# Patient Record
Sex: FEMALE | Race: WHITE | NOT HISPANIC OR LATINO
[De-identification: names, ages, dates, MRNs, and addresses within clinical notes are randomized per-mention and may not be internally consistent; named-entity substitution may affect disease eponyms.]

---

## 2019-12-20 PROBLEM — Z00.00 ENCOUNTER FOR PREVENTIVE HEALTH EXAMINATION: Status: ACTIVE | Noted: 2019-12-20

## 2019-12-23 ENCOUNTER — APPOINTMENT (OUTPATIENT)
Dept: GASTROENTEROLOGY | Facility: CLINIC | Age: 54
End: 2019-12-23
Payer: COMMERCIAL

## 2019-12-23 VITALS
DIASTOLIC BLOOD PRESSURE: 100 MMHG | SYSTOLIC BLOOD PRESSURE: 150 MMHG | RESPIRATION RATE: 16 BRPM | HEIGHT: 63 IN | BODY MASS INDEX: 39.87 KG/M2 | OXYGEN SATURATION: 98 % | HEART RATE: 57 BPM | WEIGHT: 225 LBS

## 2019-12-23 DIAGNOSIS — Z12.11 ENCOUNTER FOR SCREENING FOR MALIGNANT NEOPLASM OF COLON: ICD-10-CM

## 2019-12-23 PROCEDURE — 82272 OCCULT BLD FECES 1-3 TESTS: CPT

## 2019-12-23 PROCEDURE — 99204 OFFICE O/P NEW MOD 45 MIN: CPT

## 2020-01-27 ENCOUNTER — APPOINTMENT (OUTPATIENT)
Dept: GASTROENTEROLOGY | Facility: GI CENTER | Age: 55
End: 2020-01-27

## 2020-03-09 ENCOUNTER — APPOINTMENT (OUTPATIENT)
Dept: GASTROENTEROLOGY | Facility: GI CENTER | Age: 55
End: 2020-03-09
Payer: COMMERCIAL

## 2020-03-09 ENCOUNTER — OUTPATIENT (OUTPATIENT)
Dept: OUTPATIENT SERVICES | Facility: HOSPITAL | Age: 55
LOS: 1 days | End: 2020-03-09
Payer: COMMERCIAL

## 2020-03-09 DIAGNOSIS — Z12.11 ENCOUNTER FOR SCREENING FOR MALIGNANT NEOPLASM OF COLON: ICD-10-CM

## 2020-03-09 DIAGNOSIS — Z86.010 PERSONAL HISTORY OF COLONIC POLYPS: ICD-10-CM

## 2020-03-09 DIAGNOSIS — Z85.038 PERSONAL HISTORY OF OTHER MALIGNANT NEOPLASM OF LARGE INTESTINE: ICD-10-CM

## 2020-03-09 PROCEDURE — G0105: CPT

## 2020-03-09 PROCEDURE — 45378 DIAGNOSTIC COLONOSCOPY: CPT

## 2020-03-09 NOTE — PHYSICAL EXAM
[General Appearance - Alert] : alert [General Appearance - In No Acute Distress] : in no acute distress [General Appearance - Well Nourished] : well nourished [General Appearance - Well Developed] : well developed [Sclera] : the sclera and conjunctiva were normal [Outer Ear] : the ears and nose were normal in appearance [Neck Appearance] : the appearance of the neck was normal [Neck Cervical Mass (___cm)] : no neck mass was observed [] : no respiratory distress [Respiration, Rhythm And Depth] : normal respiratory rhythm and effort [Exaggerated Use Of Accessory Muscles For Inspiration] : no accessory muscle use [Auscultation Breath Sounds / Voice Sounds] : lungs were clear to auscultation bilaterally [Apical Impulse] : the apical impulse was normal [Heart Rate And Rhythm] : heart rate was normal and rhythm regular [Heart Sounds] : normal S1 and S2 [Bowel Sounds] : normal bowel sounds [Abdomen Soft] : soft [Abdomen Tenderness] : non-tender [Skin Color & Pigmentation] : normal skin color and pigmentation [Oriented To Time, Place, And Person] : oriented to person, place, and time [Impaired Insight] : insight and judgment were intact [Affect] : the affect was normal

## 2020-12-21 PROBLEM — Z12.11 ENCOUNTER FOR SCREENING COLONOSCOPY: Status: RESOLVED | Noted: 2019-12-23 | Resolved: 2020-12-21

## 2021-07-27 ENCOUNTER — OUTPATIENT (OUTPATIENT)
Dept: OUTPATIENT SERVICES | Facility: HOSPITAL | Age: 56
LOS: 1 days | End: 2021-07-27
Payer: COMMERCIAL

## 2021-07-27 ENCOUNTER — TRANSCRIPTION ENCOUNTER (OUTPATIENT)
Age: 56
End: 2021-07-27

## 2021-07-27 VITALS
SYSTOLIC BLOOD PRESSURE: 127 MMHG | TEMPERATURE: 98 F | HEIGHT: 63.5 IN | WEIGHT: 242.95 LBS | OXYGEN SATURATION: 96 % | HEART RATE: 66 BPM | DIASTOLIC BLOOD PRESSURE: 80 MMHG | RESPIRATION RATE: 14 BRPM

## 2021-07-27 DIAGNOSIS — M25.572 PAIN IN LEFT ANKLE AND JOINTS OF LEFT FOOT: ICD-10-CM

## 2021-07-27 DIAGNOSIS — Z90.710 ACQUIRED ABSENCE OF BOTH CERVIX AND UTERUS: Chronic | ICD-10-CM

## 2021-07-27 DIAGNOSIS — Z98.890 OTHER SPECIFIED POSTPROCEDURAL STATES: Chronic | ICD-10-CM

## 2021-07-27 DIAGNOSIS — S86.312A STRAIN OF MUSCLE(S) AND TENDON(S) OF PERONEAL MUSCLE GROUP AT LOWER LEG LEVEL, LEFT LEG, INITIAL ENCOUNTER: ICD-10-CM

## 2021-07-27 DIAGNOSIS — Z01.818 ENCOUNTER FOR OTHER PREPROCEDURAL EXAMINATION: ICD-10-CM

## 2021-07-27 LAB
ALBUMIN SERPL ELPH-MCNC: 4 G/DL — SIGNIFICANT CHANGE UP (ref 3.3–5)
ALP SERPL-CCNC: 67 U/L — SIGNIFICANT CHANGE UP (ref 30–120)
ALT FLD-CCNC: 22 U/L DA — SIGNIFICANT CHANGE UP (ref 10–60)
ANION GAP SERPL CALC-SCNC: 7 MMOL/L — SIGNIFICANT CHANGE UP (ref 5–17)
AST SERPL-CCNC: 12 U/L — SIGNIFICANT CHANGE UP (ref 10–40)
BILIRUB SERPL-MCNC: 0.8 MG/DL — SIGNIFICANT CHANGE UP (ref 0.2–1.2)
BUN SERPL-MCNC: 17 MG/DL — SIGNIFICANT CHANGE UP (ref 7–23)
CALCIUM SERPL-MCNC: 9.2 MG/DL — SIGNIFICANT CHANGE UP (ref 8.4–10.5)
CHLORIDE SERPL-SCNC: 102 MMOL/L — SIGNIFICANT CHANGE UP (ref 96–108)
CO2 SERPL-SCNC: 30 MMOL/L — SIGNIFICANT CHANGE UP (ref 22–31)
CREAT SERPL-MCNC: 0.69 MG/DL — SIGNIFICANT CHANGE UP (ref 0.5–1.3)
GLUCOSE SERPL-MCNC: 132 MG/DL — HIGH (ref 70–99)
HCT VFR BLD CALC: 41.1 % — SIGNIFICANT CHANGE UP (ref 34.5–45)
HGB BLD-MCNC: 14 G/DL — SIGNIFICANT CHANGE UP (ref 11.5–15.5)
MCHC RBC-ENTMCNC: 31.1 PG — SIGNIFICANT CHANGE UP (ref 27–34)
MCHC RBC-ENTMCNC: 34.1 GM/DL — SIGNIFICANT CHANGE UP (ref 32–36)
MCV RBC AUTO: 91.3 FL — SIGNIFICANT CHANGE UP (ref 80–100)
NRBC # BLD: 0 /100 WBCS — SIGNIFICANT CHANGE UP (ref 0–0)
PLATELET # BLD AUTO: 218 K/UL — SIGNIFICANT CHANGE UP (ref 150–400)
POTASSIUM SERPL-MCNC: 4.8 MMOL/L — SIGNIFICANT CHANGE UP (ref 3.5–5.3)
POTASSIUM SERPL-SCNC: 4.8 MMOL/L — SIGNIFICANT CHANGE UP (ref 3.5–5.3)
PROT SERPL-MCNC: 7.4 G/DL — SIGNIFICANT CHANGE UP (ref 6–8.3)
RBC # BLD: 4.5 M/UL — SIGNIFICANT CHANGE UP (ref 3.8–5.2)
RBC # FLD: 12.1 % — SIGNIFICANT CHANGE UP (ref 10.3–14.5)
SODIUM SERPL-SCNC: 139 MMOL/L — SIGNIFICANT CHANGE UP (ref 135–145)
WBC # BLD: 7.83 K/UL — SIGNIFICANT CHANGE UP (ref 3.8–10.5)
WBC # FLD AUTO: 7.83 K/UL — SIGNIFICANT CHANGE UP (ref 3.8–10.5)

## 2021-07-27 PROCEDURE — 80053 COMPREHEN METABOLIC PANEL: CPT

## 2021-07-27 PROCEDURE — 93010 ELECTROCARDIOGRAM REPORT: CPT

## 2021-07-27 PROCEDURE — 85027 COMPLETE CBC AUTOMATED: CPT

## 2021-07-27 PROCEDURE — 36415 COLL VENOUS BLD VENIPUNCTURE: CPT

## 2021-07-27 PROCEDURE — G0463: CPT

## 2021-07-27 PROCEDURE — 93005 ELECTROCARDIOGRAM TRACING: CPT

## 2021-07-27 NOTE — H&P PST ADULT - NSICDXPROBLEM_GEN_ALL_CORE_FT
PROBLEM DIAGNOSES  Problem: Left ankle pain  Assessment and Plan: left foot peroneal repair; covid appt made, preop instructions given, to go for medical clearance

## 2021-07-27 NOTE — H&P PST ADULT - NSANTHOSAYNRD_GEN_A_CORE
No. MARILOU screening performed.  STOP BANG Legend: 0-2 = LOW Risk; 3-4 = INTERMEDIATE Risk; 5-8 = HIGH Risk

## 2021-07-27 NOTE — H&P PST ADULT - HISTORY OF PRESENT ILLNESS
this is a 57 y/o female who has had left ankle pain when walking for about 2 months; tests show peroneal tendon tear, to have repair of same

## 2021-08-03 ENCOUNTER — OUTPATIENT (OUTPATIENT)
Dept: OUTPATIENT SERVICES | Facility: HOSPITAL | Age: 56
LOS: 1 days | End: 2021-08-03
Payer: COMMERCIAL

## 2021-08-03 DIAGNOSIS — Z98.890 OTHER SPECIFIED POSTPROCEDURAL STATES: Chronic | ICD-10-CM

## 2021-08-03 DIAGNOSIS — Z20.828 CONTACT WITH AND (SUSPECTED) EXPOSURE TO OTHER VIRAL COMMUNICABLE DISEASES: ICD-10-CM

## 2021-08-03 DIAGNOSIS — Z90.710 ACQUIRED ABSENCE OF BOTH CERVIX AND UTERUS: Chronic | ICD-10-CM

## 2021-08-03 LAB — SARS-COV-2 RNA SPEC QL NAA+PROBE: SIGNIFICANT CHANGE UP

## 2021-08-03 PROCEDURE — U0003: CPT

## 2021-08-03 PROCEDURE — U0005: CPT

## 2021-08-05 ENCOUNTER — TRANSCRIPTION ENCOUNTER (OUTPATIENT)
Age: 56
End: 2021-08-05

## 2021-08-06 ENCOUNTER — OUTPATIENT (OUTPATIENT)
Dept: OUTPATIENT SERVICES | Facility: HOSPITAL | Age: 56
LOS: 1 days | End: 2021-08-06
Payer: COMMERCIAL

## 2021-08-06 ENCOUNTER — RESULT REVIEW (OUTPATIENT)
Age: 56
End: 2021-08-06

## 2021-08-06 VITALS
WEIGHT: 242.95 LBS | OXYGEN SATURATION: 94 % | DIASTOLIC BLOOD PRESSURE: 62 MMHG | SYSTOLIC BLOOD PRESSURE: 152 MMHG | TEMPERATURE: 96 F | HEIGHT: 62 IN | HEART RATE: 74 BPM | RESPIRATION RATE: 18 BRPM

## 2021-08-06 VITALS
DIASTOLIC BLOOD PRESSURE: 68 MMHG | OXYGEN SATURATION: 96 % | HEART RATE: 80 BPM | SYSTOLIC BLOOD PRESSURE: 122 MMHG | TEMPERATURE: 99 F | RESPIRATION RATE: 12 BRPM

## 2021-08-06 DIAGNOSIS — Z01.818 ENCOUNTER FOR OTHER PREPROCEDURAL EXAMINATION: ICD-10-CM

## 2021-08-06 DIAGNOSIS — Z90.710 ACQUIRED ABSENCE OF BOTH CERVIX AND UTERUS: Chronic | ICD-10-CM

## 2021-08-06 DIAGNOSIS — S86.312A STRAIN OF MUSCLE(S) AND TENDON(S) OF PERONEAL MUSCLE GROUP AT LOWER LEG LEVEL, LEFT LEG, INITIAL ENCOUNTER: ICD-10-CM

## 2021-08-06 DIAGNOSIS — Z98.890 OTHER SPECIFIED POSTPROCEDURAL STATES: Chronic | ICD-10-CM

## 2021-08-06 PROCEDURE — 28200 REPAIR OF FOOT TENDON: CPT | Mod: LT

## 2021-08-06 PROCEDURE — 88304 TISSUE EXAM BY PATHOLOGIST: CPT | Mod: 26

## 2021-08-06 PROCEDURE — 97161 PT EVAL LOW COMPLEX 20 MIN: CPT

## 2021-08-06 PROCEDURE — 88304 TISSUE EXAM BY PATHOLOGIST: CPT

## 2021-08-06 PROCEDURE — 27680 RELEASE OF LOWER LEG TENDON: CPT | Mod: LT

## 2021-08-06 RX ORDER — OXYCODONE HYDROCHLORIDE 5 MG/1
5 TABLET ORAL ONCE
Refills: 0 | Status: DISCONTINUED | OUTPATIENT
Start: 2021-08-06 | End: 2021-08-09

## 2021-08-06 RX ORDER — ASPIRIN/CALCIUM CARB/MAGNESIUM 324 MG
1 TABLET ORAL
Qty: 42 | Refills: 0
Start: 2021-08-06 | End: 2021-09-16

## 2021-08-06 RX ORDER — CEFAZOLIN SODIUM 1 G
2000 VIAL (EA) INJECTION ONCE
Refills: 0 | Status: COMPLETED | OUTPATIENT
Start: 2021-08-06 | End: 2021-08-06

## 2021-08-06 RX ORDER — APREPITANT 80 MG/1
40 CAPSULE ORAL ONCE
Refills: 0 | Status: COMPLETED | OUTPATIENT
Start: 2021-08-06 | End: 2021-08-06

## 2021-08-06 RX ORDER — HYDROMORPHONE HYDROCHLORIDE 2 MG/ML
0.5 INJECTION INTRAMUSCULAR; INTRAVENOUS; SUBCUTANEOUS
Refills: 0 | Status: DISCONTINUED | OUTPATIENT
Start: 2021-08-06 | End: 2021-08-09

## 2021-08-06 RX ORDER — CHLORHEXIDINE GLUCONATE 213 G/1000ML
1 SOLUTION TOPICAL ONCE
Refills: 0 | Status: COMPLETED | OUTPATIENT
Start: 2021-08-06 | End: 2021-08-06

## 2021-08-06 RX ORDER — ONDANSETRON 8 MG/1
4 TABLET, FILM COATED ORAL ONCE
Refills: 0 | Status: DISCONTINUED | OUTPATIENT
Start: 2021-08-06 | End: 2021-08-09

## 2021-08-06 RX ORDER — SODIUM CHLORIDE 9 MG/ML
1000 INJECTION, SOLUTION INTRAVENOUS
Refills: 0 | Status: DISCONTINUED | OUTPATIENT
Start: 2021-08-06 | End: 2021-08-09

## 2021-08-06 RX ADMIN — SODIUM CHLORIDE 75 MILLILITER(S): 9 INJECTION, SOLUTION INTRAVENOUS at 11:00

## 2021-08-06 RX ADMIN — CHLORHEXIDINE GLUCONATE 1 APPLICATION(S): 213 SOLUTION TOPICAL at 06:31

## 2021-08-06 RX ADMIN — APREPITANT 40 MILLIGRAM(S): 80 CAPSULE ORAL at 06:30

## 2021-08-06 NOTE — ASU DISCHARGE PLAN (ADULT/PEDIATRIC) - CARE PROVIDER_API CALL
James Bose)  Orthopaedic Surgery  [address] [place] [address]  Phone: [phone]  Fax: [phone]  Follow Up Time:

## 2021-08-06 NOTE — PHYSICAL THERAPY INITIAL EVALUATION ADULT - RANGE OF MOTION EXAMINATION, REHAB EVAL
left ankle NT due to splint/Right LE ROM was WFL (within functional limits)/deficits as listed below

## 2021-08-06 NOTE — PHYSICAL THERAPY INITIAL EVALUATION ADULT - ADDITIONAL COMMENTS
Pt lives in a basement apt with 10 steps to apt with rail.  Pt nephew and niece lives upstairs.  Pt has a knee scooter and is going to borrow a rolling walker from her friend

## 2021-08-06 NOTE — BRIEF OPERATIVE NOTE - NSICDXBRIEFPOSTOP_GEN_ALL_CORE_FT
POST-OP DIAGNOSIS:  Peroneal tendon injury, left, initial encounter 06-Aug-2021 07:45:50  Saw Tang

## 2021-10-06 PROBLEM — E66.9 OBESITY, UNSPECIFIED: Chronic | Status: ACTIVE | Noted: 2021-07-27

## 2021-10-06 PROBLEM — U07.1 COVID-19: Chronic | Status: ACTIVE | Noted: 2021-08-06

## 2021-10-11 ENCOUNTER — OUTPATIENT (OUTPATIENT)
Dept: OUTPATIENT SERVICES | Facility: HOSPITAL | Age: 56
LOS: 1 days | End: 2021-10-11
Payer: COMMERCIAL

## 2021-10-11 VITALS
HEIGHT: 64 IN | SYSTOLIC BLOOD PRESSURE: 145 MMHG | DIASTOLIC BLOOD PRESSURE: 83 MMHG | OXYGEN SATURATION: 98 % | HEART RATE: 68 BPM | TEMPERATURE: 97 F | WEIGHT: 244.93 LBS

## 2021-10-11 DIAGNOSIS — T81.49XA INFECTION FOLLOWING A PROCEDURE, OTHER SURGICAL SITE, INITIAL ENCOUNTER: ICD-10-CM

## 2021-10-11 DIAGNOSIS — Z20.828 CONTACT WITH AND (SUSPECTED) EXPOSURE TO OTHER VIRAL COMMUNICABLE DISEASES: ICD-10-CM

## 2021-10-11 DIAGNOSIS — Z90.710 ACQUIRED ABSENCE OF BOTH CERVIX AND UTERUS: Chronic | ICD-10-CM

## 2021-10-11 DIAGNOSIS — Z01.818 ENCOUNTER FOR OTHER PREPROCEDURAL EXAMINATION: ICD-10-CM

## 2021-10-11 DIAGNOSIS — Z98.890 OTHER SPECIFIED POSTPROCEDURAL STATES: Chronic | ICD-10-CM

## 2021-10-11 LAB
ALBUMIN SERPL ELPH-MCNC: 4 G/DL — SIGNIFICANT CHANGE UP (ref 3.3–5)
ALP SERPL-CCNC: 75 U/L — SIGNIFICANT CHANGE UP (ref 30–120)
ALT FLD-CCNC: 26 U/L DA — SIGNIFICANT CHANGE UP (ref 10–60)
ANION GAP SERPL CALC-SCNC: 10 MMOL/L — SIGNIFICANT CHANGE UP (ref 5–17)
AST SERPL-CCNC: 16 U/L — SIGNIFICANT CHANGE UP (ref 10–40)
BILIRUB SERPL-MCNC: 0.5 MG/DL — SIGNIFICANT CHANGE UP (ref 0.2–1.2)
BUN SERPL-MCNC: 15 MG/DL — SIGNIFICANT CHANGE UP (ref 7–23)
CALCIUM SERPL-MCNC: 9 MG/DL — SIGNIFICANT CHANGE UP (ref 8.4–10.5)
CHLORIDE SERPL-SCNC: 103 MMOL/L — SIGNIFICANT CHANGE UP (ref 96–108)
CO2 SERPL-SCNC: 27 MMOL/L — SIGNIFICANT CHANGE UP (ref 22–31)
CREAT SERPL-MCNC: 0.84 MG/DL — SIGNIFICANT CHANGE UP (ref 0.5–1.3)
GLUCOSE SERPL-MCNC: 118 MG/DL — HIGH (ref 70–99)
HCT VFR BLD CALC: 42.8 % — SIGNIFICANT CHANGE UP (ref 34.5–45)
HGB BLD-MCNC: 14.5 G/DL — SIGNIFICANT CHANGE UP (ref 11.5–15.5)
MCHC RBC-ENTMCNC: 31 PG — SIGNIFICANT CHANGE UP (ref 27–34)
MCHC RBC-ENTMCNC: 33.9 GM/DL — SIGNIFICANT CHANGE UP (ref 32–36)
MCV RBC AUTO: 91.6 FL — SIGNIFICANT CHANGE UP (ref 80–100)
NRBC # BLD: 0 /100 WBCS — SIGNIFICANT CHANGE UP (ref 0–0)
PLATELET # BLD AUTO: 219 K/UL — SIGNIFICANT CHANGE UP (ref 150–400)
POTASSIUM SERPL-MCNC: 3.9 MMOL/L — SIGNIFICANT CHANGE UP (ref 3.5–5.3)
POTASSIUM SERPL-SCNC: 3.9 MMOL/L — SIGNIFICANT CHANGE UP (ref 3.5–5.3)
PROT SERPL-MCNC: 7.6 G/DL — SIGNIFICANT CHANGE UP (ref 6–8.3)
RBC # BLD: 4.67 M/UL — SIGNIFICANT CHANGE UP (ref 3.8–5.2)
RBC # FLD: 11.9 % — SIGNIFICANT CHANGE UP (ref 10.3–14.5)
SODIUM SERPL-SCNC: 140 MMOL/L — SIGNIFICANT CHANGE UP (ref 135–145)
WBC # BLD: 11.64 K/UL — HIGH (ref 3.8–10.5)
WBC # FLD AUTO: 11.64 K/UL — HIGH (ref 3.8–10.5)

## 2021-10-11 PROCEDURE — G0463: CPT

## 2021-10-11 PROCEDURE — 93010 ELECTROCARDIOGRAM REPORT: CPT

## 2021-10-11 PROCEDURE — 85027 COMPLETE CBC AUTOMATED: CPT

## 2021-10-11 PROCEDURE — U0003: CPT

## 2021-10-11 PROCEDURE — 93005 ELECTROCARDIOGRAM TRACING: CPT

## 2021-10-11 PROCEDURE — 36415 COLL VENOUS BLD VENIPUNCTURE: CPT

## 2021-10-11 PROCEDURE — U0005: CPT

## 2021-10-11 PROCEDURE — 80053 COMPREHEN METABOLIC PANEL: CPT

## 2021-10-11 NOTE — H&P PST ADULT - MAMMOGRAM, LAST, PROFILE
Called [phone] and left message for patient's wife to contact office to speak with clinical  2021

## 2021-10-11 NOTE — H&P PST ADULT - NSICDXPASTMEDICALHX_GEN_ALL_CORE_FT
PAST MEDICAL HISTORY:  COVID-19 december 2020 no hospitalization    Morbid obesity with BMI of 40.0-44.9, adult     Obesity     Surgical wound infection

## 2021-10-11 NOTE — H&P PST ADULT - EXTREMITIES COMMENTS
left ankle swelling; dehisced surgical wound lateral maleolus 3 cm transverse with drainage; no redness

## 2021-10-11 NOTE — H&P PST ADULT - PROBLEM SELECTOR PLAN 1
Left ankle irrigation and drainage with wound vac is planned for 10/13/2021  Covid testing today  medical clearance requested  pre op instructions reviewed; best wishes offered

## 2021-10-12 ENCOUNTER — TRANSCRIPTION ENCOUNTER (OUTPATIENT)
Age: 56
End: 2021-10-12

## 2021-10-12 LAB — SARS-COV-2 RNA SPEC QL NAA+PROBE: SIGNIFICANT CHANGE UP

## 2021-10-13 ENCOUNTER — RESULT REVIEW (OUTPATIENT)
Age: 56
End: 2021-10-13

## 2021-10-13 ENCOUNTER — INPATIENT (INPATIENT)
Facility: HOSPITAL | Age: 56
LOS: 4 days | Discharge: HOME | DRG: 908 | End: 2021-10-18
Attending: ORTHOPAEDIC SURGERY | Admitting: ORTHOPAEDIC SURGERY
Payer: COMMERCIAL

## 2021-10-13 VITALS
HEART RATE: 85 BPM | SYSTOLIC BLOOD PRESSURE: 145 MMHG | OXYGEN SATURATION: 98 % | HEIGHT: 63 IN | WEIGHT: 244.49 LBS | TEMPERATURE: 97 F | DIASTOLIC BLOOD PRESSURE: 67 MMHG | RESPIRATION RATE: 20 BRPM

## 2021-10-13 DIAGNOSIS — Z29.9 ENCOUNTER FOR PROPHYLACTIC MEASURES, UNSPECIFIED: ICD-10-CM

## 2021-10-13 DIAGNOSIS — Z98.890 OTHER SPECIFIED POSTPROCEDURAL STATES: Chronic | ICD-10-CM

## 2021-10-13 DIAGNOSIS — T81.49XA INFECTION FOLLOWING A PROCEDURE, OTHER SURGICAL SITE, INITIAL ENCOUNTER: ICD-10-CM

## 2021-10-13 DIAGNOSIS — Z90.710 ACQUIRED ABSENCE OF BOTH CERVIX AND UTERUS: Chronic | ICD-10-CM

## 2021-10-13 DIAGNOSIS — Z98.890 OTHER SPECIFIED POSTPROCEDURAL STATES: ICD-10-CM

## 2021-10-13 DIAGNOSIS — E66.01 MORBID (SEVERE) OBESITY DUE TO EXCESS CALORIES: ICD-10-CM

## 2021-10-13 PROCEDURE — 99223 1ST HOSP IP/OBS HIGH 75: CPT

## 2021-10-13 PROCEDURE — 88304 TISSUE EXAM BY PATHOLOGIST: CPT | Mod: 26

## 2021-10-13 RX ORDER — SODIUM CHLORIDE 9 MG/ML
1000 INJECTION, SOLUTION INTRAVENOUS
Refills: 0 | Status: DISCONTINUED | OUTPATIENT
Start: 2021-10-13 | End: 2021-10-13

## 2021-10-13 RX ORDER — HYDROMORPHONE HYDROCHLORIDE 2 MG/ML
0.5 INJECTION INTRAMUSCULAR; INTRAVENOUS; SUBCUTANEOUS
Refills: 0 | Status: DISCONTINUED | OUTPATIENT
Start: 2021-10-13 | End: 2021-10-13

## 2021-10-13 RX ORDER — VANCOMYCIN HCL 1 G
1750 VIAL (EA) INTRAVENOUS EVERY 12 HOURS
Refills: 0 | Status: DISCONTINUED | OUTPATIENT
Start: 2021-10-14 | End: 2021-10-14

## 2021-10-13 RX ORDER — OXYCODONE HYDROCHLORIDE 5 MG/1
5 TABLET ORAL
Refills: 0 | Status: DISCONTINUED | OUTPATIENT
Start: 2021-10-13 | End: 2021-10-18

## 2021-10-13 RX ORDER — ONDANSETRON 8 MG/1
4 TABLET, FILM COATED ORAL EVERY 6 HOURS
Refills: 0 | Status: DISCONTINUED | OUTPATIENT
Start: 2021-10-13 | End: 2021-10-18

## 2021-10-13 RX ORDER — VANCOMYCIN HCL 1 G
1750 VIAL (EA) INTRAVENOUS ONCE
Refills: 0 | Status: COMPLETED | OUTPATIENT
Start: 2021-10-13 | End: 2021-10-13

## 2021-10-13 RX ORDER — HYDROMORPHONE HYDROCHLORIDE 2 MG/ML
0.5 INJECTION INTRAMUSCULAR; INTRAVENOUS; SUBCUTANEOUS
Refills: 0 | Status: DISCONTINUED | OUTPATIENT
Start: 2021-10-13 | End: 2021-10-18

## 2021-10-13 RX ORDER — OXYCODONE HYDROCHLORIDE 5 MG/1
5 TABLET ORAL ONCE
Refills: 0 | Status: DISCONTINUED | OUTPATIENT
Start: 2021-10-13 | End: 2021-10-13

## 2021-10-13 RX ORDER — MAGNESIUM HYDROXIDE 400 MG/1
30 TABLET, CHEWABLE ORAL DAILY
Refills: 0 | Status: DISCONTINUED | OUTPATIENT
Start: 2021-10-13 | End: 2021-10-18

## 2021-10-13 RX ORDER — APREPITANT 80 MG/1
40 CAPSULE ORAL ONCE
Refills: 0 | Status: COMPLETED | OUTPATIENT
Start: 2021-10-13 | End: 2021-10-13

## 2021-10-13 RX ORDER — ACETAMINOPHEN 500 MG
1000 TABLET ORAL EVERY 8 HOURS
Refills: 0 | Status: DISCONTINUED | OUTPATIENT
Start: 2021-10-13 | End: 2021-10-18

## 2021-10-13 RX ORDER — POLYETHYLENE GLYCOL 3350 17 G/17G
17 POWDER, FOR SOLUTION ORAL AT BEDTIME
Refills: 0 | Status: DISCONTINUED | OUTPATIENT
Start: 2021-10-13 | End: 2021-10-18

## 2021-10-13 RX ORDER — SENNA PLUS 8.6 MG/1
2 TABLET ORAL AT BEDTIME
Refills: 0 | Status: DISCONTINUED | OUTPATIENT
Start: 2021-10-13 | End: 2021-10-18

## 2021-10-13 RX ORDER — OXYCODONE HYDROCHLORIDE 5 MG/1
10 TABLET ORAL
Refills: 0 | Status: DISCONTINUED | OUTPATIENT
Start: 2021-10-13 | End: 2021-10-18

## 2021-10-13 RX ORDER — ENOXAPARIN SODIUM 100 MG/ML
40 INJECTION SUBCUTANEOUS DAILY
Refills: 0 | Status: DISCONTINUED | OUTPATIENT
Start: 2021-10-14 | End: 2021-10-18

## 2021-10-13 RX ORDER — SODIUM CHLORIDE 9 MG/ML
1000 INJECTION, SOLUTION INTRAVENOUS
Refills: 0 | Status: DISCONTINUED | OUTPATIENT
Start: 2021-10-13 | End: 2021-10-14

## 2021-10-13 RX ORDER — CHLORHEXIDINE GLUCONATE 213 G/1000ML
1 SOLUTION TOPICAL ONCE
Refills: 0 | Status: COMPLETED | OUTPATIENT
Start: 2021-10-13 | End: 2021-10-13

## 2021-10-13 RX ORDER — ONDANSETRON 8 MG/1
4 TABLET, FILM COATED ORAL ONCE
Refills: 0 | Status: DISCONTINUED | OUTPATIENT
Start: 2021-10-13 | End: 2021-10-13

## 2021-10-13 RX ADMIN — HYDROMORPHONE HYDROCHLORIDE 0.5 MILLIGRAM(S): 2 INJECTION INTRAMUSCULAR; INTRAVENOUS; SUBCUTANEOUS at 18:30

## 2021-10-13 RX ADMIN — SENNA PLUS 2 TABLET(S): 8.6 TABLET ORAL at 21:53

## 2021-10-13 RX ADMIN — POLYETHYLENE GLYCOL 3350 17 GRAM(S): 17 POWDER, FOR SOLUTION ORAL at 21:53

## 2021-10-13 RX ADMIN — CHLORHEXIDINE GLUCONATE 1 APPLICATION(S): 213 SOLUTION TOPICAL at 16:44

## 2021-10-13 RX ADMIN — HYDROMORPHONE HYDROCHLORIDE 0.5 MILLIGRAM(S): 2 INJECTION INTRAMUSCULAR; INTRAVENOUS; SUBCUTANEOUS at 18:14

## 2021-10-13 RX ADMIN — APREPITANT 40 MILLIGRAM(S): 80 CAPSULE ORAL at 16:44

## 2021-10-13 NOTE — BRIEF OPERATIVE NOTE - NSICDXBRIEFPROCEDURE_GEN_ALL_CORE_FT
PROCEDURES:  Exploration, wound, foot 13-Oct-2021 18:32:16 Incisional debriedment irrigation closure and application of wound vac  left ankle Jerome Modi

## 2021-10-13 NOTE — PRE-OP CHECKLIST - AS TEMP SITE
oral
Pt lives with her .  She had heavy second hand smoke expos from childhood.  No tob/ETOH/drug use.

## 2021-10-13 NOTE — CONSULT NOTE ADULT - ASSESSMENT
57 y/o F with PMHx of obesity, COVID-19, repair of peroneal tendon of left ankle (8/6/21) presented to the hospital for infected nonhealing surgical wound.    #Wound infection of non-healing surgical wound  #S/p Peroneal Tendon Repair of Left Ankle (8/6/21)  - s/p I&D with irrigation 10/13  - continue pain control PRN  - intraoperative tissue cultures were sent - f/u results  - also reportedly had outpatient cultures taken in office - can hopefully narrow with that micro data  - placed on IV vancomycin 175mg q12 at this time  - suspect that it will be a challenge to achieve therapeutic dosing given her weight  - will need to closely monitor renal function - follow up vanco trough pre-4th dose  - recommend Infectious Disease consultation  - continue IV antibiotics pending outpatient and intraoperative cultures  - monitor hgb and electrolytes  - continue wound vac - care per surgery    #Obesity  - BMI 43.3  - MARILOU precautions    #Need for prophylactic measure  - VTE ppx: lovenox subQ 57 y/o F with PMHx of obesity, COVID-19, repair of peroneal tendon of left ankle (8/6/21) presented to the hospital for infected nonhealing surgical wound.    #Wound infection of non-healing surgical wound  #S/p Peroneal Tendon Repair of Left Ankle (8/6/21)  - s/p I&D with irrigation and wound vac 10/13  - continue pain control PRN  - intraoperative tissue cultures were sent - f/u results  - also reportedly had outpatient cultures taken in office  - placed on IV vancomycin 175mg q12 at this time  - suspect that it will be a challenge to achieve therapeutic dosing given her weight  - will need to closely monitor renal function - follow up vanco trough pre-4th dose  - can hopefully narrow down antibiotics with outpatient micro data  - recommend Infectious Disease consultation  - continue IV antibiotics pending outpatient and intraoperative cultures  - monitor hgb and electrolytes  - continue wound vac - care per surgery    #Obesity  - BMI 43.3  - MARILOU precautions    #Need for prophylactic measure  - VTE ppx: lovenox subQ 55 y/o F with PMHx of obesity, COVID-19, repair of peroneal tendon of left ankle (8/6/21) presented to the hospital for infected nonhealing surgical wound.    #Wound infection of non-healing surgical wound  #S/p Peroneal Tendon Repair of Left Ankle (8/6/21)  - s/p I&D with irrigation and wound vac 10/13  - continue pain control PRN  - intraoperative tissue cultures were sent - f/u results  - also reportedly had outpatient cultures taken in office  - placed on IV vancomycin 1750mg q12 at this time  - suspect that it will be a challenge to achieve therapeutic dosing given her weight  - will need to closely monitor renal function - follow up vanco trough pre-4th dose  - can hopefully narrow down antibiotics with outpatient micro data  - recommend Infectious Disease consultation  - continue IV antibiotics pending outpatient and intraoperative cultures  - monitor hgb and electrolytes  - continue wound vac - care per surgery    #Obesity  - BMI 43.3  - MARILOU precautions    #Need for prophylactic measure  - VTE ppx: lovenox subQ

## 2021-10-14 LAB
A1C WITH ESTIMATED AVERAGE GLUCOSE RESULT: 5.8 % — HIGH (ref 4–5.6)
ANION GAP SERPL CALC-SCNC: 6 MMOL/L — SIGNIFICANT CHANGE UP (ref 5–17)
BUN SERPL-MCNC: 11 MG/DL — SIGNIFICANT CHANGE UP (ref 7–23)
CALCIUM SERPL-MCNC: 8.8 MG/DL — SIGNIFICANT CHANGE UP (ref 8.4–10.5)
CHLORIDE SERPL-SCNC: 101 MMOL/L — SIGNIFICANT CHANGE UP (ref 96–108)
CO2 SERPL-SCNC: 29 MMOL/L — SIGNIFICANT CHANGE UP (ref 22–31)
COVID-19 SPIKE DOMAIN AB INTERP: POSITIVE
COVID-19 SPIKE DOMAIN ANTIBODY RESULT: 85 U/ML — HIGH
CREAT SERPL-MCNC: 0.68 MG/DL — SIGNIFICANT CHANGE UP (ref 0.5–1.3)
CRP SERPL-MCNC: 8 MG/L — HIGH
ERYTHROCYTE [SEDIMENTATION RATE] IN BLOOD: 8 MM/HR — SIGNIFICANT CHANGE UP (ref 0–20)
ESTIMATED AVERAGE GLUCOSE: 120 MG/DL — HIGH (ref 68–114)
GLUCOSE SERPL-MCNC: 167 MG/DL — HIGH (ref 70–99)
GRAM STN FLD: SIGNIFICANT CHANGE UP
HCT VFR BLD CALC: 40.8 % — SIGNIFICANT CHANGE UP (ref 34.5–45)
HGB BLD-MCNC: 13.8 G/DL — SIGNIFICANT CHANGE UP (ref 11.5–15.5)
MCHC RBC-ENTMCNC: 30.9 PG — SIGNIFICANT CHANGE UP (ref 27–34)
MCHC RBC-ENTMCNC: 33.8 GM/DL — SIGNIFICANT CHANGE UP (ref 32–36)
MCV RBC AUTO: 91.3 FL — SIGNIFICANT CHANGE UP (ref 80–100)
NRBC # BLD: 0 /100 WBCS — SIGNIFICANT CHANGE UP (ref 0–0)
PLATELET # BLD AUTO: 222 K/UL — SIGNIFICANT CHANGE UP (ref 150–400)
POTASSIUM SERPL-MCNC: 4 MMOL/L — SIGNIFICANT CHANGE UP (ref 3.5–5.3)
POTASSIUM SERPL-SCNC: 4 MMOL/L — SIGNIFICANT CHANGE UP (ref 3.5–5.3)
RBC # BLD: 4.47 M/UL — SIGNIFICANT CHANGE UP (ref 3.8–5.2)
RBC # FLD: 11.6 % — SIGNIFICANT CHANGE UP (ref 10.3–14.5)
SARS-COV-2 IGG+IGM SERPL QL IA: 85 U/ML — HIGH
SARS-COV-2 IGG+IGM SERPL QL IA: POSITIVE
SODIUM SERPL-SCNC: 136 MMOL/L — SIGNIFICANT CHANGE UP (ref 135–145)
SPECIMEN SOURCE: SIGNIFICANT CHANGE UP
WBC # BLD: 12.59 K/UL — HIGH (ref 3.8–10.5)
WBC # FLD AUTO: 12.59 K/UL — HIGH (ref 3.8–10.5)

## 2021-10-14 PROCEDURE — 99233 SBSQ HOSP IP/OBS HIGH 50: CPT

## 2021-10-14 RX ORDER — LINEZOLID 600 MG/300ML
600 INJECTION, SOLUTION INTRAVENOUS EVERY 12 HOURS
Refills: 0 | Status: DISCONTINUED | OUTPATIENT
Start: 2021-10-14 | End: 2021-10-18

## 2021-10-14 RX ADMIN — OXYCODONE HYDROCHLORIDE 5 MILLIGRAM(S): 5 TABLET ORAL at 08:39

## 2021-10-14 RX ADMIN — ENOXAPARIN SODIUM 40 MILLIGRAM(S): 100 INJECTION SUBCUTANEOUS at 12:30

## 2021-10-14 RX ADMIN — SODIUM CHLORIDE 100 MILLILITER(S): 9 INJECTION, SOLUTION INTRAVENOUS at 08:39

## 2021-10-14 RX ADMIN — LINEZOLID 300 MILLIGRAM(S): 600 INJECTION, SOLUTION INTRAVENOUS at 17:24

## 2021-10-14 RX ADMIN — Medication 250 MILLIGRAM(S): at 05:54

## 2021-10-14 RX ADMIN — OXYCODONE HYDROCHLORIDE 10 MILLIGRAM(S): 5 TABLET ORAL at 22:30

## 2021-10-14 RX ADMIN — SENNA PLUS 2 TABLET(S): 8.6 TABLET ORAL at 21:33

## 2021-10-14 RX ADMIN — SODIUM CHLORIDE 100 MILLILITER(S): 9 INJECTION, SOLUTION INTRAVENOUS at 05:54

## 2021-10-14 RX ADMIN — POLYETHYLENE GLYCOL 3350 17 GRAM(S): 17 POWDER, FOR SOLUTION ORAL at 21:33

## 2021-10-14 RX ADMIN — OXYCODONE HYDROCHLORIDE 10 MILLIGRAM(S): 5 TABLET ORAL at 21:32

## 2021-10-14 RX ADMIN — OXYCODONE HYDROCHLORIDE 5 MILLIGRAM(S): 5 TABLET ORAL at 09:20

## 2021-10-14 NOTE — DIETITIAN INITIAL EVALUATION ADULT. - PERTINENT MEDS FT
MEDICATIONS  (STANDING):  enoxaparin Injectable 40 milliGRAM(s) SubCutaneous daily  lactated ringers. 1000 milliLiter(s) (100 mL/Hr) IV Continuous <Continuous>  linezolid  IVPB 600 milliGRAM(s) IV Intermittent every 12 hours  polyethylene glycol 3350 17 Gram(s) Oral at bedtime  senna 2 Tablet(s) Oral at bedtime    MEDICATIONS  (PRN):  acetaminophen   Tablet .. 1000 milliGRAM(s) Oral every 8 hours PRN temp greater or equal to 38.5C (101.3F) and or Mild Pain (1-3)  HYDROmorphone  Injectable 0.5 milliGRAM(s) IV Push every 3 hours PRN breakthrough pain  magnesium hydroxide Suspension 30 milliLiter(s) Oral daily PRN Constipation  ondansetron Injectable 4 milliGRAM(s) IV Push every 6 hours PRN Nausea and/or Vomiting  oxyCODONE    IR 5 milliGRAM(s) Oral every 3 hours PRN Moderate Pain (4 - 6)  oxyCODONE    IR 10 milliGRAM(s) Oral every 3 hours PRN Severe Pain (7 - 10)

## 2021-10-14 NOTE — PHYSICAL THERAPY INITIAL EVALUATION ADULT - ACTIVE RANGE OF MOTION EXAMINATION, REHAB EVAL
left ankle immobilized/bernardino. upper extremity Active ROM was WNL (within normal limits)/RLE Active ROM was WNL (within normal limits)/deficits as listed below

## 2021-10-14 NOTE — OCCUPATIONAL THERAPY INITIAL EVALUATION ADULT - PERTINENT HX OF CURRENT PROBLEM, REHAB EVAL
rerepair of peroneal tendon of left ankle (8/6/21) presented to the hospital for I&D of nonhealing surgical wound.

## 2021-10-14 NOTE — PHARMACOTHERAPY INTERVENTION NOTE - COMMENTS
Antimicrobial Stewardship Program  ASP: restricted antibiotic   CrCl (ABW) = 110ml/min  Linezolid 600mg IVPB q12h  ID physician on case: Dr. Mandy Cueto

## 2021-10-14 NOTE — PHYSICAL THERAPY INITIAL EVALUATION ADULT - ADDITIONAL COMMENTS
Pt lives in basement apartment, 10 steps to enter. Negotiates stairs by bumping up/down on buttocks.Has stall shower. scooter and standard walker. Nephew can help at home

## 2021-10-14 NOTE — OCCUPATIONAL THERAPY INITIAL EVALUATION ADULT - ADDITIONAL COMMENTS
Pt lives in a basement apartment, her niece and nephew live upstairs and have assisting as needed.  10 steps to enter apartment, pt has been bumping up/down her stairs on her butt. +stall shower +chair  Pt has been using a knee scooter PTA

## 2021-10-14 NOTE — DIETITIAN INITIAL EVALUATION ADULT. - OTHER INFO
Per H&P, pt is a 57 y/o F with PMHx of obesity, COVID-19, repair of peroneal tendon of left ankle (8/6/21) complicated by wound dehisence with infection ( grew CNS ) admitted for I&D sp OR pod 1.  Had been placed on different antibiotics and santyl ointment for wound care Denies fever chills n/v/d CP SOB abd pain. Denies trauma. No recent travel or sick contracts, Have not yet been vaccinated against COVID 19.    Pt seen for nutrition assessment secondary to nonhealing surgical wound. Pt visited while resting in bed, daughter at bedside. Pt reports good appetite. PO intake 100% per RN documentation. Pt feeds self, reports no chewing/swallowing difficulties w/ current diet. No food allergies. Denies N/V/D/C. + BM yesterday morning (10/13) per pt report. #. CBW on admission 244#, pt reports no recent wt changes. (+1) edema in L foot, ankle, and leg noted. Nonhealing surgical wound on L ankle. Pt states that she typically limits carbohydrate intake, meals usually consist of protein source and vegetables. Discussed w/ pt the importance of protein intake for wound healing. Pt interested in trying Armando protein supplement for additional protein (80kcal, 14g pro/packet). Pt presently on regular diet, will add Armando daily to diet order. Recommend MVI and vitamin C supplementation as well to facilitate wound healing. RD will continue to follow-up and monitor pt's nutritional status.

## 2021-10-14 NOTE — OCCUPATIONAL THERAPY INITIAL EVALUATION ADULT - PATIENT/FAMILY/SIGNIFICANT OTHER GOALS STATEMENT, OT EVAL
to go home Alert and Oriented x 3   Lung CTAB  Heart RRR  Abdomen, gravid  soft and nontender  No Edema      accels, moderate, no decels   Contractions 3-7 mins   CAT 1    Sono: per ATU sono   BPP 8/8  presentation vertex   SILVIO- 10.79  placenta- fundal/anterior     Vaginal exam- 1.5/70/-2    Vital Signs Last 24 Hrs  T(C): 36.8 (03 May 2021 11:55), Max: 36.8 (03 May 2021 11:51)  T(F): 98.24 (03 May 2021 11:55), Max: 98.24 (03 May 2021 11:55)  HR: 80 (03 May 2021 12:27) (80 - 88)  BP: 110/65 (03 May 2021 12:27) (103/66 - 110/65)  RR: 18 (03 May 2021 11:51) (18 - 18)

## 2021-10-14 NOTE — PROGRESS NOTE ADULT - ASSESSMENT
57 y/o F with PMHx of obesity, COVID-19, repair of peroneal tendon of left ankle (8/6/21) presented to the hospital for infected nonhealing surgical wound.    #Wound infection of non-healing surgical wound  #S/p Peroneal Tendon Repair of Left Ankle (8/6/21)  - s/p I&D with irrigation and wound vac 10/13  - continue pain control PRN  - intraoperative tissue cultures were sent - f/u results  - also reportedly had outpatient cultures taken in office  - placed on IV vancomycin 1750mg q12 at this time  - suspect that it will be a challenge to achieve therapeutic dosing given her weight  - will need to closely monitor renal function - follow up vanco trough pre-4th dose  - can hopefully narrow down antibiotics with outpatient micro data  - ID .  - continue IV antibiotics pending outpatient and intraoperative cultures  - monitor hgb and electrolytes  - continue wound vac - care per surgery  -f/u Vanco trough.    #Obesity  - BMI 43.3  - MARILOU precautions    #Need for prophylactic measure  - VTE ppx: lovenox subQ    Plan of care was discuss with patient, allquestions were answered, seems understand and in agreement.

## 2021-10-14 NOTE — CONSULT NOTE ADULT - SUBJECTIVE AND OBJECTIVE BOX
HPI:  57 y/o F with PMHx of obesity, COVID-19, repair of peroneal tendon of left ankle (8/6/21) complicated by wound dehisence with infection ( grew CNS ) admitted for I&D sp OR pod 1.  Had been placed on different antibiotics and santyl ointment for wound care Denies fever chills n/v/d CP SOB abd pain. Denies trauma. No recent travel or sick conctacts, Have not yet been vaccinated against COVID 19.    Infectious Disease consult was called to evaluate pt and for antibiotic management.    Past Medical & Surgical Hx:  PAST MEDICAL & SURGICAL HISTORY:  Obesity  COVID-19 december 2020 no hospitalization  Morbid obesity with BMI of 40.0-44.9, adult  Surgical wound infection  S/P tendon repair  S/P hysterectomy  2008  S/P left knee surgery  2019      Social History--  EtOH: denies   Tobacco: denies   Drug Use: denies     FAMILY HISTORY:  FH: heart disease (Father)      Allergies  No Known Allergies    Intolerances  NONE      Home Medications:      Current Inpatient Medications :    ANTIBIOTICS:   vancomycin  IVPB 1750 milliGRAM(s) IV Intermittent every 12 hours      OTHER RELEVANT MEDICATIONS :  acetaminophen   Tablet .. 1000 milliGRAM(s) Oral every 8 hours PRN  enoxaparin Injectable 40 milliGRAM(s) SubCutaneous daily  HYDROmorphone  Injectable 0.5 milliGRAM(s) IV Push every 3 hours PRN  lactated ringers. 1000 milliLiter(s) IV Continuous <Continuous>  magnesium hydroxide Suspension 30 milliLiter(s) Oral daily PRN  ondansetron Injectable 4 milliGRAM(s) IV Push every 6 hours PRN  oxyCODONE    IR 5 milliGRAM(s) Oral every 3 hours PRN  oxyCODONE    IR 10 milliGRAM(s) Oral every 3 hours PRN  polyethylene glycol 3350 17 Gram(s) Oral at bedtime  senna 2 Tablet(s) Oral at bedtime        ROS:  CONSTITUTIONAL:  Negative fever or chills  EYES:  Negative  blurry vision or double vision  CARDIOVASCULAR:  Negative for chest pain or palpitations  RESPIRATORY:  Negative for cough, wheezing, or SOB   GASTROINTESTINAL:  Negative for nausea, vomiting, diarrhea, constipation, or abdominal pain  GENITOURINARY:  Negative frequency, urgency , dysuria or hematuria   NEUROLOGIC:  No headache, confusion, dizziness, lightheadedness  All other systems were reviewed and are negative        I&O's Detail    13 Oct 2021 07:01  -  14 Oct 2021 07:00  --------------------------------------------------------  IN:    Lactated Ringers: 1000 mL    Oral Fluid: 200 mL  Total IN: 1200 mL    OUT:    Blood Loss (mL): 5 mL    Voided (mL): 1100 mL  Total OUT: 1105 mL    Total NET: 95 mL          Physical Exam:  Vital Signs Last 24 Hrs  T(C): 36.5 (14 Oct 2021 10:30), Max: 36.8 (13 Oct 2021 22:01)  T(F): 97.7 (14 Oct 2021 10:30), Max: 98.3 (13 Oct 2021 22:01)  HR: 63 (14 Oct 2021 10:30) (54 - 93)  BP: 107/67 (14 Oct 2021 10:30) (107/67 - 174/97)  RR: 17 (14 Oct 2021 10:30) (12 - 20)  SpO2: 93% (14 Oct 2021 10:30) (93% - 100%)  Height (cm): 160 (10-13 @ 15:18)  Weight (kg): 110.9 (10-13 @ 15:18)  BMI (kg/m2): 43.3 (10-13 @ 15:18)  BSA (m2): 2.11 (10-13 @ 15:18)    General: well developed well nourished, in no acute distress  Neck: supple, trachea midline  Lungs: clear, no wheeze/rhonchi  Cardiovascular: regular rate and rhythm, S1 S2  Abdomen: soft, nontender, ND, bowel sounds normal  Neurological:  alert and oriented x3  Skin: no rash  Extremities: Left Foot/leg drsg c/d/i + VAC    Labs:                         13.8   12.59 )-----------( 222      ( 14 Oct 2021 06:48 )             40.8   10-14    136  |  101  |  11  ----------------------------<  167<H>  4.0   |  29  |  0.68    Ca    8.8      14 Oct 2021 06:48        RECENT CULTURES:    Culture - Tissue with Gram Stain (collected 13 Oct 2021 22:30)  Source: .Tissue Other  Gram Stain (14 Oct 2021 02:41):    Moderate polymorphonuclear leukocytes seen per low power field    No organisms seen per oil power field      RADIOLOGY & ADDITIONAL STUDIES:    Assessment :   57 y/o F with PMHx of obesity, COVID-19, repair of peroneal tendon of left ankle (8/6/21) complicated by wound dehisence with infection ( grew CNS ) admitted for I&D sp OR and VC placement pod 1.       Plan :   Change Vancomycin to Zyvox  Trend temps and cbc  Fu OR cultures  Activity per ortho    Continue with present regiment .  Approptiate use of antibiotics and adverse effects reviewed.      I have discussed the above plan of care with patient in detail. She expressed understanding of the treatment plan . Risks, benefits and alternatives discussed in detail. I have asked if she has any questions or concerns and appropriately addressed them to the best of my ability .      > 45 minutes spent in direct patient care reviewing  the notes, lab data/ imaging , discussion with multidisciplinary team. All questions were addressed and answered to the best of my capacity .    Thank you for allowing me to participate in the care of your patient .      Mandy Cueto MD  Infectious Disease  [phone]
Patient is a 56y old  Female who presents with a chief complaint of surgical wound infection    HPI: 57 y/o F with PMHx of obesity, COVID-19, repair of peroneal tendon of left ankle (8/6/21) presented to the hospital for I&D of nonhealing surgical wound. States that after her surgery in August, she has been following with Dr. Bose for non healing wound. Has been placed on different antibiotics and santyl ointment for wound care. She states she had cultures taken with Dr. Bose in the office and it determined that there was an infection. She feels well at this time. Denies history of MARILOU. Had pain in left ankle and was given pain medication. States pain is currently 0/10, but that it comes in a pulsatile fashion and not always present. Denies chest pain, palpitations, dyspnea, headache, dizziness, abdominal pain, n/v/d.      I&O's Summary    13 Oct 2021 07:01  -  13 Oct 2021 21:43  --------------------------------------------------------  IN: 1200 mL / OUT: 5 mL / NET: 1195 mL        PAST MEDICAL & SURGICAL HISTORY:  Obesity    COVID-19  december 2020 no hospitalization    Morbid obesity with BMI of 40.0-44.9, adult    Surgical wound infection    S/P tendon repair    S/P hysterectomy  2008    S/P left knee surgery  2019        Allergies    No Known Allergies    Intolerances        SOCIAL HISTORY  Alcohol: social  Tobacco: denies  Illicit substance use: denies    FAMILY HISTORY:  FH: heart disease (Father)        Home Medications:        LABS:    10/11/21 labs reviewed, wnl. WBC 11.64, Glucose 116.          CAPILLARY BLOOD GLUCOSE                MEDICATIONS  (STANDING):  lactated ringers. 1000 milliLiter(s) (100 mL/Hr) IV Continuous <Continuous>  polyethylene glycol 3350 17 Gram(s) Oral at bedtime  senna 2 Tablet(s) Oral at bedtime    MEDICATIONS  (PRN):  acetaminophen   Tablet .. 1000 milliGRAM(s) Oral every 8 hours PRN temp greater or equal to 38.5C (101.3F) and or Mild Pain (1-3)  HYDROmorphone  Injectable 0.5 milliGRAM(s) IV Push every 3 hours PRN breakthrough pain  magnesium hydroxide Suspension 30 milliLiter(s) Oral daily PRN Constipation  ondansetron Injectable 4 milliGRAM(s) IV Push every 6 hours PRN Nausea and/or Vomiting  oxyCODONE    IR 5 milliGRAM(s) Oral every 3 hours PRN Moderate Pain (4 - 6)  oxyCODONE    IR 10 milliGRAM(s) Oral every 3 hours PRN Severe Pain (7 - 10)      REVIEW OF SYSTEMS:  CONSTITUTIONAL: denies fever, chills, fatigue, weakness  HEENT: denies blurred vision, sore throat  SKIN: denies new lesions, rash  CARDIOVASCULAR: denies chest pain, chest pressure, palpitations  RESPIRATORY: denies shortness of breath, sputum production  GASTROINTESTINAL: denies nausea, vomiting, diarrhea, abdominal pain  GENITOURINARY: denies dysuria, discharge  NEUROLOGICAL: denies numbness, headache, focal weakness  MUSCULOSKELETAL: denies new joint pain, muscle aches  HEMATOLOGIC: denies gross bleeding, bruising  LYMPHATICS: denies enlarged lymph nodes, extremity swelling  PSYCHIATRIC: denies recent changes in anxiety, depression  ENDOCRINOLOGIC: denies sweating, cold or heat intolerance    RADIOLOGY & ADDITIONAL TESTS:    EKG: NSR at 92bpm, nonspecific T wave abn    Imaging Personally Reviewed:  [x] YES  [ ] NO    Consultant(s) Notes Reviewed:  [x] YES  [ ] NO      PHYSICAL EXAM:  T(F): 98.1 (10-13-21 @ 20:15), Max: 98.1 (10-13-21 @ 20:15)  HR: 82 (10-13-21 @ 20:15) (68 - 93)  BP: 129/65 (10-13-21 @ 20:15) (111/58 - 174/97)  RR: 16 (10-13-21 @ 20:15) (12 - 20)  SpO2: 98% (10-13-21 @ 20:15) (96% - 100%)    GENERAL: NAD, well-groomed, well-developed  HEAD:  Atraumatic, Normocephalic  EYES: EOMI, PERRL, conjunctiva and sclera clear  ENMT: No tonsillar erythema, exudates, or enlargement; Moist mucous membranes  NECK: Supple, No JVD, Normal thyroid  NERVOUS SYSTEM:  Alert & Oriented X3, Moves all extremities, Sensation to light touch intact  CHEST/LUNG: Clear to auscultation bilaterally; No wheezes, rales or rhonchi  HEART: RRR, S1, S2; No murmurs, rubs, or gallops  ABDOMEN: Soft, Nontender, Nondistended; Bowel sounds present  EXTREMITIES:  2+ Peripheral Pulses, No clubbing, cyanosis, or edema  LYMPH: No lymphadenopathy noted  SKIN: No rashes or lesions; s/p left ankle surgery dressing in place, c/d/i    Care Discussed with Consultants/Other Providers [x] YES  [ ] NO

## 2021-10-15 LAB
ANION GAP SERPL CALC-SCNC: 8 MMOL/L — SIGNIFICANT CHANGE UP (ref 5–17)
BUN SERPL-MCNC: 18 MG/DL — SIGNIFICANT CHANGE UP (ref 7–23)
CALCIUM SERPL-MCNC: 8.6 MG/DL — SIGNIFICANT CHANGE UP (ref 8.4–10.5)
CHLORIDE SERPL-SCNC: 102 MMOL/L — SIGNIFICANT CHANGE UP (ref 96–108)
CO2 SERPL-SCNC: 29 MMOL/L — SIGNIFICANT CHANGE UP (ref 22–31)
CREAT SERPL-MCNC: 0.73 MG/DL — SIGNIFICANT CHANGE UP (ref 0.5–1.3)
GLUCOSE SERPL-MCNC: 116 MG/DL — HIGH (ref 70–99)
HCT VFR BLD CALC: 37.1 % — SIGNIFICANT CHANGE UP (ref 34.5–45)
HGB BLD-MCNC: 12.4 G/DL — SIGNIFICANT CHANGE UP (ref 11.5–15.5)
MCHC RBC-ENTMCNC: 30.9 PG — SIGNIFICANT CHANGE UP (ref 27–34)
MCHC RBC-ENTMCNC: 33.4 GM/DL — SIGNIFICANT CHANGE UP (ref 32–36)
MCV RBC AUTO: 92.5 FL — SIGNIFICANT CHANGE UP (ref 80–100)
NRBC # BLD: 0 /100 WBCS — SIGNIFICANT CHANGE UP (ref 0–0)
PLATELET # BLD AUTO: 201 K/UL — SIGNIFICANT CHANGE UP (ref 150–400)
POTASSIUM SERPL-MCNC: 3.5 MMOL/L — SIGNIFICANT CHANGE UP (ref 3.5–5.3)
POTASSIUM SERPL-SCNC: 3.5 MMOL/L — SIGNIFICANT CHANGE UP (ref 3.5–5.3)
RBC # BLD: 4.01 M/UL — SIGNIFICANT CHANGE UP (ref 3.8–5.2)
RBC # FLD: 11.8 % — SIGNIFICANT CHANGE UP (ref 10.3–14.5)
SODIUM SERPL-SCNC: 139 MMOL/L — SIGNIFICANT CHANGE UP (ref 135–145)
WBC # BLD: 12.22 K/UL — HIGH (ref 3.8–10.5)
WBC # FLD AUTO: 12.22 K/UL — HIGH (ref 3.8–10.5)

## 2021-10-15 PROCEDURE — 99233 SBSQ HOSP IP/OBS HIGH 50: CPT

## 2021-10-15 RX ADMIN — SENNA PLUS 2 TABLET(S): 8.6 TABLET ORAL at 21:09

## 2021-10-15 RX ADMIN — POLYETHYLENE GLYCOL 3350 17 GRAM(S): 17 POWDER, FOR SOLUTION ORAL at 21:09

## 2021-10-15 RX ADMIN — OXYCODONE HYDROCHLORIDE 10 MILLIGRAM(S): 5 TABLET ORAL at 22:31

## 2021-10-15 RX ADMIN — ENOXAPARIN SODIUM 40 MILLIGRAM(S): 100 INJECTION SUBCUTANEOUS at 11:08

## 2021-10-15 RX ADMIN — LINEZOLID 300 MILLIGRAM(S): 600 INJECTION, SOLUTION INTRAVENOUS at 06:24

## 2021-10-15 RX ADMIN — LINEZOLID 300 MILLIGRAM(S): 600 INJECTION, SOLUTION INTRAVENOUS at 17:33

## 2021-10-15 RX ADMIN — OXYCODONE HYDROCHLORIDE 10 MILLIGRAM(S): 5 TABLET ORAL at 23:28

## 2021-10-16 LAB
-  AMPICILLIN/SULBACTAM: SIGNIFICANT CHANGE UP
-  CEFAZOLIN: SIGNIFICANT CHANGE UP
-  CLINDAMYCIN: SIGNIFICANT CHANGE UP
-  ERYTHROMYCIN: SIGNIFICANT CHANGE UP
-  GENTAMICIN: SIGNIFICANT CHANGE UP
-  OXACILLIN: SIGNIFICANT CHANGE UP
-  PENICILLIN: SIGNIFICANT CHANGE UP
-  RIFAMPIN: SIGNIFICANT CHANGE UP
-  TETRACYCLINE: SIGNIFICANT CHANGE UP
-  TRIMETHOPRIM/SULFAMETHOXAZOLE: SIGNIFICANT CHANGE UP
-  VANCOMYCIN: SIGNIFICANT CHANGE UP
ANION GAP SERPL CALC-SCNC: 4 MMOL/L — LOW (ref 5–17)
BUN SERPL-MCNC: 15 MG/DL — SIGNIFICANT CHANGE UP (ref 7–23)
CALCIUM SERPL-MCNC: 8.6 MG/DL — SIGNIFICANT CHANGE UP (ref 8.4–10.5)
CHLORIDE SERPL-SCNC: 104 MMOL/L — SIGNIFICANT CHANGE UP (ref 96–108)
CO2 SERPL-SCNC: 33 MMOL/L — HIGH (ref 22–31)
CREAT SERPL-MCNC: 0.84 MG/DL — SIGNIFICANT CHANGE UP (ref 0.5–1.3)
GLUCOSE SERPL-MCNC: 124 MG/DL — HIGH (ref 70–99)
HCT VFR BLD CALC: 35.8 % — SIGNIFICANT CHANGE UP (ref 34.5–45)
HGB BLD-MCNC: 11.9 G/DL — SIGNIFICANT CHANGE UP (ref 11.5–15.5)
MCHC RBC-ENTMCNC: 31.1 PG — SIGNIFICANT CHANGE UP (ref 27–34)
MCHC RBC-ENTMCNC: 33.2 GM/DL — SIGNIFICANT CHANGE UP (ref 32–36)
MCV RBC AUTO: 93.5 FL — SIGNIFICANT CHANGE UP (ref 80–100)
METHOD TYPE: SIGNIFICANT CHANGE UP
NRBC # BLD: 0 /100 WBCS — SIGNIFICANT CHANGE UP (ref 0–0)
PLATELET # BLD AUTO: 172 K/UL — SIGNIFICANT CHANGE UP (ref 150–400)
POTASSIUM SERPL-MCNC: 3.7 MMOL/L — SIGNIFICANT CHANGE UP (ref 3.5–5.3)
POTASSIUM SERPL-SCNC: 3.7 MMOL/L — SIGNIFICANT CHANGE UP (ref 3.5–5.3)
RBC # BLD: 3.83 M/UL — SIGNIFICANT CHANGE UP (ref 3.8–5.2)
RBC # FLD: 11.9 % — SIGNIFICANT CHANGE UP (ref 10.3–14.5)
SODIUM SERPL-SCNC: 141 MMOL/L — SIGNIFICANT CHANGE UP (ref 135–145)
WBC # BLD: 9.46 K/UL — SIGNIFICANT CHANGE UP (ref 3.8–10.5)
WBC # FLD AUTO: 9.46 K/UL — SIGNIFICANT CHANGE UP (ref 3.8–10.5)

## 2021-10-16 PROCEDURE — 99233 SBSQ HOSP IP/OBS HIGH 50: CPT

## 2021-10-16 RX ADMIN — LINEZOLID 300 MILLIGRAM(S): 600 INJECTION, SOLUTION INTRAVENOUS at 05:47

## 2021-10-16 RX ADMIN — ENOXAPARIN SODIUM 40 MILLIGRAM(S): 100 INJECTION SUBCUTANEOUS at 11:38

## 2021-10-16 RX ADMIN — LINEZOLID 300 MILLIGRAM(S): 600 INJECTION, SOLUTION INTRAVENOUS at 17:40

## 2021-10-17 PROCEDURE — 99232 SBSQ HOSP IP/OBS MODERATE 35: CPT

## 2021-10-17 RX ADMIN — LINEZOLID 300 MILLIGRAM(S): 600 INJECTION, SOLUTION INTRAVENOUS at 06:01

## 2021-10-17 RX ADMIN — ENOXAPARIN SODIUM 40 MILLIGRAM(S): 100 INJECTION SUBCUTANEOUS at 11:28

## 2021-10-17 RX ADMIN — LINEZOLID 300 MILLIGRAM(S): 600 INJECTION, SOLUTION INTRAVENOUS at 17:44

## 2021-10-17 NOTE — PROGRESS NOTE ADULT - TIME BILLING
Hospitalist.  Case d/w RN/ortho PA.

## 2021-10-17 NOTE — PROGRESS NOTE ADULT - REASON FOR ADMISSION
I+D left ankle
S/P I&D Left Ankle
wound infection of nonhealing surgical wound

## 2021-10-17 NOTE — PROGRESS NOTE ADULT - ASSESSMENT
55 y/o F with PMHx of obesity, COVID-19, repair of peroneal tendon of left ankle (8/6/21) presented to the hospital for infected nonhealing surgical wound.    #Wound infection of non-healing surgical wound  #S/p Peroneal Tendon Repair of Left Ankle (8/6/21)  - s/p I&D with irrigation and wound vac 10/13  - continue pain control PRN  - intraoperative tissue cultures were sent - f/u results  - also reportedly had outpatient cultures taken in office  - placed on IV vancomycin 1750mg q12 at this time  - suspect that it will be a challenge to achieve therapeutic dosing given her weight  - will need to closely monitor renal function - follow up vanco trough pre-4th dose  - can hopefully narrow down antibiotics with outpatient micro data  - ID .  - continue IV antibiotics pending outpatient and intraoperative cultures  - monitor hgb and electrolytes  - continue wound vac - care per surgery  -f/u Vanco trough.    #Obesity  - BMI 43.3  - MARILOU precautions    #Need for prophylactic measure  - VTE ppx: lovenox subQ    Plan of care was discuss with patient, allquestions were answered, seems understand and in agreement.   55 y/o F with PMHx of obesity, COVID-19, repair of peroneal tendon of left ankle (8/6/21) presented to the hospital for infected nonhealing surgical wound.    #Wound infection of non-healing surgical wound  #S/p Peroneal Tendon Repair of Left Ankle (8/6/21)  - s/p I&D with irrigation and wound vac 10/13  - continue pain control PRN  - intraoperative tissue cultures were sent - f/u results  - also reportedly had outpatient cultures taken in office  - placed on IV vancomycin 1750mg q12 at this time  - suspect that it will be a challenge to achieve therapeutic dosing given her weight  - will need to closely monitor renal function - follow up vanco trough pre-4th dose  - can hopefully narrow down antibiotics with outpatient micro data  - ID .  - continue IV antibiotics pending outpatient and intraoperative cultures  - monitor hgb and electrolytes  - continue wound vac - care per surgery      #Obesity  - BMI 43.3  - MARILOU precautions    #Need for prophylactic measure  - VTE ppx: lovenox subQ    Plan of care was discuss with patient, allquestions were answered, seems understand and in agreement.

## 2021-10-18 ENCOUNTER — TRANSCRIPTION ENCOUNTER (OUTPATIENT)
Age: 56
End: 2021-10-18

## 2021-10-18 VITALS
SYSTOLIC BLOOD PRESSURE: 110 MMHG | DIASTOLIC BLOOD PRESSURE: 75 MMHG | TEMPERATURE: 98 F | OXYGEN SATURATION: 95 % | HEART RATE: 80 BPM | RESPIRATION RATE: 18 BRPM

## 2021-10-18 LAB
CULTURE RESULTS: SIGNIFICANT CHANGE UP
CULTURE RESULTS: SIGNIFICANT CHANGE UP
SPECIMEN SOURCE: SIGNIFICANT CHANGE UP
SPECIMEN SOURCE: SIGNIFICANT CHANGE UP

## 2021-10-18 PROCEDURE — 87075 CULTR BACTERIA EXCEPT BLOOD: CPT

## 2021-10-18 PROCEDURE — 85652 RBC SED RATE AUTOMATED: CPT

## 2021-10-18 PROCEDURE — 86140 C-REACTIVE PROTEIN: CPT

## 2021-10-18 PROCEDURE — 86769 SARS-COV-2 COVID-19 ANTIBODY: CPT

## 2021-10-18 PROCEDURE — 87070 CULTURE OTHR SPECIMN AEROBIC: CPT

## 2021-10-18 PROCEDURE — 97161 PT EVAL LOW COMPLEX 20 MIN: CPT

## 2021-10-18 PROCEDURE — 83036 HEMOGLOBIN GLYCOSYLATED A1C: CPT

## 2021-10-18 PROCEDURE — 87186 SC STD MICRODIL/AGAR DIL: CPT

## 2021-10-18 PROCEDURE — 85027 COMPLETE CBC AUTOMATED: CPT

## 2021-10-18 PROCEDURE — 88304 TISSUE EXAM BY PATHOLOGIST: CPT

## 2021-10-18 PROCEDURE — 99232 SBSQ HOSP IP/OBS MODERATE 35: CPT

## 2021-10-18 PROCEDURE — 97530 THERAPEUTIC ACTIVITIES: CPT

## 2021-10-18 PROCEDURE — 97116 GAIT TRAINING THERAPY: CPT

## 2021-10-18 PROCEDURE — 36415 COLL VENOUS BLD VENIPUNCTURE: CPT

## 2021-10-18 PROCEDURE — 87077 CULTURE AEROBIC IDENTIFY: CPT

## 2021-10-18 PROCEDURE — 80048 BASIC METABOLIC PNL TOTAL CA: CPT

## 2021-10-18 PROCEDURE — 97165 OT EVAL LOW COMPLEX 30 MIN: CPT

## 2021-10-18 PROCEDURE — 97110 THERAPEUTIC EXERCISES: CPT

## 2021-10-18 RX ORDER — OMEPRAZOLE 10 MG/1
1 CAPSULE, DELAYED RELEASE ORAL
Qty: 30 | Refills: 1
Start: 2021-10-18 | End: 2021-12-16

## 2021-10-18 RX ORDER — SENNA PLUS 8.6 MG/1
2 TABLET ORAL
Qty: 0 | Refills: 0 | DISCHARGE
Start: 2021-10-18

## 2021-10-18 RX ORDER — ASPIRIN/CALCIUM CARB/MAGNESIUM 324 MG
1 TABLET ORAL
Qty: 28 | Refills: 0
Start: 2021-10-18 | End: 2021-11-14

## 2021-10-18 RX ORDER — POLYETHYLENE GLYCOL 3350 17 G/17G
17 POWDER, FOR SOLUTION ORAL
Qty: 0 | Refills: 0 | DISCHARGE
Start: 2021-10-18

## 2021-10-18 RX ORDER — OXYCODONE HYDROCHLORIDE 5 MG/1
1 TABLET ORAL
Qty: 30 | Refills: 0
Start: 2021-10-18 | End: 2021-10-24

## 2021-10-18 RX ORDER — ACETAMINOPHEN 500 MG
2 TABLET ORAL
Qty: 0 | Refills: 0 | DISCHARGE
Start: 2021-10-18

## 2021-10-18 RX ORDER — LINEZOLID 600 MG/300ML
1 INJECTION, SOLUTION INTRAVENOUS
Qty: 20 | Refills: 0
Start: 2021-10-18 | End: 2021-10-27

## 2021-10-18 RX ADMIN — ENOXAPARIN SODIUM 40 MILLIGRAM(S): 100 INJECTION SUBCUTANEOUS at 11:41

## 2021-10-18 RX ADMIN — LINEZOLID 300 MILLIGRAM(S): 600 INJECTION, SOLUTION INTRAVENOUS at 06:31

## 2021-10-18 NOTE — PROGRESS NOTE ADULT - PROVIDER SPECIALTY LIST ADULT
Infectious Disease
Orthopedics
Infectious Disease
Orthopedics
Hospitalist

## 2021-10-18 NOTE — DISCHARGE NOTE PROVIDER - CARE PROVIDER_API CALL
James Bose)  Orthopaedic Surgery  [address] [place] [address]  Phone: [phone]  Fax: [phone]  Established Patient  Follow Up Time: 1 week   James Bose)  Orthopaedic Surgery  [address] [place] [address]  Phone: [phone]  Fax: [phone]  Established Patient  Follow Up Time: 1 week    Murray Vazquez)  Physician Assistant Services  [address] [place] [address]  Phone: [phone]  Fax: [phone]  Follow Up Time: 1 week

## 2021-10-18 NOTE — DISCHARGE NOTE NURSING/CASE MANAGEMENT/SOCIAL WORK - PATIENT PORTAL LINK FT
You can access the FollowMyHealth Patient Portal offered by [facility] by registering at the following website: [email or web address]. By joining [facility]’s FollowMyHealth portal, you will also be able to view your health information using other applications (apps) compatible with our system.

## 2021-10-18 NOTE — DISCHARGE NOTE PROVIDER - NSDCCPTREATMENT_GEN_ALL_CORE_FT
PRINCIPAL PROCEDURE  Procedure: Exploration of wound of left foot  Findings and Treatment: left ankle with application of Prevena dressing

## 2021-10-18 NOTE — PROGRESS NOTE ADULT - SUBJECTIVE AND OBJECTIVE BOX
LYLA BUENO is a 56yFemale , patient examined and chart reviewed.    INTERVAL HPI/ OVERNIGHT EVENTS:   AFebrile. NAD.    PAST MEDICAL & SURGICAL HISTORY:  Obesity  COVID-19 december 2020 no hospitalization  Morbid obesity with BMI of 40.0-44.9, adult  Surgical wound infection  S/P tendon repair  S/P hysterectomy  2008  S/P left knee surgery  2019      For details regarding the patient's social history, family history, and other miscellaneous elements, please refer the initial infectious diseases consultation and/or the admitting history and physical examination for this admission.      ROS:  CONSTITUTIONAL:  Negative fever or chills  EYES:  Negative  blurry vision or double vision  CARDIOVASCULAR:  Negative for chest pain or palpitations  RESPIRATORY:  Negative for cough, wheezing, or SOB   GASTROINTESTINAL:  Negative for nausea, vomiting, diarrhea, constipation, or abdominal pain  GENITOURINARY:  Negative frequency, urgency or dysuria  NEUROLOGIC:  No headache, confusion, dizziness, lightheadedness  All other systems were reviewed and are negative         Current inpatient medications :    ANTIBIOTICS/RELEVANT:  linezolid  IVPB 600 milliGRAM(s) IV Intermittent every 12 hours      acetaminophen   Tablet .. 1000 milliGRAM(s) Oral every 8 hours PRN  enoxaparin Injectable 40 milliGRAM(s) SubCutaneous daily  HYDROmorphone  Injectable 0.5 milliGRAM(s) IV Push every 3 hours PRN  magnesium hydroxide Suspension 30 milliLiter(s) Oral daily PRN  ondansetron Injectable 4 milliGRAM(s) IV Push every 6 hours PRN  oxyCODONE    IR 5 milliGRAM(s) Oral every 3 hours PRN  oxyCODONE    IR 10 milliGRAM(s) Oral every 3 hours PRN  polyethylene glycol 3350 17 Gram(s) Oral at bedtime  senna 2 Tablet(s) Oral at bedtime      Objective:    10-14 @ 07:01  -  10-15 @ 07:00  --------------------------------------------------------  IN: 700 mL / OUT: 450 mL / NET: 250 mL      T(C): 36.7 (10-15-21 @ 18:33), Max: 36.7 (10-15-21 @ 05:13)  HR: 70 (10-15-21 @ 18:33) (55 - 70)  BP: 138/77 (10-15-21 @ 18:33) (107/63 - 138/77)  RR: 17 (10-15-21 @ 18:33) (16 - 19)  SpO2: 94% (10-15-21 @ 18:33) (93% - 94%)  Wt(kg): --      Physical Exam:  General: well developed well nourished, in no acute distress  Neck: supple, trachea midline  Lungs: clear, no wheeze/rhonchi  Cardiovascular: regular rate and rhythm, S1 S2  Abdomen: soft, nontender,  bowel sounds normal  Neurological: alert and oriented x3  Skin: no rash  Extremities: Left foot drsg c/d/i + VAC      LABS:                          12.4   12.22 )-----------( 201      ( 15 Oct 2021 08:13 )             37.1       10-15    139  |  102  |  18  ----------------------------<  116<H>  3.5   |  29  |  0.73    Ca    8.6      15 Oct 2021 08:13      MICROBIOLOGY:    Culture - Tissue with Gram Stain (collected 13 Oct 2021 22:30)  Source: .Tissue Other  Gram Stain (14 Oct 2021 02:41):    Moderate polymorphonuclear leukocytes seen per low power field    No organisms seen per oil power field  Preliminary Report (14 Oct 2021 22:38):    No growth    Culture - Surgical Swab (collected 13 Oct 2021 22:27)  Source: .Surgical Swab left ankle deep wound  Preliminary Report (15 Oct 2021 18:51):    Rare Coag Negative Staphylococcus "Susceptibilities not performed"    Culture - Surgical Swab (collected 13 Oct 2021 22:26)  Source: .Surgical Swab left ankle superficial wound  Preliminary Report (14 Oct 2021 22:57):    Few Staphylococcus pseudintermedius    "Susceptibilities not performed"      RADIOLOGY & ADDITIONAL STUDIES:    Assessment :  57 y/o F with PMHx of obesity, COVID-19, repair of peroneal tendon of left ankle (8/6/21) complicated by wound dehisence with infection ( grew Staphylococcus pseudintermedius ) admitted for I&D sp OR and VC placement 10/13/21   OR cultures with Staphylococcus pseudintermedius ( has pet cats likely source )    Plan :    Cont Zyvox  Trend temps and cbc  Fu OR cultures  Drsg and VAC per ortho  Activity per ortho    D/w Dr Bose        Continue with present regiment.  Appropriate use of antibiotics and adverse effects reviewed.      I have discussed the above plan of care with patient in detail. She expressed understanding of the  treatment plan . Risks, benefits and alternatives discussed in detail. I have asked if she has any questions or concerns and appropriately addressed them to the best of my ability .    > 35 minutes were spent in direct patient care reviewing notes, medications ,labs data/ imaging , discussion with multidisciplinary team.    Thank you for allowing me to participate in care of your patient .    Mandy Cueto MD  Infectious Disease  [phone]
  Patient is a 56y old  Female who presents with a chief complaint of Left ankle wound infection (18 Oct 2021 12:58)      SUBJECTIVE / OVERNIGHT EVENTS: No On events. Feels well, no complaints Really wants to go home, denies LE pain, f/c, cp, sob.      ADDITIONAL REVIEW OF SYSTEMS: Negative except for above    MEDICATIONS  (STANDING):  enoxaparin Injectable 40 milliGRAM(s) SubCutaneous daily  linezolid  IVPB 600 milliGRAM(s) IV Intermittent every 12 hours  polyethylene glycol 3350 17 Gram(s) Oral at bedtime  senna 2 Tablet(s) Oral at bedtime    MEDICATIONS  (PRN):  acetaminophen   Tablet .. 1000 milliGRAM(s) Oral every 8 hours PRN temp greater or equal to 38.5C (101.3F) and or Mild Pain (1-3)  HYDROmorphone  Injectable 0.5 milliGRAM(s) IV Push every 3 hours PRN breakthrough pain  magnesium hydroxide Suspension 30 milliLiter(s) Oral daily PRN Constipation  ondansetron Injectable 4 milliGRAM(s) IV Push every 6 hours PRN Nausea and/or Vomiting  oxyCODONE    IR 5 milliGRAM(s) Oral every 3 hours PRN Moderate Pain (4 - 6)  oxyCODONE    IR 10 milliGRAM(s) Oral every 3 hours PRN Severe Pain (7 - 10)      CAPILLARY BLOOD GLUCOSE        I&O's Summary    17 Oct 2021 07:01  -  18 Oct 2021 07:00  --------------------------------------------------------  IN: 1680 mL / OUT: 0 mL / NET: 1680 mL        PHYSICAL EXAM:  Vital Signs Last 24 Hrs  T(C): 36.8 (18 Oct 2021 13:13), Max: 36.9 (18 Oct 2021 05:07)  T(F): 98.2 (18 Oct 2021 13:13), Max: 98.4 (18 Oct 2021 05:07)  HR: 80 (18 Oct 2021 13:13) (65 - 80)  BP: 110/75 (18 Oct 2021 13:13) (110/75 - 127/83)  BP(mean): --  RR: 18 (18 Oct 2021 13:13) (16 - 18)  SpO2: 95% (18 Oct 2021 13:13) (93% - 96%)    PHYSICAL EXAM:  GENERAL: NAD, well-developed  HEAD:  Atraumatic, Normocephalic  NECK: Supple, No JVD  CHEST/LUNG: Clear to auscultation bilaterally; N  HEART: Regular rate and rhythm;   ABDOMEN: Soft, Nontender, Nondistended; Bowel sounds present  EXTREMITIES:  2+ Peripheral Pulses, No clubbing, cyanosis, or edema. Leg in ACE wrap with drain   PSYCH: AAOx3  NEUROLOGY: non-focal        LABS:                      RADIOLOGY & ADDITIONAL TESTS:    Imaging Personally Reviewed:    Electrocardiogram Personally Reviewed:    COORDINATION OF CARE:  Care Discussed with Consultants/Other Providers [Y/N]:  Prior or Outpatient Records Reviewed [Y/N]:    
POST OPERATIVE DAY #:  [3 ]   STATUS POST: [X ] Left Ankle I&D                     SUBJECTIVE: Patient seen and examined. Pt without complaints. No SOB,CP, N/V. Tolerated Diet well.   [+BM], + flatus, No abdominal pain. / Walked with walker / Non WB LEft LE  Pain comfortable (3/10 ) on  Interval Rx.   On IV LInezolid  Pain Rx:  acetaminophen   Tablet .. 1000 milliGRAM(s) Oral every 8 hours PRN  HYDROmorphone  Injectable 0.5 milliGRAM(s) IV Push every 3 hours PRN  ondansetron Injectable 4 milliGRAM(s) IV Push every 6 hours PRN  oxyCODONE    IR 5 milliGRAM(s) Oral every 3 hours PRN  oxyCODONE    IR 10 milliGRAM(s) Oral every 3 hours PRN      OBJECTIVE:   General: On exam, No Apparent Distress  Vital Signs Last 24 Hrs  T(C): 37 (16 Oct 2021 14:02), Max: 37 (16 Oct 2021 14:02)  T(F): 98.6 (16 Oct 2021 14:02), Max: 98.6 (16 Oct 2021 14:02)  HR: 79 (16 Oct 2021 14:02) (60 - 79)  BP: 137/81 (16 Oct 2021 14:02) (127/75 - 138/77)  RR: 17 (16 Oct 2021 14:02) (17 - 18)  SpO2: 93% (16 Oct 2021 14:02) (92% - 94%)    Lungs: BS clear bilat.  [Abdomen]: + BS, soft , benign exam   Ext(Knee): Left Ankle [ Prevena ] Dressing [Incision] clean, dry, & intact,  Not constricting at Prox & Distal ends; No drainage in canister ]; No  cellulitis; . Minimal soft tissue swelling;            Sensation: [X ] intact to light touch  [ ] decreased:          Motor exam: [ 5 ] / 5 Tibialis Anterior/Gastrocnemius-Soleus          Vascular: Feet toes warm, pink. DP = XX.  Toes warm well perfused; capillary refill <3 seconds  No calf tenderness bilat..  Urine Out [11P-7A] = Voided    VTEP: On Bilat. Venodynes + enoxaparin Injectable 40 milliGRAM(s) SubCutaneous daily    OR C&S: Rare Coag Neg staph; Staph Intermedius    Hospitalist input noted.  Labs Today:   CBC:               11.9   9.46  )-----------( 172      ( 16 Oct 2021 07:32 )             35.8     10-16  Chem:  141  |  104  |  15  ----------------------------<  124<H>  3.7   |  33<H>  |  0.84    Primary Orthopedic Assessment:  • Stable from Orthopedic perspective  • Neuro motor exam stable  • Labs: CBC / Chem stable    Plan:   • Continue:   NWB with assistance of a walker/Ice to Ankle/  Incentive spirometry encouraged / Continue Pain Rx / Prevena dressing  • Continue DVT prophylaxis as prescribed, including use of compression devices and ankle pumps  • Plans per Medicine / ID  • Discharge planning – anticipated discharge is Home D/C with Home care, Home IV Abx.,  when medically stable & cleared by ID    
Post Op     LYLA BUENO      56y        Female                                                                                                                 T(C): 36.7 (10-15-21 @ 10:08), Max: 36.7 (10-14-21 @ 18:13)  HR: 62 (10-15-21 @ 10:08) (55 - 79)  BP: 110/66 (10-15-21 @ 10:08) (107/63 - 138/66)  RR: 19 (10-15-21 @ 10:08) (18 - 19)  SpO2: 93% (10-15-21 @ 10:08) (93% - 95%)  Wt(kg): --    S/P   i and D  foot left     Patient denies shortness of breath, chest pain, dyspnea, No complaints  Pain is  3 /10    Physical Exam    Extremity: Bilaterally:  No holmon                                           No Cord                                          PAS on                                          Neurovascular intact                                          Motor intact EHL/FHL                                          Sensation intact                                          Pulses intact DP/PT                                         Calves Soft                                         Dressing Clean / Dry / Intact                                         Capillary refill with 5 seconds                          12.4   12.22 )-----------( 201      ( 15 Oct 2021 08:13 )             37.1       10-15    139  |  102  |  18  ----------------------------<  116<H>  3.5   |  29  |  0.73    Ca    8.6      15 Oct 2021 08:13        A/P  -- S/P I  and D  foot    -  Medicine To Follow    - Analagesia  - Incentive Spirometry  - Discharge Planning  - Safety Precautions  -  CBC , BMP daily - F/U wound cultures  - Continue Zyvox as per ID recommendation  - Pain management as needed  - DVT prophylaxis: Lovenox 40mg daily  - Ice/ Elevation  - PT/OT: Non weight bearing on operative leg  - Encourage incentive spirometry  - Disposition: Home       
Post Op Day #1    SUBJECTIVE    55yo Female s/p I&D left ankle  Patient is alert and comfortable.  Pain well controlled on current pain regimen.  Denies chest pain, shortness of breath, nausea, vomiting.  No complaints at this time.    OBJECTIVE    Vital Signs Last 24 Hrs  T(C): 36.5 (14 Oct 2021 10:30), Max: 36.8 (13 Oct 2021 22:01)  T(F): 97.7 (14 Oct 2021 10:30), Max: 98.3 (13 Oct 2021 22:01)  HR: 63 (14 Oct 2021 10:30) (54 - 93)  BP: 107/67 (14 Oct 2021 10:30) (107/67 - 174/97)  BP(mean): --  RR: 17 (14 Oct 2021 10:30) (12 - 20)  SpO2: 93% (14 Oct 2021 10:30) (93% - 100%)  I&O's Summary    13 Oct 2021 07:01  -  14 Oct 2021 07:00  --------------------------------------------------------  IN: 1200 mL / OUT: 1105 mL / NET: 95 mL        PHYSICAL EXAM    Left lower extremity dressing C/D/I  Prevena functioning well, with no drainage in canister at this time  Toes warm, pink, and mobile  Capillary refill less than 3 sec.  Sensation grossly intact to light touch distally    LABS                          13.8   12.59<H> )-----------( 222      ( 14 Oct 2021 06:48 )             40.8   14 Oct 2021 06:48    14 Oct 2021 06:48    136    |  101    |  11     ----------------------------<  167<H>  4.0     |  29     |  0.68     Ca    8.8        14 Oct 2021 06:48        ASSESSMENT AND PLAN    - F/U wound cultures  - Continue Zyvox as per ID recommendation  - Pain management as needed  - DVT prophylaxis: Lovenox 40mg daily  - Ice/ Elevation  - PT/OT: Non weight bearing on operative leg  - Encourage incentive spirometry  - Disposition: Home                            
     LYLA BEUNO is a 56yFemale , patient examined and chart reviewed.    INTERVAL HPI/ OVERNIGHT EVENTS:   AFebrile. NAD.  No events.    PAST MEDICAL & SURGICAL HISTORY:  Obesity  COVID-19 december 2020 no hospitalization  Morbid obesity with BMI of 40.0-44.9, adult  Surgical wound infection  S/P tendon repair  S/P hysterectomy  2008  S/P left knee surgery  2019      For details regarding the patient's social history, family history, and other miscellaneous elements, please refer the initial infectious diseases consultation and/or the admitting history and physical examination for this admission.      ROS:  CONSTITUTIONAL:  Negative fever or chills  EYES:  Negative  blurry vision or double vision  CARDIOVASCULAR:  Negative for chest pain or palpitations  RESPIRATORY:  Negative for cough, wheezing, or SOB   GASTROINTESTINAL:  Negative for nausea, vomiting, diarrhea, constipation, or abdominal pain  GENITOURINARY:  Negative frequency, urgency or dysuria  NEUROLOGIC:  No headache, confusion, dizziness, lightheadedness  All other systems were reviewed and are negative         Current inpatient medications :    ANTIBIOTICS/RELEVANT:  linezolid  IVPB 600 milliGRAM(s) IV Intermittent every 12 hours    MEDICATIONS  (STANDING):  enoxaparin Injectable 40 milliGRAM(s) SubCutaneous daily  polyethylene glycol 3350 17 Gram(s) Oral at bedtime  senna 2 Tablet(s) Oral at bedtime    MEDICATIONS  (PRN):  acetaminophen   Tablet .. 1000 milliGRAM(s) Oral every 8 hours PRN temp greater or equal to 38.5C (101.3F) and or Mild Pain (1-3)  HYDROmorphone  Injectable 0.5 milliGRAM(s) IV Push every 3 hours PRN breakthrough pain  magnesium hydroxide Suspension 30 milliLiter(s) Oral daily PRN Constipation  ondansetron Injectable 4 milliGRAM(s) IV Push every 6 hours PRN Nausea and/or Vomiting  oxyCODONE    IR 5 milliGRAM(s) Oral every 3 hours PRN Moderate Pain (4 - 6)  oxyCODONE    IR 10 milliGRAM(s) Oral every 3 hours PRN Severe Pain (7 - 10)        Objective:  Vital Signs Last 24 Hrs  T(C): 36.7 (16 Oct 2021 21:03), Max: 37.2 (16 Oct 2021 18:00)  T(F): 98.1 (16 Oct 2021 21:03), Max: 98.9 (16 Oct 2021 18:00)  HR: 73 (16 Oct 2021 21:03) (60 - 79)  BP: 129/55 (16 Oct 2021 21:03) (127/75 - 137/81)  RR: 16 (16 Oct 2021 21:03) (16 - 18)  SpO2: 93% (16 Oct 2021 21:03) (92% - 94%)    Physical Exam:  General: well developed well nourished, in no acute distress  Neck: supple, trachea midline  Lungs: clear, no wheeze/rhonchi  Cardiovascular: regular rate and rhythm, S1 S2  Abdomen: soft, nontender,  bowel sounds normal  Neurological: alert and oriented x3  Skin: no rash  Extremities: Left foot drsg c/d/i + VAC      LABS:                        11.9   9.46  )-----------( 172      ( 16 Oct 2021 07:32 )             35.8   10-16    141  |  104  |  15  ----------------------------<  124<H>  3.7   |  33<H>  |  0.84    Ca    8.6      16 Oct 2021 07:32      MICROBIOLOGY:  Culture - Tissue with Gram Stain (10.13.21 @ 22:30)    Gram Stain:   Moderate polymorphonuclear leukocytes seen per low power field  No organisms seen per oil power field    -  Ampicillin/Sulbactam: R <=8/4    -  Cefazolin: R <=4    -  Clindamycin: R >4    -  Erythromycin: R >4    -  Gentamicin: S <=4 Should not be used as monotherapy    -  Oxacillin: R >2    -  Penicillin: R >8    -  RIF- Rifampin: S <=1 Should not be used as monotherapy    -  Tetra/Doxy: R >8    -  Trimethoprim/Sulfamethoxazole: R >2/38    -  Vancomycin: S 2    Specimen Source: .Tissue Other    Culture Results:   Rare Staphylococcus epidermidis    Organism Identification: Staphylococcus epidermidis    Organism: Staphylococcus epidermidis    Method Type: DIOGO    Culture - Surgical Swab (collected 13 Oct 2021 22:27)  Source: .Surgical Swab left ankle deep wound  Preliminary Report (15 Oct 2021 18:51):    Rare Coag Negative Staphylococcus "Susceptibilities not performed"    Culture - Surgical Swab (collected 13 Oct 2021 22:26)  Source: .Surgical Swab left ankle superficial wound  Preliminary Report (14 Oct 2021 22:57):    Few Staphylococcus pseudintermedius    "Susceptibilities not performed"      RADIOLOGY & ADDITIONAL STUDIES:    Assessment :  55 y/o F with PMHx of obesity, COVID-19, repair of peroneal tendon of left ankle (8/6/21) complicated by wound dehisence with infection ( grew Staphylococcus pseudintermedius ) admitted for I&D sp OR and VC placement 10/13/21   OR cultures with Staphylococcus pseudintermedius ( has pet cats likely source )  Stable    Plan :    Cont Zyvox  Trend temps and cbc  Fu OR cultures  Drsg and VAC per ortho  Activity per ortho    Continue with present regiment.  Appropriate use of antibiotics and adverse effects reviewed.      I have discussed the above plan of care with patient in detail. She expressed understanding of the  treatment plan . Risks, benefits and alternatives discussed in detail. I have asked if she has any questions or concerns and appropriately addressed them to the best of my ability .    > 35 minutes were spent in direct patient care reviewing notes, medications ,labs data/ imaging , discussion with multidisciplinary team.    Thank you for allowing me to participate in care of your patient .    Mandy Cueto MD  Infectious Disease  [phone]
     LYLA BUENO is a 56yFemale , patient examined and chart reviewed.    INTERVAL HPI/ OVERNIGHT EVENTS:   Afebrile. NAD.  No events. In chair.    PAST MEDICAL & SURGICAL HISTORY:  Obesity  COVID-19 december 2020 no hospitalization  Morbid obesity with BMI of 40.0-44.9, adult  Surgical wound infection  S/P tendon repair  S/P hysterectomy  2008  S/P left knee surgery  2019      For details regarding the patient's social history, family history, and other miscellaneous elements, please refer the initial infectious diseases consultation and/or the admitting history and physical examination for this admission.      ROS:  CONSTITUTIONAL:  Negative fever or chills  EYES:  Negative  blurry vision or double vision  CARDIOVASCULAR:  Negative for chest pain or palpitations  RESPIRATORY:  Negative for cough, wheezing, or SOB   GASTROINTESTINAL:  Negative for nausea, vomiting, diarrhea, constipation, or abdominal pain  GENITOURINARY:  Negative frequency, urgency or dysuria  NEUROLOGIC:  No headache, confusion, dizziness, lightheadedness  All other systems were reviewed and are negative         Current inpatient medications :    ANTIBIOTICS/RELEVANT:  linezolid  IVPB 600 milliGRAM(s) IV Intermittent every 12 hours    MEDICATIONS  (STANDING):  enoxaparin Injectable 40 milliGRAM(s) SubCutaneous daily  polyethylene glycol 3350 17 Gram(s) Oral at bedtime  senna 2 Tablet(s) Oral at bedtime    MEDICATIONS  (PRN):  acetaminophen   Tablet .. 1000 milliGRAM(s) Oral every 8 hours PRN temp greater or equal to 38.5C (101.3F) and or Mild Pain (1-3)  HYDROmorphone  Injectable 0.5 milliGRAM(s) IV Push every 3 hours PRN breakthrough pain  magnesium hydroxide Suspension 30 milliLiter(s) Oral daily PRN Constipation  ondansetron Injectable 4 milliGRAM(s) IV Push every 6 hours PRN Nausea and/or Vomiting  oxyCODONE    IR 5 milliGRAM(s) Oral every 3 hours PRN Moderate Pain (4 - 6)  oxyCODONE    IR 10 milliGRAM(s) Oral every 3 hours PRN Severe Pain (7 - 10)        Objective:  Vital Signs Last 24 Hrs  T(C): 36.7 (18 Oct 2021 10:55), Max: 36.9 (18 Oct 2021 05:07)  T(F): 98 (18 Oct 2021 10:55), Max: 98.4 (18 Oct 2021 05:07)  HR: 76 (18 Oct 2021 10:55) (65 - 76)  BP: 122/75 (18 Oct 2021 10:55) (119/69 - 127/83)  RR: 18 (18 Oct 2021 10:55) (16 - 18)  SpO2: 93% (18 Oct 2021 10:55) (93% - 96%)    Physical Exam:  General: in no acute distress  Neck: supple, trachea midline  Lungs: clear, no wheeze/rhonchi  Cardiovascular: regular rate and rhythm, S1 S2  Abdomen: soft, nontender,  bowel sounds normal  Neurological: alert and oriented x3  Skin: no rash  Extremities: Left foot drsg c/d/i + provena    LABS:                        11.9   9.46  )-----------( 172      ( 16 Oct 2021 07:32 )             35.8   10-16    141  |  104  |  15  ----------------------------<  124<H>  3.7   |  33<H>  |  0.84    Ca    8.6      16 Oct 2021 07:32      MICROBIOLOGY:  Culture - Tissue with Gram Stain (10.13.21 @ 22:30)    Gram Stain:   Moderate polymorphonuclear leukocytes seen per low power field  No organisms seen per oil power field    -  Ampicillin/Sulbactam: R <=8/4    -  Cefazolin: R <=4    -  Clindamycin: R >4    -  Erythromycin: R >4    -  Gentamicin: S <=4 Should not be used as monotherapy    -  Oxacillin: R >2    -  Penicillin: R >8    -  RIF- Rifampin: S <=1 Should not be used as monotherapy    -  Tetra/Doxy: R >8    -  Trimethoprim/Sulfamethoxazole: R >2/38    -  Vancomycin: S 2    Specimen Source: .Tissue Other    Culture Results:   Rare Staphylococcus epidermidis    Organism Identification: Staphylococcus epidermidis    Organism: Staphylococcus epidermidis    Method Type: DIOGO    Culture - Surgical Swab (collected 13 Oct 2021 22:27)  Source: .Surgical Swab left ankle deep wound  Preliminary Report (15 Oct 2021 18:51):    Rare Coag Negative Staphylococcus "Susceptibilities not performed"    Culture - Surgical Swab (collected 13 Oct 2021 22:26)  Source: .Surgical Swab left ankle superficial wound  Preliminary Report (14 Oct 2021 22:57):    Few Staphylococcus pseudintermedius    "Susceptibilities not performed"      RADIOLOGY & ADDITIONAL STUDIES:    Assessment :  57 y/o F with PMHx of obesity, COVID-19, repair of peroneal tendon of left ankle (8/6/21) complicated by wound dehisence with infection ( grew Staphylococcus pseudintermedius ) admitted for I&D sp OR and VC placement 10/13/21   OR cultures with Staphylococcus pseudintermedius ( has pet cats likely source )  Stable    Plan :    On Zyvox IV  Can change to po Zyvox x additional 10 days for dc  Trend temps and cbc  Drsg and provena per ortho  Activity per ortho  Dc home    D/w Dr Bose and ortho team    D/w Dr Kaminski    Continue with present regiment.  Appropriate use of antibiotics and adverse effects reviewed.      I have discussed the above plan of care with patient in detail. She expressed understanding of the  treatment plan . Risks, benefits and alternatives discussed in detail. I have asked if she has any questions or concerns and appropriately addressed them to the best of my ability .    > 35 minutes were spent in direct patient care reviewing notes, medications ,labs data/ imaging , discussion with multidisciplinary team.    Thank you for allowing me to participate in care of your patient .    Mandy Cueot MD  Infectious Disease  [phone]
     LYLA BUENO is a 56yFemale , patient examined and chart reviewed.    INTERVAL HPI/ OVERNIGHT EVENTS:   Afebrile. NAD.  No events. In chair.    PAST MEDICAL & SURGICAL HISTORY:  Obesity  COVID-19 december 2020 no hospitalization  Morbid obesity with BMI of 40.0-44.9, adult  Surgical wound infection  S/P tendon repair  S/P hysterectomy  2008  S/P left knee surgery  2019      For details regarding the patient's social history, family history, and other miscellaneous elements, please refer the initial infectious diseases consultation and/or the admitting history and physical examination for this admission.      ROS:  CONSTITUTIONAL:  Negative fever or chills  EYES:  Negative  blurry vision or double vision  CARDIOVASCULAR:  Negative for chest pain or palpitations  RESPIRATORY:  Negative for cough, wheezing, or SOB   GASTROINTESTINAL:  Negative for nausea, vomiting, diarrhea, constipation, or abdominal pain  GENITOURINARY:  Negative frequency, urgency or dysuria  NEUROLOGIC:  No headache, confusion, dizziness, lightheadedness  All other systems were reviewed and are negative         Current inpatient medications :    ANTIBIOTICS/RELEVANT:  linezolid  IVPB 600 milliGRAM(s) IV Intermittent every 12 hours    MEDICATIONS  (STANDING):  enoxaparin Injectable 40 milliGRAM(s) SubCutaneous daily  polyethylene glycol 3350 17 Gram(s) Oral at bedtime  senna 2 Tablet(s) Oral at bedtime    MEDICATIONS  (PRN):  acetaminophen   Tablet .. 1000 milliGRAM(s) Oral every 8 hours PRN temp greater or equal to 38.5C (101.3F) and or Mild Pain (1-3)  HYDROmorphone  Injectable 0.5 milliGRAM(s) IV Push every 3 hours PRN breakthrough pain  magnesium hydroxide Suspension 30 milliLiter(s) Oral daily PRN Constipation  ondansetron Injectable 4 milliGRAM(s) IV Push every 6 hours PRN Nausea and/or Vomiting  oxyCODONE    IR 5 milliGRAM(s) Oral every 3 hours PRN Moderate Pain (4 - 6)  oxyCODONE    IR 10 milliGRAM(s) Oral every 3 hours PRN Severe Pain (7 - 10)      Objective:  Vital Signs Last 24 Hrs  T(C): 36.7 (17 Oct 2021 08:50), Max: 37.2 (16 Oct 2021 18:00)  T(F): 98.1 (17 Oct 2021 08:50), Max: 98.9 (16 Oct 2021 18:00)  HR: 78 (17 Oct 2021 08:50) (66 - 79)  BP: 135/79 (17 Oct 2021 08:50) (109/64 - 137/81)  RR: 17 (17 Oct 2021 08:50) (16 - 17)  SpO2: 93% (17 Oct 2021 08:50) (93% - 95%)    Physical Exam:  General: in no acute distress  Neck: supple, trachea midline  Lungs: clear, no wheeze/rhonchi  Cardiovascular: regular rate and rhythm, S1 S2  Abdomen: soft, nontender,  bowel sounds normal  Neurological: alert and oriented x3  Skin: no rash  Extremities: Left foot drsg c/d/i + VAC    LABS:                        11.9   9.46  )-----------( 172      ( 16 Oct 2021 07:32 )             35.8   10-16    141  |  104  |  15  ----------------------------<  124<H>  3.7   |  33<H>  |  0.84    Ca    8.6      16 Oct 2021 07:32      MICROBIOLOGY:  Culture - Tissue with Gram Stain (10.13.21 @ 22:30)    Gram Stain:   Moderate polymorphonuclear leukocytes seen per low power field  No organisms seen per oil power field    -  Ampicillin/Sulbactam: R <=8/4    -  Cefazolin: R <=4    -  Clindamycin: R >4    -  Erythromycin: R >4    -  Gentamicin: S <=4 Should not be used as monotherapy    -  Oxacillin: R >2    -  Penicillin: R >8    -  RIF- Rifampin: S <=1 Should not be used as monotherapy    -  Tetra/Doxy: R >8    -  Trimethoprim/Sulfamethoxazole: R >2/38    -  Vancomycin: S 2    Specimen Source: .Tissue Other    Culture Results:   Rare Staphylococcus epidermidis    Organism Identification: Staphylococcus epidermidis    Organism: Staphylococcus epidermidis    Method Type: DIOGO    Culture - Surgical Swab (collected 13 Oct 2021 22:27)  Source: .Surgical Swab left ankle deep wound  Preliminary Report (15 Oct 2021 18:51):    Rare Coag Negative Staphylococcus "Susceptibilities not performed"    Culture - Surgical Swab (collected 13 Oct 2021 22:26)  Source: .Surgical Swab left ankle superficial wound  Preliminary Report (14 Oct 2021 22:57):    Few Staphylococcus pseudintermedius    "Susceptibilities not performed"      RADIOLOGY & ADDITIONAL STUDIES:    Assessment :  55 y/o F with PMHx of obesity, COVID-19, repair of peroneal tendon of left ankle (8/6/21) complicated by wound dehisence with infection ( grew Staphylococcus pseudintermedius ) admitted for I&D sp OR and VC placement 10/13/21   OR cultures with Staphylococcus pseudintermedius ( has pet cats likely source )  Stable    Plan :    Cont Zyvox IV  Can change to po Zyvox x additional 10 days for dc  Trend temps and cbc  Drsg and VAC per ortho  Activity per ortho    Continue with present regiment.  Appropriate use of antibiotics and adverse effects reviewed.      I have discussed the above plan of care with patient in detail. She expressed understanding of the  treatment plan . Risks, benefits and alternatives discussed in detail. I have asked if she has any questions or concerns and appropriately addressed them to the best of my ability .    > 35 minutes were spent in direct patient care reviewing notes, medications ,labs data/ imaging , discussion with multidisciplinary team.    Thank you for allowing me to participate in care of your patient .    Mandy Cueto MD  Infectious Disease  [phone]
POST OPERATIVE DAY #: 5   STATUS POST:  I&D Left ankle                   SUBJECTIVE: Patient seen and examined. Feeling well. Looking forward to going home today.    Pain (0-10): 0      OBJECTIVE:     Vital Signs Last 24 Hrs  T(C): 36.7 (18 Oct 2021 10:55), Max: 36.9 (18 Oct 2021 05:07)  T(F): 98 (18 Oct 2021 10:55), Max: 98.4 (18 Oct 2021 05:07)  HR: 76 (18 Oct 2021 10:55) (65 - 76)  BP: 122/75 (18 Oct 2021 10:55) (119/69 - 127/83)  RR: 18 (18 Oct 2021 10:55) (16 - 18)  SpO2: 93% (18 Oct 2021 10:55) (93% - 96%)           Left ankle:          Ace Wrap removed. Prevena Dressing:  clean/dry/intact           Sensation:  intact to light touch           Motor exam:  5/5 Tibialis Anterior/Gastrocnemius-Soleus           warm well perfused; capillary refill <3 seconds       Anticoagulation:  enoxaparin Injectable 40 milliGRAM(s) SubCutaneous daily      Pain medications:   acetaminophen   Tablet .. 1000 milliGRAM(s) Oral every 8 hours PRN  HYDROmorphone  Injectable 0.5 milliGRAM(s) IV Push every 3 hours PRN  ondansetron Injectable 4 milliGRAM(s) IV Push every 6 hours PRN  oxyCODONE    IR 5 milliGRAM(s) Oral every 3 hours PRN  oxyCODONE    IR 10 milliGRAM(s) Oral every 3 hours PRN        A/P : s/p I&D Left ankle POD #5   -    Pain control  -    DVT ppx: currently on Lovenox in-house. Possibly aspirin; awaiting direction from Dr. Bose  -    Weight bearing status: NWB left lower extremity  -    Physical Therapy  -    Occupational Therapy  -    Dispo: home today
LYLA ERICKSONLAND                                                                [id number]                                                     Allergies---No Known Allergies        Pt is a 56y year old Female s/p I+D left Ankle.   Pt. is A&O x 3, resting comfortably, with no complaints.   Pain is 0/10.   Tolerating the diet.   Denies chest pain / shortness of breath / dyspnea / nausea / vomiting / headaches or light headed ness.         Vital Signs Last 24 Hrs  T(F): 97.5 (10-17-21 @ 14:00), Max: 98.3 (10-17-21 @ 05:49)  HR: 75 (10-17-21 @ 14:00)  BP: 126/67 (10-17-21 @ 14:00)  RR: 17 (10-17-21 @ 14:00)  SpO2: 93% (10-17-21 @ 14:00)    I&O's Detail    16 Oct 2021 07:01  -  17 Oct 2021 07:00  --------------------------------------------------------  IN:    IV PiggyBack: 600 mL    Oral Fluid: 1500 mL  Total IN: 2100 mL    OUT:  Total OUT: 0 mL    Total NET: 2100 mL        PE:   Left Lower Extremity:   Ace bandage is in place..   Neurovascularly intact.   No gross evidence of motor or sensory deficit.   +2 DP/PT pulses.   EHL/FHL/TA intact.   Toes are pink and mobile.   Capillary refill < 2 seconds.   Negative calf tenderness.   PAS on.           A:   Pt is a 56y year old Female S/P I+D  left ankle, Post Op Day #4        Plan:    - Follow up with Medicine and ID   - OOB with PT/OT   - Pain control    - continue Abx    - Discharge Planning   - DVT ppx = PAS +  enoxaparin Injectable 40 milliGRAM(s) SubCutaneous daily                                                                                                                                                                             Korey BAL      
Patient is a 56y old  Female who presents with a chief complaint of S/P I&D Left Ankle (16 Oct 2021 17:10)      INTERVAL HPI/OVERNIGHT EVENTS:  Pt is seen and examined.  Pain is controlled.    Pain Location & Control:     MEDICATIONS  (STANDING):  enoxaparin Injectable 40 milliGRAM(s) SubCutaneous daily  linezolid  IVPB 600 milliGRAM(s) IV Intermittent every 12 hours  polyethylene glycol 3350 17 Gram(s) Oral at bedtime  senna 2 Tablet(s) Oral at bedtime    MEDICATIONS  (PRN):  acetaminophen   Tablet .. 1000 milliGRAM(s) Oral every 8 hours PRN temp greater or equal to 38.5C (101.3F) and or Mild Pain (1-3)  HYDROmorphone  Injectable 0.5 milliGRAM(s) IV Push every 3 hours PRN breakthrough pain  magnesium hydroxide Suspension 30 milliLiter(s) Oral daily PRN Constipation  ondansetron Injectable 4 milliGRAM(s) IV Push every 6 hours PRN Nausea and/or Vomiting  oxyCODONE    IR 5 milliGRAM(s) Oral every 3 hours PRN Moderate Pain (4 - 6)  oxyCODONE    IR 10 milliGRAM(s) Oral every 3 hours PRN Severe Pain (7 - 10)      Allergies    No Known Allergies    Intolerances      Vital Signs Last 24 Hrs  T(C): 36.7 (17 Oct 2021 08:50), Max: 37.2 (16 Oct 2021 18:00)  T(F): 98.1 (17 Oct 2021 08:50), Max: 98.9 (16 Oct 2021 18:00)  HR: 78 (17 Oct 2021 08:50) (66 - 79)  BP: 135/79 (17 Oct 2021 08:50) (109/64 - 137/81)  BP(mean): --  RR: 17 (17 Oct 2021 08:50) (16 - 17)  SpO2: 93% (17 Oct 2021 08:50) (93% - 95%)        LABS:      Ca    8.6        16 Oct 2021 07:32    CAPILLARY BLOOD GLUCOSE      Cultures  Culture Results:   Rare Staphylococcus epidermidis (10-13 @ 22:30)  Culture Results:   Rare Coag Negative Staphylococcus "Susceptibilities not performed" (10-13 @ 22:27)  Culture Results:   Few Staphylococcus pseudintermedius  "Susceptibilities not performed" (10-13 @ 22:26)        Culture - Tissue with Gram Stain (collected 10-13-21 @ 22:30)  Source: .Tissue Other  Gram Stain (10-14-21 @ 02:41):    Moderate polymorphonuclear leukocytes seen per low power field    No organisms seen per oil power field  Preliminary Report (10-16-21 @ 20:44):    Rare Staphylococcus epidermidis  Organism: Staphylococcus epidermidis (10-16-21 @ 20:43)  Organism: Staphylococcus epidermidis (10-16-21 @ 20:43)      -  Ampicillin/Sulbactam: R <=8/4      -  Cefazolin: R <=4      -  Clindamycin: R >4      -  Erythromycin: R >4      -  Gentamicin: S <=4 Should not be used as monotherapy      -  Oxacillin: R >2      -  Penicillin: R >8      -  RIF- Rifampin: S <=1 Should not be used as monotherapy      -  Tetra/Doxy: R >8      -  Trimethoprim/Sulfamethoxazole: R >2/38      -  Vancomycin: S 2      Method Type: DIOGO    Culture - Surgical Swab (collected 10-13-21 @ 22:27)  Source: .Surgical Swab left ankle deep wound  Preliminary Report (10-15-21 @ 18:51):    Rare Coag Negative Staphylococcus "Susceptibilities not performed"    Culture - Surgical Swab (collected 10-13-21 @ 22:26)  Source: .Surgical Swab left ankle superficial wound  Preliminary Report (10-14-21 @ 22:57):    Few Staphylococcus pseudintermedius    "Susceptibilities not performed"        RADIOLOGY & ADDITIONAL TESTS:    Imaging Personally Reviewed:  [ ] YES  [ ] NO    Consultant(s) Notes Reviewed:  [ ] YES  [ ] NO    Care Discussed with Consultants/Other Providers [x ] YES  [ ] NO
Patient is a 56y old  Female who presents with a chief complaint of wound infection of nonhealing surgical wound (13 Oct 2021 21:42)      INTERVAL HPI/OVERNIGHT EVENTS:  Pt is seen and examined.  Pain is controlled.    Pain Location & Control:     MEDICATIONS  (STANDING):  enoxaparin Injectable 40 milliGRAM(s) SubCutaneous daily  lactated ringers. 1000 milliLiter(s) (100 mL/Hr) IV Continuous <Continuous>  polyethylene glycol 3350 17 Gram(s) Oral at bedtime  senna 2 Tablet(s) Oral at bedtime  vancomycin  IVPB 1750 milliGRAM(s) IV Intermittent every 12 hours    MEDICATIONS  (PRN):  acetaminophen   Tablet .. 1000 milliGRAM(s) Oral every 8 hours PRN temp greater or equal to 38.5C (101.3F) and or Mild Pain (1-3)  HYDROmorphone  Injectable 0.5 milliGRAM(s) IV Push every 3 hours PRN breakthrough pain  magnesium hydroxide Suspension 30 milliLiter(s) Oral daily PRN Constipation  ondansetron Injectable 4 milliGRAM(s) IV Push every 6 hours PRN Nausea and/or Vomiting  oxyCODONE    IR 5 milliGRAM(s) Oral every 3 hours PRN Moderate Pain (4 - 6)  oxyCODONE    IR 10 milliGRAM(s) Oral every 3 hours PRN Severe Pain (7 - 10)      Allergies    No Known Allergies    Intolerances            Vital Signs Last 24 Hrs  T(C): 36.5 (14 Oct 2021 10:30), Max: 36.8 (13 Oct 2021 22:01)  T(F): 97.7 (14 Oct 2021 10:30), Max: 98.3 (13 Oct 2021 22:01)  HR: 63 (14 Oct 2021 10:30) (54 - 93)  BP: 107/67 (14 Oct 2021 10:30) (107/67 - 174/97)  BP(mean): --  RR: 17 (14 Oct 2021 10:30) (12 - 20)  SpO2: 93% (14 Oct 2021 10:30) (93% - 100%)        LABS:                        13.8   12.59 )-----------( 222      ( 14 Oct 2021 06:48 )             40.8     14 Oct 2021 06:48    136    |  101    |  11     ----------------------------<  167    4.0     |  29     |  0.68     Ca    8.8        14 Oct 2021 06:48          CAPILLARY BLOOD GLUCOSE            Cultures        Culture - Tissue with Gram Stain (collected 10-13-21 @ 22:30)  Source: .Tissue Other  Gram Stain (10-14-21 @ 02:41):    Moderate polymorphonuclear leukocytes seen per low power field    No organisms seen per oil power field        RADIOLOGY & ADDITIONAL TESTS:    Imaging Personally Reviewed:  [ ] YES  [ ] NO    Consultant(s) Notes Reviewed:  [ ] YES  [ ] NO    Care Discussed with Consultants/Other Providers [ x] YES  [ ] NO
Patient is a 56y old  Female who presents with a chief complaint of wound infection of nonhealing surgical wound (14 Oct 2021 13:50)      INTERVAL HPI/OVERNIGHT EVENTS:  Pt is seen and examined.  Pain is controlled.    Pain Location & Control:     MEDICATIONS  (STANDING):  enoxaparin Injectable 40 milliGRAM(s) SubCutaneous daily  linezolid  IVPB 600 milliGRAM(s) IV Intermittent every 12 hours  polyethylene glycol 3350 17 Gram(s) Oral at bedtime  senna 2 Tablet(s) Oral at bedtime    MEDICATIONS  (PRN):  acetaminophen   Tablet .. 1000 milliGRAM(s) Oral every 8 hours PRN temp greater or equal to 38.5C (101.3F) and or Mild Pain (1-3)  HYDROmorphone  Injectable 0.5 milliGRAM(s) IV Push every 3 hours PRN breakthrough pain  magnesium hydroxide Suspension 30 milliLiter(s) Oral daily PRN Constipation  ondansetron Injectable 4 milliGRAM(s) IV Push every 6 hours PRN Nausea and/or Vomiting  oxyCODONE    IR 5 milliGRAM(s) Oral every 3 hours PRN Moderate Pain (4 - 6)  oxyCODONE    IR 10 milliGRAM(s) Oral every 3 hours PRN Severe Pain (7 - 10)      Allergies    No Known Allergies    Intolerances        Vital Signs Last 24 Hrs  T(C): 36.7 (15 Oct 2021 05:13), Max: 36.7 (14 Oct 2021 18:13)  T(F): 98 (15 Oct 2021 05:13), Max: 98 (14 Oct 2021 18:13)  HR: 55 (15 Oct 2021 05:13) (55 - 79)  BP: 107/63 (15 Oct 2021 05:13) (107/63 - 138/66)  BP(mean): --  RR: 18 (15 Oct 2021 05:13) (17 - 18)  SpO2: 94% (15 Oct 2021 05:13) (93% - 95%)        LABS:                        12.4   12.22 )-----------( 201      ( 15 Oct 2021 08:13 )             37.1     15 Oct 2021 08:13    139    |  102    |  18     ----------------------------<  116    3.5     |  29     |  0.73     Ca    8.6        15 Oct 2021 08:13      CAPILLARY BLOOD GLUCOSE        Cultures  Culture Results:   No growth (10-13 @ 22:30)  Culture Results:   No growth (10-13 @ 22:27)  Culture Results:   Few Staphylococcus pseudintermedius  "Susceptibilities not performed" (10-13 @ 22:26)        Culture - Tissue with Gram Stain (collected 10-13-21 @ 22:30)  Source: .Tissue Other  Gram Stain (10-14-21 @ 02:41):    Moderate polymorphonuclear leukocytes seen per low power field    No organisms seen per oil power field  Preliminary Report (10-14-21 @ 22:38):    No growth    Culture - Surgical Swab (collected 10-13-21 @ 22:27)  Source: .Surgical Swab left ankle deep wound  Preliminary Report (10-14-21 @ 21:44):    No growth    Culture - Surgical Swab (collected 10-13-21 @ 22:26)  Source: .Surgical Swab left ankle superficial wound  Preliminary Report (10-14-21 @ 22:57):    Few Staphylococcus pseudintermedius    "Susceptibilities not performed"        RADIOLOGY & ADDITIONAL TESTS:    Imaging Personally Reviewed:  [ ] YES  [ ] NO    Consultant(s) Notes Reviewed:  [ ] YES  [ ] NO    Care Discussed with Consultants/Other Providers [ x] YES  [ ] NO

## 2021-10-18 NOTE — DISCHARGE NOTE PROVIDER - NSDCMRMEDTOKEN_GEN_ALL_CORE_FT
acetaminophen 500 mg oral tablet: 2 tab(s) orally every 8 hours  Aspirin Enteric Coated 325 mg oral delayed release tablet: 1 tab orally once a day   omeprazole 20 mg oral delayed release capsule: 1 cap orally once a day while taking aspirin  oxyCODONE 5 mg oral tablet: 1 tab orally every 4 hours as needed for breakthrough pain.   [facility] Ref#[id number]   MDD:6  polyethylene glycol 3350 oral powder for reconstitution: 17 gram(s) orally once a day (at bedtime)  Rolling walker with 5 inch wheels: Dx: s/p I&amp;D left ankle  senna oral tablet: 2 tab(s) orally once a day (at bedtime)  Zyvox 600 mg oral tablet: 1 tab(s) orally every 12 hours

## 2021-10-18 NOTE — PROGRESS NOTE ADULT - ASSESSMENT
57 y/o F with PMHx of obesity, COVID-19, repair of peroneal tendon of left ankle (8/6/21) presented to the hospital for infected nonhealing surgical wound.    #Wound infection of surgical wound  #S/p Peroneal Tendon Repair of Left Ankle (8/6/21)  - s/p I&D with irrigation and wound vac 10/13  - discussed with ID Dr lozano:  change to po Zyvox x additional 10 days for dc  - discussed with ortho FARAZ [place]: pt being discharged home today  - afebrile, leukocytosis resolved  -Drsg and provena per ortho  -pain control per ortho  -Activity per ortho  -Dc home defer to ortho      #Obesity  - BMI 43.3  - MARILOU precautions    #Need for prophylactic measure  - VTE ppx: lovenox subQ    Dispo: defer to ortho

## 2021-10-18 NOTE — DISCHARGE NOTE PROVIDER - NSDCCPCAREPLAN_GEN_ALL_CORE_FT
PRINCIPAL DISCHARGE DIAGNOSIS  Diagnosis: Surgical wound infection  Assessment and Plan of Treatment: NWB Left Lower Extremity  Prevena dressing. Clamp tubing for shower. Remove canister prior to shower. Pat dressing dry after shower. Reconnect tubing to cannister and turn on batter after shower.

## 2021-10-18 NOTE — DISCHARGE NOTE PROVIDER - HOSPITAL COURSE
This patient was admitted to [facility] with a wound infection s/p peroneal tendon repair.  Patient underwent Pre-Surgical Testing and was medically cleared to undergo elective procedure. Patient underwent Left ankle I&D by Dr. James Bose on 10/13/21. Procedure was well tolerated.  No operative or vel-operative complications arose during patient's hospital course.  Patient received antibiotic according to SCIP guidelines for infection prevention.  Lovenox 40mg SQ daily was given for DVT prophylaxis, in addition to the use of SCDs.  Anesthesia, Medical Hospitalist, Physical Therapy and Occupational Therapy were consulted. Patient is stable for discharge with a good prognosis.  Appropriate discharge instructions and medications are provided in this document.

## 2021-10-18 NOTE — DISCHARGE NOTE PROVIDER - PROVIDER TOKENS
PROVIDER:[TOKEN:[[id number]],FOLLOWUP:[1 week],ESTABLISHEDPATIENT:[T]] PROVIDER:[TOKEN:[[id number]],FOLLOWUP:[1 week],ESTABLISHEDPATIENT:[T]],PROVIDER:[TOKEN:[[id number]],FOLLOWUP:[1 week]]

## 2021-10-18 NOTE — DISCHARGE NOTE NURSING/CASE MANAGEMENT/SOCIAL WORK - NSSCNAMETXT_GEN_ALL_CORE
[facility] [phone] or  [phone]  Nurse to visit the day after hospital discharge. Please contact the home care agency at the above phone number if you have not heard from them by 12 noon on the day after your hospital discharge.

## 2021-10-20 ENCOUNTER — APPOINTMENT (OUTPATIENT)
Dept: WOUND CARE | Facility: HOSPITAL | Age: 56
End: 2021-10-20

## 2021-10-20 LAB
CULTURE RESULTS: SIGNIFICANT CHANGE UP
ORGANISM # SPEC MICROSCOPIC CNT: SIGNIFICANT CHANGE UP
ORGANISM # SPEC MICROSCOPIC CNT: SIGNIFICANT CHANGE UP
SPECIMEN SOURCE: SIGNIFICANT CHANGE UP

## 2021-11-04 PROBLEM — E66.01 MORBID (SEVERE) OBESITY DUE TO EXCESS CALORIES: Chronic | Status: ACTIVE | Noted: 2021-10-11

## 2021-11-04 PROBLEM — Z98.890 OTHER SPECIFIED POSTPROCEDURAL STATES: Chronic | Status: ACTIVE | Noted: 2021-10-13

## 2021-11-04 PROBLEM — T81.49XA INFECTION FOLLOWING A PROCEDURE, OTHER SURGICAL SITE, INITIAL ENCOUNTER: Chronic | Status: ACTIVE | Noted: 2021-10-11

## 2021-11-10 ENCOUNTER — RESULT REVIEW (OUTPATIENT)
Age: 56
End: 2021-11-10

## 2021-11-10 ENCOUNTER — OUTPATIENT (OUTPATIENT)
Dept: OUTPATIENT SERVICES | Facility: HOSPITAL | Age: 56
LOS: 1 days | Discharge: HOME | End: 2021-11-10
Payer: COMMERCIAL

## 2021-11-10 ENCOUNTER — APPOINTMENT (OUTPATIENT)
Dept: WOUND CARE | Facility: HOSPITAL | Age: 56
End: 2021-11-10
Payer: COMMERCIAL

## 2021-11-10 VITALS
HEART RATE: 87 BPM | SYSTOLIC BLOOD PRESSURE: 128 MMHG | WEIGHT: 245 LBS | BODY MASS INDEX: 43.41 KG/M2 | HEIGHT: 63 IN | OXYGEN SATURATION: 96 % | TEMPERATURE: 97.8 F | RESPIRATION RATE: 20 BRPM | DIASTOLIC BLOOD PRESSURE: 81 MMHG

## 2021-11-10 DIAGNOSIS — M76.70 PERONEAL TENDINITIS, UNSPECIFIED LEG: ICD-10-CM

## 2021-11-10 DIAGNOSIS — Z78.9 OTHER SPECIFIED HEALTH STATUS: ICD-10-CM

## 2021-11-10 DIAGNOSIS — Z82.49 FAMILY HISTORY OF ISCHEMIC HEART DISEASE AND OTHER DISEASES OF THE CIRCULATORY SYSTEM: ICD-10-CM

## 2021-11-10 DIAGNOSIS — U07.1 COVID-19: ICD-10-CM

## 2021-11-10 DIAGNOSIS — Z90.710 ACQUIRED ABSENCE OF BOTH CERVIX AND UTERUS: Chronic | ICD-10-CM

## 2021-11-10 DIAGNOSIS — L97.301 NON-PRESSURE CHRONIC ULCER OF UNSPECIFIED ANKLE LIMITED TO BREAKDOWN OF SKIN: ICD-10-CM

## 2021-11-10 DIAGNOSIS — Z87.09 PERSONAL HISTORY OF OTHER DISEASES OF THE RESPIRATORY SYSTEM: ICD-10-CM

## 2021-11-10 DIAGNOSIS — Z98.890 OTHER SPECIFIED POSTPROCEDURAL STATES: Chronic | ICD-10-CM

## 2021-11-10 PROCEDURE — 73630 X-RAY EXAM OF FOOT: CPT | Mod: 26,50

## 2021-11-10 PROCEDURE — 99203 OFFICE O/P NEW LOW 30 MIN: CPT

## 2021-11-10 RX ORDER — ELECTROLYTES/DEXTROSE
SOLUTION, ORAL ORAL
Refills: 0 | Status: ACTIVE | COMMUNITY

## 2021-11-10 NOTE — REVIEW OF SYSTEMS
[Joint Stiffness] : joint stiffness [Skin Wound] : skin wound [Negative] : Heme/Lymph [Fever] : no fever [Chills] : no chills [Eye Pain] : no eye pain [Loss Of Hearing] : no hearing loss [Abdominal Pain] : no abdominal pain [Vomiting] : no vomiting [Anxiety] : no anxiety [de-identified] : dehiscence of surgical wound lateral left foot /ankle  [de-identified] : Possible prediabetic , elevated HgA1c and blood glucose , BMI 43 ^

## 2021-11-10 NOTE — PLAN
[FreeTextEntry1] : Patient to see her PCP for elevated glucose ( BMI 43) , seeing Dr Beltran for ID consults , culture was taken today and patient sent for x rays and vascular studies . Wound care with silver alginate . PTR 1 week Spent 35  minutes for patient care and medical decision making.\par

## 2021-11-10 NOTE — PHYSICAL EXAM
[1+] : left 1+ [Ankle Swelling (On Exam)] : present [Ankle Swelling Bilaterally] : bilaterally  [Ankle Swelling On The Left] : moderate [Alert] : alert [Oriented to Person] : oriented to person [Oriented to Place] : oriented to place [Oriented to Time] : oriented to time [Calm] : calm [4 x 4] : 4 x 4  [Varicose Veins Of Lower Extremities] : not present [Purpura] : no purpura  [Petechiae] : no petechiae [Skin Ulcer] : no ulcer [de-identified] : calm [de-identified] : wnl [de-identified] : BMI 43 [de-identified] : Dehiscence of surgical wound lateral left foot/ankle , skin, subcutaneous, fat and fascia  [de-identified] : wnl [de-identified] : DPM removed tissue for culture [FreeTextEntry1] : Left lateral ankle - incision line [FreeTextEntry2] : 1.4 [FreeTextEntry3] : 0.3 [FreeTextEntry4] : 1.0 [de-identified] : small serosanguineous [de-identified] : intact [de-identified] : none [de-identified] : 20% [de-identified] : 80% [de-identified] : Alginate Ag [de-identified] : NSC [de-identified] : Every other day

## 2021-11-10 NOTE — VITALS
[Pain related to present condition?] : The patient's  pain is not related to present condition. [de-identified] : Pain scale:  0/10 - Patient reports no c/o pains or discomforts at present.   Left ankle is tender to the touch.

## 2021-11-10 NOTE — HISTORY OF PRESENT ILLNESS
[FreeTextEntry1] : Patient presents with a surgical wound on left lateral ankle. Patient reports that in late May, 2021, she tore her tendon but does not know how she tore tendon denying trauma to area. Patient was tx by Dr. Bose, Orthopedic Surgeon, with offloading boot for 2 months. Patient reports she developed perineal tendinitis and  Dr. Bose did surgical tendon repair on 8/6/21. In early 10/2021 the site became infected and patient was tx at [facility], 10/13/21 - 10/18/21 with IV antibiotics.   While in [facility],  Dr. Bose did excisional debridement and secondary closure. Patient was discharged home with oral antibiotic (completed). Patient saw Dr. Bose last Tuesday and he removed the sutures. Dr. Bose referred patient to [facility]. Patient presents with small dehiscence on incision line.

## 2021-11-10 NOTE — ASSESSMENT
[Verbal] : Verbal [Patient] : Patient [Good - alert, interested, motivated] : Good - alert, interested, motivated [Verbalizes knowledge/Understanding] : Verbalizes knowledge/understanding [Dressing changes] : dressing changes [Foot Care] : foot care [Skin Care] : skin care [Signs and symptoms of infection] : sign and symptoms of infection [How and When to Call] : how and when to call [Off-loading] : off-loading [Patient responsibility to plan of care] : patient responsibility to plan of care [] : Yes [Labs and Tests] : labs and tests [Stable] : stable [Home] : Home [Ambulatory] : Ambulatory [Not Applicable - Long Term Care/Home Health Agency] : Long Term Care/Home Health Agency: Not Applicable [FreeTextEntry2] : Promote optimal skin integrity, offloading, infection prevention, edema control, diagnostic testing\par  [FreeTextEntry4] : \par Circulation:\par \par Dorsalis Pedis:  bilateral palpable\par Posterior Tibialis:  bilateral unable to palpate\par Doppler Pulses:  bilateral  present \par Extremity Color:  bilateral  pigmented\par Extremity Temperature:  bilateral warm\par Capillary Refill:  bilateral <3 sec\par \par Non-invasive vascular studies (arterial) ordered - submitted for authorization\par Xray of left ankle to be done today at [facility]\par Tissue culture done\par Patient has an appointment with Dr. Kim, ID, tomorrow, and requested culture results be faxed to Dr. Kim  ([phone] / [phone])\par Patient has an appointment on Saturday with her PCP to evaluate blood glucose status.\par f/u 1 week\par

## 2021-11-11 DIAGNOSIS — T81.31XD DISRUPTION OF EXTERNAL OPERATION (SURGICAL) WOUND, NOT ELSEWHERE CLASSIFIED, SUBSEQUENT ENCOUNTER: ICD-10-CM

## 2021-11-11 DIAGNOSIS — Z86.16 PERSONAL HISTORY OF COVID-19: ICD-10-CM

## 2021-11-11 DIAGNOSIS — Z85.038 PERSONAL HISTORY OF OTHER MALIGNANT NEOPLASM OF LARGE INTESTINE: ICD-10-CM

## 2021-11-11 DIAGNOSIS — Z98.890 OTHER SPECIFIED POSTPROCEDURAL STATES: ICD-10-CM

## 2021-11-11 DIAGNOSIS — Z90.710 ACQUIRED ABSENCE OF BOTH CERVIX AND UTERUS: ICD-10-CM

## 2021-11-11 DIAGNOSIS — Z82.49 FAMILY HISTORY OF ISCHEMIC HEART DISEASE AND OTHER DISEASES OF THE CIRCULATORY SYSTEM: ICD-10-CM

## 2021-11-11 DIAGNOSIS — Z79.899 OTHER LONG TERM (CURRENT) DRUG THERAPY: ICD-10-CM

## 2021-11-11 DIAGNOSIS — Z79.51 LONG TERM (CURRENT) USE OF INHALED STEROIDS: ICD-10-CM
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
## 2021-11-11 DIAGNOSIS — Z86.010 PERSONAL HISTORY OF COLONIC POLYPS: ICD-10-CM

## 2021-11-15 ENCOUNTER — RESULT REVIEW (OUTPATIENT)
Age: 56
End: 2021-11-15

## 2021-11-15 PROCEDURE — 93923 UPR/LXTR ART STDY 3+ LVLS: CPT

## 2021-11-15 PROCEDURE — 87075 CULTR BACTERIA EXCEPT BLOOD: CPT

## 2021-11-15 PROCEDURE — 87070 CULTURE OTHR SPECIMN AEROBIC: CPT

## 2021-11-15 PROCEDURE — 73630 X-RAY EXAM OF FOOT: CPT

## 2021-11-15 PROCEDURE — G0463: CPT

## 2021-11-15 PROCEDURE — 93922 UPR/L XTREMITY ART 2 LEVELS: CPT | Mod: 26

## 2021-11-15 PROCEDURE — 87077 CULTURE AEROBIC IDENTIFY: CPT

## 2021-11-17 ENCOUNTER — APPOINTMENT (OUTPATIENT)
Dept: WOUND CARE | Facility: HOSPITAL | Age: 56
End: 2021-11-17
Payer: COMMERCIAL

## 2021-11-17 ENCOUNTER — OUTPATIENT (OUTPATIENT)
Dept: OUTPATIENT SERVICES | Facility: HOSPITAL | Age: 56
LOS: 1 days | Discharge: HOME | End: 2021-11-17
Payer: COMMERCIAL

## 2021-11-17 VITALS
OXYGEN SATURATION: 97 % | BODY MASS INDEX: 43.41 KG/M2 | TEMPERATURE: 97.6 F | HEART RATE: 73 BPM | HEIGHT: 63 IN | SYSTOLIC BLOOD PRESSURE: 145 MMHG | DIASTOLIC BLOOD PRESSURE: 78 MMHG | RESPIRATION RATE: 18 BRPM | WEIGHT: 245 LBS

## 2021-11-17 DIAGNOSIS — Z98.890 OTHER SPECIFIED POSTPROCEDURAL STATES: Chronic | ICD-10-CM

## 2021-11-17 DIAGNOSIS — Z90.710 ACQUIRED ABSENCE OF BOTH CERVIX AND UTERUS: Chronic | ICD-10-CM

## 2021-11-17 DIAGNOSIS — L97.301 NON-PRESSURE CHRONIC ULCER OF UNSPECIFIED ANKLE LIMITED TO BREAKDOWN OF SKIN: ICD-10-CM

## 2021-11-17 PROCEDURE — 71046 X-RAY EXAM CHEST 2 VIEWS: CPT

## 2021-11-17 PROCEDURE — G0463: CPT

## 2021-11-17 PROCEDURE — 99213 OFFICE O/P EST LOW 20 MIN: CPT

## 2021-11-17 PROCEDURE — 71046 X-RAY EXAM CHEST 2 VIEWS: CPT | Mod: 26

## 2021-11-17 NOTE — PHYSICAL EXAM
[4 x 4] : 4 x 4  [1+] : left 1+ [Ankle Swelling (On Exam)] : present [Ankle Swelling Bilaterally] : bilaterally  [Ankle Swelling On The Left] : moderate [Varicose Veins Of Lower Extremities] : bilaterally [Purpura] : no purpura  [Petechiae] : no petechiae [Skin Ulcer] : no ulcer [Alert] : alert [Oriented to Person] : oriented to person [Oriented to Place] : oriented to place [Oriented to Time] : oriented to time [Calm] : calm [de-identified] : calm [de-identified] : wnl [de-identified] : BMI 43 [de-identified] : Dehiscence of surgical wound lateral left foot/ankle , skin, subcutaneous, fat and fascia to tendon , considered DFU 3  [de-identified] : wnl [FreeTextEntry1] : Left lateral ankle  [FreeTextEntry2] : 1.4 [FreeTextEntry3] : 0.3 [FreeTextEntry4] : 2.0 [de-identified] : Alginate Ag [TWNoteComboBox4] : Small [TWNoteComboBox6] : False [de-identified] : Normal [de-identified] : >75% [de-identified] : 3x Weekly [de-identified] : Ace wraps

## 2021-11-17 NOTE — VITALS
[Pain related to present condition?] : The patient's  pain is related to present condition. [Sharp] : sharp [] : Yes [de-identified] : upon palpation

## 2021-11-17 NOTE — ASSESSMENT
[Verbal] : Verbal [Written] : Written [Patient] : Patient [Good - alert, interested, motivated] : Good - alert, interested, motivated [Demonstrates independently] : demonstrates independently [Dressing changes] : dressing changes [Foot Care] : foot care [Signs and symptoms of infection] : sign and symptoms of infection [Nutrition] : nutrition [How and When to Call] : how and when to call [Off-loading] : off-loading [Compression Therapy] : compression therapy [Home Health] : home health [Patient responsibility to plan of care] : patient responsibility to plan of care [Glycemic Control] : glycemic control [Stable] : stable [Home] : Home [Ambulatory] : Ambulatory [Not Applicable - Long Term Care/Home Health Agency] : Long Term Care/Home Health Agency: Not Applicable [] : No [FreeTextEntry2] : Infection prevention \par Glycemic control\par Dressing changes \par HBOT\par Compression  [FreeTextEntry3] : Adjunctive HBOT to be initiated\par  [FreeTextEntry4] : DPM review, XRAY, culture, and vascular studies, all possess unremarkable findings. \par Auth submitted for HBOT\par CXR to be completed today \par Pt has existing BW\par

## 2021-11-17 NOTE — PLAN
[FreeTextEntry1] : Reviewed vascular , MRI and x rays with patient , patient recently deemed Diabetic and is now taking metformin . The ulceration and delayed in healing is related to the patient diabetic status . Patient would benefit from HBOT to help close the wound and prevent further complications and decrease the chances of limb loss . Patient agrees with the plan and is aware of the risks and the benefits Spent 20 minutes for patient care and medical decision making.\par

## 2021-11-17 NOTE — REVIEW OF SYSTEMS
[Fever] : no fever [Chills] : no chills [Eye Pain] : no eye pain [Loss Of Hearing] : no hearing loss [Abdominal Pain] : no abdominal pain [Vomiting] : no vomiting [Joint Stiffness] : joint stiffness [Skin Wound] : skin wound [Anxiety] : no anxiety [Negative] : Heme/Lymph [de-identified] : dehiscence of surgical wound lateral left foot /ankle , with DFU 3 lateral left foot  [de-identified] : Possible prediabetic , elevated HgA1c and blood glucose , BMI 43 ^, patient now taking Metformin

## 2021-11-17 NOTE — HISTORY OF PRESENT ILLNESS
[FreeTextEntry1] : Patient diagnosed as NIDDM and is taking Metformin ,she has a DFU 3 that probes to tendon on the left lateral ankle , she is currently taking Linezolid for the bacteria cultured in the wound

## 2021-11-18 DIAGNOSIS — Z82.49 FAMILY HISTORY OF ISCHEMIC HEART DISEASE AND OTHER DISEASES OF THE CIRCULATORY SYSTEM: ICD-10-CM

## 2021-11-18 DIAGNOSIS — E11.622 TYPE 2 DIABETES MELLITUS WITH OTHER SKIN ULCER: ICD-10-CM

## 2021-11-18 DIAGNOSIS — Z86.010 PERSONAL HISTORY OF COLONIC POLYPS: ICD-10-CM

## 2021-11-18 DIAGNOSIS — Z85.038 PERSONAL HISTORY OF OTHER MALIGNANT NEOPLASM OF LARGE INTESTINE: ICD-10-CM

## 2021-11-18 DIAGNOSIS — Z90.710 ACQUIRED ABSENCE OF BOTH CERVIX AND UTERUS: ICD-10-CM

## 2021-11-18 DIAGNOSIS — L97.323 NON-PRESSURE CHRONIC ULCER OF LEFT ANKLE WITH NECROSIS OF MUSCLE: ICD-10-CM

## 2021-11-18 DIAGNOSIS — Z79.51 LONG TERM (CURRENT) USE OF INHALED STEROIDS: ICD-10-CM

## 2021-11-18 DIAGNOSIS — Z79.899 OTHER LONG TERM (CURRENT) DRUG THERAPY: ICD-10-CM

## 2021-11-18 DIAGNOSIS — Z86.16 PERSONAL HISTORY OF COVID-19: ICD-10-CM

## 2021-11-18 DIAGNOSIS — Z98.890 OTHER SPECIFIED POSTPROCEDURAL STATES: ICD-10-CM

## 2021-11-26 ENCOUNTER — OUTPATIENT (OUTPATIENT)
Dept: OUTPATIENT SERVICES | Facility: HOSPITAL | Age: 56
LOS: 1 days | Discharge: HOME | End: 2021-11-26
Payer: COMMERCIAL

## 2021-11-26 ENCOUNTER — APPOINTMENT (OUTPATIENT)
Dept: WOUND CARE | Facility: HOSPITAL | Age: 56
End: 2021-11-26

## 2021-11-26 VITALS
OXYGEN SATURATION: 96 % | SYSTOLIC BLOOD PRESSURE: 146 MMHG | HEART RATE: 77 BPM | RESPIRATION RATE: 20 BRPM | DIASTOLIC BLOOD PRESSURE: 83 MMHG

## 2021-11-26 DIAGNOSIS — Z90.710 ACQUIRED ABSENCE OF BOTH CERVIX AND UTERUS: Chronic | ICD-10-CM

## 2021-11-26 DIAGNOSIS — Z00.00 ENCOUNTER FOR GENERAL ADULT MEDICAL EXAMINATION WITHOUT ABNORMAL FINDINGS: ICD-10-CM

## 2021-11-26 DIAGNOSIS — E11.622 TYPE 2 DIABETES MELLITUS WITH OTHER SKIN ULCER: ICD-10-CM

## 2021-11-26 DIAGNOSIS — Z98.890 OTHER SPECIFIED POSTPROCEDURAL STATES: Chronic | ICD-10-CM

## 2021-11-26 LAB — SARS-COV-2 RNA SPEC QL NAA+PROBE: SIGNIFICANT CHANGE UP

## 2021-11-26 PROCEDURE — G0463: CPT

## 2021-11-26 PROCEDURE — U0005: CPT

## 2021-11-26 PROCEDURE — U0003: CPT

## 2021-11-27 DIAGNOSIS — Z20.822 CONTACT WITH AND (SUSPECTED) EXPOSURE TO COVID-19: ICD-10-CM

## 2021-11-29 ENCOUNTER — NON-APPOINTMENT (OUTPATIENT)
Age: 56
End: 2021-11-29

## 2021-11-30 ENCOUNTER — OUTPATIENT (OUTPATIENT)
Dept: OUTPATIENT SERVICES | Facility: HOSPITAL | Age: 56
LOS: 1 days | Discharge: HOME | End: 2021-11-30
Payer: COMMERCIAL

## 2021-11-30 ENCOUNTER — APPOINTMENT (OUTPATIENT)
Dept: HYPERBARIC MEDICINE | Facility: HOSPITAL | Age: 56
End: 2021-11-30
Payer: COMMERCIAL

## 2021-11-30 VITALS
OXYGEN SATURATION: 99 % | RESPIRATION RATE: 18 BRPM | DIASTOLIC BLOOD PRESSURE: 74 MMHG | TEMPERATURE: 98.2 F | HEART RATE: 72 BPM | SYSTOLIC BLOOD PRESSURE: 134 MMHG

## 2021-11-30 VITALS
SYSTOLIC BLOOD PRESSURE: 155 MMHG | RESPIRATION RATE: 18 BRPM | HEART RATE: 80 BPM | TEMPERATURE: 97.3 F | OXYGEN SATURATION: 98 % | DIASTOLIC BLOOD PRESSURE: 76 MMHG

## 2021-11-30 DIAGNOSIS — Z98.890 OTHER SPECIFIED POSTPROCEDURAL STATES: Chronic | ICD-10-CM

## 2021-11-30 DIAGNOSIS — E11.622 TYPE 2 DIABETES MELLITUS WITH OTHER SKIN ULCER: ICD-10-CM

## 2021-11-30 DIAGNOSIS — L97.323 NON-PRESSURE CHRONIC ULCER OF LEFT ANKLE WITH NECROSIS OF MUSCLE: ICD-10-CM

## 2021-11-30 DIAGNOSIS — Z90.710 ACQUIRED ABSENCE OF BOTH CERVIX AND UTERUS: Chronic | ICD-10-CM

## 2021-11-30 PROCEDURE — 82962 GLUCOSE BLOOD TEST: CPT

## 2021-11-30 PROCEDURE — 99183 HYPERBARIC OXYGEN THERAPY: CPT

## 2021-11-30 PROCEDURE — G0277: CPT

## 2021-12-01 ENCOUNTER — APPOINTMENT (OUTPATIENT)
Dept: HYPERBARIC MEDICINE | Facility: HOSPITAL | Age: 56
End: 2021-12-01
Payer: COMMERCIAL

## 2021-12-01 ENCOUNTER — NON-APPOINTMENT (OUTPATIENT)
Age: 56
End: 2021-12-01

## 2021-12-01 ENCOUNTER — OUTPATIENT (OUTPATIENT)
Dept: OUTPATIENT SERVICES | Facility: HOSPITAL | Age: 56
LOS: 1 days | Discharge: HOME | End: 2021-12-01
Payer: COMMERCIAL

## 2021-12-01 VITALS
RESPIRATION RATE: 18 BRPM | DIASTOLIC BLOOD PRESSURE: 75 MMHG | HEART RATE: 66 BPM | OXYGEN SATURATION: 100 % | TEMPERATURE: 98.1 F | SYSTOLIC BLOOD PRESSURE: 129 MMHG

## 2021-12-01 VITALS
HEART RATE: 83 BPM | DIASTOLIC BLOOD PRESSURE: 79 MMHG | TEMPERATURE: 97.4 F | SYSTOLIC BLOOD PRESSURE: 149 MMHG | RESPIRATION RATE: 18 BRPM | OXYGEN SATURATION: 98 %

## 2021-12-01 DIAGNOSIS — E11.622 TYPE 2 DIABETES MELLITUS WITH OTHER SKIN ULCER: ICD-10-CM

## 2021-12-01 DIAGNOSIS — Z98.890 OTHER SPECIFIED POSTPROCEDURAL STATES: Chronic | ICD-10-CM

## 2021-12-01 DIAGNOSIS — L97.323 NON-PRESSURE CHRONIC ULCER OF LEFT ANKLE WITH NECROSIS OF MUSCLE: ICD-10-CM

## 2021-12-01 DIAGNOSIS — Z90.710 ACQUIRED ABSENCE OF BOTH CERVIX AND UTERUS: Chronic | ICD-10-CM

## 2021-12-01 PROCEDURE — 82962 GLUCOSE BLOOD TEST: CPT

## 2021-12-01 PROCEDURE — 99183 HYPERBARIC OXYGEN THERAPY: CPT

## 2021-12-01 PROCEDURE — G0277: CPT

## 2021-12-01 NOTE — PROCEDURE
[Outpatient] : Outpatient [Ambulatory] : Patient is ambulatory. [THIS CHAMBER HAS BEEN CLEANED / DISINFECTED] : This chamber has been cleaned / disinfected according to local and hospital policy and procedure prior to this treatment. [Patient demonstrated and verbalized proper technique for using air break mask] : Patient demonstrated and verbalized proper technique for using air break mask [Patient educated on the risks of SMOKING prior to HBOT with understanding] : Patient educated on the risks of SMOKING prior to HBOT with understanding [Patient educated on the risks of CONSUMING ALCOHOL prior to HBOT with understanding] : Patient educated on the risks of CONSUMING ALCOHOL prior to HBOT with understanding [100% Cotton] : 100% cotton [Empty all pockets] : empty all pockets [No hair oils, wigs, hairpieces, pins] : no hair oils, wigs, hairpieces, pins  [Pre tx medications] : pre tx medications  [No make-up, creams] : no make-up, creams  [No jewelry] : no jewelry  [No matches, cigarettes, lighters] : no matches, cigarettes, lighters  [Hearing aid removed] : hearing aid removed [Dentures removed] : dentures removed [Ground bracelet on pt's wrist] : ground bracelet on pt's wrist  [Contacts removed] : contacts removed  [Remove nail polish] : remove nail polish  [No reading material] : no reading material  [Bra, undergarments removed] : bra, undergarments removed  [No contraindicated dressings] : no contraindicated dressings [Ground Wire - VISUAL Verification - Intact/Free of Obstruction] : Ground Wire - VISUAL Verification - Intact/Free of Obstruction  [Ground Continuity - Verified < 1 ohm w/ Wrist Strap Enrrique] : Ground Continuity - Verified < 1 ohm w/ Wrist Strap Enrrique [Number: ___] : Number: [unfilled] [Diagnosis: ___] : Diagnosis: [unfilled] [____] : Post-Dive: Time - [unfilled] [___] : Post-Dive: Value - [unfilled] mg/dL [Clear all fields] : clear all fields [] : No [FreeTextEntry3] : 90 [FreeTextEntry5] : 1779 [[facility]] : 4963 [FreeTextEntry9] : 1840 [de-identified] : 1852 [de-identified] : 108 MINUTES  [de-identified] : RODNEY

## 2021-12-01 NOTE — ADDENDUM
[FreeTextEntry1] : PT ARRIVED A&OX3 \par ALL VITALS WITHIN PARAMETERS FOR HBOT\par SIGN OF INFECTED NOTED AT PATIENT WOUND SITE\par RN NOTIFIED, AS WELL AS MD\par MD ADVISED PATIENT TO SEE INFECTIOUS DISEASE \par PT WAS CLEARED TO BEGIN HBOT \par PT DESCENDED TO 2.0 RAIMUNDO @ 1.75 PSI/MIN IN CHAMBER #4 WITHOUT INCIDENT\par PT RESTING AT TX DEPTH WITH VISIBLE CHEST RISE AND FALL OBSERVED CHAMBER SIDE \par PT ASCENDED FROM 2.0 RAIMUNDO @ 1.75 PSI/MIN WITHOUT INCIDENT\par PT TOLERATED TX WELL

## 2021-12-02 ENCOUNTER — OUTPATIENT (OUTPATIENT)
Dept: OUTPATIENT SERVICES | Facility: HOSPITAL | Age: 56
LOS: 1 days | Discharge: HOME | End: 2021-12-02
Payer: COMMERCIAL

## 2021-12-02 ENCOUNTER — APPOINTMENT (OUTPATIENT)
Dept: HYPERBARIC MEDICINE | Facility: HOSPITAL | Age: 56
End: 2021-12-02
Payer: COMMERCIAL

## 2021-12-02 VITALS
TEMPERATURE: 98.7 F | WEIGHT: 245 LBS | HEIGHT: 63 IN | SYSTOLIC BLOOD PRESSURE: 154 MMHG | DIASTOLIC BLOOD PRESSURE: 85 MMHG | BODY MASS INDEX: 43.41 KG/M2 | HEART RATE: 85 BPM | OXYGEN SATURATION: 99 %

## 2021-12-02 DIAGNOSIS — Z90.710 ACQUIRED ABSENCE OF BOTH CERVIX AND UTERUS: Chronic | ICD-10-CM

## 2021-12-02 DIAGNOSIS — Z98.890 OTHER SPECIFIED POSTPROCEDURAL STATES: Chronic | ICD-10-CM

## 2021-12-02 DIAGNOSIS — E11.622 TYPE 2 DIABETES MELLITUS WITH OTHER SKIN ULCER: ICD-10-CM

## 2021-12-02 PROCEDURE — G0463: CPT

## 2021-12-02 PROCEDURE — 99213 OFFICE O/P EST LOW 20 MIN: CPT

## 2021-12-02 NOTE — HISTORY OF PRESENT ILLNESS
[FreeTextEntry1] : Patient diagnosed as NIDDM and is taking Metformin ,she has a DFU 3 that probes to tendon on the left lateral ankle, healed at this time but with new swelling and redness. Patient relates that her pain has gradually increased.

## 2021-12-02 NOTE — REVIEW OF SYSTEMS
[Fever] : no fever [Chills] : no chills [Eye Pain] : no eye pain [Loss Of Hearing] : no hearing loss [Abdominal Pain] : no abdominal pain [Vomiting] : no vomiting [Joint Stiffness] : joint stiffness [Skin Wound] : skin wound [Anxiety] : no anxiety [Negative] : Heme/Lymph [de-identified] : dehiscence of surgical wound lateral left foot /ankle , with DFU 3 lateral left foot  [de-identified] : Possible prediabetic , elevated HgA1c and blood glucose , BMI 43 ^, patient now taking Metformin

## 2021-12-02 NOTE — PLAN
[FreeTextEntry1] : Patient examined and evaluated at this time.\par Will auth for mri of the left ankle.\par Will continue HBOT at this time.\par Patient is at risk for infection, sepsis, limb loss, and death.\par Spent 20 minutes for patient care and medical decision making.\par Patient to follow up in 1 week.\par

## 2021-12-02 NOTE — ADDENDUM
[FreeTextEntry1] : PT ARRIVED A&OX3 \par ALL VITALS WITHIN PARAMETERS WITH THE EXCEPTION OF BGL\par PT WAS GIVEN 16 GRAMS OF SUGAR VIA 2 JUICE\par PT WAS RETESTED AND NOT FOUND TO BE WITHIN ACCEPTABLE LIMITS\par MD NOTIFIED\par PT WAS SENT INTO THE CHAMBER, CHEWING 1 GLUCOSE TABLET EVERY 20 MINUTES\par PT DESCENDED TO 2.0 RAIMUNDO @ 1.75 PSI/MIN IN CHAMBER #2 WITHOUT INCIDENT\par PT RESTING AT TX DEPTH  WITH VISIBLE CHEST RISE AND FALL OBSERVED CHAMBER SIDE \par PT ASCENDED  FROM 2.0 RAIMUNDO @ 1.75 PSI/MIN WITHOUT INCIDENT\par PT TOLERATED TX WELL

## 2021-12-02 NOTE — ASSESSMENT
[Verbal] : Verbal [Written] : Written [Patient] : Patient [Good - alert, interested, motivated] : Good - alert, interested, motivated [Demonstrates independently] : demonstrates independently [Dressing changes] : dressing changes [Foot Care] : foot care [Signs and symptoms of infection] : sign and symptoms of infection [Nutrition] : nutrition [How and When to Call] : how and when to call [Off-loading] : off-loading [Compression Therapy] : compression therapy [Home Health] : home health [Patient responsibility to plan of care] : patient responsibility to plan of care [Glycemic Control] : glycemic control [Stable] : stable [Home] : Home [Ambulatory] : Ambulatory [Not Applicable - Long Term Care/Home Health Agency] : Long Term Care/Home Health Agency: Not Applicable [] : Yes [FreeTextEntry2] : MRI\par Compression Therapy \par Localized Wound Care \par HBOT\par Infection Prevention \par Restore Optimal Skin Integrity  [FreeTextEntry3] : \par  [FreeTextEntry4] : Pt completed 2/30 HBOT\par Pt has appointment to see Dr. Jorge tomorrow.\par Submitted for Authorization for an MRI to r/o Abscess or Osteomyelitis \par Pt to F/U to [facility] daily for HBOT & 1 Week for Assessment

## 2021-12-02 NOTE — PROCEDURE
[Outpatient] : Outpatient [Ambulatory] : Patient is ambulatory. [THIS CHAMBER HAS BEEN CLEANED / DISINFECTED] : This chamber has been cleaned / disinfected according to local and hospital policy and procedure prior to this treatment. [Patient demonstrated and verbalized proper technique for using air break mask] : Patient demonstrated and verbalized proper technique for using air break mask [Patient educated on the risks of SMOKING prior to HBOT with understanding] : Patient educated on the risks of SMOKING prior to HBOT with understanding [Patient educated on the risks of CONSUMING ALCOHOL prior to HBOT with understanding] : Patient educated on the risks of CONSUMING ALCOHOL prior to HBOT with understanding [100% Cotton] : 100% cotton [Empty all pockets] : empty all pockets [No hair oils, wigs, hairpieces, pins] : no hair oils, wigs, hairpieces, pins  [Pre tx medications] : pre tx medications  [No make-up, creams] : no make-up, creams  [No jewelry] : no jewelry  [No matches, cigarettes, lighters] : no matches, cigarettes, lighters  [Hearing aid removed] : hearing aid removed [Dentures removed] : dentures removed [Ground bracelet on pt's wrist] : ground bracelet on pt's wrist  [Contacts removed] : contacts removed  [Remove nail polish] : remove nail polish  [No reading material] : no reading material  [Bra, undergarments removed] : bra, undergarments removed  [No contraindicated dressings] : no contraindicated dressings [Ground Wire - VISUAL Verification - Intact/Free of Obstruction] : Ground Wire - VISUAL Verification - Intact/Free of Obstruction  [Ground Continuity - Verified < 1 ohm w/ Wrist Strap Enrrique] : Ground Continuity - Verified < 1 ohm w/ Wrist Strap Enrrique [Number: ___] : Number: [unfilled] [Diagnosis: ___] : Diagnosis: [unfilled] [____] : Post-Dive: Time - [unfilled] [___] : Post-Dive: Value - [unfilled] mg/dL [Clear all fields] : clear all fields [] : No [FreeTextEntry3] : 90 [FreeTextEntry5] : 1712 [FreeTextEntry7] : 1812 [FreeTextEntry9] : 1949 [de-identified] : 1958 [de-identified] : 108 MINUTES

## 2021-12-02 NOTE — PHYSICAL EXAM
[4 x 4] : 4 x 4  [1+] : left 1+ [Ankle Swelling (On Exam)] : present [Ankle Swelling Bilaterally] : bilaterally  [Ankle Swelling On The Left] : moderate [Varicose Veins Of Lower Extremities] : bilaterally [Purpura] : no purpura  [Petechiae] : no petechiae [Skin Ulcer] : no ulcer [Alert] : alert [Oriented to Person] : oriented to person [Oriented to Place] : oriented to place [Oriented to Time] : oriented to time [Calm] : calm [de-identified] : calm [de-identified] : wnl [de-identified] : BMI 43 [de-identified] : Left DFU 3 lateral left foot/ankle , skin, subcutaneous, fat and fascia to tendon epithelialized with swelling and mild erythema. [de-identified] : wnl [FreeTextEntry1] : Left lateral ankle  [FreeTextEntry2] : 0.5 [FreeTextEntry3] : 0.3 [FreeTextEntry4] : dry eschar [de-identified] : edematous- mild erythema [de-identified] : Dry Dressing  [de-identified] : Cleansed with Normal Saline. \par \par Dry eschar removed- fragile epithelium beneath  [TWNoteComboBox4] : Small [de-identified] : other [de-identified] : >75% [de-identified] : Ace wraps [de-identified] : 3x Weekly

## 2021-12-03 ENCOUNTER — APPOINTMENT (OUTPATIENT)
Dept: HYPERBARIC MEDICINE | Facility: HOSPITAL | Age: 56
End: 2021-12-03
Payer: COMMERCIAL

## 2021-12-03 ENCOUNTER — NON-APPOINTMENT (OUTPATIENT)
Age: 56
End: 2021-12-03

## 2021-12-03 ENCOUNTER — OUTPATIENT (OUTPATIENT)
Dept: OUTPATIENT SERVICES | Facility: HOSPITAL | Age: 56
LOS: 1 days | Discharge: HOME | End: 2021-12-03
Payer: COMMERCIAL

## 2021-12-03 VITALS
SYSTOLIC BLOOD PRESSURE: 147 MMHG | DIASTOLIC BLOOD PRESSURE: 86 MMHG | OXYGEN SATURATION: 96 % | HEART RATE: 87 BPM | RESPIRATION RATE: 20 BRPM

## 2021-12-03 VITALS
HEART RATE: 82 BPM | SYSTOLIC BLOOD PRESSURE: 149 MMHG | RESPIRATION RATE: 18 BRPM | DIASTOLIC BLOOD PRESSURE: 79 MMHG | OXYGEN SATURATION: 100 % | TEMPERATURE: 99 F

## 2021-12-03 DIAGNOSIS — T81.31XD DISRUPTION OF EXTERNAL OPERATION (SURGICAL) WOUND, NOT ELSEWHERE CLASSIFIED, SUBSEQUENT ENCOUNTER: ICD-10-CM

## 2021-12-03 DIAGNOSIS — E11.622 TYPE 2 DIABETES MELLITUS WITH OTHER SKIN ULCER: ICD-10-CM

## 2021-12-03 DIAGNOSIS — Z98.890 OTHER SPECIFIED POSTPROCEDURAL STATES: Chronic | ICD-10-CM

## 2021-12-03 DIAGNOSIS — Z90.710 ACQUIRED ABSENCE OF BOTH CERVIX AND UTERUS: Chronic | ICD-10-CM

## 2021-12-03 DIAGNOSIS — L97.323 NON-PRESSURE CHRONIC ULCER OF LEFT ANKLE WITH NECROSIS OF MUSCLE: ICD-10-CM

## 2021-12-03 PROCEDURE — U0005: CPT

## 2021-12-03 PROCEDURE — U0003: CPT

## 2021-12-03 PROCEDURE — 99183 HYPERBARIC OXYGEN THERAPY: CPT

## 2021-12-03 PROCEDURE — G0277: CPT

## 2021-12-03 PROCEDURE — 82962 GLUCOSE BLOOD TEST: CPT

## 2021-12-03 NOTE — REVIEW OF SYSTEMS
[Negative] : Heme/Lymph [FreeTextEntry9] : leg swelling  [de-identified] : healed wound in left ankle no open wound or discharge

## 2021-12-03 NOTE — PHYSICAL EXAM
[FreeTextEntry1] : left lower leg with swelling but no warmth or tenderness, wound healed, no opening or discharge.

## 2021-12-03 NOTE — REVIEW OF SYSTEMS
[Negative] : Heme/Lymph [FreeTextEntry9] : leg swelling  [de-identified] : healed wound in left ankle no open wound or discharge

## 2021-12-04 DIAGNOSIS — Z85.038 PERSONAL HISTORY OF OTHER MALIGNANT NEOPLASM OF LARGE INTESTINE: ICD-10-CM

## 2021-12-04 DIAGNOSIS — Z86.16 PERSONAL HISTORY OF COVID-19: ICD-10-CM

## 2021-12-04 DIAGNOSIS — Z98.890 OTHER SPECIFIED POSTPROCEDURAL STATES: ICD-10-CM

## 2021-12-04 DIAGNOSIS — Z79.51 LONG TERM (CURRENT) USE OF INHALED STEROIDS: ICD-10-CM

## 2021-12-04 DIAGNOSIS — Z86.010 PERSONAL HISTORY OF COLONIC POLYPS: ICD-10-CM

## 2021-12-04 DIAGNOSIS — Z79.899 OTHER LONG TERM (CURRENT) DRUG THERAPY: ICD-10-CM

## 2021-12-04 DIAGNOSIS — Z82.49 FAMILY HISTORY OF ISCHEMIC HEART DISEASE AND OTHER DISEASES OF THE CIRCULATORY SYSTEM: ICD-10-CM

## 2021-12-04 DIAGNOSIS — E11.622 TYPE 2 DIABETES MELLITUS WITH OTHER SKIN ULCER: ICD-10-CM

## 2021-12-04 DIAGNOSIS — Z90.710 ACQUIRED ABSENCE OF BOTH CERVIX AND UTERUS: ICD-10-CM

## 2021-12-04 DIAGNOSIS — L97.323 NON-PRESSURE CHRONIC ULCER OF LEFT ANKLE WITH NECROSIS OF MUSCLE: ICD-10-CM

## 2021-12-04 LAB — SARS-COV-2 RNA SPEC QL NAA+PROBE: SIGNIFICANT CHANGE UP

## 2021-12-06 ENCOUNTER — OUTPATIENT (OUTPATIENT)
Dept: OUTPATIENT SERVICES | Facility: HOSPITAL | Age: 56
LOS: 1 days | Discharge: HOME | End: 2021-12-06
Payer: COMMERCIAL

## 2021-12-06 ENCOUNTER — RESULT REVIEW (OUTPATIENT)
Age: 56
End: 2021-12-06

## 2021-12-06 ENCOUNTER — APPOINTMENT (OUTPATIENT)
Dept: WOUND CARE | Facility: HOSPITAL | Age: 56
End: 2021-12-06
Payer: COMMERCIAL

## 2021-12-06 ENCOUNTER — APPOINTMENT (OUTPATIENT)
Dept: HYPERBARIC MEDICINE | Facility: HOSPITAL | Age: 56
End: 2021-12-06

## 2021-12-06 ENCOUNTER — NON-APPOINTMENT (OUTPATIENT)
Age: 56
End: 2021-12-06

## 2021-12-06 VITALS
OXYGEN SATURATION: 98 % | SYSTOLIC BLOOD PRESSURE: 148 MMHG | RESPIRATION RATE: 18 BRPM | TEMPERATURE: 101.5 F | DIASTOLIC BLOOD PRESSURE: 83 MMHG | HEART RATE: 90 BPM

## 2021-12-06 DIAGNOSIS — Z90.710 ACQUIRED ABSENCE OF BOTH CERVIX AND UTERUS: Chronic | ICD-10-CM

## 2021-12-06 DIAGNOSIS — E11.622 TYPE 2 DIABETES MELLITUS WITH OTHER SKIN ULCER: ICD-10-CM

## 2021-12-06 DIAGNOSIS — M65.072: ICD-10-CM

## 2021-12-06 DIAGNOSIS — Z98.890 OTHER SPECIFIED POSTPROCEDURAL STATES: Chronic | ICD-10-CM

## 2021-12-06 PROBLEM — T81.31XD DEHISCENCE OF CLOSURE OF SKIN, SUBSEQUENT ENCOUNTER: Status: ACTIVE | Noted: 2021-11-10

## 2021-12-06 PROCEDURE — 27613 BIOPSY LOWER LEG SOFT TISSUE: CPT | Mod: LT

## 2021-12-06 PROCEDURE — 10061 I&D ABSCESS COMP/MULTIPLE: CPT

## 2021-12-06 PROCEDURE — 88304 TISSUE EXAM BY PATHOLOGIST: CPT | Mod: 26

## 2021-12-06 PROCEDURE — 87075 CULTR BACTERIA EXCEPT BLOOD: CPT

## 2021-12-06 PROCEDURE — 27603 DRAIN LOWER LEG LESION: CPT

## 2021-12-06 PROCEDURE — 87077 CULTURE AEROBIC IDENTIFY: CPT

## 2021-12-06 PROCEDURE — 87070 CULTURE OTHR SPECIMN AEROBIC: CPT

## 2021-12-06 PROCEDURE — 82962 GLUCOSE BLOOD TEST: CPT

## 2021-12-06 PROCEDURE — 87186 SC STD MICRODIL/AGAR DIL: CPT

## 2021-12-06 PROCEDURE — 88304 TISSUE EXAM BY PATHOLOGIST: CPT

## 2021-12-06 RX ORDER — OXYCODONE AND ACETAMINOPHEN 5; 325 MG/1; MG/1
5-325 TABLET ORAL
Qty: 42 | Refills: 0 | Status: COMPLETED | COMMUNITY
Start: 2021-12-06 | End: 2021-12-13

## 2021-12-06 NOTE — ASSESSMENT
[Treatment Education] : treatment education [Treatment Adherence] : treatment adherence [Rx Dose / Side Effects] : Rx dose/side effects [Risk Reduction] : risk reduction [Drug Interactions / Side Effects] : drug interactions/side effects [Anticipatory Guidance] : anticipatory guidance [FreeTextEntry1] : I wouldn't’'t start any antibiotics, no sign of infection. Most of the time after surgery swelling stays for a while and gradually improves., No sing of infection at this time. \par MRI has been ordered, I would wait for result of MRI and then will decide about treatment. \par \par Patient was given the opportunity to ask questions and all questions were answered to their satisfaction.\par Counseling included lab results, differential diagnosis, treatment options, risks and benefits, lifestyle changes, current condition, medications and dose adjustments.\par The patient verbalized understanding.   [No change from previous assessment] : No change from previous assessment [Patient prepared for dive] : Patient prepared for dive [Patient descended without problem for 9 minutes] : Patient descended without problem for 9 minutes [No dizziness or thirst] :  No dizziness or thirst [No ear problems] : No ear problems [Vital signs stable] : Vital signs stable [Tolerating dive well] : Tolerating dive well [No Chest Pain, shortness of breath] : No Chest Pain, shortness of breath [Respiratory Rate Stable] : Respiratory Rate Stable [No chest pain, shortness of breath, or ear pain] :  No chest pain, shortness of breath, or ear pain  [Tolerated Ascent well] : Tolerated Ascent well [Vital Signs stable] : Vital Signs stable [A physician was present throughout the entire HBOT] : A physician was present throughout the entire HBOT [No] : No [Clinically Stable] : Clinically stable [Continue Treatment Plan] : Continue treatment plan [0] : 0 out of 10

## 2021-12-06 NOTE — ADDENDUM
[FreeTextEntry1] : PT ARRIVED TO [facility] AXOX3\par pt descended to tx depth 2.0 efrain @1.75 psi/min without incident in chamber #1 \par pt's resting at tx depth with visible chest rise and fall as observed chamber side \par pt ascended from tx depth without incident in chamber #1 \par pt received wc post hbot \par pt tolerated tx well\par pt received covid swab post hbot without incident

## 2021-12-06 NOTE — ADDENDUM
[FreeTextEntry1] : PT ARRIVED TO [facility] AXOX3\par pt descended to tx depth 2.0 efrain @1.75 psi/min without incident in chamber #1 \par pt's resting at tx depth with visible chest rise and fall as observed chamber side \par pt ascended from tx depth without incident in chamber #1 \par pt received wc post hbot \par pt tolerated tx well\par pt received covid swab post hbot without incident

## 2021-12-06 NOTE — PROCEDURE
[Outpatient] : Outpatient [THIS CHAMBER HAS BEEN CLEANED / DISINFECTED] : This chamber has been cleaned / disinfected according to local and hospital policy and procedure prior to this treatment. [Patient demonstrated and verbalized proper technique for using air break mask] : Patient demonstrated and verbalized proper technique for using air break mask [Patient educated on the risks of SMOKING prior to HBOT with understanding] : Patient educated on the risks of SMOKING prior to HBOT with understanding [Patient educated on the risks of CONSUMING ALCOHOL prior to HBOT with understanding] : Patient educated on the risks of CONSUMING ALCOHOL prior to HBOT with understanding [100% Cotton] : 100% cotton [Empty all pockets] : empty all pockets [No hair oils, wigs, hairpieces, pins] : no hair oils, wigs, hairpieces, pins  [Pre tx medications] : pre tx medications  [No make-up, creams] : no make-up, creams  [No jewelry] : no jewelry  [No matches, cigarettes, lighters] : no matches, cigarettes, lighters  [Hearing aid removed] : hearing aid removed [Dentures removed] : dentures removed [Ground bracelet on pt's wrist] : ground bracelet on pt's wrist  [Contacts removed] : contacts removed  [Remove nail polish] : remove nail polish  [No reading material] : no reading material  [Bra, undergarments removed] : bra, undergarments removed  [No contraindicated dressings] : no contraindicated dressings [Ground Wire - VISUAL Verification - Intact/Free of Obstruction] : Ground Wire - VISUAL Verification - Intact/Free of Obstruction  [Ground Continuity - Verified < 1 ohm w/ Wrist Strap Enrrique] : Ground Continuity - Verified < 1 ohm w/ Wrist Strap Enrrique [Number: ___] : Number: [unfilled] [Diagnosis: ___] : Diagnosis: [unfilled] [Ambulatory] : Patient is ambulatory. [____] : Post-Dive: Time - [unfilled] [___] : Post-Dive: Value - [unfilled] mg/dL [Clear all fields] : clear all fields [] : No [FreeTextEntry3] : 90 minutes [facility] : 5804 [FreeTextEntry7] : 5294 [FreeTextEntry9] : 5989 [de-identified] : 2603 [de-identified] : 108 minutes

## 2021-12-06 NOTE — PROCEDURE
[Outpatient] : Outpatient [THIS CHAMBER HAS BEEN CLEANED / DISINFECTED] : This chamber has been cleaned / disinfected according to local and hospital policy and procedure prior to this treatment. [Patient demonstrated and verbalized proper technique for using air break mask] : Patient demonstrated and verbalized proper technique for using air break mask [Patient educated on the risks of SMOKING prior to HBOT with understanding] : Patient educated on the risks of SMOKING prior to HBOT with understanding [Patient educated on the risks of CONSUMING ALCOHOL prior to HBOT with understanding] : Patient educated on the risks of CONSUMING ALCOHOL prior to HBOT with understanding [100% Cotton] : 100% cotton [Empty all pockets] : empty all pockets [No hair oils, wigs, hairpieces, pins] : no hair oils, wigs, hairpieces, pins  [Pre tx medications] : pre tx medications  [No make-up, creams] : no make-up, creams  [No jewelry] : no jewelry  [No matches, cigarettes, lighters] : no matches, cigarettes, lighters  [Hearing aid removed] : hearing aid removed [Dentures removed] : dentures removed [Ground bracelet on pt's wrist] : ground bracelet on pt's wrist  [Contacts removed] : contacts removed  [Remove nail polish] : remove nail polish  [No reading material] : no reading material  [Bra, undergarments removed] : bra, undergarments removed  [No contraindicated dressings] : no contraindicated dressings [Ground Wire - VISUAL Verification - Intact/Free of Obstruction] : Ground Wire - VISUAL Verification - Intact/Free of Obstruction  [Ground Continuity - Verified < 1 ohm w/ Wrist Strap Enrrique] : Ground Continuity - Verified < 1 ohm w/ Wrist Strap Enrrique [Number: ___] : Number: [unfilled] [Diagnosis: ___] : Diagnosis: [unfilled] [Ambulatory] : Patient is ambulatory. [____] : Post-Dive: Time - [unfilled] [___] : Post-Dive: Value - [unfilled] mg/dL [Clear all fields] : clear all fields [] : No [FreeTextEntry3] : 90 minutes [[facility] : 1600 [FreeTextEntry7] : 2968 [FreeTextEntry9] : 1376 [de-identified] : 3152 [de-identified] : 108 minutes

## 2021-12-07 ENCOUNTER — APPOINTMENT (OUTPATIENT)
Dept: HYPERBARIC MEDICINE | Facility: HOSPITAL | Age: 56
End: 2021-12-07
Payer: COMMERCIAL

## 2021-12-07 ENCOUNTER — APPOINTMENT (OUTPATIENT)
Dept: HYPERBARIC MEDICINE | Facility: HOSPITAL | Age: 56
End: 2021-12-07

## 2021-12-07 ENCOUNTER — OUTPATIENT (OUTPATIENT)
Dept: OUTPATIENT SERVICES | Facility: HOSPITAL | Age: 56
LOS: 1 days | Discharge: HOME | End: 2021-12-07
Payer: COMMERCIAL

## 2021-12-07 VITALS
RESPIRATION RATE: 16 BRPM | HEART RATE: 88 BPM | SYSTOLIC BLOOD PRESSURE: 137 MMHG | DIASTOLIC BLOOD PRESSURE: 88 MMHG | OXYGEN SATURATION: 99 % | TEMPERATURE: 98 F

## 2021-12-07 VITALS
SYSTOLIC BLOOD PRESSURE: 161 MMHG | TEMPERATURE: 98 F | RESPIRATION RATE: 18 BRPM | DIASTOLIC BLOOD PRESSURE: 78 MMHG | OXYGEN SATURATION: 99 % | HEART RATE: 80 BPM

## 2021-12-07 VITALS — TEMPERATURE: 100.7 F

## 2021-12-07 VITALS — TEMPERATURE: 100.3 F

## 2021-12-07 DIAGNOSIS — Z90.710 ACQUIRED ABSENCE OF BOTH CERVIX AND UTERUS: Chronic | ICD-10-CM

## 2021-12-07 DIAGNOSIS — Z98.890 OTHER SPECIFIED POSTPROCEDURAL STATES: Chronic | ICD-10-CM

## 2021-12-07 DIAGNOSIS — E11.622 TYPE 2 DIABETES MELLITUS WITH OTHER SKIN ULCER: ICD-10-CM

## 2021-12-07 LAB
GRAM STN FLD: SIGNIFICANT CHANGE UP
SPECIMEN SOURCE: SIGNIFICANT CHANGE UP

## 2021-12-07 PROCEDURE — G0277: CPT

## 2021-12-07 PROCEDURE — 82962 GLUCOSE BLOOD TEST: CPT

## 2021-12-07 PROCEDURE — 99183 HYPERBARIC OXYGEN THERAPY: CPT

## 2021-12-08 ENCOUNTER — APPOINTMENT (OUTPATIENT)
Dept: HYPERBARIC MEDICINE | Facility: HOSPITAL | Age: 56
End: 2021-12-08
Payer: COMMERCIAL

## 2021-12-08 ENCOUNTER — APPOINTMENT (OUTPATIENT)
Dept: HYPERBARIC MEDICINE | Facility: HOSPITAL | Age: 56
End: 2021-12-08

## 2021-12-08 ENCOUNTER — OUTPATIENT (OUTPATIENT)
Dept: OUTPATIENT SERVICES | Facility: HOSPITAL | Age: 56
LOS: 1 days | Discharge: HOME | End: 2021-12-08
Payer: COMMERCIAL

## 2021-12-08 VITALS
RESPIRATION RATE: 18 BRPM | HEART RATE: 81 BPM | DIASTOLIC BLOOD PRESSURE: 75 MMHG | TEMPERATURE: 97.4 F | OXYGEN SATURATION: 98 % | SYSTOLIC BLOOD PRESSURE: 135 MMHG

## 2021-12-08 VITALS
TEMPERATURE: 99.2 F | OXYGEN SATURATION: 95 % | HEART RATE: 76 BPM | RESPIRATION RATE: 18 BRPM | DIASTOLIC BLOOD PRESSURE: 75 MMHG | SYSTOLIC BLOOD PRESSURE: 136 MMHG

## 2021-12-08 DIAGNOSIS — L97.323 NON-PRESSURE CHRONIC ULCER OF LEFT ANKLE WITH NECROSIS OF MUSCLE: ICD-10-CM

## 2021-12-08 DIAGNOSIS — Z98.890 OTHER SPECIFIED POSTPROCEDURAL STATES: Chronic | ICD-10-CM

## 2021-12-08 DIAGNOSIS — Z90.710 ACQUIRED ABSENCE OF BOTH CERVIX AND UTERUS: Chronic | ICD-10-CM

## 2021-12-08 DIAGNOSIS — E11.622 TYPE 2 DIABETES MELLITUS WITH OTHER SKIN ULCER: ICD-10-CM

## 2021-12-08 PROCEDURE — 97602 WOUND(S) CARE NON-SELECTIVE: CPT

## 2021-12-08 PROCEDURE — 82962 GLUCOSE BLOOD TEST: CPT

## 2021-12-08 PROCEDURE — G0277: CPT

## 2021-12-08 PROCEDURE — 99183 HYPERBARIC OXYGEN THERAPY: CPT

## 2021-12-08 NOTE — ADDENDUM
[FreeTextEntry1] : PT ARRIVED TO [facility] AXOX3\par \par Pt's BGL low. MD notified. Pt given 16g of glucose via 8oz juice Pt BGL retested.Pt BGL now within acceptable limits for HBOT tx.\par Pt received Wc by RN prior to tx. \par Pt descended to 2.0 RAIMUNDO @ 1.75 PSI/min without incident in chamber #1\par Pt resting @ depth with chest rise and fall observed throughout tx. \par Pt ascended from 2.0 RAIMUNDO @ 1.75 PSI/min without incident. \par Pt tolerated tx well. As per MD Pt Recieved totAL 78 Mins treatment. \par \par \par \par \par

## 2021-12-09 ENCOUNTER — APPOINTMENT (OUTPATIENT)
Dept: OTOLARYNGOLOGY | Facility: CLINIC | Age: 56
End: 2021-12-09
Payer: COMMERCIAL

## 2021-12-09 ENCOUNTER — APPOINTMENT (OUTPATIENT)
Dept: HYPERBARIC MEDICINE | Facility: HOSPITAL | Age: 56
End: 2021-12-09

## 2021-12-09 ENCOUNTER — APPOINTMENT (OUTPATIENT)
Dept: MRI IMAGING | Facility: CLINIC | Age: 56
End: 2021-12-09

## 2021-12-09 ENCOUNTER — NON-APPOINTMENT (OUTPATIENT)
Age: 56
End: 2021-12-09

## 2021-12-09 ENCOUNTER — OUTPATIENT (OUTPATIENT)
Dept: OUTPATIENT SERVICES | Facility: HOSPITAL | Age: 56
LOS: 1 days | Discharge: HOME | End: 2021-12-09
Payer: COMMERCIAL

## 2021-12-09 ENCOUNTER — RESULT REVIEW (OUTPATIENT)
Age: 56
End: 2021-12-09

## 2021-12-09 VITALS
HEIGHT: 63 IN | SYSTOLIC BLOOD PRESSURE: 112 MMHG | WEIGHT: 245 LBS | BODY MASS INDEX: 43.41 KG/M2 | DIASTOLIC BLOOD PRESSURE: 78 MMHG | HEART RATE: 61 BPM

## 2021-12-09 VITALS
DIASTOLIC BLOOD PRESSURE: 88 MMHG | TEMPERATURE: 98.4 F | SYSTOLIC BLOOD PRESSURE: 151 MMHG | OXYGEN SATURATION: 97 % | HEART RATE: 73 BPM

## 2021-12-09 DIAGNOSIS — L97.323 NON-PRESSURE CHRONIC ULCER OF LEFT ANKLE WITH NECROSIS OF MUSCLE: ICD-10-CM

## 2021-12-09 DIAGNOSIS — E11.622 TYPE 2 DIABETES MELLITUS WITH OTHER SKIN ULCER: ICD-10-CM

## 2021-12-09 DIAGNOSIS — Z90.710 ACQUIRED ABSENCE OF BOTH CERVIX AND UTERUS: Chronic | ICD-10-CM

## 2021-12-09 DIAGNOSIS — H70.13 CHRONIC MASTOIDITIS, BILATERAL: ICD-10-CM

## 2021-12-09 DIAGNOSIS — Z98.890 OTHER SPECIFIED POSTPROCEDURAL STATES: Chronic | ICD-10-CM

## 2021-12-09 DIAGNOSIS — H65.193 OTHER ACUTE NONSUPPURATIVE OTITIS MEDIA, BILATERAL: ICD-10-CM

## 2021-12-09 LAB
-  AMIKACIN: SIGNIFICANT CHANGE UP
-  AMOXICILLIN/CLAVULANIC ACID: SIGNIFICANT CHANGE UP
-  AMPICILLIN/SULBACTAM: SIGNIFICANT CHANGE UP
-  AMPICILLIN: SIGNIFICANT CHANGE UP
-  AMPICILLIN: SIGNIFICANT CHANGE UP
-  AZTREONAM: SIGNIFICANT CHANGE UP
-  CEFAZOLIN: SIGNIFICANT CHANGE UP
-  CEFEPIME: SIGNIFICANT CHANGE UP
-  CEFOXITIN: SIGNIFICANT CHANGE UP
-  CEFTRIAXONE: SIGNIFICANT CHANGE UP
-  CIPROFLOXACIN: SIGNIFICANT CHANGE UP
-  ERTAPENEM: SIGNIFICANT CHANGE UP
-  GENTAMICIN: SIGNIFICANT CHANGE UP
-  IMIPENEM: SIGNIFICANT CHANGE UP
-  LEVOFLOXACIN: SIGNIFICANT CHANGE UP
-  MEROPENEM: SIGNIFICANT CHANGE UP
-  PIPERACILLIN/TAZOBACTAM: SIGNIFICANT CHANGE UP
-  TETRACYCLINE: SIGNIFICANT CHANGE UP
-  TOBRAMYCIN: SIGNIFICANT CHANGE UP
-  TRIMETHOPRIM/SULFAMETHOXAZOLE: SIGNIFICANT CHANGE UP
-  VANCOMYCIN: SIGNIFICANT CHANGE UP
CULTURE RESULTS: SIGNIFICANT CHANGE UP
METHOD TYPE: SIGNIFICANT CHANGE UP
METHOD TYPE: SIGNIFICANT CHANGE UP
ORGANISM # SPEC MICROSCOPIC CNT: SIGNIFICANT CHANGE UP
SPECIMEN SOURCE: SIGNIFICANT CHANGE UP

## 2021-12-09 PROCEDURE — 92567 TYMPANOMETRY: CPT

## 2021-12-09 PROCEDURE — 99204 OFFICE O/P NEW MOD 45 MIN: CPT | Mod: 25

## 2021-12-09 PROCEDURE — 97602 WOUND(S) CARE NON-SELECTIVE: CPT

## 2021-12-09 PROCEDURE — 70480 CT ORBIT/EAR/FOSSA W/O DYE: CPT

## 2021-12-09 PROCEDURE — 92557 COMPREHENSIVE HEARING TEST: CPT

## 2021-12-09 RX ORDER — CHROMIUM 200 MCG
TABLET ORAL
Refills: 0 | Status: ACTIVE | COMMUNITY

## 2021-12-09 RX ORDER — ZINC SULFATE 50(220)MG
CAPSULE ORAL
Refills: 0 | Status: ACTIVE | COMMUNITY

## 2021-12-09 RX ORDER — CIPROFLOXACIN HYDROCHLORIDE 500 MG/1
500 TABLET, FILM COATED ORAL TWICE DAILY
Qty: 14 | Refills: 0 | Status: DISCONTINUED | COMMUNITY
Start: 2021-12-06 | End: 2021-12-09

## 2021-12-09 RX ORDER — ASCORBIC ACID 500 MG
TABLET ORAL
Refills: 0 | Status: ACTIVE | COMMUNITY

## 2021-12-09 NOTE — ASSESSMENT
[FreeTextEntry1] :  AS WNL w mild conductive component type Ad/c, AD borderline WNL-mild CHL w type Ad/C\par \par CT temporal bones official reading pending\par BMT pending official report\par \par Pt is not cleared at this time for hyperbaric O2

## 2021-12-09 NOTE — HISTORY OF PRESENT ILLNESS
[de-identified] : For a screening colonoscopy. She is  NO history of colon cancer colon polyps. The patient has never had a colonoscopy performed\par    The patient has mild constipation for 3 months. This began when she began her on [employer] program. She has a bowel movement every other day. Symptoms are mild and occur every day. Better With increase fiber. No rectal bleeding\par     Patient has history of hemorrhoids over 20 years. Symptoms are mild and lasted minutes. Symptoms consistent itching. No rectal bleeding. Occurs after a bowel movement and occurs once a  month

## 2021-12-09 NOTE — REVIEW OF SYSTEMS
[Sneezing] : sneezing [Seasonal Allergies] : seasonal allergies [Ear Pain] : ear pain [Ear Itch] : ear itch [Ear Noises] : ear noises [Sinus Pain] : sinus pain [Sinus Pressure] : sinus pressure [Sense Of Smell Problem] : sense of smell problem [Throat Pain] : throat pain [Eyes Itch] : itching of the eyes [Negative] : Heme/Lymph [FreeTextEntry3] : watery eyes [FreeTextEntry1] : sweating at night

## 2021-12-09 NOTE — PHYSICAL EXAM
[Normal] : mucosa is normal [Midline] : trachea located in midline position [de-identified] : AS hemorhagic w serous effusion, AD hemorragic with effusion and mucous vs polyp in middle ear [de-identified] : enlarged

## 2021-12-09 NOTE — PHYSICAL EXAM
[General Appearance - Alert] : alert [General Appearance - Well Nourished] : well nourished [General Appearance - In No Acute Distress] : in no acute distress [Outer Ear] : the ears and nose were normal in appearance [Sclera] : the sclera and conjunctiva were normal [General Appearance - Well Developed] : well developed [Hearing Threshold Finger Rub Not [place]] : hearing was normal [Neck Appearance] : the appearance of the neck was normal [Both Tympanic Membranes Were Examined] : both tympanic membranes were normal [Respiration, Rhythm And Depth] : normal respiratory rhythm and effort [Exaggerated Use Of Accessory Muscles For Inspiration] : no accessory muscle use [Auscultation Breath Sounds / Voice Sounds] : lungs were clear to auscultation bilaterally [Apical Impulse] : the apical impulse was normal [Heart Rate And Rhythm] : heart rate was normal and rhythm regular [Bowel Sounds] : normal bowel sounds [Heart Sounds] : normal S1 and S2 [Abdomen Soft] : soft [No Rectal Mass] : no rectal mass [Normal Sphincter Tone] : normal sphincter tone [Abdomen Tenderness] : non-tender [External Hemorrhoid] : external hemorrhoids [Femoral Lymph Nodes Enlarged Bilaterally] : femoral [Skin Color & Pigmentation] : normal skin color and pigmentation [Impaired Insight] : insight and judgment were intact [Oriented To Time, Place, And Person] : oriented to person, place, and time [] : no rash [Skin Turgor] : normal skin turgor [Affect] : the affect was normal [Internal Hemorrhoid] : no internal hemorrhoids [Occult Blood Positive] : stool was negative for occult blood

## 2021-12-09 NOTE — PROCEDURE
[Hx of Colon Polyps] : history of colon polyps [Procedure Explained] : The procedure was explained [Allergies Reviewed] : allergies reviewed. [Risks] : Risks [Benefits] : benefits [Alternatives] : alternatives [Bleeding] : bleeding risk [Infection] : risk of infection [Consent Obtained] : written consent was obtained prior to the procedure and is detailed in the patient's record [Patient] : the patient [Bowel Prep Kit] : the patient took the appropriate bowel preparation kit as directed [Approved Diet Followed] : the patient avoided solid foods and adhered to the approved diet list for 24 hours prior to the procedure [Automated Blood Pressure Cuff] : automated blood pressure cuff [Cardiac Monitor] : cardiac monitor [Pulse Oximeter] : pulse oximeter [Propofol ___ mg IV] : Propofol [unfilled] ~Umg intravenously [2] : 2 [Sedation Clearance] : the patient was cleared for moderate sedation [Prep Qualtiy: ___] : Prep Quality:  [unfilled] [Withdrawal Time: ___] : Withdrawal Time:  [unfilled] [Performed By: ___] : Performed by:  JAYANT [Left Lateral Decubitus] : The patient was positioned in the left lateral decubitus position [Cecum (Landmarks/Transillum)] : and guided to the cecum which was identified by the anatomic landmarks of the appendiceal orifice and ileocecal valve and by transillumination in the right lower quadrant [Insufflated] : insufflated [Single Pass Needed] : after a single pass [Retroflex View] : a retroflex view of the rectum was performed [Normal] : Normal [Hemorrhoids] : hemorrhoids [Abnormal Rectum] : a normal rectum [External Hemorrhoids] : no external hemorrhoids [Patient Rotated Into Alternating Positions] : the patient was not rotated

## 2021-12-09 NOTE — DATA REVIEWED
[de-identified] : Tymps: Type Ad/C, au\par Left: WNL - mild -8khz, w/ conductive component at 250 hz\par Right: Borderline wnl - mild -8khz\par Recs: 1) ENT f/u 2) re-eval as per md  [de-identified] : CT temporal bones opacification of mastoid bilat, left more than right. Official report pending

## 2021-12-09 NOTE — ASSESSMENT
[FreeTextEntry1] : A/P\par  NO polyps\par hemorrhoids\par Hx of colon polyps- addendum. NO hx of polyps. This was pt's fist colonoscopy\par colonoscopy in 5 years ( pt does not know mother's health hx at all )

## 2021-12-09 NOTE — HISTORY OF PRESENT ILLNESS
[de-identified] : 56 yr old female had 4 hyperbaric O2 tx,  feels like her ears are clogged and has pain during tx\par c/o nasal congestion, alternates sides\par no discolored mucous\par +tinnitus recently\par -dizzy\par -hx otitis\par +seasonal allergy

## 2021-12-09 NOTE — REASON FOR VISIT
[Follow-Up: _____] : a [unfilled] follow-up visit [Colonoscopy] : a colonoscopy [FreeTextEntry1] : colon polyps- addendum. Pt never had a previous colonscopy, This is screening colon  [FreeTextEntry2] : hx of colon polyps- addendum- first screening colon. NO HX OF COLON POLYPS

## 2021-12-09 NOTE — ASSESSMENT
[FreeTextEntry1] : A/P\par screening colon\par hemorrhoids- proctostol HC prn\par constipation- high fiber diet\par  I discussed the risks and benefits of colonoscopy and patient was given opportunity to ask questions. Colonoscopy to r/o colon cancer, polyps, AVM's. Patient is medically optimized for the procedure\par miralax prep\par will get cbc from primary md

## 2021-12-10 ENCOUNTER — OUTPATIENT (OUTPATIENT)
Dept: OUTPATIENT SERVICES | Facility: HOSPITAL | Age: 56
LOS: 1 days | Discharge: HOME | End: 2021-12-10
Payer: COMMERCIAL

## 2021-12-10 ENCOUNTER — APPOINTMENT (OUTPATIENT)
Dept: HYPERBARIC MEDICINE | Facility: HOSPITAL | Age: 56
End: 2021-12-10

## 2021-12-10 ENCOUNTER — RESULT REVIEW (OUTPATIENT)
Age: 56
End: 2021-12-10

## 2021-12-10 ENCOUNTER — OUTPATIENT (OUTPATIENT)
Dept: OUTPATIENT SERVICES | Facility: HOSPITAL | Age: 56
LOS: 1 days | End: 2021-12-10
Payer: COMMERCIAL

## 2021-12-10 VITALS
SYSTOLIC BLOOD PRESSURE: 167 MMHG | HEART RATE: 75 BPM | TEMPERATURE: 98 F | OXYGEN SATURATION: 97 % | DIASTOLIC BLOOD PRESSURE: 87 MMHG

## 2021-12-10 DIAGNOSIS — Z79.899 OTHER LONG TERM (CURRENT) DRUG THERAPY: ICD-10-CM

## 2021-12-10 DIAGNOSIS — E11.622 TYPE 2 DIABETES MELLITUS WITH OTHER SKIN ULCER: ICD-10-CM

## 2021-12-10 DIAGNOSIS — A49.8 OTHER BACTERIAL INFECTIONS OF UNSPECIFIED SITE: ICD-10-CM

## 2021-12-10 DIAGNOSIS — Z98.890 OTHER SPECIFIED POSTPROCEDURAL STATES: Chronic | ICD-10-CM

## 2021-12-10 DIAGNOSIS — Z82.49 FAMILY HISTORY OF ISCHEMIC HEART DISEASE AND OTHER DISEASES OF THE CIRCULATORY SYSTEM: ICD-10-CM

## 2021-12-10 DIAGNOSIS — Z90.710 ACQUIRED ABSENCE OF BOTH CERVIX AND UTERUS: Chronic | ICD-10-CM

## 2021-12-10 DIAGNOSIS — M65.072 ABSCESS OF TENDON SHEATH, LEFT ANKLE AND FOOT: ICD-10-CM

## 2021-12-10 DIAGNOSIS — Z86.010 PERSONAL HISTORY OF COLONIC POLYPS: ICD-10-CM

## 2021-12-10 DIAGNOSIS — L97.323 NON-PRESSURE CHRONIC ULCER OF LEFT ANKLE WITH NECROSIS OF MUSCLE: ICD-10-CM

## 2021-12-10 DIAGNOSIS — Z79.51 LONG TERM (CURRENT) USE OF INHALED STEROIDS: ICD-10-CM

## 2021-12-10 DIAGNOSIS — Z85.038 PERSONAL HISTORY OF OTHER MALIGNANT NEOPLASM OF LARGE INTESTINE: ICD-10-CM

## 2021-12-10 DIAGNOSIS — Z86.16 PERSONAL HISTORY OF COVID-19: ICD-10-CM

## 2021-12-10 DIAGNOSIS — Z90.710 ACQUIRED ABSENCE OF BOTH CERVIX AND UTERUS: ICD-10-CM

## 2021-12-10 DIAGNOSIS — Z98.890 OTHER SPECIFIED POSTPROCEDURAL STATES: ICD-10-CM

## 2021-12-10 PROCEDURE — G0463: CPT

## 2021-12-10 PROCEDURE — 73723 MRI JOINT LWR EXTR W/O&W/DYE: CPT | Mod: 26,LT

## 2021-12-10 PROCEDURE — A9579: CPT

## 2021-12-10 PROCEDURE — 73723 MRI JOINT LWR EXTR W/O&W/DYE: CPT

## 2021-12-10 NOTE — PROCEDURE
[FreeTextEntry9] : 1912 [de-identified] : Left DFU 3 lateral left foot/ankle , skin, subcutaneous, fat and fascia to tendon with cellulitic changes, swelling, and underlying abscess [de-identified] : nitesh [de-identified] : maya [FreeTextEntry6] : Left DFU 3 lateral left foot/ankle , skin, subcutaneous, fat and fascia to tendon with cellulitic changes, swelling, and underlying abscess [FreeTextEntry7] : Left DFU 3 lateral left foot/ankle , skin, subcutaneous, fat and fascia to tendon with cellulitic changes, swelling, and underlying abscess [de-identified] : marcaine 0.5% plain [de-identified] : 10mL [de-identified] : skin, subcutaneous tissue, fat

## 2021-12-10 NOTE — ASSESSMENT
[Verbal] : Verbal [Written] : Written [Demo] : Demo [Patient] : Patient [Good - alert, interested, motivated] : Good - alert, interested, motivated [Demonstrates independently] : demonstrates independently [Dressing changes] : dressing changes [Foot Care] : foot care [Skin Care] : skin care [Signs and symptoms of infection] : sign and symptoms of infection [Nutrition] : nutrition [How and When to Call] : how and when to call [Labs and Tests] : labs and tests [Hyperbaric Therapy] : hyperbaric therapy [Off-loading] : off-loading [Patient responsibility to plan of care] : patient responsibility to plan of care [Glycemic Control] : glycemic control [Stable] : stable [Home] : Home [Ambulatory] : Ambulatory [Not Applicable - Long Term Care/Home Health Agency] : Long Term Care/Home Health Agency: Not Applicable [] : No [FreeTextEntry2] : Infection prevention \par Wound care (dressing changes)\par Maintain optimal skin integrity to high pressure areas\par Compression therapy\par Elevation and low sodium compliance.\par HBOT. [FreeTextEntry3] : Abscess and increased drainage and warmth from left ankle [FreeTextEntry4] : HBOT deferred today due to oral temperature above acceptable parameters for HBOT. Pt reports receiving the 2nd inoculation of the COVID-19 vaccine yesterday (12/5/21). Chamber supervisor advised. \par Same day emergent auth submitted for left ankle abscess I&D\par Culture obtained\par Pathology obtained \par DPM e-scribed oral abt therapy (Ciprofloxacin HCl - 500 MG Oral Tablet; TAKE 1 TABLET TWICE DAILY. MDD:2\par tablets) for patient to start tonight. \par DPM e-scribed Pt pain medication (oxyCODONE-Acetaminophen 5-325 MG Oral Tablet (Percocet); TAKE 1 TABLET\par EVERY 4 TO 6 HOURS AS NEEDED FOR PAIN. MDD:6 tablets)\par Elevation and offloading emphasized. Pt to begin using knee scooter.\par Pt to f/u tomorrow, 12/7/21 for HBOT.

## 2021-12-10 NOTE — PHYSICAL EXAM
[4 x 4] : 4 x 4  [Abdominal Pad] : Abdominal Pad [1+] : left 1+ [Ankle Swelling (On Exam)] : present [Ankle Swelling Bilaterally] : bilaterally  [Ankle Swelling On The Left] : moderate [Alert] : alert [Oriented to Person] : oriented to person [Oriented to Place] : oriented to place [Oriented to Time] : oriented to time [Calm] : calm [Varicose Veins Of Lower Extremities] : not present [Purpura] : no purpura  [Petechiae] : no petechiae [Skin Ulcer] : no ulcer [de-identified] : A&Ox3, NAD [de-identified] : wnl [de-identified] : BMI 43 [de-identified] : Left DFU 3 lateral left foot/ankle , skin, subcutaneous, fat and fascia to tendon with cellulitic changes, swelling, and underlying abscess [de-identified] : wnl [FreeTextEntry1] : Left Ankle (Lateral) [FreeTextEntry2] : 3.0 [FreeTextEntry3] : 1.5 [FreeTextEntry4] : 4.0 [de-identified] : serosanguineous  [de-identified] : Tunnels distally  [de-identified] : 15% [de-identified] : 70% [de-identified] : 15% [FreeTextEntry5] : I&D Left Ankle Abcess [de-identified] : None [de-identified] : 1/2" Iodoform Packing [de-identified] : NSC\par DD\par  [TWNoteComboBox4] : Moderate [TWNoteComboBox5] : Yes [TWNoteComboBox6] : Surgical [de-identified] : No [de-identified] : Normal [de-identified] : Mild [de-identified] : 1% Lidocaine Injection [TWNoteComboBox7] : Sonido [de-identified] : Cultures obtained [de-identified] : Daily [de-identified] : Primary Dressing

## 2021-12-10 NOTE — REVIEW OF SYSTEMS
[Joint Stiffness] : joint stiffness [Skin Wound] : skin wound [Negative] : Heme/Lymph [FreeTextEntry1] : 1844 [Fever] : no fever [Chills] : no chills [Eye Pain] : no eye pain [Loss Of Hearing] : no hearing loss [Abdominal Pain] : no abdominal pain [Vomiting] : no vomiting [Anxiety] : no anxiety [de-identified] : Possible prediabetic , elevated HgA1c and blood glucose , BMI 43 ^, patient now taking Metformin

## 2021-12-11 ENCOUNTER — APPOINTMENT (OUTPATIENT)
Dept: HYPERBARIC MEDICINE | Facility: HOSPITAL | Age: 56
End: 2021-12-11

## 2021-12-11 DIAGNOSIS — E11.622 TYPE 2 DIABETES MELLITUS WITH OTHER SKIN ULCER: ICD-10-CM

## 2021-12-11 DIAGNOSIS — L97.323 NON-PRESSURE CHRONIC ULCER OF LEFT ANKLE WITH NECROSIS OF MUSCLE: ICD-10-CM

## 2021-12-13 ENCOUNTER — APPOINTMENT (OUTPATIENT)
Dept: HYPERBARIC MEDICINE | Facility: HOSPITAL | Age: 56
End: 2021-12-13

## 2021-12-13 ENCOUNTER — OUTPATIENT (OUTPATIENT)
Dept: OUTPATIENT SERVICES | Facility: HOSPITAL | Age: 56
LOS: 1 days | Discharge: HOME | End: 2021-12-13
Payer: COMMERCIAL

## 2021-12-13 ENCOUNTER — APPOINTMENT (OUTPATIENT)
Dept: OTOLARYNGOLOGY | Facility: CLINIC | Age: 56
End: 2021-12-13
Payer: COMMERCIAL

## 2021-12-13 VITALS
HEIGHT: 63 IN | BODY MASS INDEX: 43.41 KG/M2 | HEART RATE: 85 BPM | DIASTOLIC BLOOD PRESSURE: 84 MMHG | SYSTOLIC BLOOD PRESSURE: 136 MMHG | WEIGHT: 245 LBS

## 2021-12-13 VITALS
OXYGEN SATURATION: 95 % | TEMPERATURE: 97 F | HEART RATE: 75 BPM | DIASTOLIC BLOOD PRESSURE: 76 MMHG | RESPIRATION RATE: 16 BRPM | SYSTOLIC BLOOD PRESSURE: 125 MMHG

## 2021-12-13 DIAGNOSIS — E11.622 TYPE 2 DIABETES MELLITUS WITH OTHER SKIN ULCER: ICD-10-CM

## 2021-12-13 DIAGNOSIS — L97.323 NON-PRESSURE CHRONIC ULCER OF LEFT ANKLE WITH NECROSIS OF MUSCLE: ICD-10-CM

## 2021-12-13 DIAGNOSIS — Z98.890 OTHER SPECIFIED POSTPROCEDURAL STATES: Chronic | ICD-10-CM

## 2021-12-13 DIAGNOSIS — Z90.710 ACQUIRED ABSENCE OF BOTH CERVIX AND UTERUS: Chronic | ICD-10-CM

## 2021-12-13 PROCEDURE — 69433 CREATE EARDRUM OPENING: CPT | Mod: 50

## 2021-12-13 PROCEDURE — 97602 WOUND(S) CARE NON-SELECTIVE: CPT

## 2021-12-13 NOTE — REASON FOR VISIT
[Subsequent Evaluation] : a subsequent evaluation for [FreeTextEntry2] : Patient here for tube insertion

## 2021-12-13 NOTE — ASSESSMENT
[FreeTextEntry1] : H2O precautions\par rx ciprodex\par \par pt is cleared from ENT point of view to resume hyperbaric O2 treatments today\par \par f/u 2 weeks

## 2021-12-14 ENCOUNTER — TRANSCRIPTION ENCOUNTER (OUTPATIENT)
Age: 56
End: 2021-12-14

## 2021-12-14 ENCOUNTER — INPATIENT (INPATIENT)
Facility: HOSPITAL | Age: 56
LOS: 5 days | Discharge: HOME | DRG: 857 | End: 2021-12-20
Attending: STUDENT IN AN ORGANIZED HEALTH CARE EDUCATION/TRAINING PROGRAM | Admitting: INTERNAL MEDICINE
Payer: COMMERCIAL

## 2021-12-14 ENCOUNTER — APPOINTMENT (OUTPATIENT)
Dept: HYPERBARIC MEDICINE | Facility: HOSPITAL | Age: 56
End: 2021-12-14

## 2021-12-14 VITALS
TEMPERATURE: 98 F | WEIGHT: 240.08 LBS | HEART RATE: 67 BPM | RESPIRATION RATE: 16 BRPM | HEIGHT: 63 IN | SYSTOLIC BLOOD PRESSURE: 142 MMHG | OXYGEN SATURATION: 98 % | DIASTOLIC BLOOD PRESSURE: 86 MMHG

## 2021-12-14 DIAGNOSIS — Z29.9 ENCOUNTER FOR PROPHYLACTIC MEASURES, UNSPECIFIED: ICD-10-CM

## 2021-12-14 DIAGNOSIS — Z98.890 OTHER SPECIFIED POSTPROCEDURAL STATES: Chronic | ICD-10-CM

## 2021-12-14 DIAGNOSIS — T14.8XXA OTHER INJURY OF UNSPECIFIED BODY REGION, INITIAL ENCOUNTER: ICD-10-CM

## 2021-12-14 DIAGNOSIS — Z90.710 ACQUIRED ABSENCE OF BOTH CERVIX AND UTERUS: Chronic | ICD-10-CM

## 2021-12-14 DIAGNOSIS — E66.01 MORBID (SEVERE) OBESITY DUE TO EXCESS CALORIES: ICD-10-CM

## 2021-12-14 DIAGNOSIS — Z98.890 OTHER SPECIFIED POSTPROCEDURAL STATES: ICD-10-CM

## 2021-12-14 DIAGNOSIS — T81.49XA INFECTION FOLLOWING A PROCEDURE, OTHER SURGICAL SITE, INITIAL ENCOUNTER: ICD-10-CM

## 2021-12-14 LAB
ALBUMIN SERPL ELPH-MCNC: 3.7 G/DL — SIGNIFICANT CHANGE UP (ref 3.3–5)
ALP SERPL-CCNC: 71 U/L — SIGNIFICANT CHANGE UP (ref 40–120)
ALT FLD-CCNC: 24 U/L — SIGNIFICANT CHANGE UP (ref 12–78)
ANION GAP SERPL CALC-SCNC: 5 MMOL/L — SIGNIFICANT CHANGE UP (ref 5–17)
APPEARANCE UR: CLEAR — SIGNIFICANT CHANGE UP
APTT BLD: 31.5 SEC — SIGNIFICANT CHANGE UP (ref 27.5–35.5)
AST SERPL-CCNC: 16 U/L — SIGNIFICANT CHANGE UP (ref 15–37)
BASOPHILS # BLD AUTO: 0.05 K/UL — SIGNIFICANT CHANGE UP (ref 0–0.2)
BASOPHILS NFR BLD AUTO: 0.6 % — SIGNIFICANT CHANGE UP (ref 0–2)
BILIRUB SERPL-MCNC: 0.6 MG/DL — SIGNIFICANT CHANGE UP (ref 0.2–1.2)
BILIRUB UR-MCNC: NEGATIVE — SIGNIFICANT CHANGE UP
BUN SERPL-MCNC: 9 MG/DL — SIGNIFICANT CHANGE UP (ref 7–23)
CALCIUM SERPL-MCNC: 9.5 MG/DL — SIGNIFICANT CHANGE UP (ref 8.5–10.1)
CHLORIDE SERPL-SCNC: 106 MMOL/L — SIGNIFICANT CHANGE UP (ref 96–108)
CO2 SERPL-SCNC: 30 MMOL/L — SIGNIFICANT CHANGE UP (ref 22–31)
COLOR SPEC: SIGNIFICANT CHANGE UP
CREAT SERPL-MCNC: 0.73 MG/DL — SIGNIFICANT CHANGE UP (ref 0.5–1.3)
CRP SERPL-MCNC: <3 MG/L — SIGNIFICANT CHANGE UP
DIFF PNL FLD: NEGATIVE — SIGNIFICANT CHANGE UP
EOSINOPHIL # BLD AUTO: 0.27 K/UL — SIGNIFICANT CHANGE UP (ref 0–0.5)
EOSINOPHIL NFR BLD AUTO: 3.3 % — SIGNIFICANT CHANGE UP (ref 0–6)
GLUCOSE SERPL-MCNC: 113 MG/DL — HIGH (ref 70–99)
GLUCOSE UR QL: NEGATIVE — SIGNIFICANT CHANGE UP
HCT VFR BLD CALC: 38.8 % — SIGNIFICANT CHANGE UP (ref 34.5–45)
HGB BLD-MCNC: 13.5 G/DL — SIGNIFICANT CHANGE UP (ref 11.5–15.5)
IMM GRANULOCYTES NFR BLD AUTO: 0.4 % — SIGNIFICANT CHANGE UP (ref 0–1.5)
INR BLD: 1.13 RATIO — SIGNIFICANT CHANGE UP (ref 0.88–1.16)
KETONES UR-MCNC: NEGATIVE — SIGNIFICANT CHANGE UP
LACTATE SERPL-SCNC: 1.5 MMOL/L — SIGNIFICANT CHANGE UP (ref 0.7–2)
LEUKOCYTE ESTERASE UR-ACNC: NEGATIVE — SIGNIFICANT CHANGE UP
LYMPHOCYTES # BLD AUTO: 2.88 K/UL — SIGNIFICANT CHANGE UP (ref 1–3.3)
LYMPHOCYTES # BLD AUTO: 34.9 % — SIGNIFICANT CHANGE UP (ref 13–44)
MCHC RBC-ENTMCNC: 30.8 PG — SIGNIFICANT CHANGE UP (ref 27–34)
MCHC RBC-ENTMCNC: 34.8 GM/DL — SIGNIFICANT CHANGE UP (ref 32–36)
MCV RBC AUTO: 88.6 FL — SIGNIFICANT CHANGE UP (ref 80–100)
MONOCYTES # BLD AUTO: 0.47 K/UL — SIGNIFICANT CHANGE UP (ref 0–0.9)
MONOCYTES NFR BLD AUTO: 5.7 % — SIGNIFICANT CHANGE UP (ref 2–14)
NEUTROPHILS # BLD AUTO: 4.56 K/UL — SIGNIFICANT CHANGE UP (ref 1.8–7.4)
NEUTROPHILS NFR BLD AUTO: 55.1 % — SIGNIFICANT CHANGE UP (ref 43–77)
NITRITE UR-MCNC: NEGATIVE — SIGNIFICANT CHANGE UP
NRBC # BLD: 0 /100 WBCS — SIGNIFICANT CHANGE UP (ref 0–0)
PH UR: 5 — SIGNIFICANT CHANGE UP (ref 5–8)
PLATELET # BLD AUTO: 300 K/UL — SIGNIFICANT CHANGE UP (ref 150–400)
POTASSIUM SERPL-MCNC: 3.8 MMOL/L — SIGNIFICANT CHANGE UP (ref 3.5–5.3)
POTASSIUM SERPL-SCNC: 3.8 MMOL/L — SIGNIFICANT CHANGE UP (ref 3.5–5.3)
PROT SERPL-MCNC: 7.7 G/DL — SIGNIFICANT CHANGE UP (ref 6–8.3)
PROT UR-MCNC: NEGATIVE — SIGNIFICANT CHANGE UP
PROTHROM AB SERPL-ACNC: 13.1 SEC — SIGNIFICANT CHANGE UP (ref 10.6–13.6)
RBC # BLD: 4.38 M/UL — SIGNIFICANT CHANGE UP (ref 3.8–5.2)
RBC # FLD: 11.8 % — SIGNIFICANT CHANGE UP (ref 10.3–14.5)
SARS-COV-2 RNA SPEC QL NAA+PROBE: SIGNIFICANT CHANGE UP
SODIUM SERPL-SCNC: 141 MMOL/L — SIGNIFICANT CHANGE UP (ref 135–145)
SP GR SPEC: 1.02 — SIGNIFICANT CHANGE UP (ref 1.01–1.02)
UROBILINOGEN FLD QL: NEGATIVE — SIGNIFICANT CHANGE UP
WBC # BLD: 8.26 K/UL — SIGNIFICANT CHANGE UP (ref 3.8–10.5)
WBC # FLD AUTO: 8.26 K/UL — SIGNIFICANT CHANGE UP (ref 3.8–10.5)

## 2021-12-14 PROCEDURE — 71045 X-RAY EXAM CHEST 1 VIEW: CPT | Mod: 26

## 2021-12-14 PROCEDURE — 73610 X-RAY EXAM OF ANKLE: CPT | Mod: 26,LT

## 2021-12-14 PROCEDURE — 99024 POSTOP FOLLOW-UP VISIT: CPT

## 2021-12-14 PROCEDURE — 99285 EMERGENCY DEPT VISIT HI MDM: CPT

## 2021-12-14 PROCEDURE — 93010 ELECTROCARDIOGRAM REPORT: CPT

## 2021-12-14 PROCEDURE — 99222 1ST HOSP IP/OBS MODERATE 55: CPT | Mod: GC

## 2021-12-14 RX ORDER — SODIUM CHLORIDE 9 MG/ML
1000 INJECTION INTRAMUSCULAR; INTRAVENOUS; SUBCUTANEOUS
Refills: 0 | Status: DISCONTINUED | OUTPATIENT
Start: 2021-12-15 | End: 2021-12-20

## 2021-12-14 RX ORDER — ONDANSETRON 8 MG/1
4 TABLET, FILM COATED ORAL EVERY 8 HOURS
Refills: 0 | Status: DISCONTINUED | OUTPATIENT
Start: 2021-12-14 | End: 2021-12-15

## 2021-12-14 RX ORDER — ACETAMINOPHEN 500 MG
650 TABLET ORAL EVERY 6 HOURS
Refills: 0 | Status: DISCONTINUED | OUTPATIENT
Start: 2021-12-14 | End: 2021-12-15

## 2021-12-14 RX ORDER — LANOLIN ALCOHOL/MO/W.PET/CERES
3 CREAM (GRAM) TOPICAL AT BEDTIME
Refills: 0 | Status: DISCONTINUED | OUTPATIENT
Start: 2021-12-14 | End: 2021-12-15

## 2021-12-14 RX ORDER — LACTOBACILLUS ACIDOPHILUS 100MM CELL
1 CAPSULE ORAL DAILY
Refills: 0 | Status: DISCONTINUED | OUTPATIENT
Start: 2021-12-14 | End: 2021-12-15

## 2021-12-14 RX ORDER — CHOLECALCIFEROL (VITAMIN D3) 125 MCG
1 CAPSULE ORAL
Qty: 0 | Refills: 0 | DISCHARGE

## 2021-12-14 RX ORDER — ENOXAPARIN SODIUM 100 MG/ML
40 INJECTION SUBCUTANEOUS DAILY
Refills: 0 | Status: COMPLETED | OUTPATIENT
Start: 2021-12-14 | End: 2021-12-14

## 2021-12-14 RX ORDER — PIPERACILLIN AND TAZOBACTAM 4; .5 G/20ML; G/20ML
3.38 INJECTION, POWDER, LYOPHILIZED, FOR SOLUTION INTRAVENOUS ONCE
Refills: 0 | Status: COMPLETED | OUTPATIENT
Start: 2021-12-14 | End: 2021-12-14

## 2021-12-14 RX ORDER — PIPERACILLIN AND TAZOBACTAM 4; .5 G/20ML; G/20ML
3.38 INJECTION, POWDER, LYOPHILIZED, FOR SOLUTION INTRAVENOUS EVERY 8 HOURS
Refills: 0 | Status: DISCONTINUED | OUTPATIENT
Start: 2021-12-14 | End: 2021-12-15

## 2021-12-14 RX ORDER — SODIUM CHLORIDE 9 MG/ML
1600 INJECTION INTRAMUSCULAR; INTRAVENOUS; SUBCUTANEOUS ONCE
Refills: 0 | Status: COMPLETED | OUTPATIENT
Start: 2021-12-14 | End: 2021-12-14

## 2021-12-14 RX ORDER — VANCOMYCIN HCL 1 G
1000 VIAL (EA) INTRAVENOUS ONCE
Refills: 0 | Status: COMPLETED | OUTPATIENT
Start: 2021-12-14 | End: 2021-12-14

## 2021-12-14 RX ORDER — ZINC SULFATE TAB 220 MG (50 MG ZINC EQUIVALENT) 220 (50 ZN) MG
1 TAB ORAL
Qty: 0 | Refills: 0 | DISCHARGE

## 2021-12-14 RX ORDER — ASCORBIC ACID 60 MG
1 TABLET,CHEWABLE ORAL
Qty: 0 | Refills: 0 | DISCHARGE

## 2021-12-14 RX ORDER — L.ACIDOPH/B.ANIMALIS/B.LONGUM 15B CELL
1 CAPSULE ORAL
Qty: 0 | Refills: 0 | DISCHARGE

## 2021-12-14 RX ADMIN — Medication 250 MILLIGRAM(S): at 12:29

## 2021-12-14 RX ADMIN — PIPERACILLIN AND TAZOBACTAM 25 GRAM(S): 4; .5 INJECTION, POWDER, LYOPHILIZED, FOR SOLUTION INTRAVENOUS at 22:13

## 2021-12-14 RX ADMIN — PIPERACILLIN AND TAZOBACTAM 200 GRAM(S): 4; .5 INJECTION, POWDER, LYOPHILIZED, FOR SOLUTION INTRAVENOUS at 11:51

## 2021-12-14 RX ADMIN — SODIUM CHLORIDE 1600 MILLILITER(S): 9 INJECTION INTRAMUSCULAR; INTRAVENOUS; SUBCUTANEOUS at 11:27

## 2021-12-14 NOTE — CONSULT NOTE ADULT - SUBJECTIVE AND OBJECTIVE BOX
***INCOMPLETE NOTE IN PROGRESS*** HPI:  Patient is a 56 year old female with no significant PMH who is S/P Left peroneal tendon repair 08/06/21, with infection and non closure of surgical site referred from wound care center for admission, abx treatment and wound debridement scheduled for 12/15/21. Patient informs that she has been treated with multiple round of Abx including Linezolid x 3 since August. An incision and drainage was performed in the Wound Care Center last week, however pt needs OR debridement. Denies any fever, chills, nausea, vomiting, chest pain, shortness of breath, or calf pain at this time.       ED course:   Vitals:   BP: 142/86  HR:67  Temp:97.5  RR: 16, O2: 98% on RA   Labs:   CBC: WBC: 8.26  Hb:13.5 , Platlets: 300   CMP:  Lytes: WNL,  BUN/Creatinine: 9/0.73 , Glucose: 113     CXR: No abnormality detected (self read).   MRI Left Ankle: Soft tissue ulcer overlying the lateral aspect of the ankle with   subjacent small abscess/phlegmon. Retro and inframalleolar tenosynovitis of the peroneal tendons, which may represent an infectious tenosynovitis. Susceptibility artifact tracking   along the inframalleolar peroneal tendons, which may be related to prior surgery or represent foci of gas within the peroneal tenosynovial sheath from the overlying ulcer. Tendinosis of the retromalleolar and inframalleolar peroneal tendons with areas of irregular morphology, which may be postsurgical or be related to partial tearing. Osseous edema with minimal T1 marrow signal abnormality within the lateral malleolus, which may represent osteitis or early osteomyelitis.  EKG:  NSR with incomplete RBBB,   HR 63,  QT/QTc: 450/460  Patient received: Zosyn 3.375 G x 1, Vanco 1G X 1, 1600 ML X 1   (14 Dec 2021 12:11)    PAST MEDICAL & SURGICAL HISTORY:  Obesity    COVID-19 december 2020 no hospitalization    Morbid obesity with BMI of 40.0-44.9, adult    Surgical wound infection    S/P tendon repair    S/P hysterectomy  2008    S/P left knee surgery  2019        Allergies    No Known Allergies    Intolerances        MEDICATIONS  (STANDING):  enoxaparin Injectable 40 milliGRAM(s) SubCutaneous daily  lactobacillus acidophilus 1 Tablet(s) Oral daily  piperacillin/tazobactam IVPB.. 3.375 Gram(s) IV Intermittent every 8 hours    MEDICATIONS  (PRN):  acetaminophen     Tablet .. 650 milliGRAM(s) Oral every 6 hours PRN Temp greater or equal to 38C (100.4F), Mild Pain (1 - 3)  aluminum hydroxide/magnesium hydroxide/simethicone Suspension 30 milliLiter(s) Oral every 4 hours PRN Dyspepsia  melatonin 3 milliGRAM(s) Oral at bedtime PRN Insomnia  ondansetron Injectable 4 milliGRAM(s) IV Push every 8 hours PRN Nausea and/or Vomiting      Social History:  Tobacco: No  EtOH: Social  recreational Drug use: Never  Lives with: By herself  Ambulates: Independently  ADLs: No issues   Occupation:  Vaccinations: COVID Pfizer X 2 (14 Dec 2021 12:11)      FAMILY HISTORY:  FH: heart disease (Father)        Vital Signs Last 24 Hrs  T(C): 36.4 (14 Dec 2021 09:31), Max: 36.4 (14 Dec 2021 09:31)  T(F): 97.5 (14 Dec 2021 09:31), Max: 97.5 (14 Dec 2021 09:31)  HR: 67 (14 Dec 2021 09:31) (67 - 67)  BP: 142/86 (14 Dec 2021 09:31) (142/86 - 142/86)  BP(mean): --  RR: 16 (14 Dec 2021 09:31) (16 - 16)  SpO2: 98% (14 Dec 2021 09:31) (98% - 98%)    PHYSICAL EXAM:  Vascular: DP & PT palpable bilaterally, Capillary refill 3 seconds, Digital hair present bilaterally, left vel-malleolar non-pitting edema.  Neurological: Light touch sensation intact bilaterally.  Musculoskeletal: 5/5 strength in all quadrants bilaterally, AJ & STJ ROM intact.  Dermatological: 5cm x 2cm x 0.5cm ulceration noted proximal to the left lateral malleolus with exposed tendon, fibrotic wound bed, no probe to bone, + periwound erythema, + fluctuance, no malodor, no proximal streaking at this time.    CBC Full  -  ( 14 Dec 2021 10:57 )  WBC Count : 8.26 K/uL  RBC Count : 4.38 M/uL  Hemoglobin : 13.5 g/dL  Hematocrit : 38.8 %  Platelet Count - Automated : 300 K/uL  Mean Cell Volume : 88.6 fl  Mean Cell Hemoglobin : 30.8 pg  Mean Cell Hemoglobin Concentration : 34.8 gm/dL  Auto Neutrophil # : 4.56 K/uL  Auto Lymphocyte # : 2.88 K/uL  Auto Monocyte # : 0.47 K/uL  Auto Eosinophil # : 0.27 K/uL  Auto Basophil # : 0.05 K/uL  Auto Neutrophil % : 55.1 %  Auto Lymphocyte % : 34.9 %  Auto Monocyte % : 5.7 %  Auto Eosinophil % : 3.3 %  Auto Basophil % : 0.6 %      ----------CHEM PANEL----------    PT/INR - ( 14 Dec 2021 10:57 )   PT: 13.1 sec;   INR: 1.13 ratio         PTT - ( 14 Dec 2021 10:57 )  PTT:31.5 sec

## 2021-12-14 NOTE — ED ADULT NURSE NOTE - OBJECTIVE STATEMENT
arrived to ED from wound care with purulent draining wound (infection) yellow in color and thick, No odor. measured approximately 4x 3x 0.3 with intact edges. Stated it started in august.  Denies pain. Area cleansed with normal saline, wound culture obtained, and was seen by Podiatry. Iv placed ,labs done, blood culture obtained, urine pending. Iv NS infusing. Iv antibiotics to follow. arrived to ED from wound care with purulent left leg (ankle) draining wound (infection) yellow in color and thick, No odor. measured approximately 4x 3x 0.3 with intact edges. Stated it started in august.  Denies pain. Area cleansed with normal saline, wound culture obtained, and was seen by Podiatry. Iv placed ,labs done, blood culture obtained, urine pending. Iv NS infusing. Iv antibiotics zosyn and vanco ordered. Zosyn in progress. vanco to follow.

## 2021-12-14 NOTE — ED PROVIDER NOTE - PHYSICAL EXAMINATION
Gen: Awake, Alert, WD, WN, NAD  Head:  NC/AT  Eyes:  PERRL, EOMI, Conjunctiva pink, lids normal, no scleral icterus  ENT: OP clear, moist mucus membranes  Neck: supple, nontender, no meningismus, no JVD, trachea midline  Cardiac/CV:  S1 S2, RRR, no M/G/R  Respiratory/Pulm:  CTAB, good air movement, normal resp effort, no wheezes/stridor/retractions/rales/rhonchi  Gastrointestinal/Abdomen:  Soft, nontender, nondistended, +BS, no rebound/guarding  Back:  no CVAT, no MLT  Ext:  warm, well perfused, moving all extremities spontaneously, no cyanosis, no erythema, distal pulses intact, left ankle lateral aspect with 4 cm open linear surgical incision with purulent drainiage, non tender, no leg tenderness.  Skin: no rash, no vesicles, no petechiae, no ecchymosis  Neuro:  AAOx3, sensation intact, motor 5/5 x 4 extremities, normal gait, speech clear

## 2021-12-14 NOTE — ED ADULT NURSE REASSESSMENT NOTE - MUSCULOSKELETAL ASSESSMENT
----- Message from Xena Estevez sent at 1/16/2018  7:21 AM CST -----  Regarding: need active referral please  Patient needs an active referral for rheumatology! ty   - - -

## 2021-12-14 NOTE — H&P ADULT - NSICDXPASTMEDICALHX_GEN_ALL_CORE_FT
PAST MEDICAL HISTORY:  COVID-19 december 2020 no hospitalization    Morbid obesity with BMI of 40.0-44.9, adult     Obesity     S/P tendon repair     Surgical wound infection

## 2021-12-14 NOTE — CONSULT NOTE ADULT - PROBLEM SELECTOR RECOMMENDATION 9
Pt seen and evaluated.  Reviewed left ankle MRI results. Pt seen and evaluated.  Reviewed left ankle MRI results.  Please obtain left ankle XR, 3 views; f/u results.  Please start pt on broad spectrum IV abx and obtain ID consult.  Given the patient's severity of symptoms, will plan for surgical intervention at this time. Discussed risks, benefits, and potential complications with patient and family members regarding surgical intervention including sepsis, loss of limb, and loss of life. All questions answered to satisfaction at this time.  Scheduled for left ankle surgical debridement on Weds 12/15/21 at 1PM with Dr. Quevedo.  Pt will be followed by Podiatry while in house. Pt seen and evaluated.  Reviewed left ankle MRI results.  Please obtain left ankle XR, 3 views; f/u results.  Please start pt on broad spectrum IV abx and obtain ID consult.  Given the patient's severity of symptoms, will plan for surgical intervention at this time. Discussed risks, benefits, and potential complications with patient and family members regarding surgical intervention including sepsis, loss of limb, and loss of life. All questions answered to satisfaction at this time.  Scheduled for left ankle surgical debridement with fibular bone biopsy on Weds 12/15/21 at 1PM with Dr. Quevedo.  Pt will be followed by Podiatry while in house.

## 2021-12-14 NOTE — H&P ADULT - NSHPPHYSICALEXAM_GEN_ALL_CORE
Vital Signs Last 24 Hrs  T(C): 36.4 (14 Dec 2021 09:31), Max: 36.4 (14 Dec 2021 09:31)  T(F): 97.5 (14 Dec 2021 09:31), Max: 97.5 (14 Dec 2021 09:31)  HR: 67 (14 Dec 2021 09:31) (67 - 67)  BP: 142/86 (14 Dec 2021 09:31) (142/86 - 142/86)  BP(mean): --  RR: 16 (14 Dec 2021 09:31) (16 - 16)  SpO2: 98% (14 Dec 2021 09:31) (98% - 98%)    GENERAL: NAD, lying in bed comfortably  HEAD:  Atraumatic, Normocephalic  EYES: EOMI, PERRLA, conjunctiva and sclera clear  ENT: Moist mucous membranes  NECK: Supple, No JVD  CHEST/LUNG: Clear to auscultation bilaterally; No rales, rhonchi, wheezing, or rubs. Unlabored respirations  HEART: Regular rate and rhythm; No murmurs, rubs, or gallops  ABDOMEN: Bowel sounds present; Soft, Nontender, Nondistended. No hepatomegally  EXTREMITIES:  2+ Peripheral Pulses, brisk capillary refill. No clubbing, cyanosis, or edema  NERVOUS SYSTEM:  Alert & Oriented X3, speech clear. No deficits   MSK: FROM all 4 extremities, full and equal strength  SKIN: No rashes or lesions Vital Signs Last 24 Hrs  T(C): 36.4 (14 Dec 2021 09:31), Max: 36.4 (14 Dec 2021 09:31)  T(F): 97.5 (14 Dec 2021 09:31), Max: 97.5 (14 Dec 2021 09:31)  HR: 67 (14 Dec 2021 09:31) (67 - 67)  BP: 142/86 (14 Dec 2021 09:31) (142/86 - 142/86)  BP(mean): --  RR: 16 (14 Dec 2021 09:31) (16 - 16)  SpO2: 98% (14 Dec 2021 09:31) (98% - 98%)    GENERAL: Well developed female who is laying in bed and is in NAD  HEAD:  Atraumatic, Normocephalic  EYES: EOMI, PERRLA, conjunctiva and sclera clear  ENT: Moist mucous membranes  NECK: Supple, No JVD  CHEST/LUNG: Clear to auscultation bilaterally; No rales, rhonchi, wheezing, or rubs. Unlabored respirations  HEART: Regular rate and rhythm; No murmurs, rubs, or gallops  ABDOMEN: Bowel sounds present; Soft, Nontender, Nondistended. No hepatomegaly  EXTREMITIES:  2+ Peripheral Pulses, brisk capillary refill. No clubbing, cyanosis, 1+ pitting edema. Left lateral surface with 2 non healing wound with discharge. 1st one is above lateral malleolus 2 x2 inch round well circumscribed boarder with mild erythema. 2 nd is below the lateral malleolus 3x4 inch irregular borders with surrounding erythema.    NERVOUS SYSTEM:  Alert & Oriented X3, speech clear. No deficits   MSK: FROM all 4 extremities, full and equal strength  SKIN: Warm and dry

## 2021-12-14 NOTE — ED PROVIDER NOTE - OBJECTIVE STATEMENT
57yo F sent to ED with post op infection to left ankle, pt had peroneal tendon repair 8/21, infected now with purulent drainage. pt denies fever, chills, cp, sob, n/v/d, abd pain, headache, pt under care of dr elena/rebecca podiatrists.   +covid vaccine  lmp= 2008 Anxiety state

## 2021-12-14 NOTE — H&P ADULT - PROBLEM SELECTOR PLAN 2
- Patient is s/p left peroneal tendon repair with surgical site infection in August 21  - Underwent multiple round of Abx treatment, now being admitted for wound debridement  - MRI left ankle shows possible osteitis or early osteomyelitis  - ID consulted, will appreciate recs  - Podiatry on board

## 2021-12-14 NOTE — H&P ADULT - HISTORY OF PRESENT ILLNESS
ED course:   Vitals:   BP: 142/86  HR:67  Temp:97.5  RR: 16, O2: 98% on RA   Labs:   CBC: WBC: 8.26  Hb:13.5 , Platlets: 300   CMP:  Lytes: WNL,  BUN/Creatinine: 9/0.73 , Glucose: 113     CXR: No abnormality detected (self read).   MRI Left Ankle: Soft tissue ulcer overlying the lateral aspect of the ankle with   subjacent small abscess/phlegmon. Retro and inframalleolar tenosynovitis of the peroneal tendons, which may represent an infectious tenosynovitis. Susceptibility artifact tracking   along the inframalleolar peroneal tendons, which may be related to prior surgery or represent foci of gas within the peroneal tenosynovial sheath from the overlying ulcer. Tendinosis of the retromalleolar and inframalleolar peroneal tendons with areas of irregular morphology, which may be postsurgical or be related to partial tearing.Osseous edema with minimal T1 marrow signal abnormality within the lateral malleolus, which may represent osteitis or early osteomyelitis.  EKG:   Patient received: Zosyn 3.375 G x 1, Vanco 1G X 1, 1600 ML X 1   Patient is a 56 year old female with no significant PMH who is S/P Left peroneal tendon repair 08/06/21, with infection and non closure of surgical site referred from wound care center for admission, Abx treatment and wound debridement. Patient informs that she has been treated with multiple round of Abx including Linezolid x 3 since august Informs that she has been under the treatment of wound care center since her surgery in August. Informs that she went for her scheduled visit today and was told to come to the ED for admission and possible wound clean under anesthesia. Denies any fever or chills. No chest pain, SOB or palpitations. No N/V/D/C.        ED course:   Vitals:   BP: 142/86  HR:67  Temp:97.5  RR: 16, O2: 98% on RA   Labs:   CBC: WBC: 8.26  Hb:13.5 , Platlets: 300   CMP:  Lytes: WNL,  BUN/Creatinine: 9/0.73 , Glucose: 113     CXR: No abnormality detected (self read).   MRI Left Ankle: Soft tissue ulcer overlying the lateral aspect of the ankle with   subjacent small abscess/phlegmon. Retro and inframalleolar tenosynovitis of the peroneal tendons, which may represent an infectious tenosynovitis. Susceptibility artifact tracking   along the inframalleolar peroneal tendons, which may be related to prior surgery or represent foci of gas within the peroneal tenosynovial sheath from the overlying ulcer. Tendinosis of the retromalleolar and inframalleolar peroneal tendons with areas of irregular morphology, which may be postsurgical or be related to partial tearing. Osseous edema with minimal T1 marrow signal abnormality within the lateral malleolus, which may represent osteitis or early osteomyelitis.  EKG:  NSR with incomplete RBBB,   HR 63,  QT/QTc: 450/460  Patient received: Zosyn 3.375 G x 1, Vanco 1G X 1, 1600 ML X 1

## 2021-12-14 NOTE — H&P ADULT - ASSESSMENT
Patient is a 56 year old female with no significant PMH who is S/P Left peroneal tendon repair 08/06/21, with infection and non closure of surgical site referred from wound care center for admission, Abx treatment and wound debridement.

## 2021-12-14 NOTE — H&P ADULT - NSHPSOCIALHISTORY_GEN_ALL_CORE
Tobacco:   EtOH:   recreational drug use:   Lives with:   Ambulates:    ADLs:   Occupation:   Vaccinations: Tobacco: No  EtOH: Social  recreational Drug use: Never  Lives with: By herself  Ambulates: Independently  ADLs: No issues   Occupation:  Vaccinations: COVID Pfizer X 2

## 2021-12-14 NOTE — ED ADULT NURSE REASSESSMENT NOTE - BP NONINVASIVE SYSTOLIC (MM HG)
Veronica Wolf       132

## 2021-12-14 NOTE — H&P ADULT - PROBLEM SELECTOR PLAN 3
IMPROVE VTE Individual Risk Assessment          RISK                                                          Points  [  ] Previous VTE                                                3  [  ] Thrombophilia                                             2  [  ] Lower limb paralysis                                   2        (unable to hold up >15 seconds)    [  ] Current Cancer                                             2         (within 6 months)  [  ] Immobilization > 24 hrs                              1  [  ] ICU/CCU stay > 24 hours                             1  [  ] Age > 60                                                         1    IMPROVE VTE Score: 0  Lovenox 40 qd - Patient with morbid obesity  - BMI: 42.5  - Patient educated on diet and exercise.

## 2021-12-14 NOTE — CONSULT NOTE ADULT - ASSESSMENT
Left ankle infected surgical wound    - Please start pt on broad spectrum IV abx and obtain ID consult  - Please obtain left ankle XR, 3 views    Pt will be scheduled for left ankle surgical debridement on either Weds 12/15/21 or Thurs 12/16/21 with Dr. Quevedo (TBD by this afternoon).  ·	Requesting medical risk stratification and optimization  ·	Please obtain pre-op labs (CBC, BMP, type & screen, coags on AM of surgery)  ·	Please obtain chest XR (2 views) and EKG  ·	Please keep pt NPO at midnight prior to surgery and start IV fluids  ·	Please hold anti-coags AM of surgery Left ankle infected surgical wound    - Please start pt on broad spectrum IV abx and obtain ID consult  - Please obtain left ankle XR, 3 views    Pt will be scheduled for left ankle surgical debridement on Weds 12/15/21 at 1PM with Dr. Quevedo.  ·	Requesting medical risk stratification and optimization  ·	Please obtain pre-op labs (CBC, BMP, type & screen, coags on AM of surgery)  ·	Please obtain chest XR (2 views) and EKG  ·	Please keep pt NPO at midnight prior to surgery and start IV fluids  ·	Please hold anti-coags AM of surgery Left ankle infected surgical wound    Pt will be scheduled for left ankle surgical debridement on Weds 12/15/21 at 1PM with Dr. Quevedo.  ·	Requesting medical risk stratification and optimization  ·	Please obtain pre-op labs (CBC, BMP, type & screen, coags on AM of surgery)  ·	Please obtain chest XR (2 views) and EKG  ·	Please keep pt NPO at midnight prior to surgery and start IV fluids  ·	Please hold anti-coags AM of surgery Left ankle infected surgical wound    Pt will be scheduled for left ankle surgical debridement with fibular bone biopsy on Weds 12/15/21 at 1PM with Dr. Quevedo.  ·	Requesting medical risk stratification and optimization  ·	Please obtain pre-op labs (CBC, BMP, type & screen, coags on AM of surgery)  ·	Please obtain chest XR (2 views) and EKG  ·	Please keep pt NPO at midnight prior to surgery and start IV fluids  ·	Please hold anti-coags AM of surgery

## 2021-12-14 NOTE — H&P ADULT - PROBLEM SELECTOR PLAN 1
- Patient is s/p left peroneal tendon repair with surgical site infection in August 21  - Patient presented from wound care center with Hx of surgical wound infection in left ankle for Abx treatment and wound debridement.  - MRI Left Ankle shows: Soft tissue ulcer overlying the lateral aspect of the ankle with   subjacent small abscess/phlegmon. Retro and inframalleolar tenosynovitis of the peroneal tendons, which may represent an infectious tenosynovitis. Susceptibility artifact tracking   along the inframalleolar peroneal tendons, which may be related to prior surgery or represent foci of gas within the peroneal tenosynovial sheath from the overlying ulcer. Tendinosis of the retromalleolar and inframalleolar peroneal tendons with areas of irregular morphology, which may be postsurgical or be related to partial tearing. Osseous edema with minimal T1 marrow signal abnormality within the lateral malleolus, which may represent osteitis or early osteomyelitis.  - S/P Vanc and Zosyn x 1 each in the ED  - will continue Vaco and zosyn   - ID Dr. Downing consulted, will appreciate recs  - Podiatry on board, recommends wound debridement tomorrow 12/15 or 12/16 by Dr. Quevedo   - Patient is medically optimized and is clinically stable  and is low risk for a low risk procedure of scheduled wound debridement. - Patient is s/p left peroneal tendon repair with surgical site infection in August 21  - Patient presented from [facility] care center with Hx of surgical wound infection in left ankle for Abx treatment and wound debridement.  - MRI Left Ankle shows: Soft tissue ulcer overlying the lateral aspect of the ankle with   subjacent small abscess/phlegmon. Retro and inframalleolar tenosynovitis of the peroneal tendons, which may represent an infectious tenosynovitis. Susceptibility artifact tracking   along the inframalleolar peroneal tendons, which may be related to prior surgery or represent foci of gas within the peroneal tenosynovial sheath from the overlying ulcer. Tendinosis of the retromalleolar and inframalleolar peroneal tendons with areas of irregular morphology, which may be postsurgical or be related to partial tearing. Osseous edema with minimal T1 marrow signal abnormality within the lateral malleolus, which may represent osteitis or early osteomyelitis.  - S/P Vanc and Zosyn x 1 each in the ED  - will continue Vaco and zosyn   - f/u blood and urine culture  - ID Dr. Downing consulted, will appreciate recs  - Podiatry on board, recommends wound debridement tomorrow 12/15 or 12/16 by Dr. Quevedo   - Patient is medically optimized and is clinically stable  and is low risk for a low risk procedure of scheduled wound debridement.

## 2021-12-14 NOTE — ED PROVIDER NOTE - CARE PLAN
Principal Discharge DX:	Open wound with complication  Secondary Diagnosis:	Bacterial skin infection of leg   1

## 2021-12-14 NOTE — H&P ADULT - PROBLEM SELECTOR PLAN 4
IMPROVE VTE Individual Risk Assessment          RISK                                                          Points  [  ] Previous VTE                                                3  [  ] Thrombophilia                                             2  [  ] Lower limb paralysis                                   2        (unable to hold up >15 seconds)    [  ] Current Cancer                                             2         (within 6 months)  [  ] Immobilization > 24 hrs                              1  [  ] ICU/CCU stay > 24 hours                             1  [  ] Age > 60                                                         1    IMPROVE VTE Score: 0  Lovenox 40 qd

## 2021-12-14 NOTE — CONSULT NOTE ADULT - SUBJECTIVE AND OBJECTIVE BOX
HPI:  55 y/o F with PMHx of obesity, COVID-19, repair of peroneal tendon of left ankle (8/6/21) complicated by wound dehisence with infection ( grew Staphylococcus pseudintermedius ) sp I&D 10/13/21 but per pt, wound has never healed and was being seen at [facility] wound care center now admitted sec infected wound with abscess needing debridement in OR and antibiotics. Had debridement at the wound care center a week ago on 12/7 due to abscess formation. Had MRI on 12/10 showing small abscess/phlegmon with  infectious tenosynovitis and osteitis or early osteomyelitis. + ankle pain. No fever chills n/v/d CP SOB abd pain.    Infectious Disease consult was called to evaluate pt and for antibiotic management.      Past Medical & Surgical Hx:  PAST MEDICAL & SURGICAL HISTORY:  Obesity  COVID-19 december 2020 no hospitalization  Morbid obesity with BMI of 40.0-44.9, adult  Surgical wound infection  S/P tendon repair  S/P hysterectomy  2008  S/P left knee surgery  2019      Social History--  EtOH: denies   Tobacco: denies   Drug Use: denies      FAMILY HISTORY:  FH: heart disease (Father)        Allergies  No Known Allergies    Intolerances  NONE    Home Medications:  metFORMIN 500 mg oral tablet, extended release: 1 tab(s) orally once a day (in the evening)    *Pt was recently prescribed for pre-diabetes. Not compliant (14 Dec 2021 19:06)  multivitamin: 1 tab(s) orally once a day (14 Dec 2021 19:06)  Probiotic Formula oral capsule: 1 cap(s) orally once a day (14 Dec 2021 19:06)  Vitamin C: 1 tab(s) orally once a day (14 Dec 2021 19:06)  Vitamin D3: 1 tab(s) orally once a day (14 Dec 2021 19:06)  Zinc: 1 tab(s) orally once a day (14 Dec 2021 19:06)      Current Inpatient Medications :    ANTIBIOTICS:   piperacillin/tazobactam IVPB.. 3.375 Gram(s) IV Intermittent every 8 hours      OTHER RELEVANT MEDICATIONS :  acetaminophen     Tablet .. 650 milliGRAM(s) Oral every 6 hours PRN  aluminum hydroxide/magnesium hydroxide/simethicone Suspension 30 milliLiter(s) Oral every 4 hours PRN  enoxaparin Injectable 40 milliGRAM(s) SubCutaneous daily  melatonin 3 milliGRAM(s) Oral at bedtime PRN  ondansetron Injectable 4 milliGRAM(s) IV Push every 8 hours PRN      ROS:  CONSTITUTIONAL:  Negative fever or chills  EYES:  Negative  blurry vision or double vision  CARDIOVASCULAR:  Negative for chest pain or palpitations  RESPIRATORY:  Negative for cough, wheezing, or SOB   GASTROINTESTINAL:  Negative for nausea, vomiting, diarrhea, constipation, or abdominal pain  GENITOURINARY:  Negative frequency, urgency , dysuria or hematuria   NEUROLOGIC:  No headache, confusion, dizziness, lightheadedness  All other systems were reviewed and are negative      Physical Exam:  Vital Signs Last 24 Hrs  T(C): 37 (14 Dec 2021 18:41), Max: 37 (14 Dec 2021 18:41)  T(F): 98.6 (14 Dec 2021 18:41), Max: 98.6 (14 Dec 2021 18:41)  HR: 74 (14 Dec 2021 18:41) (67 - 74)  BP: 128/85 (14 Dec 2021 18:41) (128/85 - 142/86)  RR: 16 (14 Dec 2021 18:41) (16 - 16)  SpO2: 95% (14 Dec 2021 18:41) (95% - 98%)  Height (cm): 160 (12-14 @ 09:31)  Weight (kg): 108.9 (12-14 @ 09:31)  BMI (kg/m2): 42.5 (12-14 @ 09:31)  BSA (m2): 2.09 (12-14 @ 09:31)    General: no acute distress  Neck: supple, trachea midline  Lungs: clear, no wheeze/rhonchi  Cardiovascular: regular rate and rhythm, S1 S2  Abdomen: soft, nontender, ND, bowel sounds normal  Neurological:  alert and oriented x3  Skin: no rash  Extremities: Left lateral ankle wound nonhealing with yellow slough minimal surrounding erythema      Labs:                         13.5   8.26  )-----------( 300      ( 14 Dec 2021 10:57 )             38.8   12-14    141  |  106  |  9   ----------------------------<  113<H>  3.8   |  30  |  0.73    Ca    9.5      14 Dec 2021 10:57    TPro  7.7  /  Alb  3.7  /  TBili  0.6  /  DBili  x   /  AST  16  /  ALT  24  /  AlkPhos  71  12-14    Sedimentation Rate, Erythrocyte (12.14.21 @ 10:57)    Sedimentation Rate, Erythrocyte: 14 mm/hr    C-Reactive Protein, Serum (12.14.21 @ 16:04)    C-Reactive Protein, Serum: <3: Test Repeated mg/L    RECENT CULTURES:  Culture - Tissue with Gram Stain (12.07.21 @ 11:32)    -  Tetra/Doxy: S <=1    -  Tobramycin: S <=2    -  Trimethoprim/Sulfamethoxazole: S <=0.5/9.5    -  Vancomycin: S 2    -  Piperacillin/Tazobactam: S <=8    Gram Stain:   Rare polymorphonuclear leukocytes per low power field  Few Gram Positive Rods per oil power field    -  Amikacin: S <=16    -  Amoxicillin/Clavulanic Acid: S <=8/4    -  Ampicillin: R <=8 These ampicillin results predict results for amoxicillin    -  Ampicillin: S <=2 Predicts results to ampicillin/sulbactam, amoxacillin-clavulanate and  piperacillin-tazobactam.    -  Ampicillin/Sulbactam: S <=4/2 Enterobacter, Klebsiella aerogenes, Citrobacter, and Serratia may develop resistance during prolonged therapy (3-4 days)    -  Cefepime: S <=2    -  Cefoxitin: S <=8    -  Ceftriaxone: S <=1 Enterobacter, Klebsiella aerogenes, Citrobacter, and Serratia may develop resistance during prolonged therapy    -  Ciprofloxacin: S <=0.25    -  Aztreonam: S <=4    -  Cefazolin: S <=2 Enterobacter, Klebsiella aerogenes, Citrobacter, and Serratia may develop resistance during prolonged therapy (3-4 days)    -  Ertapenem: S <=0.5    -  Gentamicin: S <=2    -  Imipenem: S <=1    -  Levofloxacin: S <=0.5    -  Meropenem: S <=1    Specimen Source: .Tissue Left Ankle    Culture Results:   Culture yields >4 types of aerobic and/or anaerobic bacteria  Call client services within 7 days if further workup is clinically  indicated. Culture includes  Moderate Klebsiella oxytoca/Raoutella ornithinolytica  Few Enterococcus faecalis  Few Bacillus species not anthracis "Susceptibilities not performed"    Organism Identification: Klebsiella oxytoca /Raoutella ornithinolytica  Enterococcus faecalis    Organism: Klebsiella oxytoca /Raoutella ornithinolytica    Organism: Enterococcus faecalis    Method Type: [facility]    Method Type: [facility]    RADIOLOGY & ADDITIONAL STUDIES:    ACC: [id number] EXAM:  MR ANKLE WAW IC LT                          PROCEDURE DATE:  12/10/2021          INTERPRETATION:   History: Ankle ulcer, evaluate for osteomyelitis    Technique: Multiplanar and multisequence MRI images were obtained through   the left ankle without and with contrast. Approximately 10 cc intravenous   Gadavist was administered.    Comparison: Foot radiographs dated 11/10/2021    Findings:    There is a soft tissue ulcer overlying the lateral aspect of the ankle   with subjacent small abscess/phlegmon, measuring 1.9 x 1.1 cm   transaxially and up to 2.7 cm craniocaudal.    There is retro and inframalleolar tenosynovitis of the peroneal tendons.   There is susceptibility artifact tracking along the inframalleolar   peroneal tendons, which may be related to prior surgery or represent foci   of gas within the peroneal tenosynovial sheath from the overlying ulcer.   Tendinosis of the retromalleolar and inframalleolar peroneal tendons with   areas of irregular morphology,which may be postsurgical or be related to   partial tearing.    There is osseous edema with minimal T1 marrow signal abnormality within   the lateral malleolus, which may represent osteitis or early   osteomyelitis.    Mild chondral wear with subchondral cystic change at the second and third   tarsometatarsal joints.    Chronic scar remodeling of the anterior talofibular, posterior   talofibular, and calcaneofibular ligaments. Fluid with mild synovitis   along the anterolateral gutter, which canbe seen in setting of   anterolateral impingement.    Long flexor tendons are grossly intact. Long extensor tendons are grossly   intact. Moderate distal Achilles tendinosis. Plantar fasciitis noted.    Neurovascular structures are unremarkable.    Impression:    Soft tissue ulcer overlying the lateral aspect of the ankle with   subjacent small abscess/phlegmon.    Retro and inframalleolar tenosynovitis of the peroneal tendons, which may   represent an infectious tenosynovitis. Susceptibility artifact tracking   along the inframalleolar peroneal tendons, which may be related to prior   surgery or represent foci of gas within the peroneal tenosynovial sheath   from the overlying ulcer. Tendinosis of the retromalleolar and   inframalleolar peroneal tendons with areas of irregular morphology, which   may be postsurgical or be related to partial tearing.    Osseous edema with minimal T1 marrow signal abnormality within the   lateral malleolus, which may represent osteitis or early osteomyelitis.    Assessment :   55 y/o F with PMHx of obesity, COVID-19, repair of peroneal tendon of left ankle (8/6/21) complicated by wound dehisence with infection ( grew Staphylococcus pseudintermedius ) sp I&D 10/13/21 but per pt, wound has never healed and was being seen at [facility] wound care center now admitted sec infected wound with abscess needing debridement in OR and antibiotics. MRI on 12/10 showing small abscess/phlegmon with  infectious tenosynovitis and osteitis or early osteomyelitis.     Plan :   Cont Zosyn  For OR debridement  Fu OR cultures  Trend temps and cbc  Will likely need long term IV antibiotics      Continue with present regiment .  Approptiate use of antibiotics and adverse effects reviewed.      I have discussed the above plan of care with patient in detail. She expressed understanding of the treatment plan . Risks, benefits and alternatives discussed in detail. I have asked if she has any questions or concerns and appropriately addressed them to the best of my ability .      > 45 minutes spent in direct patient care reviewing  the notes, lab data/ imaging , discussion with multidisciplinary team. All questions were addressed and answered to the best of my capacity .    Thank you for allowing me to participate in the care of your patient .      Mandy Cueto MD  Infectious Disease  [phone]

## 2021-12-14 NOTE — ED PROVIDER NOTE - CLINICAL SUMMARY MEDICAL DECISION MAKING FREE TEXT BOX
55yo F with post op wound infection to left ankle requiring IV antibiotics and surgical debridement. labs, blood cultures, lactatel, IVFs, IV antibiotics, podiatry consult, admission for post op infection

## 2021-12-15 ENCOUNTER — TRANSCRIPTION ENCOUNTER (OUTPATIENT)
Age: 56
End: 2021-12-15

## 2021-12-15 ENCOUNTER — APPOINTMENT (OUTPATIENT)
Dept: HYPERBARIC MEDICINE | Facility: HOSPITAL | Age: 56
End: 2021-12-15

## 2021-12-15 ENCOUNTER — RESULT REVIEW (OUTPATIENT)
Age: 56
End: 2021-12-15

## 2021-12-15 DIAGNOSIS — R73.03 PREDIABETES: ICD-10-CM

## 2021-12-15 LAB
ABO RH CONFIRMATION: SIGNIFICANT CHANGE UP
ALBUMIN SERPL ELPH-MCNC: 3.1 G/DL — LOW (ref 3.3–5)
ALP SERPL-CCNC: 62 U/L — SIGNIFICANT CHANGE UP (ref 40–120)
ALT FLD-CCNC: 22 U/L — SIGNIFICANT CHANGE UP (ref 12–78)
ANION GAP SERPL CALC-SCNC: 4 MMOL/L — LOW (ref 5–17)
APTT BLD: 31.4 SEC — SIGNIFICANT CHANGE UP (ref 27.5–35.5)
AST SERPL-CCNC: 10 U/L — LOW (ref 15–37)
BASOPHILS # BLD AUTO: 0.02 K/UL — SIGNIFICANT CHANGE UP (ref 0–0.2)
BASOPHILS NFR BLD AUTO: 0.3 % — SIGNIFICANT CHANGE UP (ref 0–2)
BILIRUB SERPL-MCNC: 0.6 MG/DL — SIGNIFICANT CHANGE UP (ref 0.2–1.2)
BUN SERPL-MCNC: 11 MG/DL — SIGNIFICANT CHANGE UP (ref 7–23)
CALCIUM SERPL-MCNC: 8.8 MG/DL — SIGNIFICANT CHANGE UP (ref 8.5–10.1)
CHLORIDE SERPL-SCNC: 110 MMOL/L — HIGH (ref 96–108)
CO2 SERPL-SCNC: 29 MMOL/L — SIGNIFICANT CHANGE UP (ref 22–31)
CREAT SERPL-MCNC: 0.72 MG/DL — SIGNIFICANT CHANGE UP (ref 0.5–1.3)
CULTURE RESULTS: NO GROWTH — SIGNIFICANT CHANGE UP
EOSINOPHIL # BLD AUTO: 0.34 K/UL — SIGNIFICANT CHANGE UP (ref 0–0.5)
EOSINOPHIL NFR BLD AUTO: 4.3 % — SIGNIFICANT CHANGE UP (ref 0–6)
GLUCOSE SERPL-MCNC: 109 MG/DL — HIGH (ref 70–99)
HCT VFR BLD CALC: 36 % — SIGNIFICANT CHANGE UP (ref 34.5–45)
HCV AB S/CO SERPL IA: 0.13 S/CO — SIGNIFICANT CHANGE UP (ref 0–0.99)
HCV AB SERPL-IMP: SIGNIFICANT CHANGE UP
HGB BLD-MCNC: 12.4 G/DL — SIGNIFICANT CHANGE UP (ref 11.5–15.5)
IMM GRANULOCYTES NFR BLD AUTO: 0.3 % — SIGNIFICANT CHANGE UP (ref 0–1.5)
INR BLD: 1.15 RATIO — SIGNIFICANT CHANGE UP (ref 0.88–1.16)
LYMPHOCYTES # BLD AUTO: 3.1 K/UL — SIGNIFICANT CHANGE UP (ref 1–3.3)
LYMPHOCYTES # BLD AUTO: 39 % — SIGNIFICANT CHANGE UP (ref 13–44)
MCHC RBC-ENTMCNC: 30.8 PG — SIGNIFICANT CHANGE UP (ref 27–34)
MCHC RBC-ENTMCNC: 34.4 GM/DL — SIGNIFICANT CHANGE UP (ref 32–36)
MCV RBC AUTO: 89.3 FL — SIGNIFICANT CHANGE UP (ref 80–100)
MONOCYTES # BLD AUTO: 0.47 K/UL — SIGNIFICANT CHANGE UP (ref 0–0.9)
MONOCYTES NFR BLD AUTO: 5.9 % — SIGNIFICANT CHANGE UP (ref 2–14)
NEUTROPHILS # BLD AUTO: 4 K/UL — SIGNIFICANT CHANGE UP (ref 1.8–7.4)
NEUTROPHILS NFR BLD AUTO: 50.2 % — SIGNIFICANT CHANGE UP (ref 43–77)
NRBC # BLD: 0 /100 WBCS — SIGNIFICANT CHANGE UP (ref 0–0)
PLATELET # BLD AUTO: 279 K/UL — SIGNIFICANT CHANGE UP (ref 150–400)
POTASSIUM SERPL-MCNC: 3.9 MMOL/L — SIGNIFICANT CHANGE UP (ref 3.5–5.3)
POTASSIUM SERPL-SCNC: 3.9 MMOL/L — SIGNIFICANT CHANGE UP (ref 3.5–5.3)
PROT SERPL-MCNC: 6.6 G/DL — SIGNIFICANT CHANGE UP (ref 6–8.3)
PROTHROM AB SERPL-ACNC: 13.4 SEC — SIGNIFICANT CHANGE UP (ref 10.6–13.6)
RBC # BLD: 4.03 M/UL — SIGNIFICANT CHANGE UP (ref 3.8–5.2)
RBC # FLD: 11.8 % — SIGNIFICANT CHANGE UP (ref 10.3–14.5)
SODIUM SERPL-SCNC: 143 MMOL/L — SIGNIFICANT CHANGE UP (ref 135–145)
SPECIMEN SOURCE: SIGNIFICANT CHANGE UP
WBC # BLD: 7.95 K/UL — SIGNIFICANT CHANGE UP (ref 3.8–10.5)
WBC # FLD AUTO: 7.95 K/UL — SIGNIFICANT CHANGE UP (ref 3.8–10.5)

## 2021-12-15 PROCEDURE — 88304 TISSUE EXAM BY PATHOLOGIST: CPT | Mod: 26

## 2021-12-15 PROCEDURE — 11044 DBRDMT BONE 1ST 20 SQ CM/<: CPT | Mod: 58

## 2021-12-15 PROCEDURE — 11047 DBRDMT BONE EACH ADDL: CPT

## 2021-12-15 PROCEDURE — 88311 DECALCIFY TISSUE: CPT | Mod: 26

## 2021-12-15 PROCEDURE — 99232 SBSQ HOSP IP/OBS MODERATE 35: CPT

## 2021-12-15 PROCEDURE — 73610 X-RAY EXAM OF ANKLE: CPT | Mod: 26,LT

## 2021-12-15 RX ORDER — PIPERACILLIN AND TAZOBACTAM 4; .5 G/20ML; G/20ML
3.38 INJECTION, POWDER, LYOPHILIZED, FOR SOLUTION INTRAVENOUS ONCE
Refills: 0 | Status: COMPLETED | OUTPATIENT
Start: 2021-12-15 | End: 2021-12-15

## 2021-12-15 RX ORDER — LANOLIN ALCOHOL/MO/W.PET/CERES
3 CREAM (GRAM) TOPICAL AT BEDTIME
Refills: 0 | Status: DISCONTINUED | OUTPATIENT
Start: 2021-12-15 | End: 2021-12-20

## 2021-12-15 RX ORDER — ACETAMINOPHEN 500 MG
650 TABLET ORAL EVERY 6 HOURS
Refills: 0 | Status: DISCONTINUED | OUTPATIENT
Start: 2021-12-15 | End: 2021-12-20

## 2021-12-15 RX ORDER — ONDANSETRON 8 MG/1
4 TABLET, FILM COATED ORAL EVERY 8 HOURS
Refills: 0 | Status: DISCONTINUED | OUTPATIENT
Start: 2021-12-15 | End: 2021-12-20

## 2021-12-15 RX ORDER — ONDANSETRON 8 MG/1
4 TABLET, FILM COATED ORAL ONCE
Refills: 0 | Status: DISCONTINUED | OUTPATIENT
Start: 2021-12-15 | End: 2021-12-15

## 2021-12-15 RX ORDER — OXYCODONE HYDROCHLORIDE 5 MG/1
5 TABLET ORAL ONCE
Refills: 0 | Status: DISCONTINUED | OUTPATIENT
Start: 2021-12-15 | End: 2021-12-15

## 2021-12-15 RX ORDER — APREPITANT 80 MG/1
40 CAPSULE ORAL ONCE
Refills: 0 | Status: COMPLETED | OUTPATIENT
Start: 2021-12-15 | End: 2021-12-15

## 2021-12-15 RX ORDER — PIPERACILLIN AND TAZOBACTAM 4; .5 G/20ML; G/20ML
3.38 INJECTION, POWDER, LYOPHILIZED, FOR SOLUTION INTRAVENOUS EVERY 8 HOURS
Refills: 0 | Status: DISCONTINUED | OUTPATIENT
Start: 2021-12-15 | End: 2021-12-20

## 2021-12-15 RX ORDER — SODIUM CHLORIDE 9 MG/ML
1000 INJECTION, SOLUTION INTRAVENOUS
Refills: 0 | Status: DISCONTINUED | OUTPATIENT
Start: 2021-12-15 | End: 2021-12-15

## 2021-12-15 RX ORDER — LACTOBACILLUS ACIDOPHILUS 100MM CELL
1 CAPSULE ORAL DAILY
Refills: 0 | Status: DISCONTINUED | OUTPATIENT
Start: 2021-12-15 | End: 2021-12-20

## 2021-12-15 RX ORDER — HYDROMORPHONE HYDROCHLORIDE 2 MG/ML
0.5 INJECTION INTRAMUSCULAR; INTRAVENOUS; SUBCUTANEOUS
Refills: 0 | Status: DISCONTINUED | OUTPATIENT
Start: 2021-12-15 | End: 2021-12-15

## 2021-12-15 RX ADMIN — PIPERACILLIN AND TAZOBACTAM 200 GRAM(S): 4; .5 INJECTION, POWDER, LYOPHILIZED, FOR SOLUTION INTRAVENOUS at 23:42

## 2021-12-15 RX ADMIN — APREPITANT 40 MILLIGRAM(S): 80 CAPSULE ORAL at 13:08

## 2021-12-15 RX ADMIN — PIPERACILLIN AND TAZOBACTAM 25 GRAM(S): 4; .5 INJECTION, POWDER, LYOPHILIZED, FOR SOLUTION INTRAVENOUS at 13:01

## 2021-12-15 RX ADMIN — Medication 1 TABLET(S): at 21:08

## 2021-12-15 RX ADMIN — SODIUM CHLORIDE 60 MILLILITER(S): 9 INJECTION INTRAMUSCULAR; INTRAVENOUS; SUBCUTANEOUS at 00:01

## 2021-12-15 RX ADMIN — PIPERACILLIN AND TAZOBACTAM 25 GRAM(S): 4; .5 INJECTION, POWDER, LYOPHILIZED, FOR SOLUTION INTRAVENOUS at 05:12

## 2021-12-15 RX ADMIN — SODIUM CHLORIDE 75 MILLILITER(S): 9 INJECTION, SOLUTION INTRAVENOUS at 16:51

## 2021-12-15 NOTE — DISCHARGE NOTE PROVIDER - NSDCFUADDINST_GEN_ALL_CORE_FT
Continue with low sugar and low carbohydrate diet at home. Try to eat a consistent amount of carbs at meals throughout the day. Avoid sodas, juices, processed grains and sweets. Eat small portions of rice, pasta, potatoes. Increase fiber and water intake in your diet.

## 2021-12-15 NOTE — BRIEF OPERATIVE NOTE - NSICDXBRIEFPROCEDURE_GEN_ALL_CORE_FT
PROCEDURES:  Debridement, tendon, ankle 15-Dec-2021 16:50:51  Desi Tapia  Biopsy of bone of left ankle 15-Dec-2021 16:51:14  Desi Tapia

## 2021-12-15 NOTE — ED ADULT NURSE REASSESSMENT NOTE - NS ED NURSE REASSESS COMMENT FT1
Pt escorted to OR in stable condition.
Pt received from prior shift, AA&Ox4, breathing unlabored and regular, no s/s of acute distress noted. Pt has L foot wound and for OR this afternoon. Pt is ambualtory with steady gait. Pt is NPO, aware of plan of care. Will continue to monitor and maintain safety.
Assumed care from Madhav RN. pt alert and awake. offers no complaints upon RN rounding. pt on-call to OR for left leg wound debridement

## 2021-12-15 NOTE — BRIEF OPERATIVE NOTE - NSICDXBRIEFPREOP_GEN_ALL_CORE_FT
PRE-OP DIAGNOSIS:  Surgical site infection 15-Dec-2021 16:46:51  Desi Tapia  Osteomyelitis 15-Dec-2021 16:47:22  Desi Tapia

## 2021-12-15 NOTE — DISCHARGE NOTE PROVIDER - NSDCMRMEDTOKEN_GEN_ALL_CORE_FT
metFORMIN 500 mg oral tablet, extended release: 1 tab(s) orally once a day (in the evening)    *Pt was recently prescribed for pre-diabetes. Not compliant  multivitamin: 1 tab(s) orally once a day  Probiotic Formula oral capsule: 1 cap(s) orally once a day  Vitamin C: 1 tab(s) orally once a day  Vitamin D3: 1 tab(s) orally once a day  Zinc: 1 tab(s) orally once a day   metFORMIN 500 mg oral tablet, extended release: 1 tab(s) orally once a day (in the evening)    *Pt was recently prescribed for pre-diabetes. Not compliant  multivitamin: 1 tab(s) orally once a day  Probiotic Formula oral capsule: 1 cap(s) orally once a day  Vitamin C: 1 tab(s) orally once a day  Vitamin D3: 1 tab(s) orally once a day  Zinc: 1 tab(s) orally once a day  Zosyn 3 g-0.375 g intravenous injection: intravenous every 8 hours   metFORMIN 500 mg oral tablet, extended release: 1 tab(s) orally once a day (in the evening)  multivitamin: 1 tab(s) orally once a day  Probiotic Formula oral capsule: 1 cap(s) orally once a day  Vitamin C: 1 tab(s) orally once a day  Vitamin D3: 1 tab(s) orally once a day  Zinc: 1 tab(s) orally once a day  Zosyn 3 g-0.375 g intravenous injection: intravenous every 8 hours

## 2021-12-15 NOTE — PROGRESS NOTE ADULT - SUBJECTIVE AND OBJECTIVE BOX
LYLA BUENO is a 56yFemale , patient examined and chart reviewed.     INTERVAL HPI/ OVERNIGHT EVENTS:   Sp Left ankle wound I&D today.    PAST MEDICAL & SURGICAL HISTORY:  Obesity  COVID-2020 no hospitalization  Morbid obesity with BMI of 40.0-44.9, adult  Surgical wound infection  S/P tendon repair  S/P hysterectomy  2008  S/P left knee surgery        For details regarding the patient's social history, family history, and other miscellaneous elements, please refer the initial infectious diseases consultation and/or the admitting history and physical examination for this admission.      ROS:  CONSTITUTIONAL:  Negative fever or chills  EYES:  Negative  blurry vision or double vision  CARDIOVASCULAR:  Negative for chest pain or palpitations  RESPIRATORY:  Negative for cough, wheezing, or SOB   GASTROINTESTINAL:  Negative for nausea, vomiting, diarrhea, constipation, or abdominal pain  GENITOURINARY:  Negative frequency, urgency or dysuria  NEUROLOGIC:  No headache, confusion, dizziness, lightheadedness  All other systems were reviewed and are negative         Current inpatient medications :    ANTIBIOTICS/RELEVANT:  lactobacillus acidophilus 1 Tablet(s) Oral daily      acetaminophen     Tablet .. 650 milliGRAM(s) Oral every 6 hours PRN  aluminum hydroxide/magnesium hydroxide/simethicone Suspension 30 milliLiter(s) Oral every 4 hours PRN  melatonin 3 milliGRAM(s) Oral at bedtime PRN  ondansetron Injectable 4 milliGRAM(s) IV Push every 8 hours PRN  sodium chloride 0.9%. 1000 milliLiter(s) IV Continuous <Continuous>      Objective:    12-15 @ 07:01  -  12-15 @ 23:13  --------------------------------------------------------  IN: 350 mL / OUT: 0 mL / NET: 350 mL      T(C): 36.7 (12-15-21 @ 18:43), Max: 36.9 (12-15-21 @ 05:49)  HR: 69 (12-15-21 @ 18:43) (62 - 88)  BP: 134/68 (12-15-21 @ 18:43) (128/59 - 167/87)  RR: 16 (12-15-21 @ 18:43) (13 - 19)  SpO2: 96% (12-15-21 @ 18:43) (95% - 100%)      Physical Exam:  General: no acute distress  Neck: supple, trachea midline  Lungs: clear, no wheeze/rhonchi  Cardiovascular: regular rate and rhythm, S1 S2  Abdomen: soft, nontender,  bowel sounds normal  Neurological: alert and oriented x3  Skin: no rash  Extremities:  Left foot drsg c/d/i      LABS:                        12.4   7.95  )-----------( 279      ( 15 Dec 2021 04:34 )             36.0       12-15    143  |  110<H>  |  11  ----------------------------<  109<H>  3.9   |  29  |  0.72    Ca    8.8      15 Dec 2021 04:34    TPro  6.6  /  Alb  3.1<L>  /  TBili  0.6  /  DBili  x   /  AST  10<L>  /  ALT  22  /  AlkPhos  62  12-15      PT/INR - ( 15 Dec 2021 04:34 )   PT: 13.4 sec;   INR: 1.15 ratio         PTT - ( 15 Dec 2021 04:34 )  PTT:31.4 sec  Urinalysis Basic - ( 14 Dec 2021 19:29 )    Color: Pale Yellow / Appearance: Clear / S.020 / pH: x  Gluc: x / Ketone: Negative  / Bili: Negative / Urobili: Negative   Blood: x / Protein: Negative / Nitrite: Negative   Leuk Esterase: Negative / RBC: x / WBC x   Sq Epi: x / Non Sq Epi: x / Bacteria: x      MICROBIOLOGY:    Culture - Urine (collected 15 Dec 2021 00:43)  Source: Clean Catch Clean Catch (Midstream)  Final Report (15 Dec 2021 21:45):    No growth    Culture - Blood (collected 14 Dec 2021 15:07)  Source: .Blood Blood-Peripheral  Preliminary Report (15 Dec 2021 16:01):    No growth to date.    Culture - Blood (collected 14 Dec 2021 15:07)  Source: .Blood Blood-Peripheral  Preliminary Report (15 Dec 2021 16:01):    No growth to date      RADIOLOGY & ADDITIONAL STUDIES:  ACC: [id number] EXAM:  MR ANKLE WAW IC LT                          PROCEDURE DATE:  12/10/2021          INTERPRETATION:   History: Ankle ulcer, evaluate for osteomyelitis    Technique: Multiplanar and multisequence MRI images were obtained through   the left ankle without and with contrast. Approximately 10 cc intravenous   Gadavist was administered.    Comparison: Foot radiographs dated 11/10/2021    Findings:    There is a soft tissue ulcer overlying the lateral aspect of the ankle   with subjacent small abscess/phlegmon, measuring 1.9 x 1.1 cm   transaxially and up to 2.7 cm craniocaudal.    There is retro and inframalleolar tenosynovitis of the peroneal tendons.   There is susceptibility artifact tracking along the inframalleolar   peroneal tendons, which may be related to prior surgery or represent foci   of gas within the peroneal tenosynovial sheath from the overlying ulcer.   Tendinosis of the retromalleolar and inframalleolar peroneal tendons with   areas of irregular morphology,which may be postsurgical or be related to   partial tearing.    There is osseous edema with minimal T1 marrow signal abnormality within   the lateral malleolus, which may represent osteitis or early   osteomyelitis.    Mild chondral wear with subchondral cystic change at the second and third   tarsometatarsal joints.    Chronic scar remodeling of the anterior talofibular, posterior   talofibular, and calcaneofibular ligaments. Fluid with mild synovitis   along the anterolateral gutter, which canbe seen in setting of   anterolateral impingement.    Long flexor tendons are grossly intact. Long extensor tendons are grossly   intact. Moderate distal Achilles tendinosis. Plantar fasciitis noted.    Neurovascular structures are unremarkable.    Impression:    Soft tissue ulcer overlying the lateral aspect of the ankle with   subjacent small abscess/phlegmon.    Retro and inframalleolar tenosynovitis of the peroneal tendons, which may   represent an infectious tenosynovitis. Susceptibility artifact tracking   along the inframalleolar peroneal tendons, which may be related to prior   surgery or represent foci of gas within the peroneal tenosynovial sheath   from the overlying ulcer. Tendinosis of the retromalleolar and   inframalleolar peroneal tendons with areas of irregular morphology, which   may be postsurgical or be related to partial tearing.    Osseous edema with minimal T1 marrow signal abnormality within the   lateral malleolus, which may represent osteitis or early osteomyelitis.    Assessment :   55 y/o F with PMHx of obesity, COVID-19, repair of peroneal tendon of left ankle (21) complicated by wound dehisence with infection ( grew Staphylococcus pseudintermedius ) sp I&D 10/13/21 but per pt, wound has never healed and was being seen at [facility] wound care center now admitted sec infected wound with abscess.   MRI on 12/10 showing small abscess/phlegmon with  infectious tenosynovitis and osteitis or early osteomyelitis.   Sp I&D today    Plan :   Cont Zosyn  Fu OR cultures  Trend temps and cbc  Will likely need long term IV antibiotics    Continue with present regiment.  Appropriate use of antibiotics and adverse effects reviewed.    > 35 minutes were spent in direct patient care reviewing notes, medications ,labs data/ imaging , discussion with multidisciplinary team.    Thank you for allowing me to participate in care of your patient .    Mandy Cueto MD  Infectious Disease  [phone]

## 2021-12-15 NOTE — DISCHARGE NOTE PROVIDER - CARE PROVIDER_API CALL
Ashok Augustin (DPM)  Podiatric Medicine and Surgery  [address] [place] [address]  Phone: [phone]  Fax: [phone]  Follow Up Time:    Ashok Augustin (DPM)  Podiatric Medicine and Surgery  [address] [place] [address]  Phone: [phone]  Fax: [phone]  Follow Up Time:     Murray Vazquez (PA)  Physician Assistant Services  [address] [place] [address]  Phone: [phone]  Fax: [phone]  Established Patient  Follow Up Time:

## 2021-12-15 NOTE — PATIENT PROFILE ADULT - FALL HARM RISK - HARM RISK INTERVENTIONS
Assistance with ambulation/Assistance OOB with selected safe patient handling equipment/Communicate Risk of Fall with Harm to all staff/Discuss with provider need for PT consult/Monitor gait and stability/Provide patient with walking aids - walker, cane, crutches/Reinforce activity limits and safety measures with patient and family/Sit up slowly, dangle for a short time, stand at bedside before walking/Tailored Fall Risk Interventions/Use of alarms - bed, chair and/or voice tab/Visual Cue: Yellow wristband and red socks/Bed in lowest position, wheels locked, appropriate side rails in place/Call bell, personal items and telephone in reach/Instruct patient to call for assistance before getting out of bed or chair/Non-slip footwear when patient is out of bed/[place] to call system/Physically safe environment - no spills, clutter or unnecessary equipment/Purposeful Proactive Rounding/Room/bathroom lighting operational, light cord in reach

## 2021-12-15 NOTE — PHARMACOTHERAPY INTERVENTION NOTE - COMMENTS
Patient gisselle perez is a 56y Female came in with complaint of left ankle infection. Patient was counseled on the following medications that were ordered inpatient:    piperacillin/tazobactam IVPB.. 3.375 Gram(s) IV Intermittent every 8 hours    Discussed indication and directions for use of the medication with patient and provided additional patient education in print form.    Patient verbalized understanding and had no further follow up question

## 2021-12-15 NOTE — ED ADULT NURSE REASSESSMENT NOTE - NSIMPLEMENTINTERV_GEN_ALL_ED
Implemented All Universal Safety Interventions:  [place] to call system. Call bell, personal items and telephone within reach. Instruct patient to call for assistance. Room bathroom lighting operational. Non-slip footwear when patient is off stretcher. Physically safe environment: no spills, clutter or unnecessary equipment. Stretcher in lowest position, wheels locked, appropriate side rails in place.
Implemented All Universal Safety Interventions:  [place] to call system. Call bell, personal items and telephone within reach. Instruct patient to call for assistance. Room bathroom lighting operational. Non-slip footwear when patient is off stretcher. Physically safe environment: no spills, clutter or unnecessary equipment. Stretcher in lowest position, wheels locked, appropriate side rails in place.

## 2021-12-15 NOTE — PROGRESS NOTE ADULT - PROBLEM SELECTOR PLAN 1
- Patient is s/p left peroneal tendon repair with surgical site infection in August 21  - Patient presented from [facility] care center with Hx of surgical wound infection in left ankle for Abx treatment and wound debridement.  - MRI Left Ankle shows: Soft tissue ulcer overlying the lateral aspect of the ankle with   subjacent small abscess/phlegmon. Retro and inframalleolar tenosynovitis of the peroneal tendons, which may represent an infectious tenosynovitis. Susceptibility artifact tracking   along the inframalleolar peroneal tendons, which may be related to prior surgery or represent foci of gas within the peroneal tenosynovial sheath from the overlying ulcer. Tendinosis of the retromalleolar and inframalleolar peroneal tendons with areas of irregular morphology, which may be postsurgical or be related to partial tearing. Osseous edema with minimal T1 marrow signal abnormality within the lateral malleolus, which may represent osteitis or early osteomyelitis.  - S/P Vanc and Zosyn x 1 each in the ED  - will continue Vaco and zosyn   - f/u blood and urine culture  - ID Dr. Downing consulted, will appreciate recs  - Podiatry on board, recommends wound debridement tomorrow 12/15 or 12/16 by Dr. Quevedo   - Patient is medically optimized and is clinically stable  and is low risk for a low risk procedure of scheduled wound debridement. - Patient is s/p left peroneal tendon repair with surgical site infection in August 21  - Patient presented from wound care center with Hx of surgical wound infection in left ankle for Abx treatment and wound debridement.  - MRI Left Ankle shows: Soft tissue ulcer overlying the lateral aspect of the ankle with   subjacent small abscess/phlegmon. Osseous edema with minimal T1 marrow signal abnormality within the lateral malleolus, which may represent osteitis or early osteomyelitis.  - S/P Vanc and Zosyn x 1 each in the ED, c/w zosyn at this time  - f/u blood and urine culture  - ESR and CRP wnl  - ID Dr. Downing consulted, will appreciate recs  - Podiatry on board, will have wound debridement procedure today  - Patient is medically optimized and is clinically stable  and is low risk for a low risk procedure of scheduled wound debridement. - Patient is s/p left peroneal tendon repair with surgical site infection in August 21  - Patient presented from wound care center with Hx of surgical wound infection in left ankle for Abx treatment and wound debridement.  - MRI Left Ankle shows: Soft tissue ulcer overlying the lateral aspect of the ankle with   subjacent small abscess/phlegmon. Osseous edema with minimal T1 marrow signal abnormality within the lateral malleolus, which may represent osteitis or early osteomyelitis.  - S/P Vanc and Zosyn x 1 each in the ED, c/w zosyn at this time  - f/u blood and urine culture  - ESR and CRP wnl  - ID Dr. Cueto consulted, will appreciate recs  - Podiatry on board, will have wound debridement procedure today  - Patient is medically optimized and is clinically stable  and is low risk for a low risk procedure of scheduled wound debridement.

## 2021-12-15 NOTE — PROGRESS NOTE ADULT - SUBJECTIVE AND OBJECTIVE BOX
Patient is a 56y old  Female who presents with a chief complaint of Left Ankle infection (15 Dec 2021 09:05)      INTERVAL HPI/OVERNIGHT EVENTS: Patient seen and examined at bedside. No overnight events. No acute complaints at bedside. Patient denies fevers, chills, n/v/d/c, pain of ankle.    MEDICATIONS  (STANDING):  lactobacillus acidophilus 1 Tablet(s) Oral daily  piperacillin/tazobactam IVPB.. 3.375 Gram(s) IV Intermittent every 8 hours  sodium chloride 0.9%. 1000 milliLiter(s) (60 mL/Hr) IV Continuous <Continuous>    MEDICATIONS  (PRN):  acetaminophen     Tablet .. 650 milliGRAM(s) Oral every 6 hours PRN Temp greater or equal to 38C (100.4F), Mild Pain (1 - 3)  aluminum hydroxide/magnesium hydroxide/simethicone Suspension 30 milliLiter(s) Oral every 4 hours PRN Dyspepsia  melatonin 3 milliGRAM(s) Oral at bedtime PRN Insomnia  ondansetron Injectable 4 milliGRAM(s) IV Push every 8 hours PRN Nausea and/or Vomiting      Allergies    No Known Allergies    Intolerances        REVIEW OF SYSTEMS:  CONSTITUTIONAL: No fever or chills  HEENT:  No headache, no sore throat  RESPIRATORY: No cough, wheezing, or shortness of breath  CARDIOVASCULAR: No chest pain, palpitations  GASTROINTESTINAL: No abd pain, nausea, vomiting, or diarrhea  GENITOURINARY: No dysuria, frequency, or hematuria  NEUROLOGICAL: no focal weakness or dizziness  MUSCULOSKELETAL: no myalgias     Vital Signs Last 24 Hrs  T(C): 36.7 (15 Dec 2021 08:10), Max: 37 (14 Dec 2021 18:41)  T(F): 98 (15 Dec 2021 08:10), Max: 98.6 (14 Dec 2021 18:41)  HR: 85 (15 Dec 2021 08:10) (74 - 98)  BP: 128/78 (15 Dec 2021 08:10) (128/78 - 132/74)  BP(mean): --  RR: 18 (15 Dec 2021 08:10) (16 - 19)  SpO2: 98% (15 Dec 2021 08:10) (95% - 99%)    PHYSICAL EXAM:  GENERAL: NAD  HEENT:  anicteric, moist mucous membranes  CHEST/LUNG:  CTA b/l, no rales, wheezes, or rhonchi  HEART:  RRR, S1, S2  ABDOMEN:  BS+, soft, nontender, nondistended  EXTREMITIES: no edema, cyanosis, or calf tenderness. + edema of LLE, bandaged in clean dressing  NERVOUS SYSTEM: answers questions and follows commands appropriately    LABS:                        12.4   7.95  )-----------( 279      ( 15 Dec 2021 04:34 )             36.0     CBC Full  -  ( 15 Dec 2021 04:34 )  WBC Count : 7.95 K/uL  Hemoglobin : 12.4 g/dL  Hematocrit : 36.0 %  Platelet Count - Automated : 279 K/uL  Mean Cell Volume : 89.3 fl  Mean Cell Hemoglobin : 30.8 pg  Mean Cell Hemoglobin Concentration : 34.4 gm/dL  Auto Neutrophil # : 4.00 K/uL  Auto Lymphocyte # : 3.10 K/uL  Auto Monocyte # : 0.47 K/uL  Auto Eosinophil # : 0.34 K/uL  Auto Basophil # : 0.02 K/uL  Auto Neutrophil % : 50.2 %  Auto Lymphocyte % : 39.0 %  Auto Monocyte % : 5.9 %  Auto Eosinophil % : 4.3 %  Auto Basophil % : 0.3 %    15 Dec 2021 04:34    143    |  110    |  11     ----------------------------<  109    3.9     |  29     |  0.72     Ca    8.8        15 Dec 2021 04:34    TPro  6.6    /  Alb  3.1    /  TBili  0.6    /  DBili  x      /  AST  10     /  ALT  22     /  AlkPhos  62     15 Dec 2021 04:34    PT/INR - ( 15 Dec 2021 04:34 )   PT: 13.4 sec;   INR: 1.15 ratio         PTT - ( 15 Dec 2021 04:34 )  PTT:31.4 sec  Urinalysis Basic - ( 14 Dec 2021 19:29 )    Color: Pale Yellow / Appearance: Clear / S.020 / pH: x  Gluc: x / Ketone: Negative  / Bili: Negative / Urobili: Negative   Blood: x / Protein: Negative / Nitrite: Negative   Leuk Esterase: Negative / RBC: x / WBC x   Sq Epi: x / Non Sq Epi: x / Bacteria: x      CAPILLARY BLOOD GLUCOSE              RADIOLOGY & ADDITIONAL TESTS:    Personally reviewed.     Consultant(s) Notes Reviewed:  [x] YES  [ ] NO

## 2021-12-15 NOTE — DISCHARGE NOTE PROVIDER - PROVIDER TOKENS
PROVIDER:[TOKEN:[[id number]]] PROVIDER:[[id number]]],PROVIDER:[[id number]],ESTABLISHEDPATIENT:[[id number]]]

## 2021-12-15 NOTE — DISCHARGE NOTE PROVIDER - HOSPITAL COURSE
FROM ADMISSION H+P:   HPI:  Patient is a 56 year old female with no significant PMH who is S/P Left peroneal tendon repair 08/06/21, with infection and non closure of surgical site referred from wound care center for admission, Abx treatment and wound debridement. Patient informs that she has been treated with multiple round of Abx including Linezolid x 3 since august Informs that she has been under the treatment of wound care center since her surgery in August. Informs that she went for her scheduled visit today and was told to come to the ED for admission and possible wound clean under anesthesia. Denies any fever or chills. No chest pain, SOB or palpitations. No N/V/D/C.        ED course:   Vitals:   BP: 142/86  HR:67  Temp:97.5  RR: 16, O2: 98% on RA   Labs:   CBC: WBC: 8.26  Hb:13.5 , Platlets: 300   CMP:  Lytes: WNL,  BUN/Creatinine: 9/0.73 , Glucose: 113     CXR: No abnormality detected (self read).   MRI Left Ankle: Soft tissue ulcer overlying the lateral aspect of the ankle with   subjacent small abscess/phlegmon. Retro and inframalleolar tenosynovitis of the peroneal tendons, which may represent an infectious tenosynovitis. Susceptibility artifact tracking   along the inframalleolar peroneal tendons, which may be related to prior surgery or represent foci of gas within the peroneal tenosynovial sheath from the overlying ulcer. Tendinosis of the retromalleolar and inframalleolar peroneal tendons with areas of irregular morphology, which may be postsurgical or be related to partial tearing. Osseous edema with minimal T1 marrow signal abnormality within the lateral malleolus, which may represent osteitis or early osteomyelitis.  EKG:  NSR with incomplete RBBB,   HR 63,  QT/QTc: 450/460  Patient received: Zosyn 3.375 G x 1, Vanco 1G X 1, 1600 ML X 1   (14 Dec 2021 12:11)      ---  HOSPITAL COURSE: Patient presented with surgical site infection. Patient was started on IV antibiotics by ID. Debridement   ---  CONSULTANTS:     ---  TIME SPENT:  I, the attending physician, was physically present for the key portions of the evaluation and management (E/M) service provided. The total amount of time spent reviewing the hospital notes, laboratory values, imaging findings, assessing/counseling the patient, discussing with consultant physicians, social work, nursing staff was -- minutes    ---  Primary care provider was made aware of plan for discharge:      [  ] NO     [  ] YES   FROM ADMISSION H+P:   HPI:  Patient is a 56 year old female with no significant PMH who is S/P Left peroneal tendon repair 08/06/21, with infection and non closure of surgical site referred from wound care center for admission, Abx treatment and wound debridement. Patient informs that she has been treated with multiple round of Abx including Linezolid x 3 since august Informs that she has been under the treatment of wound care center since her surgery in August. Informs that she went for her scheduled visit today and was told to come to the ED for admission and possible wound clean under anesthesia. Denies any fever or chills. No chest pain, SOB or palpitations. No N/V/D/C.        ED course:   Vitals:   BP: 142/86  HR:67  Temp:97.5  RR: 16, O2: 98% on RA   Labs:   CBC: WBC: 8.26  Hb:13.5 , Platlets: 300   CMP:  Lytes: WNL,  BUN/Creatinine: 9/0.73 , Glucose: 113     CXR: No abnormality detected (self read).   MRI Left Ankle: Soft tissue ulcer overlying the lateral aspect of the ankle with   subjacent small abscess/phlegmon. Retro and inframalleolar tenosynovitis of the peroneal tendons, which may represent an infectious tenosynovitis. Susceptibility artifact tracking   along the inframalleolar peroneal tendons, which may be related to prior surgery or represent foci of gas within the peroneal tenosynovial sheath from the overlying ulcer. Tendinosis of the retromalleolar and inframalleolar peroneal tendons with areas of irregular morphology, which may be postsurgical or be related to partial tearing. Osseous edema with minimal T1 marrow signal abnormality within the lateral malleolus, which may represent osteitis or early osteomyelitis.  EKG:  NSR with incomplete RBBB,   HR 63,  QT/QTc: 450/460  Patient received: Zosyn 3.375 G x 1, Vanco 1G X 1, 1600 ML X 1   (14 Dec 2021 12:11)      ---  HOSPITAL COURSE: Patient presented with surgical site infection. Patient was started on IV antibiotics by ID. Debridement and biopsy of fibular head was done on 12/15 and biopsy of fibular head showed ****.   ---  CONSULTANTS:   Podiatry - [facility]- Dr. Toney Cueto    ---  TIME SPENT:  I, the attending physician, was physically present for the key portions of the evaluation and management (E/M) service provided. The total amount of time spent reviewing the hospital notes, laboratory values, imaging findings, assessing/counseling the patient, discussing with consultant physicians, social work, nursing staff was -- minutes    ---  Primary care provider was made aware of plan for discharge:      [  ] NO     [  ] YES   FROM ADMISSION H+P:   HPI:  Patient is a 56 year old female with no significant PMH who is S/P left peroneal tendon repair 08/06/21, with infection and non closure of surgical site referred from wound care center for admission, Abx treatment and wound debridement. Patient informs that she has been treated with multiple round of Abx including Linezolid x 3 since august Informs that she has been under the treatment of wound care center since her surgery in August. Informs that she went for her scheduled visit today and was told to come to the ED for admission and possible wound clean under anesthesia. Denies any fever or chills. No chest pain, SOB or palpitations. No N/V/D/C.        ED course:   Vitals:   BP: 142/86  HR:67  Temp:97.5  RR: 16, O2: 98% on RA   Labs:   CBC: WBC: 8.26  Hb:13.5 , Platlets: 300   CMP:  Lytes: WNL,  BUN/Creatinine: 9/0.73 , Glucose: 113     CXR: No abnormality detected (self read).   MRI Left Ankle: Soft tissue ulcer overlying the lateral aspect of the ankle with   subjacent small abscess/phlegmon. Retro and inframalleolar tenosynovitis of the peroneal tendons, which may represent an infectious tenosynovitis. Susceptibility artifact tracking   along the inframalleolar peroneal tendons, which may be related to prior surgery or represent foci of gas within the peroneal tenosynovial sheath from the overlying ulcer. Tendinosis of the retromalleolar and inframalleolar peroneal tendons with areas of irregular morphology, which may be postsurgical or be related to partial tearing. Osseous edema with minimal T1 marrow signal abnormality within the lateral malleolus, which may represent osteitis or early osteomyelitis.  EKG:  NSR with incomplete RBBB,   HR 63,  QT/QTc: 450/460  Patient received: Zosyn 3.375 G x 1, Vanco 1G X 1, 1600 ML X 1   (14 Dec 2021 12:11)    HOSPITAL COURSE: Patient presented with surgical site infection. Patient was started on IV antibiotic - Zosyn and ID followed. Debridement and biopsy of fibular head was done on 12/15 by podiatry. Biopsy of fibular head is pending, culture of fibular head is negative, and soft tissue culture is positive for Corynebacterium.  Blood cultures were negative and leukocytosis downtrend and resolved. ESR and CRP were normal. Podiatry continued with evaluation and placed a wound vac on 12/17, which was replaced on the day of discharge and will continue with the vac at home. Patient was sent to IR for PICC line placement (right basilic vein), procedure performed with no complications. Patient has to continue with IV antibiotic treatment at home for 4 weeks as per ID recommendation. During the hospital stay, patient with fasting glucose levels that suggest prediabetes and HbA1c in 10/21 was 5.8%. Needs to continue PT at home, walker use with weight bearing restrictions and close follow up with podiatry and with pathology result, low glucose and carb diet and f/u A1c with PCP.     Patient was seen bu the attending the day of discharge and was stable and optimized.     Vital Signs Last 24 Hrs  T(C): 36.7 (20 Dec 2021 06:02), Max: 36.7 (19 Dec 2021 14:00)  T(F): 98.1 (20 Dec 2021 06:02), Max: 98.1 (20 Dec 2021 06:02)  HR: 60 (20 Dec 2021 06:02) (60 - 76)  BP: 127/64 (20 Dec 2021 06:02) (117/68 - 127/64)  RR: 17 (20 Dec 2021 06:02) (17 - 17)  SpO2: 98% (20 Dec 2021 06:02) (93% - 98%)    GENERAL: NAD  HEENT:  anicteric, moist mucous membranes  CHEST/LUNG:  CTA b/l, no rales, wheezes, or rhonchi  HEART:  RRR, S1, S2  ABDOMEN:  BS+, soft, nontender, nondistended  EXTREMITIES: no edema, cyanosis, or calf tenderness. L ankle wrapped in clean dressing  NERVOUS SYSTEM: answers questions and follows commands appropriately    CONSULTANTS:   Podiatry   Dr. Augustin  ID  Dr. Toney Cueto  IR  Dr. Brown     TIME SPENT:  I, the attending physician, was physically present for the key portions of the evaluation and management (E/M) service provided. The total amount of time spent reviewing the hospital notes, laboratory values, imaging findings, assessing/counseling the patient, discussing with consultant physicians, social work, nursing staff was -- minutes    Primary care provider was made aware of plan for discharge:      [  ] NO     [  ] YES   FROM ADMISSION H+P:   HPI:  Patient is a 56 year old female with no significant PMH who is S/P left peroneal tendon repair 08/06/21, with infection and non closure of surgical site referred from wound care center for admission, Abx treatment and wound debridement. Patient informs that she has been treated with multiple round of Abx including Linezolid x 3 since august Informs that she has been under the treatment of wound care center since her surgery in August. Informs that she went for her scheduled visit today and was told to come to the ED for admission and possible wound clean under anesthesia. Denies any fever or chills. No chest pain, SOB or palpitations. No N/V/D/C.        ED course:   Vitals:   BP: 142/86  HR:67  Temp:97.5  RR: 16, O2: 98% on RA   Labs:   CBC: WBC: 8.26  Hb:13.5 , Platlets: 300   CMP:  Lytes: WNL,  BUN/Creatinine: 9/0.73 , Glucose: 113     CXR: No abnormality detected (self read).   MRI Left Ankle: Soft tissue ulcer overlying the lateral aspect of the ankle with   subjacent small abscess/phlegmon. Retro and inframalleolar tenosynovitis of the peroneal tendons, which may represent an infectious tenosynovitis. Susceptibility artifact tracking   along the inframalleolar peroneal tendons, which may be related to prior surgery or represent foci of gas within the peroneal tenosynovial sheath from the overlying ulcer. Tendinosis of the retromalleolar and inframalleolar peroneal tendons with areas of irregular morphology, which may be postsurgical or be related to partial tearing. Osseous edema with minimal T1 marrow signal abnormality within the lateral malleolus, which may represent osteitis or early osteomyelitis.  EKG:  NSR with incomplete RBBB,   HR 63,  QT/QTc: 450/460  Patient received: Zosyn 3.375 G x 1, Vanco 1G X 1, 1600 ML X 1   (14 Dec 2021 12:11)    HOSPITAL COURSE: Patient presented with left ankle surgical site infection. Patient was started on IV antibiotic - Zosyn and ID Dr Cueto followed. Debridement and biopsy of fibular head was done on 12/15 by podiatry Dr Augustin. Surgical pathology for biopsy of fibular head is pending, culture of fibular head is negative, and soft tissue culture is positive for Corynebacterium. Blood cultures were negative and leukocytosis downtrend and resolved. ESR and CRP were normal. Podiatry placed a wound vac on 12/17, which was replaced on the day of discharge and patient will continue with the vac at home. Patient was sent to IR for PICC line placement (right basilic vein), procedure performed on 12/20 with no complications. Patient is to continue with IV antibiotic treatment with IV Zosyn at home for 4 weeks as per ID recommendation. During the hospital stay, patient with fasting glucose levels that suggest prediabetes and HbA1c in 10/21 was 5.8%. Needs to continue PT at home, walker use with weight bearing restrictions (patient reports already having walker at home) and close follow up with podiatry for pathology result and further management, low glucose and carb diet and f/u A1c with PCP.     Patient was seen by the attending the day of discharge and was stable and optimized for discharge.     CONSULTANTS:   Podiatry   Dr. Augustin  ID  Dr. Toney Cueto  IR  Dr. Brown     TIME SPENT:  I, the attending physician, was physically present for the key portions of the evaluation and management (E/M) service provided. The total amount of time spent reviewing the hospital notes, laboratory values, imaging findings, assessing/counseling the patient, discussing with consultant physicians, social work, nursing staff was 40 minutes. FROM ADMISSION H+P:   HPI:  Patient is a 56 year old female with no significant PMH who is S/P left peroneal tendon repair 08/06/21, with infection and non closure of surgical site referred from wound care center for admission, Abx treatment and wound debridement. Patient informs that she has been treated with multiple round of Abx including Linezolid x 3 since august Informs that she has been under the treatment of wound care center since her surgery in August. Informs that she went for her scheduled visit today and was told to come to the ED for admission and possible wound clean under anesthesia. Denies any fever or chills. No chest pain, SOB or palpitations. No N/V/D/C.        ED course:   Vitals:   BP: 142/86  HR:67  Temp:97.5  RR: 16, O2: 98% on RA   Labs:   CBC: WBC: 8.26  Hb:13.5 , Platlets: 300   CMP:  Lytes: WNL,  BUN/Creatinine: 9/0.73 , Glucose: 113     CXR: No abnormality detected (self read).   MRI Left Ankle: Soft tissue ulcer overlying the lateral aspect of the ankle with   subjacent small abscess/phlegmon. Retro and inframalleolar tenosynovitis of the peroneal tendons, which may represent an infectious tenosynovitis. Susceptibility artifact tracking   along the inframalleolar peroneal tendons, which may be related to prior surgery or represent foci of gas within the peroneal tenosynovial sheath from the overlying ulcer. Tendinosis of the retromalleolar and inframalleolar peroneal tendons with areas of irregular morphology, which may be postsurgical or be related to partial tearing. Osseous edema with minimal T1 marrow signal abnormality within the lateral malleolus, which may represent osteitis or early osteomyelitis.  EKG:  NSR with incomplete RBBB,   HR 63,  QT/QTc: 450/460  Patient received: Zosyn 3.375 G x 1, Vanco 1G X 1, 1600 ML X 1 (14 Dec 2021 12:11)    HOSPITAL COURSE: Patient presented with left ankle surgical site infection. Patient was started on IV antibiotic - Zosyn and ID Dr Cueto followed. Debridement and biopsy of fibular head was done on 12/15 by podiatry Dr Quevedo. Surgical pathology for biopsy of fibular head is pending, culture of fibular head is negative, and soft tissue culture is positive for Corynebacterium. Blood cultures were negative and leukocytosis downtrend and resolved. ESR and CRP were normal. Podiatry placed a wound vac on 12/17, which was replaced on the day of discharge and patient will continue with the vac at home. Patient was sent to IR for PICC line placement (right basilic vein), procedure performed on 12/20 with no complications. Patient is to continue with IV antibiotic treatment with IV Zosyn at home for 4 weeks as per ID recommendation. During the hospital stay, patient with fasting glucose levels that suggest prediabetes and HbA1c in 10/21 was 5.8%. Needs to continue PT at home, walker use with weight bearing restrictions (patient reports already having walker at home) and close follow up with podiatry for pathology result and further management, low glucose and carb diet and f/u A1c with PCP.     Patient was seen by the attending the day of discharge and was stable and optimized for discharge.     CONSULTANTS:   Podiatry   Dr. Sae Cueto  IR  Dr. Brown     TIME SPENT:  I, the attending physician, was physically present for the key portions of the evaluation and management (E/M) service provided. The total amount of time spent reviewing the hospital notes, laboratory values, imaging findings, assessing/counseling the patient, discussing with consultant physicians, social work, nursing staff was 40 minutes. FROM ADMISSION H+P:   HPI:  Patient is a 56 year old female with no significant PMH who is S/P left peroneal tendon repair 08/06/21, with infection and non closure of surgical site referred from wound care center for admission, Abx treatment and wound debridement. Patient informs that she has been treated with multiple round of Abx including Linezolid x 3 since august Informs that she has been under the treatment of wound care center since her surgery in August. Informs that she went for her scheduled visit today and was told to come to the ED for admission and possible wound clean under anesthesia. Denies any fever or chills. No chest pain, SOB or palpitations. No N/V/D/C.        ED course:   Vitals:   BP: 142/86  HR:67  Temp:97.5  RR: 16, O2: 98% on RA   Labs:   CBC: WBC: 8.26  Hb:13.5 , Platlets: 300   CMP:  Lytes: WNL,  BUN/Creatinine: 9/0.73 , Glucose: 113     CXR: No abnormality detected (self read).   MRI Left Ankle: Soft tissue ulcer overlying the lateral aspect of the ankle with subjacent small abscess/phlegmon. Retro and inframalleolar tenosynovitis of the peroneal tendons, which may represent an infectious tenosynovitis. Susceptibility artifact tracking along the inframalleolar peroneal tendons, which may be related to prior surgery or represent foci of gas within the peroneal tenosynovial sheath from the overlying ulcer. Tendinosis of the retromalleolar and inframalleolar peroneal tendons with areas of irregular morphology, which may be postsurgical or be related to partial tearing. Osseous edema with minimal T1 marrow signal abnormality within the lateral malleolus, which may represent osteitis or early osteomyelitis.  EKG:  NSR with incomplete RBBB,   HR 63,  QT/QTc: 450/460  Patient received: Zosyn 3.375 G x 1, Vanco 1G X 1, 1600 ML X 1 (14 Dec 2021 12:11)    HOSPITAL COURSE: Patient presented with left ankle surgical site infection. Patient was started on IV antibiotic - Zosyn and ID Dr Cueto followed. Debridement and biopsy of fibular head was done on 12/15 by podiatry Dr Quevedo. Surgical pathology of left ankle soft tissue showed dense fibrocollagenous tissue and granulation tissue with fibrinopurulent exudate and of left fibula showed mild focal chronic osteomyelitis. Culture of fibular head is negative, and soft tissue culture is positive for Corynebacterium. Blood cultures were negative and leukocytosis downtrend and resolved. ESR and CRP were normal. Podiatry placed a wound vac on 12/17, which was replaced on the day of discharge and patient will continue with the vac at home. Patient was sent to IR for PICC line placement (right basilic vein), procedure performed on 12/20 with no complications. Patient is to continue with IV antibiotic treatment with IV Zosyn at home for 4 weeks as per ID recommendation. During the hospital stay, patient with fasting glucose levels that suggest prediabetes and HbA1c in 10/21 was 5.8%. Needs to continue PT at home, walker use with weight bearing restrictions (patient reports already having walker at home) and close follow up with podiatry for pathology result and further management, low glucose and carb diet and f/u A1c with PCP.     Patient was seen by the attending the day of discharge and was stable and optimized for discharge.     CONSULTANTS:   Podiatry   Dr. Quevedo  ID  Dr. Toney Cueto  IR  Dr. Brown     TIME SPENT:  I, the attending physician, was physically present for the key portions of the evaluation and management (E/M) service provided. The total amount of time spent reviewing the hospital notes, laboratory values, imaging findings, assessing/counseling the patient, discussing with consultant physicians, social work, nursing staff was 40 minutes.

## 2021-12-15 NOTE — DISCHARGE NOTE PROVIDER - NSDCCPCAREPLAN_GEN_ALL_CORE_FT
PRINCIPAL DISCHARGE DIAGNOSIS  Diagnosis: Open wound with complication  Assessment and Plan of Treatment: You had an MRI of your left ankle which showed an abcess and possible underlying infection. You were started on IV antibiotics and you had a debridement and biopsy of your fibula on 12/15 with podiatry.   ***      SECONDARY DISCHARGE DIAGNOSES  Diagnosis: Bacterial skin infection of leg  Assessment and Plan of Treatment:      PRINCIPAL DISCHARGE DIAGNOSIS  Diagnosis: Surgical wound infection  Assessment and Plan of Treatment: You were referred by podiatry for antibiotic treatment and evaluation after non healing and closure from the surgery in the left ankle.  You had an MRI which showed an abcess and possible underlying infection to the bone. You were started on IV antibiotic and you had a procedure with debridement and biopsy of your bone (fibula) on 12/15 with podiatry, and cultures of bone and soft tissue. The culture for the soft tissue was positive for corynebacterium, bone culture was negative and patohology reults are pending.  You need to continue follow up with podiatry, and review the pathology results with them. Also, you need to continue with the antibiotic treament throught the PICC line - the catheter that was placed in your arm.  Continue with physical therapy and wound vac and care.      SECONDARY DISCHARGE DIAGNOSES  Diagnosis: Prediabetes  Assessment and Plan of Treatment: You were having every day mildly elevated sugar blood levels, suggesting prediabetes. You had an HbA1c of 5.8% in 10/21 which indicates prediabetes and you were prescribed Metformin by your primary doctor.  Please, continue with low sugar and low carbohydrates diet at home. Avoid sodas, juices, breath, cookies. Eat small portions of rice, pasta, potatoes. Increase fiber and water in your diet. Take every day the metformin that was prescribed for you, since it will helpt you to control the sugar levels as well as your weight. Follow up with your PCP and check in one month the HbA1c levels.  The good control of your sugar blood levels, will help to resolve the infection in your ankle.     PRINCIPAL DISCHARGE DIAGNOSIS  Diagnosis: Surgical wound infection  Assessment and Plan of Treatment: You were referred by podiatry for antibiotic treatment and evaluation after non healing and closure from the surgery in the left ankle. You had an MRI which showed an abcess and possible underlying infection to the bone (fibula). You were started on IV antibiotics and had a debridement and biopsy of your bone at the left ankle on 12/15 with Dr Quevedo. Surgical pathology reults are pending.    - Continue with IV Zosyn 3.375 grams every 8 hours through the PICC line as directed.  - Follow up with Dr Quevedo within 1 week to review the pathology results and for further management.  - Continue with physical therapy and wound vac and care.      SECONDARY DISCHARGE DIAGNOSES  Diagnosis: Prediabetes  Assessment and Plan of Treatment: While hospitalized you had mildly elevated blood sugar levels and a HbA1c of 5.8% in October which indicate prediabetes. Good control of your blood sugar levels will help in resolving the infection in your ankle.  - Continue Metformin as prescribed by your primary doctor.   - Follow a consistent carbohydrate/diabetic diet as detailed in your discharge.  - Follow up with your primary doctor within 1 month for further management.     PRINCIPAL DISCHARGE DIAGNOSIS  Diagnosis: Surgical wound infection  Assessment and Plan of Treatment: You were referred by podiatry for antibiotic treatment and evaluation after non healing and closure from the surgery in the left ankle. You had an MRI which showed an abcess and possible underlying infection to the bone (fibula). You were started on IV antibiotics and had a debridement and biopsy of your bone at the left ankle on 12/15 with Dr Quevedo. Surgical pathology of the left fibula showed mild focal chronic osteomyelitis.  - Continue with IV Zosyn 3.375 grams every 8 hours through the PICC line as directed.  - Follow up with Dr Quevedo within 1 week to review the pathology results and for further management.  - Continue with physical therapy and wound vac and care.      SECONDARY DISCHARGE DIAGNOSES  Diagnosis: Prediabetes  Assessment and Plan of Treatment: While hospitalized you had mildly elevated blood sugar levels and a HbA1c of 5.8% in October which indicate prediabetes. Good control of your blood sugar levels will help in resolving the infection in your ankle.  - Continue Metformin as prescribed by your primary doctor.   - Follow a consistent carbohydrate/diabetic diet as detailed in your discharge.  - Follow up with your primary doctor within 1 month for further management.

## 2021-12-15 NOTE — BRIEF OPERATIVE NOTE - NSICDXBRIEFPOSTOP_GEN_ALL_CORE_FT
POST-OP DIAGNOSIS:  Surgical site infection 15-Dec-2021 16:47:01  Desi Tapia  Osteomyelitis 15-Dec-2021 16:47:34  Desi Tapia

## 2021-12-15 NOTE — PROGRESS NOTE ADULT - PROBLEM SELECTOR PLAN 4
IMPROVE VTE Individual Risk Assessment          RISK                                                          Points  [  ] Previous VTE                                                3  [  ] Thrombophilia                                             2  [  ] Lower limb paralysis                                   2        (unable to hold up >15 seconds)    [  ] Current Cancer                                             2         (within 6 months)  [  ] Immobilization > 24 hrs                              1  [  ] ICU/CCU stay > 24 hours                             1  [  ] Age > 60                                                         1    IMPROVE VTE Score: 0  Lovenox 40 qd Patient found to have HbA1C of 5.8 on admission  - blood glucose levels have been well controlled during hospital stay  - consistent carbohydrate diet  - Will monitor glucose levels with daily BMP, if levels become elevated will start low dose sliding scale

## 2021-12-15 NOTE — DISCHARGE NOTE PROVIDER - NSDCFUSCHEDAPPT_GEN_ALL_CORE_FT
LYLA BUENO ; 12/15/2021 ; NPP Outp Hyperb [address]  LYLA BUENO ; 12/16/2021 ; NPP Outp Hyperb [address]  LYLA BUENO ; 12/17/2021 ; NPP Outp Hyperb [address]  LYLA BUENO ; 12/27/2021 ; [facility] Otolaryng [address] [facility] L[facility] ; 12/20/2021 ; NPP Outp Hyperb [address]  [place] LYLA ALISHA ; 12/21/2021 ; [facility] Outp Hyperb [address]  [place] [facility] ; [facility] Out[facility] Hyperb [address]  [place] [facility] ; 12/23/2021 ; [facility] Outp Hyperb [address]  [place] [facility] ; 12/27/[facility]21 ; NPP Otolaryng [address]

## 2021-12-15 NOTE — DISCHARGE NOTE PROVIDER - ATTENDING DISCHARGE PHYSICAL EXAMINATION:
Vital Signs Last 24 Hrs  T(C): 36.7 (20 Dec 2021 06:02), Max: 36.7 (19 Dec 2021 14:00)  T(F): 98.1 (20 Dec 2021 06:02), Max: 98.1 (20 Dec 2021 06:02)  HR: 60 (20 Dec 2021 06:02) (60 - 76)  BP: 127/64 (20 Dec 2021 06:02) (117/68 - 127/64)  RR: 17 (20 Dec 2021 06:02) (17 - 17)  SpO2: 98% (20 Dec 2021 06:02) (93% - 98%)    GENERAL: NAD  HEENT:  anicteric, moist mucous membranes  CHEST/LUNG:  CTA b/l, no rales, wheezes, or rhonchi  HEART:  RRR, S1, S2  ABDOMEN:  BS+, soft, nontender, nondistended  EXTREMITIES: no edema, cyanosis, or calf tenderness. L ankle wrapped in clean dressing  NERVOUS SYSTEM: answers questions and follows commands appropriately

## 2021-12-15 NOTE — DISCHARGE NOTE PROVIDER - CARE PROVIDERS DIRECT ADDRESSES
[email or web address]

## 2021-12-16 ENCOUNTER — APPOINTMENT (OUTPATIENT)
Dept: HYPERBARIC MEDICINE | Facility: HOSPITAL | Age: 56
End: 2021-12-16

## 2021-12-16 LAB
ALBUMIN SERPL ELPH-MCNC: 3.1 G/DL — LOW (ref 3.3–5)
ALP SERPL-CCNC: 62 U/L — SIGNIFICANT CHANGE UP (ref 40–120)
ALT FLD-CCNC: 21 U/L — SIGNIFICANT CHANGE UP (ref 12–78)
ANION GAP SERPL CALC-SCNC: 5 MMOL/L — SIGNIFICANT CHANGE UP (ref 5–17)
AST SERPL-CCNC: 14 U/L — LOW (ref 15–37)
BASOPHILS # BLD AUTO: 0.02 K/UL — SIGNIFICANT CHANGE UP (ref 0–0.2)
BASOPHILS NFR BLD AUTO: 0.1 % — SIGNIFICANT CHANGE UP (ref 0–2)
BILIRUB SERPL-MCNC: 0.8 MG/DL — SIGNIFICANT CHANGE UP (ref 0.2–1.2)
BUN SERPL-MCNC: 11 MG/DL — SIGNIFICANT CHANGE UP (ref 7–23)
CALCIUM SERPL-MCNC: 8.9 MG/DL — SIGNIFICANT CHANGE UP (ref 8.5–10.1)
CHLORIDE SERPL-SCNC: 108 MMOL/L — SIGNIFICANT CHANGE UP (ref 96–108)
CO2 SERPL-SCNC: 29 MMOL/L — SIGNIFICANT CHANGE UP (ref 22–31)
CREAT SERPL-MCNC: 0.69 MG/DL — SIGNIFICANT CHANGE UP (ref 0.5–1.3)
EOSINOPHIL # BLD AUTO: 0 K/UL — SIGNIFICANT CHANGE UP (ref 0–0.5)
EOSINOPHIL NFR BLD AUTO: 0 % — SIGNIFICANT CHANGE UP (ref 0–6)
GLUCOSE SERPL-MCNC: 133 MG/DL — HIGH (ref 70–99)
GRAM STN FLD: SIGNIFICANT CHANGE UP
GRAM STN FLD: SIGNIFICANT CHANGE UP
HCT VFR BLD CALC: 37.4 % — SIGNIFICANT CHANGE UP (ref 34.5–45)
HGB BLD-MCNC: 12.9 G/DL — SIGNIFICANT CHANGE UP (ref 11.5–15.5)
IMM GRANULOCYTES NFR BLD AUTO: 0.7 % — SIGNIFICANT CHANGE UP (ref 0–1.5)
LYMPHOCYTES # BLD AUTO: 1.49 K/UL — SIGNIFICANT CHANGE UP (ref 1–3.3)
LYMPHOCYTES # BLD AUTO: 9.2 % — LOW (ref 13–44)
MCHC RBC-ENTMCNC: 30.4 PG — SIGNIFICANT CHANGE UP (ref 27–34)
MCHC RBC-ENTMCNC: 34.5 GM/DL — SIGNIFICANT CHANGE UP (ref 32–36)
MCV RBC AUTO: 88.2 FL — SIGNIFICANT CHANGE UP (ref 80–100)
MONOCYTES # BLD AUTO: 0.52 K/UL — SIGNIFICANT CHANGE UP (ref 0–0.9)
MONOCYTES NFR BLD AUTO: 3.2 % — SIGNIFICANT CHANGE UP (ref 2–14)
NEUTROPHILS # BLD AUTO: 13.96 K/UL — HIGH (ref 1.8–7.4)
NEUTROPHILS NFR BLD AUTO: 86.8 % — HIGH (ref 43–77)
NRBC # BLD: 0 /100 WBCS — SIGNIFICANT CHANGE UP (ref 0–0)
PLATELET # BLD AUTO: 321 K/UL — SIGNIFICANT CHANGE UP (ref 150–400)
POTASSIUM SERPL-MCNC: 4 MMOL/L — SIGNIFICANT CHANGE UP (ref 3.5–5.3)
POTASSIUM SERPL-SCNC: 4 MMOL/L — SIGNIFICANT CHANGE UP (ref 3.5–5.3)
PROT SERPL-MCNC: 7 G/DL — SIGNIFICANT CHANGE UP (ref 6–8.3)
RBC # BLD: 4.24 M/UL — SIGNIFICANT CHANGE UP (ref 3.8–5.2)
RBC # FLD: 11.9 % — SIGNIFICANT CHANGE UP (ref 10.3–14.5)
SODIUM SERPL-SCNC: 142 MMOL/L — SIGNIFICANT CHANGE UP (ref 135–145)
SPECIMEN SOURCE: SIGNIFICANT CHANGE UP
SPECIMEN SOURCE: SIGNIFICANT CHANGE UP
WBC # BLD: 16.11 K/UL — HIGH (ref 3.8–10.5)
WBC # FLD AUTO: 16.11 K/UL — HIGH (ref 3.8–10.5)

## 2021-12-16 PROCEDURE — 99024 POSTOP FOLLOW-UP VISIT: CPT

## 2021-12-16 PROCEDURE — 99232 SBSQ HOSP IP/OBS MODERATE 35: CPT

## 2021-12-16 RX ORDER — ENOXAPARIN SODIUM 100 MG/ML
40 INJECTION SUBCUTANEOUS
Refills: 0 | Status: DISCONTINUED | OUTPATIENT
Start: 2021-12-16 | End: 2021-12-20

## 2021-12-16 RX ADMIN — PIPERACILLIN AND TAZOBACTAM 25 GRAM(S): 4; .5 INJECTION, POWDER, LYOPHILIZED, FOR SOLUTION INTRAVENOUS at 21:28

## 2021-12-16 RX ADMIN — Medication 650 MILLIGRAM(S): at 22:00

## 2021-12-16 RX ADMIN — PIPERACILLIN AND TAZOBACTAM 25 GRAM(S): 4; .5 INJECTION, POWDER, LYOPHILIZED, FOR SOLUTION INTRAVENOUS at 05:07

## 2021-12-16 RX ADMIN — Medication 1 TABLET(S): at 11:08

## 2021-12-16 RX ADMIN — Medication 650 MILLIGRAM(S): at 21:29

## 2021-12-16 RX ADMIN — ENOXAPARIN SODIUM 40 MILLIGRAM(S): 100 INJECTION SUBCUTANEOUS at 11:08

## 2021-12-16 RX ADMIN — ENOXAPARIN SODIUM 40 MILLIGRAM(S): 100 INJECTION SUBCUTANEOUS at 21:28

## 2021-12-16 RX ADMIN — PIPERACILLIN AND TAZOBACTAM 25 GRAM(S): 4; .5 INJECTION, POWDER, LYOPHILIZED, FOR SOLUTION INTRAVENOUS at 13:29

## 2021-12-16 NOTE — PROGRESS NOTE ADULT - SUBJECTIVE AND OBJECTIVE BOX
HPI:  57 y/o F pt seen bedside s/p left ankle infected surgical site debridement and fibular biopsy (DOS: 12/15/21). She states that she has minimal pain in the ankle and has been elevating it as instructed. She has eaten since surgery, has urinated but has not had a bowel movement. She denies nausea, vomiting, fever, chills, SOB, chest pain and calf pain.    Patient is a 56 year old female with no significant PMH who is S/P Left peroneal tendon repair 08/06/21, with infection and non closure of surgical site referred from wound care center for admission, abx treatment and wound debridement scheduled for 12/15/21. Patient informs that she has been treated with multiple round of Abx including Linezolid x 3 since August. An incision and drainage was performed in the [facility] last week, however pt needs OR debridement. Denies any fever, chills, nausea, vomiting, chest pain, shortness of breath, or calf pain at this time.       ED course:   Vitals:   BP: 142/86  HR:67  Temp:97.5  RR: 16, O2: 98% on RA   Labs:   CBC: WBC: 8.26  Hb:13.5 , Platlets: 300   CMP:  Lytes: WNL,  BUN/Creatinine: 9/0.73 , Glucose: 113     CXR: No abnormality detected (self read).   MRI Left Ankle: Soft tissue ulcer overlying the lateral aspect of the ankle with   subjacent small abscess/phlegmon. Retro and inframalleolar tenosynovitis of the peroneal tendons, which may represent an infectious tenosynovitis. Susceptibility artifact tracking   along the inframalleolar peroneal tendons, which may be related to prior surgery or represent foci of gas within the peroneal tenosynovial sheath from the overlying ulcer. Tendinosis of the retromalleolar and inframalleolar peroneal tendons with areas of irregular morphology, which may be postsurgical or be related to partial tearing. Osseous edema with minimal T1 marrow signal abnormality within the lateral malleolus, which may represent osteitis or early osteomyelitis.  EKG:  NSR with incomplete RBBB,   HR 63,  QT/QTc: 450/460  Patient received: Zosyn 3.375 G x 1, Vanco 1G X 1, 1600 ML X 1   (14 Dec 2021 12:11)    PAST MEDICAL & SURGICAL HISTORY:  Obesity    COVID-19 december 2020 no hospitalization    Morbid obesity with BMI of 40.0-44.9, adult    Surgical wound infection    S/P tendon repair    S/P hysterectomy  2008    S/P left knee surgery  2019        Allergies    No Known Allergies    Intolerances      MEDICATIONS  (STANDING):  enoxaparin Injectable 40 milliGRAM(s) SubCutaneous two times a day  lactobacillus acidophilus 1 Tablet(s) Oral daily  piperacillin/tazobactam IVPB.. 3.375 Gram(s) IV Intermittent every 8 hours  sodium chloride 0.9%. 1000 milliLiter(s) (60 mL/Hr) IV Continuous <Continuous>    MEDICATIONS  (PRN):  acetaminophen     Tablet .. 650 milliGRAM(s) Oral every 6 hours PRN Temp greater or equal to 38C (100.4F), Mild Pain (1 - 3)  aluminum hydroxide/magnesium hydroxide/simethicone Suspension 30 milliLiter(s) Oral every 4 hours PRN Dyspepsia  melatonin 3 milliGRAM(s) Oral at bedtime PRN Insomnia  ondansetron Injectable 4 milliGRAM(s) IV Push every 8 hours PRN Nausea and/or Vomiting      Social History:  Tobacco: No  EtOH: Social  recreational Drug use: Never  Lives with: By herself  Ambulates: Independently  ADLs: No issues   Occupation:  Vaccinations: COVID Pfizer X 2 (14 Dec 2021 12:11)      FAMILY HISTORY:  FH: heart disease (Father)    Vital Signs Last 24 Hrs  T(C): 36.3 (16 Dec 2021 11:10), Max: 36.6 (16 Dec 2021 00:25)  T(F): 97.4 (16 Dec 2021 11:10), Max: 97.8 (16 Dec 2021 00:25)  HR: 66 (16 Dec 2021 11:10) (64 - 66)  BP: 120/78 (16 Dec 2021 11:10) (110/66 - 120/78)  BP(mean): --  RR: 18 (16 Dec 2021 11:10) (18 - 18)  SpO2: 96% (16 Dec 2021 11:10) (94% - 96%)    PHYSICAL EXAM:  Vascular: DP & PT palpable bilaterally, Capillary refill 3 seconds, Digital hair present bilaterally, left vel-malleolar non-pitting edema.  Neurological: Light touch sensation intact bilaterally.  Musculoskeletal: 5/5 strength in all quadrants bilaterally, AJ & STJ ROM intact.  Dermatological: Approx 5cm x 2cm x 0.5cm debrided surgical site with exposed peroneal tendons. Granular base. Sanguinous drainage. No purulence. No erythema. No malodor. Sutures intact at distal aspect of the incision. No dehiscence.    CBC Full  -  ( 16 Dec 2021 09:07 )  WBC Count : 16.11 K/uL  RBC Count : 4.24 M/uL  Hemoglobin : 12.9 g/dL  Hematocrit : 37.4 %  Platelet Count - Automated : 321 K/uL  Mean Cell Volume : 88.2 fl  Mean Cell Hemoglobin : 30.4 pg  Mean Cell Hemoglobin Concentration : 34.5 gm/dL  Auto Neutrophil # : 13.96 K/uL  Auto Lymphocyte # : 1.49 K/uL  Auto Monocyte # : 0.52 K/uL  Auto Eosinophil # : 0.00 K/uL  Auto Basophil # : 0.02 K/uL  Auto Neutrophil % : 86.8 %  Auto Lymphocyte % : 9.2 %  Auto Monocyte % : 3.2 %  Auto Eosinophil % : 0.0 %  Auto Basophil % : 0.1 %    12-16    142  |  108  |  11  ----------------------------<  133<H>  4.0   |  29  |  0.69    Ca    8.9      16 Dec 2021 08:56    TPro  7.0  /  Alb  3.1<L>  /  TBili  0.8  /  DBili  x   /  AST  14<L>  /  ALT  21  /  AlkPhos  62  12-16

## 2021-12-16 NOTE — PROGRESS NOTE ADULT - PROBLEM SELECTOR PLAN 4
Patient found to have HbA1C of 5.8 on admission  - blood glucose levels have been well controlled during hospital stay  - consistent carbohydrate diet  - Will monitor glucose levels with daily BMP, if levels become elevated will start low dose sliding scale

## 2021-12-16 NOTE — PROGRESS NOTE ADULT - SUBJECTIVE AND OBJECTIVE BOX
Patient is a 56y old  Female who presents with a chief complaint of Left Ankle infection (15 Dec 2021 17:12)      INTERVAL HPI/OVERNIGHT EVENTS: Patient seen and examined at bedside. No overnight events. Patient is POD 1 from her surgical wound debridement. Patient denies pain at surgical site, fevers, chills, cp, sob , n/v/d/c.    MEDICATIONS  (STANDING):  lactobacillus acidophilus 1 Tablet(s) Oral daily  piperacillin/tazobactam IVPB.. 3.375 Gram(s) IV Intermittent every 8 hours  sodium chloride 0.9%. 1000 milliLiter(s) (60 mL/Hr) IV Continuous <Continuous>    MEDICATIONS  (PRN):  acetaminophen     Tablet .. 650 milliGRAM(s) Oral every 6 hours PRN Temp greater or equal to 38C (100.4F), Mild Pain (1 - 3)  aluminum hydroxide/magnesium hydroxide/simethicone Suspension 30 milliLiter(s) Oral every 4 hours PRN Dyspepsia  melatonin 3 milliGRAM(s) Oral at bedtime PRN Insomnia  ondansetron Injectable 4 milliGRAM(s) IV Push every 8 hours PRN Nausea and/or Vomiting      Allergies    No Known Allergies    Intolerances        REVIEW OF SYSTEMS:  CONSTITUTIONAL: No fever or chills  HEENT:  No headache, no sore throat  RESPIRATORY: No cough, wheezing, or shortness of breath  CARDIOVASCULAR: No chest pain, palpitations  GASTROINTESTINAL: No abd pain, nausea, vomiting, or diarrhea  GENITOURINARY: No dysuria, frequency, or hematuria  NEUROLOGICAL: no focal weakness or dizziness  MUSCULOSKELETAL: no myalgias     Vital Signs Last 24 Hrs  T(C): 36.4 (16 Dec 2021 04:15), Max: 36.9 (15 Dec 2021 12:40)  T(F): 97.6 (16 Dec 2021 04:15), Max: 98.4 (15 Dec 2021 12:40)  HR: 64 (16 Dec 2021 04:15) (62 - 79)  BP: 113/67 (16 Dec 2021 04:15) (110/66 - 167/87)  BP(mean): --  RR: 18 (16 Dec 2021 04:15) (13 - 18)  SpO2: 96% (16 Dec 2021 04:15) (94% - 100%)    PHYSICAL EXAM:  GENERAL: NAD  HEENT:  anicteric, moist mucous membranes  CHEST/LUNG:  CTA b/l, no rales, wheezes, or rhonchi  HEART:  RRR, S1, S2  ABDOMEN:  BS+, soft, nontender, nondistended  EXTREMITIES: R foot covered in clean dressing. L foot no edema or erythema  NERVOUS SYSTEM: answers questions and follows commands appropriately    LABS:                        12.9   16.11 )-----------( 321      ( 16 Dec 2021 09:07 )             37.4     CBC Full  -  ( 16 Dec 2021 09:07 )  WBC Count : 16.11 K/uL  Hemoglobin : 12.9 g/dL  Hematocrit : 37.4 %  Platelet Count - Automated : 321 K/uL  Mean Cell Volume : 88.2 fl  Mean Cell Hemoglobin : 30.4 pg  Mean Cell Hemoglobin Concentration : 34.5 gm/dL  Auto Neutrophil # : 13.96 K/uL  Auto Lymphocyte # : 1.49 K/uL  Auto Monocyte # : 0.52 K/uL  Auto Eosinophil # : 0.00 K/uL  Auto Basophil # : 0.02 K/uL  Auto Neutrophil % : 86.8 %  Auto Lymphocyte % : 9.2 %  Auto Monocyte % : 3.2 %  Auto Eosinophil % : 0.0 %  Auto Basophil % : 0.1 %      Ca    8.8        15 Dec 2021 04:34      PT/INR - ( 15 Dec 2021 04:34 )   PT: 13.4 sec;   INR: 1.15 ratio         PTT - ( 15 Dec 2021 04:34 )  PTT:31.4 sec  Urinalysis Basic - ( 14 Dec 2021 19:29 )    Color: Pale Yellow / Appearance: Clear / S.020 / pH: x  Gluc: x / Ketone: Negative  / Bili: Negative / Urobili: Negative   Blood: x / Protein: Negative / Nitrite: Negative   Leuk Esterase: Negative / RBC: x / WBC x   Sq Epi: x / Non Sq Epi: x / Bacteria: x      CAPILLARY BLOOD GLUCOSE            Culture - Tissue with Gram Stain (collected 21 @ 02:02)  Source: .Tissue Other, left fibula bone culture  Gram Stain (21 @ 03:53):    No polymorphonuclear leukocytes seen per low power field    No organisms seen per oil power field    Culture - Tissue with Gram Stain (collected 21 @ 02:02)  Source: .Tissue Other, left ankle soft tissue culture  Gram Stain (21 @ 03:54):    No polymorphonuclear leukocytes seen per low power field    No organisms seen per oil power field    Culture - Urine (collected 12-15-21 @ 00:43)  Source: Clean Catch Clean Catch (Midstream)  Final Report (12-15-21 @ 21:45):    No growth    Culture - Blood (collected 21 @ 15:07)  Source: .Blood Blood-Peripheral  Preliminary Report (12-15-21 @ 16:01):    No growth to date.    Culture - Blood (collected 21 @ 15:07)  Source: .Blood Blood-Peripheral  Preliminary Report (12-15-21 @ 16:01):    No growth to date.        RADIOLOGY & ADDITIONAL TESTS:    Personally reviewed.     Consultant(s) Notes Reviewed:  [x] YES  [ ] NO     Patient is a 56y old  Female who presents with a chief complaint of Left Ankle infection (15 Dec 2021 17:12)      INTERVAL HPI/OVERNIGHT EVENTS: Patient seen and examined at bedside. No overnight events. Patient is POD 1 from her surgical wound debridement. Patient denies pain at surgical site, fevers, chills, cp, sob , n/v/d/c.    MEDICATIONS  (STANDING):  lactobacillus acidophilus 1 Tablet(s) Oral daily  piperacillin/tazobactam IVPB.. 3.375 Gram(s) IV Intermittent every 8 hours  sodium chloride 0.9%. 1000 milliLiter(s) (60 mL/Hr) IV Continuous <Continuous>    MEDICATIONS  (PRN):  acetaminophen     Tablet .. 650 milliGRAM(s) Oral every 6 hours PRN Temp greater or equal to 38C (100.4F), Mild Pain (1 - 3)  aluminum hydroxide/magnesium hydroxide/simethicone Suspension 30 milliLiter(s) Oral every 4 hours PRN Dyspepsia  melatonin 3 milliGRAM(s) Oral at bedtime PRN Insomnia  ondansetron Injectable 4 milliGRAM(s) IV Push every 8 hours PRN Nausea and/or Vomiting      Allergies    No Known Allergies    Intolerances        REVIEW OF SYSTEMS:  CONSTITUTIONAL: No fever or chills  HEENT:  No headache, no sore throat  RESPIRATORY: No cough, wheezing, or shortness of breath  CARDIOVASCULAR: No chest pain, palpitations  GASTROINTESTINAL: No abd pain, nausea, vomiting, or diarrhea  GENITOURINARY: No dysuria, frequency, or hematuria  NEUROLOGICAL: no focal weakness or dizziness  MUSCULOSKELETAL: no myalgias     Vital Signs Last 24 Hrs  T(C): 36.4 (16 Dec 2021 04:15), Max: 36.9 (15 Dec 2021 12:40)  T(F): 97.6 (16 Dec 2021 04:15), Max: 98.4 (15 Dec 2021 12:40)  HR: 64 (16 Dec 2021 04:15) (62 - 79)  BP: 113/67 (16 Dec 2021 04:15) (110/66 - 167/87)  BP(mean): --  RR: 18 (16 Dec 2021 04:15) (13 - 18)  SpO2: 96% (16 Dec 2021 04:15) (94% - 100%)    PHYSICAL EXAM:  GENERAL: NAD, comfortable appearing  HEENT:  anicteric, moist mucous membranes  CHEST/LUNG:  CTA b/l, no rales, wheezes, or rhonchi  HEART:  RRR, S1, S2  ABDOMEN:  Soft, nontender, nondistended  EXTREMITIES: no edema, cyanosis, or calf tenderness. L foot/ankle with ace wrap c/d/i.  NERVOUS SYSTEM: answers questions and follows commands appropriately    LABS:                        12.9   16.11 )-----------( 321      ( 16 Dec 2021 09:07 )             37.4     CBC Full  -  ( 16 Dec 2021 09:07 )  WBC Count : 16.11 K/uL  Hemoglobin : 12.9 g/dL  Hematocrit : 37.4 %  Platelet Count - Automated : 321 K/uL  Mean Cell Volume : 88.2 fl  Mean Cell Hemoglobin : 30.4 pg  Mean Cell Hemoglobin Concentration : 34.5 gm/dL  Auto Neutrophil # : 13.96 K/uL  Auto Lymphocyte # : 1.49 K/uL  Auto Monocyte # : 0.52 K/uL  Auto Eosinophil # : 0.00 K/uL  Auto Basophil # : 0.02 K/uL  Auto Neutrophil % : 86.8 %  Auto Lymphocyte % : 9.2 %  Auto Monocyte % : 3.2 %  Auto Eosinophil % : 0.0 %  Auto Basophil % : 0.1 %      Ca    8.8        15 Dec 2021 04:34      PT/INR - ( 15 Dec 2021 04:34 )   PT: 13.4 sec;   INR: 1.15 ratio         PTT - ( 15 Dec 2021 04:34 )  PTT:31.4 sec  Urinalysis Basic - ( 14 Dec 2021 19:29 )    Color: Pale Yellow / Appearance: Clear / S.020 / pH: x  Gluc: x / Ketone: Negative  / Bili: Negative / Urobili: Negative   Blood: x / Protein: Negative / Nitrite: Negative   Leuk Esterase: Negative / RBC: x / WBC x   Sq Epi: x / Non Sq Epi: x / Bacteria: x      CAPILLARY BLOOD GLUCOSE            Culture - Tissue with Gram Stain (collected 21 @ 02:02)  Source: .Tissue Other, left fibula bone culture  Gram Stain (21 @ 03:53):    No polymorphonuclear leukocytes seen per low power field    No organisms seen per oil power field    Culture - Tissue with Gram Stain (collected 21 @ 02:02)  Source: .Tissue Other, left ankle soft tissue culture  Gram Stain (21 @ 03:54):    No polymorphonuclear leukocytes seen per low power field    No organisms seen per oil power field    Culture - Urine (collected 12-15-21 @ 00:43)  Source: Clean Catch Clean Catch (Midstream)  Final Report (12-15-21 @ 21:45):    No growth    Culture - Blood (collected 21 @ 15:07)  Source: .Blood Blood-Peripheral  Preliminary Report (12-15-21 @ 16:01):    No growth to date.    Culture - Blood (collected 21 @ 15:07)  Source: .Blood Blood-Peripheral  Preliminary Report (12-15-21 @ 16:01):    No growth to date.        RADIOLOGY & ADDITIONAL TESTS:    Personally reviewed.     Consultant(s) Notes Reviewed:  [x] YES  [ ] NO

## 2021-12-16 NOTE — PROGRESS NOTE ADULT - PROBLEM SELECTOR PLAN 1
Pt seen and evaluated.  s/p left ankle infected surgical site debridement and fibular biopsy POD #1  f/u OR fibular bone culture and pathology; f/u final ID recs.  Applied iodoform packing, DSD and ACE compression to the left LE.  Will transition to a wound vac tomorrow; pt will be discharged with a home vac.  Cont elevation to left LE.  Non weight bearing to the left LE.  Pt will be followed by Podiatry while in house.

## 2021-12-16 NOTE — PROGRESS NOTE ADULT - SUBJECTIVE AND OBJECTIVE BOX
LYLA BUENO is a 56yFemale , patient examined and chart reviewed.     INTERVAL HPI/ OVERNIGHT EVENTS:   NAD. Afebrile.    PAST MEDICAL & SURGICAL HISTORY:  Obesity  COVID-2020 no hospitalization  Morbid obesity with BMI of 40.0-44.9, adult  Surgical wound infection  S/P tendon repair  S/P hysterectomy  2008  S/P left knee surgery        For details regarding the patient's social history, family history, and other miscellaneous elements, please refer the initial infectious diseases consultation and/or the admitting history and physical examination for this admission.      ROS:  CONSTITUTIONAL:  Negative fever or chills  EYES:  Negative  blurry vision or double vision  CARDIOVASCULAR:  Negative for chest pain or palpitations  RESPIRATORY:  Negative for cough, wheezing, or SOB   GASTROINTESTINAL:  Negative for nausea, vomiting, diarrhea, constipation, or abdominal pain  GENITOURINARY:  Negative frequency, urgency or dysuria  NEUROLOGIC:  No headache, confusion, dizziness, lightheadedness  All other systems were reviewed and are negative         Current inpatient medications :    ANTIBIOTICS/RELEVANT:  piperacillin/tazobactam IVPB.. 3.375 Gram(s) IV Intermittent every 8 hours    MEDICATIONS  (STANDING):  enoxaparin Injectable 40 milliGRAM(s) SubCutaneous two times a day  lactobacillus acidophilus 1 Tablet(s) Oral daily  sodium chloride 0.9%. 1000 milliLiter(s) (60 mL/Hr) IV Continuous <Continuous>    MEDICATIONS  (PRN):  acetaminophen     Tablet .. 650 milliGRAM(s) Oral every 6 hours PRN Temp greater or equal to 38C (100.4F), Mild Pain (1 - 3)  aluminum hydroxide/magnesium hydroxide/simethicone Suspension 30 milliLiter(s) Oral every 4 hours PRN Dyspepsia  melatonin 3 milliGRAM(s) Oral at bedtime PRN Insomnia  ondansetron Injectable 4 milliGRAM(s) IV Push every 8 hours PRN Nausea and/or Vomiting      Objective:    Vital Signs Last 24 Hrs  T(C): 36.8 (16 Dec 2021 19:58), Max: 36.8 (16 Dec 2021 19:58)  T(F): 98.3 (16 Dec 2021 19:58), Max: 98.3 (16 Dec 2021 19:58)  HR: 68 (16 Dec 2021 19:58) (64 - 68)  BP: 108/64 (16 Dec 2021 19:58) (108/64 - 120/78)  RR: 18 (16 Dec 2021 19:58) (18 - 18)  SpO2: 92% (16 Dec 2021 19:58) (92% - 96%)    Physical Exam:  General: no acute distress  Neck: supple, trachea midline  Lungs: clear, no wheeze/rhonchi  Cardiovascular: regular rate and rhythm, S1 S2  Abdomen: soft, nontender,  bowel sounds normal  Neurological: alert and oriented x3  Skin: no rash  Extremities:  Left foot drsg c/d/i      LABS:                        12.9   16.11 )-----------( 321      ( 16 Dec 2021 09:07 )             37.4   12-16    142  |  108  |  11  ----------------------------<  133<H>  4.0   |  29  |  0.69    Ca    8.9      16 Dec 2021 08:56    TPro  7.0  /  Alb  3.1<L>  /  TBili  0.8  /  DBili  x   /  AST  14<L>  /  ALT  21  /  AlkPhos  62  12-16    Urinalysis Basic - ( 14 Dec 2021 19:29 )    Color: Pale Yellow / Appearance: Clear / S.020 / pH: x  Gluc: x / Ketone: Negative  / Bili: Negative / Urobili: Negative   Blood: x / Protein: Negative / Nitrite: Negative   Leuk Esterase: Negative / RBC: x / WBC x   Sq Epi: x / Non Sq Epi: x / Bacteria: x      MICROBIOLOGY:    Culture - Tissue with Gram Stain (collected 16 Dec 2021 02:02)  Source: .Tissue Other, left fibula bone culture  Gram Stain (16 Dec 2021 03:53):    No polymorphonuclear leukocytes seen per low power field    No organisms seen per oil power field  Preliminary Report (16 Dec 2021 20:14):    No growth    Culture - Tissue with Gram Stain (collected 16 Dec 2021 02:02)  Source: .Tissue Other, left ankle soft tissue culture  Gram Stain (16 Dec 2021 03:54):    No polymorphonuclear leukocytes seen per low power field    No organisms seen per oil power field  Preliminary Report (16 Dec 2021 20:14):    No growth    Culture - Urine (collected 15 Dec 2021 00:43)  Source: Clean Catch Clean Catch (Midstream)  Final Report (15 Dec 2021 21:45):    No growth    Culture - Blood (collected 14 Dec 2021 15:07)  Source: .Blood Blood-Peripheral  Preliminary Report (15 Dec 2021 16:01):    No growth to date.    Culture - Blood (collected 14 Dec 2021 15:07)  Source: .Blood Blood-Peripheral  Preliminary Report (15 Dec 2021 16:01):    No growth to date.    RADIOLOGY & ADDITIONAL STUDIES:  ACC: [id number] EXAM:  MR ANKLE WAW IC LT                          PROCEDURE DATE:  12/10/2021          INTERPRETATION:   History: Ankle ulcer, evaluate for osteomyelitis    Technique: Multiplanar and multisequence MRI images were obtained through   the left ankle without and with contrast. Approximately 10 cc intravenous   Gadavist was administered.    Comparison: Foot radiographs dated 11/10/2021    Findings:    There is a soft tissue ulcer overlying the lateral aspect of the ankle   with subjacent small abscess/phlegmon, measuring 1.9 x 1.1 cm   transaxially and up to 2.7 cm craniocaudal.    There is retro and inframalleolar tenosynovitis of the peroneal tendons.   There is susceptibility artifact tracking along the inframalleolar   peroneal tendons, which may be related to prior surgery or represent foci   of gas within the peroneal tenosynovial sheath from the overlying ulcer.   Tendinosis of the retromalleolar and inframalleolar peroneal tendons with   areas of irregular morphology,which may be postsurgical or be related to   partial tearing.    There is osseous edema with minimal T1 marrow signal abnormality within   the lateral malleolus, which may represent osteitis or early   osteomyelitis.    Mild chondral wear with subchondral cystic change at the second and third   tarsometatarsal joints.    Chronic scar remodeling of the anterior talofibular, posterior   talofibular, and calcaneofibular ligaments. Fluid with mild synovitis   along the anterolateral gutter, which canbe seen in setting of   anterolateral impingement.    Long flexor tendons are grossly intact. Long extensor tendons are grossly   intact. Moderate distal Achilles tendinosis. Plantar fasciitis noted.    Neurovascular structures are unremarkable.    Impression:    Soft tissue ulcer overlying the lateral aspect of the ankle with   subjacent small abscess/phlegmon.    Retro and inframalleolar tenosynovitis of the peroneal tendons, which may   represent an infectious tenosynovitis. Susceptibility artifact tracking   along the inframalleolar peroneal tendons, which may be related to prior   surgery or represent foci of gas within the peroneal tenosynovial sheath   from the overlying ulcer. Tendinosis of the retromalleolar and   inframalleolar peroneal tendons with areas of irregular morphology, which   may be postsurgical or be related to partial tearing.    Osseous edema with minimal T1 marrow signal abnormality within the   lateral malleolus, which may represent osteitis or early osteomyelitis.    Assessment :   55 y/o F with PMHx of obesity, COVID-19, repair of peroneal tendon of left ankle (21) complicated by wound dehisence with infection ( grew Staphylococcus pseudintermedius ) sp I&D 10/13/21 but per pt, wound has never healed and was being seen at [facility] wound care center now admitted sec infected wound with abscess.   MRI on 12/10 showing small abscess/phlegmon with  infectious tenosynovitis and osteitis or early osteomyelitis.   Sp I&D 12/15    Plan :   Cont Zosyn  Fu OR cultures/path  Trend temps and cbc  Will likely need long term IV antibiotics    Continue with present regiment.  Appropriate use of antibiotics and adverse effects reviewed.    > 35 minutes were spent in direct patient care reviewing notes, medications ,labs data/ imaging , discussion with multidisciplinary team.    Thank you for allowing me to participate in care of your patient .    Mandy Cueto MD  Infectious Disease  [phone]

## 2021-12-17 ENCOUNTER — APPOINTMENT (OUTPATIENT)
Dept: HYPERBARIC MEDICINE | Facility: HOSPITAL | Age: 56
End: 2021-12-17

## 2021-12-17 LAB
ALBUMIN SERPL ELPH-MCNC: 2.9 G/DL — LOW (ref 3.3–5)
ALP SERPL-CCNC: 52 U/L — SIGNIFICANT CHANGE UP (ref 40–120)
ALT FLD-CCNC: 24 U/L — SIGNIFICANT CHANGE UP (ref 12–78)
ANION GAP SERPL CALC-SCNC: 5 MMOL/L — SIGNIFICANT CHANGE UP (ref 5–17)
AST SERPL-CCNC: 13 U/L — LOW (ref 15–37)
BASOPHILS # BLD AUTO: 0.04 K/UL — SIGNIFICANT CHANGE UP (ref 0–0.2)
BASOPHILS NFR BLD AUTO: 0.4 % — SIGNIFICANT CHANGE UP (ref 0–2)
BILIRUB SERPL-MCNC: 0.4 MG/DL — SIGNIFICANT CHANGE UP (ref 0.2–1.2)
BUN SERPL-MCNC: 12 MG/DL — SIGNIFICANT CHANGE UP (ref 7–23)
CALCIUM SERPL-MCNC: 8.6 MG/DL — SIGNIFICANT CHANGE UP (ref 8.5–10.1)
CHLORIDE SERPL-SCNC: 109 MMOL/L — HIGH (ref 96–108)
CO2 SERPL-SCNC: 30 MMOL/L — SIGNIFICANT CHANGE UP (ref 22–31)
CREAT SERPL-MCNC: 0.74 MG/DL — SIGNIFICANT CHANGE UP (ref 0.5–1.3)
EOSINOPHIL # BLD AUTO: 0.09 K/UL — SIGNIFICANT CHANGE UP (ref 0–0.5)
EOSINOPHIL NFR BLD AUTO: 0.8 % — SIGNIFICANT CHANGE UP (ref 0–6)
GLUCOSE SERPL-MCNC: 112 MG/DL — HIGH (ref 70–99)
HCT VFR BLD CALC: 34.8 % — SIGNIFICANT CHANGE UP (ref 34.5–45)
HGB BLD-MCNC: 11.9 G/DL — SIGNIFICANT CHANGE UP (ref 11.5–15.5)
IMM GRANULOCYTES NFR BLD AUTO: 0.7 % — SIGNIFICANT CHANGE UP (ref 0–1.5)
LYMPHOCYTES # BLD AUTO: 38.2 % — SIGNIFICANT CHANGE UP (ref 13–44)
LYMPHOCYTES # BLD AUTO: 4.09 K/UL — HIGH (ref 1–3.3)
MCHC RBC-ENTMCNC: 30.8 PG — SIGNIFICANT CHANGE UP (ref 27–34)
MCHC RBC-ENTMCNC: 34.2 GM/DL — SIGNIFICANT CHANGE UP (ref 32–36)
MCV RBC AUTO: 90.2 FL — SIGNIFICANT CHANGE UP (ref 80–100)
MONOCYTES # BLD AUTO: 0.49 K/UL — SIGNIFICANT CHANGE UP (ref 0–0.9)
MONOCYTES NFR BLD AUTO: 4.6 % — SIGNIFICANT CHANGE UP (ref 2–14)
NEUTROPHILS # BLD AUTO: 5.93 K/UL — SIGNIFICANT CHANGE UP (ref 1.8–7.4)
NEUTROPHILS NFR BLD AUTO: 55.3 % — SIGNIFICANT CHANGE UP (ref 43–77)
NRBC # BLD: 0 /100 WBCS — SIGNIFICANT CHANGE UP (ref 0–0)
PLATELET # BLD AUTO: 248 K/UL — SIGNIFICANT CHANGE UP (ref 150–400)
POTASSIUM SERPL-MCNC: 3.5 MMOL/L — SIGNIFICANT CHANGE UP (ref 3.5–5.3)
POTASSIUM SERPL-SCNC: 3.5 MMOL/L — SIGNIFICANT CHANGE UP (ref 3.5–5.3)
PROT SERPL-MCNC: 6.4 G/DL — SIGNIFICANT CHANGE UP (ref 6–8.3)
RBC # BLD: 3.86 M/UL — SIGNIFICANT CHANGE UP (ref 3.8–5.2)
RBC # FLD: 12.3 % — SIGNIFICANT CHANGE UP (ref 10.3–14.5)
SODIUM SERPL-SCNC: 144 MMOL/L — SIGNIFICANT CHANGE UP (ref 135–145)
WBC # BLD: 10.71 K/UL — HIGH (ref 3.8–10.5)
WBC # FLD AUTO: 10.71 K/UL — HIGH (ref 3.8–10.5)

## 2021-12-17 PROCEDURE — 99232 SBSQ HOSP IP/OBS MODERATE 35: CPT

## 2021-12-17 PROCEDURE — 99024 POSTOP FOLLOW-UP VISIT: CPT

## 2021-12-17 RX ADMIN — Medication 1 TABLET(S): at 11:47

## 2021-12-17 RX ADMIN — Medication 650 MILLIGRAM(S): at 21:50

## 2021-12-17 RX ADMIN — PIPERACILLIN AND TAZOBACTAM 25 GRAM(S): 4; .5 INJECTION, POWDER, LYOPHILIZED, FOR SOLUTION INTRAVENOUS at 21:53

## 2021-12-17 RX ADMIN — PIPERACILLIN AND TAZOBACTAM 25 GRAM(S): 4; .5 INJECTION, POWDER, LYOPHILIZED, FOR SOLUTION INTRAVENOUS at 13:32

## 2021-12-17 RX ADMIN — ENOXAPARIN SODIUM 40 MILLIGRAM(S): 100 INJECTION SUBCUTANEOUS at 05:09

## 2021-12-17 RX ADMIN — Medication 650 MILLIGRAM(S): at 22:20

## 2021-12-17 RX ADMIN — ENOXAPARIN SODIUM 40 MILLIGRAM(S): 100 INJECTION SUBCUTANEOUS at 17:12

## 2021-12-17 RX ADMIN — PIPERACILLIN AND TAZOBACTAM 25 GRAM(S): 4; .5 INJECTION, POWDER, LYOPHILIZED, FOR SOLUTION INTRAVENOUS at 05:09

## 2021-12-17 NOTE — PROGRESS NOTE ADULT - PROBLEM SELECTOR PLAN 4
Patient found to have HbA1C of 5.8 on admission  - blood glucose levels have been controlled during hospital stay on BMP  - consistent carbohydrate diet  - Will monitor glucose levels with daily BMP, if levels become elevated will start low dose sliding scale

## 2021-12-17 NOTE — PROGRESS NOTE ADULT - SUBJECTIVE AND OBJECTIVE BOX
Patient is a 56y old  Female who presents with a chief complaint of Left Ankle infection (17 Dec 2021 15:30)    INTERVAL HPI/OVERNIGHT EVENTS:  Patient was seen and examined at bedside. No acute events overnight. Seen at bedside while working with PT.    MEDICATIONS  (STANDING):  enoxaparin Injectable 40 milliGRAM(s) SubCutaneous two times a day  lactobacillus acidophilus 1 Tablet(s) Oral daily  piperacillin/tazobactam IVPB.. 3.375 Gram(s) IV Intermittent every 8 hours  sodium chloride 0.9%. 1000 milliLiter(s) (60 mL/Hr) IV Continuous <Continuous>    MEDICATIONS  (PRN):  acetaminophen     Tablet .. 650 milliGRAM(s) Oral every 6 hours PRN Temp greater or equal to 38C (100.4F), Mild Pain (1 - 3)  aluminum hydroxide/magnesium hydroxide/simethicone Suspension 30 milliLiter(s) Oral every 4 hours PRN Dyspepsia  melatonin 3 milliGRAM(s) Oral at bedtime PRN Insomnia  ondansetron Injectable 4 milliGRAM(s) IV Push every 8 hours PRN Nausea and/or Vomiting      Allergies    No Known Allergies    Intolerances      REVIEW OF SYSTEMS:  CONSTITUTIONAL: No fever or chills  HEENT: No headache, no sore throat  RESPIRATORY: No cough, wheezing, or shortness of breath  CARDIOVASCULAR: No chest pain, palpitations  GASTROINTESTINAL: No abd pain, nausea, vomiting, or diarrhea  GENITOURINARY: No dysuria, frequency, or hematuria  NEUROLOGICAL: No focal weakness or dizziness  MUSCULOSKELETAL: No myalgias     PHYSICAL EXAM:  GENERAL: NAD  HEENT:  anicteric, moist mucous membranes  CHEST/LUNG:  CTA b/l, no rales, wheezes, or rhonchi  HEART:  RRR, S1, S2  ABDOMEN:  BS+, soft, nontender, nondistended  EXTREMITIES: R foot covered in clean dressing. L foot no edema or erythema  NERVOUS SYSTEM: answers questions and follows commands appropriately    Vital Signs Last 24 Hrs  T(C): 36.4 (17 Dec 2021 12:03), Max: 36.8 (16 Dec 2021 19:58)  T(F): 97.5 (17 Dec 2021 12:03), Max: 98.3 (16 Dec 2021 19:58)  HR: 62 (17 Dec 2021 12:03) (55 - 68)  BP: 115/75 (17 Dec 2021 12:03) (108/64 - 115/75)  BP(mean): --  RR: 18 (17 Dec 2021 12:03) (16 - 18)  SpO2: 95% (17 Dec 2021 12:03) (92% - 95%)    LABS:                        11.9   10.71 )-----------( 248      ( 17 Dec 2021 08:37 )             34.8     CBC Full  -  ( 17 Dec 2021 08:37 )  WBC Count : 10.71 K/uL  Hemoglobin : 11.9 g/dL  Hematocrit : 34.8 %  Platelet Count - Automated : 248 K/uL  Mean Cell Volume : 90.2 fl  Mean Cell Hemoglobin : 30.8 pg  Mean Cell Hemoglobin Concentration : 34.2 gm/dL  Auto Neutrophil # : 5.93 K/uL  Auto Lymphocyte # : 4.09 K/uL  Auto Monocyte # : 0.49 K/uL  Auto Eosinophil # : 0.09 K/uL  Auto Basophil # : 0.04 K/uL  Auto Neutrophil % : 55.3 %  Auto Lymphocyte % : 38.2 %  Auto Monocyte % : 4.6 %  Auto Eosinophil % : 0.8 %  Auto Basophil % : 0.4 %    17 Dec 2021 08:37    144    |  109    |  12     ----------------------------<  112    3.5     |  30     |  0.74     Ca    8.6        17 Dec 2021 08:37    TPro  6.4    /  Alb  2.9    /  TBili  0.4    /  DBili  x      /  AST  13     /  ALT  24     /  AlkPhos  52     17 Dec 2021 08:37        CAPILLARY BLOOD GLUCOSE            Culture - Tissue with Gram Stain (collected 12-16-21 @ 02:02)  Source: .Tissue Other, left fibula bone culture  Gram Stain (12-16-21 @ 03:53):    No polymorphonuclear leukocytes seen per low power field    No organisms seen per oil power field  Preliminary Report (12-16-21 @ 20:14):    No growth    Culture - Tissue with Gram Stain (collected 12-16-21 @ 02:02)  Source: .Tissue Other, left ankle soft tissue culture  Gram Stain (12-16-21 @ 03:54):    No polymorphonuclear leukocytes seen per low power field    No organisms seen per oil power field  Preliminary Report (12-16-21 @ 20:14):    No growth    Culture - Urine (collected 12-15-21 @ 00:43)  Source: Clean Catch Clean Catch (Midstream)  Final Report (12-15-21 @ 21:45):    No growth    Culture - Blood (collected 12-14-21 @ 15:07)  Source: .Blood Blood-Peripheral  Preliminary Report (12-15-21 @ 16:01):    No growth to date.    Culture - Blood (collected 12-14-21 @ 15:07)  Source: .Blood Blood-Peripheral  Preliminary Report (12-15-21 @ 16:01):    No growth to date.        RADIOLOGY & ADDITIONAL TESTS:    Personally reviewed.     Consultant(s) Notes Reviewed:  [x] YES  [ ] NO Patient is a 56y old  Female who presents with a chief complaint of Left Ankle infection (17 Dec 2021 15:30)    INTERVAL HPI/OVERNIGHT EVENTS:  Patient was seen and examined at bedside. No acute events overnight. Seen at bedside while working with PT.    MEDICATIONS  (STANDING):  enoxaparin Injectable 40 milliGRAM(s) SubCutaneous two times a day  lactobacillus acidophilus 1 Tablet(s) Oral daily  piperacillin/tazobactam IVPB.. 3.375 Gram(s) IV Intermittent every 8 hours  sodium chloride 0.9%. 1000 milliLiter(s) (60 mL/Hr) IV Continuous <Continuous>    MEDICATIONS  (PRN):  acetaminophen     Tablet .. 650 milliGRAM(s) Oral every 6 hours PRN Temp greater or equal to 38C (100.4F), Mild Pain (1 - 3)  aluminum hydroxide/magnesium hydroxide/simethicone Suspension 30 milliLiter(s) Oral every 4 hours PRN Dyspepsia  melatonin 3 milliGRAM(s) Oral at bedtime PRN Insomnia  ondansetron Injectable 4 milliGRAM(s) IV Push every 8 hours PRN Nausea and/or Vomiting      Allergies    No Known Allergies    Intolerances      REVIEW OF SYSTEMS:  CONSTITUTIONAL: No fever or chills  HEENT: No headache, no sore throat  RESPIRATORY: No cough, wheezing, or shortness of breath  CARDIOVASCULAR: No chest pain, palpitations  GASTROINTESTINAL: No abd pain, nausea, vomiting, or diarrhea  GENITOURINARY: No dysuria, frequency, or hematuria  NEUROLOGICAL: No focal weakness or dizziness  MUSCULOSKELETAL: No myalgias     PHYSICAL EXAM:  GENERAL: NAD, comfortable appearing  HEENT:  anicteric, moist mucous membranes  CHEST/LUNG:  CTA b/l, no rales, wheezes, or rhonchi  HEART:  RRR, S1, S2  ABDOMEN:  Soft, nontender, nondistended  EXTREMITIES: no edema, cyanosis, or calf tenderness. L foot/ankle with ace wrap c/d/i.  NERVOUS SYSTEM: answers questions and follows commands appropriately    Vital Signs Last 24 Hrs  T(C): 36.4 (17 Dec 2021 12:03), Max: 36.8 (16 Dec 2021 19:58)  T(F): 97.5 (17 Dec 2021 12:03), Max: 98.3 (16 Dec 2021 19:58)  HR: 62 (17 Dec 2021 12:03) (55 - 68)  BP: 115/75 (17 Dec 2021 12:03) (108/64 - 115/75)  BP(mean): --  RR: 18 (17 Dec 2021 12:03) (16 - 18)  SpO2: 95% (17 Dec 2021 12:03) (92% - 95%)    LABS:                        11.9   10.71 )-----------( 248      ( 17 Dec 2021 08:37 )             34.8     CBC Full  -  ( 17 Dec 2021 08:37 )  WBC Count : 10.71 K/uL  Hemoglobin : 11.9 g/dL  Hematocrit : 34.8 %  Platelet Count - Automated : 248 K/uL  Mean Cell Volume : 90.2 fl  Mean Cell Hemoglobin : 30.8 pg  Mean Cell Hemoglobin Concentration : 34.2 gm/dL  Auto Neutrophil # : 5.93 K/uL  Auto Lymphocyte # : 4.09 K/uL  Auto Monocyte # : 0.49 K/uL  Auto Eosinophil # : 0.09 K/uL  Auto Basophil # : 0.04 K/uL  Auto Neutrophil % : 55.3 %  Auto Lymphocyte % : 38.2 %  Auto Monocyte % : 4.6 %  Auto Eosinophil % : 0.8 %  Auto Basophil % : 0.4 %    17 Dec 2021 08:37    144    |  109    |  12     ----------------------------<  112    3.5     |  30     |  0.74     Ca    8.6        17 Dec 2021 08:37    TPro  6.4    /  Alb  2.9    /  TBili  0.4    /  DBili  x      /  AST  13     /  ALT  24     /  AlkPhos  52     17 Dec 2021 08:37        CAPILLARY BLOOD GLUCOSE            Culture - Tissue with Gram Stain (collected 12-16-21 @ 02:02)  Source: .Tissue Other, left fibula bone culture  Gram Stain (12-16-21 @ 03:53):    No polymorphonuclear leukocytes seen per low power field    No organisms seen per oil power field  Preliminary Report (12-16-21 @ 20:14):    No growth    Culture - Tissue with Gram Stain (collected 12-16-21 @ 02:02)  Source: .Tissue Other, left ankle soft tissue culture  Gram Stain (12-16-21 @ 03:54):    No polymorphonuclear leukocytes seen per low power field    No organisms seen per oil power field  Preliminary Report (12-16-21 @ 20:14):    No growth    Culture - Urine (collected 12-15-21 @ 00:43)  Source: Clean Catch Clean Catch (Midstream)  Final Report (12-15-21 @ 21:45):    No growth    Culture - Blood (collected 12-14-21 @ 15:07)  Source: .Blood Blood-Peripheral  Preliminary Report (12-15-21 @ 16:01):    No growth to date.    Culture - Blood (collected 12-14-21 @ 15:07)  Source: .Blood Blood-Peripheral  Preliminary Report (12-15-21 @ 16:01):    No growth to date.        RADIOLOGY & ADDITIONAL TESTS:    Personally reviewed.     Consultant(s) Notes Reviewed:  [x] YES  [ ] NO

## 2021-12-17 NOTE — PROGRESS NOTE ADULT - SUBJECTIVE AND OBJECTIVE BOX
LYLA BUENO is a 56yFemale , patient examined and chart reviewed.     INTERVAL HPI/ OVERNIGHT EVENTS:   NAD. Afebrile.  No events.    PAST MEDICAL & SURGICAL HISTORY:  Obesity  COVID-19 december 2020 no hospitalization  Morbid obesity with BMI of 40.0-44.9, adult  Surgical wound infection  S/P tendon repair  S/P hysterectomy  2008  S/P left knee surgery  2019      For details regarding the patient's social history, family history, and other miscellaneous elements, please refer the initial infectious diseases consultation and/or the admitting history and physical examination for this admission.      ROS:  CONSTITUTIONAL:  Negative fever or chills  EYES:  Negative  blurry vision or double vision  CARDIOVASCULAR:  Negative for chest pain or palpitations  RESPIRATORY:  Negative for cough, wheezing, or SOB   GASTROINTESTINAL:  Negative for nausea, vomiting, diarrhea, constipation, or abdominal pain  GENITOURINARY:  Negative frequency, urgency or dysuria  NEUROLOGIC:  No headache, confusion, dizziness, lightheadedness  All other systems were reviewed and are negative         Current inpatient medications :    ANTIBIOTICS/RELEVANT:  piperacillin/tazobactam IVPB.. 3.375 Gram(s) IV Intermittent every 8 hours    MEDICATIONS  (STANDING):  enoxaparin Injectable 40 milliGRAM(s) SubCutaneous two times a day  lactobacillus acidophilus 1 Tablet(s) Oral daily  sodium chloride 0.9%. 1000 milliLiter(s) (60 mL/Hr) IV Continuous <Continuous>    MEDICATIONS  (PRN):  acetaminophen     Tablet .. 650 milliGRAM(s) Oral every 6 hours PRN Temp greater or equal to 38C (100.4F), Mild Pain (1 - 3)  aluminum hydroxide/magnesium hydroxide/simethicone Suspension 30 milliLiter(s) Oral every 4 hours PRN Dyspepsia  melatonin 3 milliGRAM(s) Oral at bedtime PRN Insomnia  ondansetron Injectable 4 milliGRAM(s) IV Push every 8 hours PRN Nausea and/or Vomiting        Objective:  Vital Signs Last 24 Hrs  T(C): 36.4 (17 Dec 2021 12:03), Max: 36.8 (16 Dec 2021 19:58)  T(F): 97.5 (17 Dec 2021 12:03), Max: 98.3 (16 Dec 2021 19:58)  HR: 62 (17 Dec 2021 12:03) (55 - 68)  BP: 115/75 (17 Dec 2021 12:03) (108/64 - 115/75)  RR: 18 (17 Dec 2021 12:03) (16 - 18)  SpO2: 95% (17 Dec 2021 12:03) (92% - 95%)    Physical Exam:  General: no acute distress  Neck: supple, trachea midline  Lungs: clear, no wheeze/rhonchi  Cardiovascular: regular rate and rhythm, S1 S2  Abdomen: soft, nontender,  bowel sounds normal  Neurological: alert and oriented x3  Skin: no rash  Extremities:  Left foot drsg c/d/i      LABS:                        11.9   10.71 )-----------( 248      ( 17 Dec 2021 08:37 )             34.8   12-17    144  |  109<H>  |  12  ----------------------------<  112<H>  3.5   |  30  |  0.74    Ca    8.6      17 Dec 2021 08:37    TPro  6.4  /  Alb  2.9<L>  /  TBili  0.4  /  DBili  x   /  AST  13<L>  /  ALT  24  /  AlkPhos  52  12-17    MICROBIOLOGY:    Culture - Tissue with Gram Stain (collected 16 Dec 2021 02:02)  Source: .Tissue Other, left fibula bone culture  Gram Stain (16 Dec 2021 03:53):    No polymorphonuclear leukocytes seen per low power field    No organisms seen per oil power field  Preliminary Report (16 Dec 2021 20:14):    No growth    Culture - Tissue with Gram Stain (collected 16 Dec 2021 02:02)  Source: .Tissue Other, left ankle soft tissue culture  Gram Stain (16 Dec 2021 03:54):    No polymorphonuclear leukocytes seen per low power field    No organisms seen per oil power field  Preliminary Report (16 Dec 2021 20:14):    No growth    Culture - Urine (collected 15 Dec 2021 00:43)  Source: Clean Catch Clean Catch (Midstream)  Final Report (15 Dec 2021 21:45):    No growth    Culture - Blood (collected 14 Dec 2021 15:07)  Source: .Blood Blood-Peripheral  Preliminary Report (15 Dec 2021 16:01):    No growth to date.    Culture - Blood (collected 14 Dec 2021 15:07)  Source: .Blood Blood-Peripheral  Preliminary Report (15 Dec 2021 16:01):    No growth to date.    RADIOLOGY & ADDITIONAL STUDIES:  ACC: [id number] EXAM:  MR ANKLE WAW IC LT                          PROCEDURE DATE:  12/10/2021          INTERPRETATION:   History: Ankle ulcer, evaluate for osteomyelitis    Technique: Multiplanar and multisequence MRI images were obtained through   the left ankle without and with contrast. Approximately 10 cc intravenous   Gadavist was administered.    Comparison: Foot radiographs dated 11/10/2021    Findings:    There is a soft tissue ulcer overlying the lateral aspect of the ankle   with subjacent small abscess/phlegmon, measuring 1.9 x 1.1 cm   transaxially and up to 2.7 cm craniocaudal.    There is retro and inframalleolar tenosynovitis of the peroneal tendons.   There is susceptibility artifact tracking along the inframalleolar   peroneal tendons, which may be related to prior surgery or represent foci   of gas within the peroneal tenosynovial sheath from the overlying ulcer.   Tendinosis of the retromalleolar and inframalleolar peroneal tendons with   areas of irregular morphology,which may be postsurgical or be related to   partial tearing.    There is osseous edema with minimal T1 marrow signal abnormality within   the lateral malleolus, which may represent osteitis or early   osteomyelitis.    Mild chondral wear with subchondral cystic change at the second and third   tarsometatarsal joints.    Chronic scar remodeling of the anterior talofibular, posterior   talofibular, and calcaneofibular ligaments. Fluid with mild synovitis   along the anterolateral gutter, which canbe seen in setting of   anterolateral impingement.    Long flexor tendons are grossly intact. Long extensor tendons are grossly   intact. Moderate distal Achilles tendinosis. Plantar fasciitis noted.    Neurovascular structures are unremarkable.    Impression:    Soft tissue ulcer overlying the lateral aspect of the ankle with   subjacent small abscess/phlegmon.    Retro and inframalleolar tenosynovitis of the peroneal tendons, which may   represent an infectious tenosynovitis. Susceptibility artifact tracking   along the inframalleolar peroneal tendons, which may be related to prior   surgery or represent foci of gas within the peroneal tenosynovial sheath   from the overlying ulcer. Tendinosis of the retromalleolar and   inframalleolar peroneal tendons with areas of irregular morphology, which   may be postsurgical or be related to partial tearing.    Osseous edema with minimal T1 marrow signal abnormality within the   lateral malleolus, which may represent osteitis or early osteomyelitis.    Assessment :   55 y/o F with PMHx of obesity, COVID-19, repair of peroneal tendon of left ankle (8/6/21) complicated by wound dehisence with infection ( grew Staphylococcus pseudintermedius ) sp I&D 10/13/21 but per pt, wound has never healed and was being seen at [facility] wound care center now admitted sec infected wound with abscess.   MRI on 12/10 showing small abscess/phlegmon with  infectious tenosynovitis and osteitis or early osteomyelitis.   Sp I&D 12/15  OR cultures NTGTD    Plan :   Cont Zosyn  Fu OR cultures/path  Trend temps and cbc  Will likely need long term IV antibiotics  PICC line    Continue with present regiment.  Appropriate use of antibiotics and adverse effects reviewed.    > 35 minutes were spent in direct patient care reviewing notes, medications ,labs data/ imaging , discussion with multidisciplinary team.    Thank you for allowing me to participate in care of your patient .    Mandy Cueto MD  Infectious Disease  [phone]

## 2021-12-17 NOTE — PROGRESS NOTE ADULT - PROBLEM SELECTOR PLAN 1
s/p Left ankle surgical site/tendon debridement, fibular bone biopsy and pulse lavage pm 12/15/21  - Patient is s/p left peroneal tendon repair with surgical site infection in August 21  - Patient presented from wound care center with Hx of surgical wound infection in left ankle for Abx treatment and wound debridement.  - MRI Left Ankle shows: Soft tissue ulcer overlying the lateral aspect of the ankle with   subjacent small abscess/phlegmon. Osseous edema with minimal T1 marrow signal abnormality within the lateral malleolus, which may represent osteitis or early osteomyelitis.  - S/P Vanc and Zosyn x 1 each in the ED, c/w zosyn at this time  - BCx- NGTD  - leukocytosis likely reactive, downtrending  - ESR and CRP wnl  - ID Dr. Cueto consulted, will appreciate recs  - Podiatry following

## 2021-12-17 NOTE — PROGRESS NOTE ADULT - PROBLEM SELECTOR PLAN 1
s/p left ankle infected surgical site debridement and fibular biopsy POD #2  f/u OR fibular bone culture and pathology; f/u final ID recs.  Applied wound vac today running continuously at 125mmHg. Next change will be on Mon 12/20/21.  Pt will be discharged with a home vac. [employer] form filled out and left in the pt's chart.  Cont elevation to left LE.  Non weight bearing to the left LE with a rolling walker.  Pt will be followed by Podiatry while in house.

## 2021-12-17 NOTE — PROGRESS NOTE ADULT - SUBJECTIVE AND OBJECTIVE BOX
HPI:  55 y/o F pt seen bedside s/p left ankle infected surgical site debridement and fibular biopsy (DOS: 12/15/21). She states that she has minimal pain in the ankle and has been elevating it as instructed. Was seen by Physical Therapy to be non weight bearing with a rolling walker. She denies nausea, vomiting, fever, chills, SOB, chest pain and calf pain.    Patient is a 56 year old female with no significant PMH who is S/P Left peroneal tendon repair 08/06/21, with infection and non closure of surgical site referred from wound care center for admission, abx treatment and wound debridement scheduled for 12/15/21. Patient informs that she has been treated with multiple round of Abx including Linezolid x 3 since August. An incision and drainage was performed in the Wound Care Center last week, however pt needs OR debridement. Denies any fever, chills, nausea, vomiting, chest pain, shortness of breath, or calf pain at this time.       ED course:   Vitals:   BP: 142/86  HR:67  Temp:97.5  RR: 16, O2: 98% on RA   Labs:   CBC: WBC: 8.26  Hb:13.5 , Platlets: 300   CMP:  Lytes: WNL,  BUN/Creatinine: 9/0.73 , Glucose: 113     CXR: No abnormality detected (self read).   MRI Left Ankle: Soft tissue ulcer overlying the lateral aspect of the ankle with   subjacent small abscess/phlegmon. Retro and inframalleolar tenosynovitis of the peroneal tendons, which may represent an infectious tenosynovitis. Susceptibility artifact tracking   along the inframalleolar peroneal tendons, which may be related to prior surgery or represent foci of gas within the peroneal tenosynovial sheath from the overlying ulcer. Tendinosis of the retromalleolar and inframalleolar peroneal tendons with areas of irregular morphology, which may be postsurgical or be related to partial tearing. Osseous edema with minimal T1 marrow signal abnormality within the lateral malleolus, which may represent osteitis or early osteomyelitis.  EKG:  NSR with incomplete RBBB,   HR 63,  QT/QTc: 450/460  Patient received: Zosyn 3.375 G x 1, Vanco 1G X 1, 1600 ML X 1   (14 Dec 2021 12:11)    PAST MEDICAL & SURGICAL HISTORY:  Obesity    COVID-19 december 2020 no hospitalization    Morbid obesity with BMI of 40.0-44.9, adult    Surgical wound infection    S/P tendon repair    S/P hysterectomy  2008    S/P left knee surgery  2019        Allergies    No Known Allergies    Intolerances    MEDICATIONS  (STANDING):  enoxaparin Injectable 40 milliGRAM(s) SubCutaneous two times a day  lactobacillus acidophilus 1 Tablet(s) Oral daily  piperacillin/tazobactam IVPB.. 3.375 Gram(s) IV Intermittent every 8 hours  sodium chloride 0.9%. 1000 milliLiter(s) (60 mL/Hr) IV Continuous <Continuous>    MEDICATIONS  (PRN):  acetaminophen     Tablet .. 650 milliGRAM(s) Oral every 6 hours PRN Temp greater or equal to 38C (100.4F), Mild Pain (1 - 3)  aluminum hydroxide/magnesium hydroxide/simethicone Suspension 30 milliLiter(s) Oral every 4 hours PRN Dyspepsia  melatonin 3 milliGRAM(s) Oral at bedtime PRN Insomnia  ondansetron Injectable 4 milliGRAM(s) IV Push every 8 hours PRN Nausea and/or Vomiting      Social History:  Tobacco: No  EtOH: Social  recreational Drug use: Never  Lives with: By herself  Ambulates: Independently  ADLs: No issues   Occupation:  Vaccinations: COVID Pfizer X 2 (14 Dec 2021 12:11)      FAMILY HISTORY:  FH: heart disease (Father)    Vital Signs Last 24 Hrs  T(C): 36.4 (17 Dec 2021 12:03), Max: 36.8 (16 Dec 2021 19:58)  T(F): 97.5 (17 Dec 2021 12:03), Max: 98.3 (16 Dec 2021 19:58)  HR: 62 (17 Dec 2021 12:03) (55 - 68)  BP: 115/75 (17 Dec 2021 12:03) (108/64 - 115/75)  BP(mean): --  RR: 18 (17 Dec 2021 12:03) (16 - 18)  SpO2: 95% (17 Dec 2021 12:03) (92% - 95%)    PHYSICAL EXAM:  Vascular: DP & PT palpable bilaterally, Capillary refill 3 seconds, Digital hair present bilaterally, left vel-malleolar non-pitting edema.  Neurological: Light touch sensation intact bilaterally.  Musculoskeletal: 5/5 strength in all quadrants bilaterally, AJ & STJ ROM intact.  Dermatological: Approx 5cm x 2cm x 0.5cm debrided surgical site with exposed peroneal tendons. Granular base. Sanguinous drainage. No purulence. No erythema. No malodor. Sutures intact at distal aspect of the incision. No dehiscence.    CBC Full  -  ( 17 Dec 2021 08:37 )  WBC Count : 10.71 K/uL  RBC Count : 3.86 M/uL  Hemoglobin : 11.9 g/dL  Hematocrit : 34.8 %  Platelet Count - Automated : 248 K/uL  Mean Cell Volume : 90.2 fl  Mean Cell Hemoglobin : 30.8 pg  Mean Cell Hemoglobin Concentration : 34.2 gm/dL  Auto Neutrophil # : 5.93 K/uL  Auto Lymphocyte # : 4.09 K/uL  Auto Monocyte # : 0.49 K/uL  Auto Eosinophil # : 0.09 K/uL  Auto Basophil # : 0.04 K/uL  Auto Neutrophil % : 55.3 %  Auto Lymphocyte % : 38.2 %  Auto Monocyte % : 4.6 %  Auto Eosinophil % : 0.8 %  Auto Basophil % : 0.4 %    12-17    144  |  109<H>  |  12  ----------------------------<  112<H>  3.5   |  30  |  0.74    Ca    8.6      17 Dec 2021 08:37    TPro  6.4  /  Alb  2.9<L>  /  TBili  0.4  /  DBili  x   /  AST  13<L>  /  ALT  24  /  AlkPhos  52  12-17  16

## 2021-12-18 LAB
ANION GAP SERPL CALC-SCNC: 4 MMOL/L — LOW (ref 5–17)
BUN SERPL-MCNC: 11 MG/DL — SIGNIFICANT CHANGE UP (ref 7–23)
CALCIUM SERPL-MCNC: 8.9 MG/DL — SIGNIFICANT CHANGE UP (ref 8.5–10.1)
CHLORIDE SERPL-SCNC: 110 MMOL/L — HIGH (ref 96–108)
CO2 SERPL-SCNC: 28 MMOL/L — SIGNIFICANT CHANGE UP (ref 22–31)
CREAT SERPL-MCNC: 0.68 MG/DL — SIGNIFICANT CHANGE UP (ref 0.5–1.3)
GLUCOSE SERPL-MCNC: 100 MG/DL — HIGH (ref 70–99)
HCT VFR BLD CALC: 39.5 % — SIGNIFICANT CHANGE UP (ref 34.5–45)
HGB BLD-MCNC: 13 G/DL — SIGNIFICANT CHANGE UP (ref 11.5–15.5)
MAGNESIUM SERPL-MCNC: 2.2 MG/DL — SIGNIFICANT CHANGE UP (ref 1.6–2.6)
MCHC RBC-ENTMCNC: 30.4 PG — SIGNIFICANT CHANGE UP (ref 27–34)
MCHC RBC-ENTMCNC: 32.9 GM/DL — SIGNIFICANT CHANGE UP (ref 32–36)
MCV RBC AUTO: 92.3 FL — SIGNIFICANT CHANGE UP (ref 80–100)
NRBC # BLD: 0 /100 WBCS — SIGNIFICANT CHANGE UP (ref 0–0)
PHOSPHATE SERPL-MCNC: 3.6 MG/DL — SIGNIFICANT CHANGE UP (ref 2.5–4.5)
PLATELET # BLD AUTO: 244 K/UL — SIGNIFICANT CHANGE UP (ref 150–400)
POTASSIUM SERPL-MCNC: 3.7 MMOL/L — SIGNIFICANT CHANGE UP (ref 3.5–5.3)
POTASSIUM SERPL-SCNC: 3.7 MMOL/L — SIGNIFICANT CHANGE UP (ref 3.5–5.3)
RBC # BLD: 4.28 M/UL — SIGNIFICANT CHANGE UP (ref 3.8–5.2)
RBC # FLD: 11.9 % — SIGNIFICANT CHANGE UP (ref 10.3–14.5)
SODIUM SERPL-SCNC: 142 MMOL/L — SIGNIFICANT CHANGE UP (ref 135–145)
WBC # BLD: 7.42 K/UL — SIGNIFICANT CHANGE UP (ref 3.8–10.5)
WBC # FLD AUTO: 7.42 K/UL — SIGNIFICANT CHANGE UP (ref 3.8–10.5)

## 2021-12-18 PROCEDURE — 99233 SBSQ HOSP IP/OBS HIGH 50: CPT

## 2021-12-18 PROCEDURE — 99024 POSTOP FOLLOW-UP VISIT: CPT

## 2021-12-18 RX ADMIN — PIPERACILLIN AND TAZOBACTAM 25 GRAM(S): 4; .5 INJECTION, POWDER, LYOPHILIZED, FOR SOLUTION INTRAVENOUS at 21:38

## 2021-12-18 RX ADMIN — ENOXAPARIN SODIUM 40 MILLIGRAM(S): 100 INJECTION SUBCUTANEOUS at 17:16

## 2021-12-18 RX ADMIN — ENOXAPARIN SODIUM 40 MILLIGRAM(S): 100 INJECTION SUBCUTANEOUS at 05:59

## 2021-12-18 RX ADMIN — Medication 1 TABLET(S): at 11:01

## 2021-12-18 RX ADMIN — PIPERACILLIN AND TAZOBACTAM 25 GRAM(S): 4; .5 INJECTION, POWDER, LYOPHILIZED, FOR SOLUTION INTRAVENOUS at 05:59

## 2021-12-18 RX ADMIN — PIPERACILLIN AND TAZOBACTAM 25 GRAM(S): 4; .5 INJECTION, POWDER, LYOPHILIZED, FOR SOLUTION INTRAVENOUS at 13:44

## 2021-12-18 NOTE — PROGRESS NOTE ADULT - PROBLEM SELECTOR PLAN 1
s/p Left ankle surgical site/tendon debridement, fibular bone biopsy and pulse lavage pm 12/15/21  - Patient is s/p left peroneal tendon repair with surgical site infection in August 21  - Patient presented from wound care center with Hx of surgical wound infection in left ankle for Abx treatment and wound debridement.  - MRI Left Ankle shows: Soft tissue ulcer overlying the lateral aspect of the ankle with   subjacent small abscess/phlegmon. Osseous edema with minimal T1 marrow signal abnormality within the lateral malleolus, which may represent osteitis or early osteomyelitis.  - S/P Vanc and Zosyn x 1 each in the ED, c/w zosyn at this time  - BCx- NGTD  - leukocytosis likely reactive, downtrending  - ESR and CRP wnl  - ID Dr. Cueto consulted, will appreciate recs  - Podiatry following  - f/u surgical pathology  - PICC line for long-term IV abx s/p Left ankle surgical site/tendon debridement, fibular bone biopsy and pulse lavage pm 12/15/21  - Patient is s/p left peroneal tendon repair with surgical site infection in August 21  - Patient presented from wound care center with Hx of surgical wound infection in left ankle for Abx treatment and wound debridement.  - MRI Left Ankle shows: Soft tissue ulcer overlying the lateral aspect of the ankle with   subjacent small abscess/phlegmon. Osseous edema with minimal T1 marrow signal abnormality within the lateral malleolus, which may represent osteitis or early osteomyelitis.  - S/P Vanc and Zosyn x 1 each in the ED, c/w zosyn at this time  - BCx- NGTD  - leukocytosis likely reactive, downtrending --> resolved  - ESR and CRP wnl  - ID Dr. Cueto consulted, will appreciate recs  - Podiatry following  - f/u surgical pathology  - PICC line for long-term IV abx s/p Left ankle surgical site/tendon debridement, fibular bone biopsy and pulse lavage pm 12/15/21  - Patient is s/p left peroneal tendon repair with surgical site infection in August 21  - Patient presented from wound care center with Hx of surgical wound infection in left ankle for Abx treatment and wound debridement.  - MRI Left Ankle shows: Soft tissue ulcer overlying the lateral aspect of the ankle with   subjacent small abscess/phlegmon. Osseous edema with minimal T1 marrow signal abnormality within the lateral malleolus, which may represent osteitis or early osteomyelitis.  - S/P Vanc and Zosyn x 1 each in the ED, c/w zosyn at this time  - BCx- NGTD  - leukocytosis likely reactive, downtrending --> resolved  - ESR and CRP wnl  - D/w ID Dr. Cueto regarding PICC  - Podiatry following  - f/u surgical pathology  - PICC line for long-term IV abx

## 2021-12-18 NOTE — CHART NOTE - NSCHARTNOTEFT_GEN_A_CORE
Wound vac running continuously at 125mmHg.  Approx 50cc of serosanguinous drainage in the cannister.  Next vac change will be Mon 12/20/21.

## 2021-12-18 NOTE — PROGRESS NOTE ADULT - SUBJECTIVE AND OBJECTIVE BOX
Patient is a 56y old  Female who presents with a chief complaint of Left Ankle infection (17 Dec 2021 15:50)      INTERVAL HPI/OVERNIGHT EVENTS: Patient seen and examined at bedside. No overnight events occurred. Patient has no complaints at this time. Denies fevers, chills, headache, chest pain, dyspnea, abdominal pain, n/v/d/c.    MEDICATIONS  (STANDING):  enoxaparin Injectable 40 milliGRAM(s) SubCutaneous two times a day  lactobacillus acidophilus 1 Tablet(s) Oral daily  piperacillin/tazobactam IVPB.. 3.375 Gram(s) IV Intermittent every 8 hours  sodium chloride 0.9%. 1000 milliLiter(s) (60 mL/Hr) IV Continuous <Continuous>    MEDICATIONS  (PRN):  acetaminophen     Tablet .. 650 milliGRAM(s) Oral every 6 hours PRN Temp greater or equal to 38C (100.4F), Mild Pain (1 - 3)  aluminum hydroxide/magnesium hydroxide/simethicone Suspension 30 milliLiter(s) Oral every 4 hours PRN Dyspepsia  melatonin 3 milliGRAM(s) Oral at bedtime PRN Insomnia  ondansetron Injectable 4 milliGRAM(s) IV Push every 8 hours PRN Nausea and/or Vomiting      Allergies    No Known Allergies    Intolerances        REVIEW OF SYSTEMS:  CONSTITUTIONAL: No fever or chills  HEENT:  No headache  RESPIRATORY: No shortness of breath  CARDIOVASCULAR: No chest pain  GASTROINTESTINAL: No abd pain, nausea, vomiting, or diarrhea  GENITOURINARY: No dysuria, frequency, or hematuria  NEUROLOGICAL: no focal weakness or dizziness  MUSCULOSKELETAL: no new myalgias     Vital Signs Last 24 Hrs  T(C): 36.4 (18 Dec 2021 04:20), Max: 36.8 (17 Dec 2021 19:58)  T(F): 97.6 (18 Dec 2021 04:20), Max: 98.3 (17 Dec 2021 19:58)  HR: 66 (18 Dec 2021 04:20) (62 - 73)  BP: 121/63 (18 Dec 2021 04:20) (115/75 - 121/63)  BP(mean): --  RR: 18 (18 Dec 2021 04:20) (18 - 18)  SpO2: 95% (18 Dec 2021 04:20) (94% - 95%)    PHYSICAL EXAM:  GENERAL: NAD, comfortable appearing  HEENT:  anicteric, moist mucous membranes  CHEST/LUNG:  CTA b/l, no rales, wheezes, or rhonchi  HEART:  RRR, S1, S2  ABDOMEN:  Soft, nontender, nondistended  EXTREMITIES: no edema, cyanosis, or calf tenderness. L foot/ankle with ace wrap c/d/i.  NERVOUS SYSTEM: answers questions and follows commands appropriately    LABS:                        13.0   7.42  )-----------( 244      ( 18 Dec 2021 09:04 )             39.5     CBC Full  -  ( 18 Dec 2021 09:04 )  WBC Count : 7.42 K/uL  Hemoglobin : 13.0 g/dL  Hematocrit : 39.5 %  Platelet Count - Automated : 244 K/uL  Mean Cell Volume : 92.3 fl  Mean Cell Hemoglobin : 30.4 pg  Mean Cell Hemoglobin Concentration : 32.9 gm/dL  Auto Neutrophil # : x  Auto Lymphocyte # : x  Auto Monocyte # : x  Auto Eosinophil # : x  Auto Basophil # : x  Auto Neutrophil % : x  Auto Lymphocyte % : x  Auto Monocyte % : x  Auto Eosinophil % : x  Auto Basophil % : x    18 Dec 2021 09:04    142    |  110    |  11     ----------------------------<  100    3.7     |  28     |  0.68     Ca    8.9        18 Dec 2021 09:04  Phos  3.6       18 Dec 2021 09:04  Mg     2.2       18 Dec 2021 09:04          CAPILLARY BLOOD GLUCOSE            Culture - Tissue with Gram Stain (collected 12-16-21 @ 02:02)  Source: .Tissue Other, left fibula bone culture  Gram Stain (12-16-21 @ 03:53):    No polymorphonuclear leukocytes seen per low power field    No organisms seen per oil power field  Preliminary Report (12-16-21 @ 20:14):    No growth    Culture - Tissue with Gram Stain (collected 12-16-21 @ 02:02)  Source: .Tissue Other, left ankle soft tissue culture  Gram Stain (12-16-21 @ 03:54):    No polymorphonuclear leukocytes seen per low power field    No organisms seen per oil power field  Preliminary Report (12-17-21 @ 21:01):    Rare Corynebacterium species "Susceptibilities not performed"    Culture - Urine (collected 12-15-21 @ 00:43)  Source: Clean Catch Clean Catch (Midstream)  Final Report (12-15-21 @ 21:45):    No growth    Culture - Blood (collected 12-14-21 @ 15:07)  Source: .Blood Blood-Peripheral  Preliminary Report (12-15-21 @ 16:01):    No growth to date.    Culture - Blood (collected 12-14-21 @ 15:07)  Source: .Blood Blood-Peripheral  Preliminary Report (12-15-21 @ 16:01):    No growth to date.        RADIOLOGY & ADDITIONAL TESTS:    Personally reviewed.     Consultant(s) Notes Reviewed:  [x] YES  [ ] NO

## 2021-12-19 LAB
ANION GAP SERPL CALC-SCNC: 6 MMOL/L — SIGNIFICANT CHANGE UP (ref 5–17)
BASOPHILS # BLD AUTO: 0.04 K/UL — SIGNIFICANT CHANGE UP (ref 0–0.2)
BASOPHILS NFR BLD AUTO: 0.5 % — SIGNIFICANT CHANGE UP (ref 0–2)
BUN SERPL-MCNC: 12 MG/DL — SIGNIFICANT CHANGE UP (ref 7–23)
CALCIUM SERPL-MCNC: 9 MG/DL — SIGNIFICANT CHANGE UP (ref 8.5–10.1)
CHLORIDE SERPL-SCNC: 108 MMOL/L — SIGNIFICANT CHANGE UP (ref 96–108)
CO2 SERPL-SCNC: 29 MMOL/L — SIGNIFICANT CHANGE UP (ref 22–31)
CREAT SERPL-MCNC: 0.74 MG/DL — SIGNIFICANT CHANGE UP (ref 0.5–1.3)
CULTURE RESULTS: SIGNIFICANT CHANGE UP
CULTURE RESULTS: SIGNIFICANT CHANGE UP
EOSINOPHIL # BLD AUTO: 0.29 K/UL — SIGNIFICANT CHANGE UP (ref 0–0.5)
EOSINOPHIL NFR BLD AUTO: 3.3 % — SIGNIFICANT CHANGE UP (ref 0–6)
GLUCOSE SERPL-MCNC: 115 MG/DL — HIGH (ref 70–99)
HCT VFR BLD CALC: 37.5 % — SIGNIFICANT CHANGE UP (ref 34.5–45)
HGB BLD-MCNC: 12.8 G/DL — SIGNIFICANT CHANGE UP (ref 11.5–15.5)
IMM GRANULOCYTES NFR BLD AUTO: 0.5 % — SIGNIFICANT CHANGE UP (ref 0–1.5)
LYMPHOCYTES # BLD AUTO: 3.15 K/UL — SIGNIFICANT CHANGE UP (ref 1–3.3)
LYMPHOCYTES # BLD AUTO: 36.4 % — SIGNIFICANT CHANGE UP (ref 13–44)
MAGNESIUM SERPL-MCNC: 2.1 MG/DL — SIGNIFICANT CHANGE UP (ref 1.6–2.6)
MCHC RBC-ENTMCNC: 30.7 PG — SIGNIFICANT CHANGE UP (ref 27–34)
MCHC RBC-ENTMCNC: 34.1 GM/DL — SIGNIFICANT CHANGE UP (ref 32–36)
MCV RBC AUTO: 89.9 FL — SIGNIFICANT CHANGE UP (ref 80–100)
MONOCYTES # BLD AUTO: 0.57 K/UL — SIGNIFICANT CHANGE UP (ref 0–0.9)
MONOCYTES NFR BLD AUTO: 6.6 % — SIGNIFICANT CHANGE UP (ref 2–14)
NEUTROPHILS # BLD AUTO: 4.57 K/UL — SIGNIFICANT CHANGE UP (ref 1.8–7.4)
NEUTROPHILS NFR BLD AUTO: 52.7 % — SIGNIFICANT CHANGE UP (ref 43–77)
NRBC # BLD: 0 /100 WBCS — SIGNIFICANT CHANGE UP (ref 0–0)
PHOSPHATE SERPL-MCNC: 3.2 MG/DL — SIGNIFICANT CHANGE UP (ref 2.5–4.5)
PLATELET # BLD AUTO: 264 K/UL — SIGNIFICANT CHANGE UP (ref 150–400)
POTASSIUM SERPL-MCNC: 3.8 MMOL/L — SIGNIFICANT CHANGE UP (ref 3.5–5.3)
POTASSIUM SERPL-SCNC: 3.8 MMOL/L — SIGNIFICANT CHANGE UP (ref 3.5–5.3)
RBC # BLD: 4.17 M/UL — SIGNIFICANT CHANGE UP (ref 3.8–5.2)
RBC # FLD: 11.9 % — SIGNIFICANT CHANGE UP (ref 10.3–14.5)
SARS-COV-2 RNA SPEC QL NAA+PROBE: SIGNIFICANT CHANGE UP
SODIUM SERPL-SCNC: 143 MMOL/L — SIGNIFICANT CHANGE UP (ref 135–145)
SPECIMEN SOURCE: SIGNIFICANT CHANGE UP
SPECIMEN SOURCE: SIGNIFICANT CHANGE UP
WBC # BLD: 8.66 K/UL — SIGNIFICANT CHANGE UP (ref 3.8–10.5)
WBC # FLD AUTO: 8.66 K/UL — SIGNIFICANT CHANGE UP (ref 3.8–10.5)

## 2021-12-19 PROCEDURE — 99232 SBSQ HOSP IP/OBS MODERATE 35: CPT

## 2021-12-19 RX ORDER — SENNA PLUS 8.6 MG/1
2 TABLET ORAL AT BEDTIME
Refills: 0 | Status: DISCONTINUED | OUTPATIENT
Start: 2021-12-19 | End: 2021-12-20

## 2021-12-19 RX ADMIN — PIPERACILLIN AND TAZOBACTAM 25 GRAM(S): 4; .5 INJECTION, POWDER, LYOPHILIZED, FOR SOLUTION INTRAVENOUS at 13:14

## 2021-12-19 RX ADMIN — PIPERACILLIN AND TAZOBACTAM 25 GRAM(S): 4; .5 INJECTION, POWDER, LYOPHILIZED, FOR SOLUTION INTRAVENOUS at 21:35

## 2021-12-19 RX ADMIN — SENNA PLUS 2 TABLET(S): 8.6 TABLET ORAL at 21:36

## 2021-12-19 RX ADMIN — PIPERACILLIN AND TAZOBACTAM 25 GRAM(S): 4; .5 INJECTION, POWDER, LYOPHILIZED, FOR SOLUTION INTRAVENOUS at 06:02

## 2021-12-19 RX ADMIN — ENOXAPARIN SODIUM 40 MILLIGRAM(S): 100 INJECTION SUBCUTANEOUS at 17:22

## 2021-12-19 RX ADMIN — Medication 1 TABLET(S): at 11:03

## 2021-12-19 RX ADMIN — ENOXAPARIN SODIUM 40 MILLIGRAM(S): 100 INJECTION SUBCUTANEOUS at 06:02

## 2021-12-19 NOTE — PROGRESS NOTE ADULT - SUBJECTIVE AND OBJECTIVE BOX
Patient is a 56y old  Female who presents with a chief complaint of Left Ankle infection (18 Dec 2021 09:51)      INTERVAL HPI/OVERNIGHT EVENTS: Patient seen and examined at bedside. Patient had no overnight events. No acute complaints at this time. Admits to constipation. Patient denies fevers, chills, cp, sob, n/v/d.    MEDICATIONS  (STANDING):  enoxaparin Injectable 40 milliGRAM(s) SubCutaneous two times a day  lactobacillus acidophilus 1 Tablet(s) Oral daily  piperacillin/tazobactam IVPB.. 3.375 Gram(s) IV Intermittent every 8 hours  senna 2 Tablet(s) Oral at bedtime  sodium chloride 0.9%. 1000 milliLiter(s) (60 mL/Hr) IV Continuous <Continuous>    MEDICATIONS  (PRN):  acetaminophen     Tablet .. 650 milliGRAM(s) Oral every 6 hours PRN Temp greater or equal to 38C (100.4F), Mild Pain (1 - 3)  aluminum hydroxide/magnesium hydroxide/simethicone Suspension 30 milliLiter(s) Oral every 4 hours PRN Dyspepsia  melatonin 3 milliGRAM(s) Oral at bedtime PRN Insomnia  ondansetron Injectable 4 milliGRAM(s) IV Push every 8 hours PRN Nausea and/or Vomiting      Allergies    No Known Allergies    Intolerances        REVIEW OF SYSTEMS:  CONSTITUTIONAL: No fever or chills  HEENT:  No headache, no sore throat  RESPIRATORY: No cough, wheezing, or shortness of breath  CARDIOVASCULAR: No chest pain, palpitations  GASTROINTESTINAL: No abd pain, nausea, vomiting, or diarrhea  GENITOURINARY: No dysuria, frequency, or hematuria  NEUROLOGICAL: no focal weakness or dizziness  MUSCULOSKELETAL: no myalgias     Vital Signs Last 24 Hrs  T(C): 36.7 (19 Dec 2021 04:56), Max: 36.7 (18 Dec 2021 19:39)  T(F): 98.1 (19 Dec 2021 04:56), Max: 98.1 (18 Dec 2021 19:39)  HR: 60 (19 Dec 2021 04:56) (60 - 73)  BP: 133/72 (19 Dec 2021 04:56) (118/63 - 133/72)  BP(mean): --  RR: 18 (19 Dec 2021 04:56) (17 - 18)  SpO2: 94% (19 Dec 2021 04:56) (93% - 94%)    PHYSICAL EXAM:  GENERAL: NAD  HEENT:  anicteric, moist mucous membranes  CHEST/LUNG:  CTA b/l, no rales, wheezes, or rhonchi  HEART:  RRR, S1, S2  ABDOMEN:  BS+, soft, nontender, nondistended  EXTREMITIES: no edema, cyanosis, or calf tenderness. L ankle wrapped in clean dressing  NERVOUS SYSTEM: answers questions and follows commands appropriately    LABS:                        12.8   8.66  )-----------( 264      ( 19 Dec 2021 07:46 )             37.5     CBC Full  -  ( 19 Dec 2021 07:46 )  WBC Count : 8.66 K/uL  Hemoglobin : 12.8 g/dL  Hematocrit : 37.5 %  Platelet Count - Automated : 264 K/uL  Mean Cell Volume : 89.9 fl  Mean Cell Hemoglobin : 30.7 pg  Mean Cell Hemoglobin Concentration : 34.1 gm/dL  Auto Neutrophil # : 4.57 K/uL  Auto Lymphocyte # : 3.15 K/uL  Auto Monocyte # : 0.57 K/uL  Auto Eosinophil # : 0.29 K/uL  Auto Basophil # : 0.04 K/uL  Auto Neutrophil % : 52.7 %  Auto Lymphocyte % : 36.4 %  Auto Monocyte % : 6.6 %  Auto Eosinophil % : 3.3 %  Auto Basophil % : 0.5 %    19 Dec 2021 07:46    143    |  108    |  12     ----------------------------<  115    3.8     |  29     |  0.74     Ca    9.0        19 Dec 2021 07:46  Phos  3.2       19 Dec 2021 07:46  Mg     2.1       19 Dec 2021 07:46          CAPILLARY BLOOD GLUCOSE            Culture - Tissue with Gram Stain (collected 12-16-21 @ 02:02)  Source: .Tissue Other, left fibula bone culture  Gram Stain (12-16-21 @ 03:53):    No polymorphonuclear leukocytes seen per low power field    No organisms seen per oil power field  Preliminary Report (12-16-21 @ 20:14):    No growth    Culture - Tissue with Gram Stain (collected 12-16-21 @ 02:02)  Source: .Tissue Other, left ankle soft tissue culture  Gram Stain (12-16-21 @ 03:54):    No polymorphonuclear leukocytes seen per low power field    No organisms seen per oil power field  Preliminary Report (12-17-21 @ 21:01):    Rare Corynebacterium species "Susceptibilities not performed"    Culture - Urine (collected 12-15-21 @ 00:43)  Source: Clean Catch Clean Catch (Midstream)  Final Report (12-15-21 @ 21:45):    No growth    Culture - Blood (collected 12-14-21 @ 15:07)  Source: .Blood Blood-Peripheral  Preliminary Report (12-15-21 @ 16:01):    No growth to date.    Culture - Blood (collected 12-14-21 @ 15:07)  Source: .Blood Blood-Peripheral  Preliminary Report (12-15-21 @ 16:01):    No growth to date.        RADIOLOGY & ADDITIONAL TESTS:    Personally reviewed.     Consultant(s) Notes Reviewed:  [x] YES  [ ] NO

## 2021-12-19 NOTE — PROGRESS NOTE ADULT - ATTENDING COMMENTS
Agree with above findings and plan
Agree with above findings and plan
56 year old female with no significant PMH who is S/P Left peroneal tendon repair 08/06/21, with infection and non closure of surgical site referred from wound care center for admission, Abx treatment and wound debridement.     Underwent L ankle surgical site/tendon debridement, fibular bone Bx and pulse lavage on 12/15 with podiatry Dr Quevedo. ID Dr Cueto following. On Zosyn. Awaiting path. Scheduled for PICC tomorrow for likely home IV Abx. Rest as above.
56 year old female with no significant PMH who is S/P Left peroneal tendon repair 08/06/21, with infection and non closure of surgical site referred from wound care center for admission, Abx treatment and wound debridement.     Underwent L ankle surgical site/tendon debridement, fibular bone Bx and pulse lavage on 12/15 with podiatry Dr Quevedo. ID Dr Cueto following. On Zosyn. Awaiting path. Scheduled for PICC Monday tentatively. Rest as above.
56 year old female with no significant PMH who is S/P Left peroneal tendon repair 08/06/21, with infection and non closure of surgical site referred from wound care center for admission, Abx treatment and wound debridement.     Underwent L ankle surgical site/tendon debridement, fibular bone Bx and pulse lavage on 12/15 with podiatry Dr Quevedo. ID Dr Cueto following. Awaiting path. Rest as above.
56 year old female with no significant PMH who is S/P Left peroneal tendon repair 08/06/21, with infection and non closure of surgical site referred from wound care center for admission, Abx treatment and wound debridement.     Underwent L ankle surgical site/tendon debridement, fibular bone Bx and pulse lavage today with podiatry Dr Quevedo. ID Dr Cueto following. Rest as above.

## 2021-12-19 NOTE — PROGRESS NOTE ADULT - PROBLEM SELECTOR PLAN 1
s/p Left ankle surgical site/tendon debridement, fibular bone biopsy and pulse lavage pm 12/15/21  - Patient is s/p left peroneal tendon repair with surgical site infection in August 21  - Patient presented from wound care center with Hx of surgical wound infection in left ankle for Abx treatment and wound debridement.  - MRI Left Ankle shows: Soft tissue ulcer overlying the lateral aspect of the ankle with   subjacent small abscess/phlegmon. Osseous edema with minimal T1 marrow signal abnormality within the lateral malleolus, which may represent osteitis or early osteomyelitis.  - S/P Vanc and Zosyn x 1 each in the ED, c/w zosyn at this time  - BCx- NGTD  - leukocytosis likely reactive, downtrending --> resolved  - ESR and CRP wnl  - D/w ID Dr. Cueto regarding PICC--> will be going tomorrow for PICC line insertion  - Podiatry following  - f/u surgical pathology  - PICC line for long-term IV abx

## 2021-12-20 ENCOUNTER — APPOINTMENT (OUTPATIENT)
Dept: HYPERBARIC MEDICINE | Facility: HOSPITAL | Age: 56
End: 2021-12-20

## 2021-12-20 ENCOUNTER — TRANSCRIPTION ENCOUNTER (OUTPATIENT)
Age: 56
End: 2021-12-20

## 2021-12-20 VITALS
TEMPERATURE: 98 F | RESPIRATION RATE: 18 BRPM | DIASTOLIC BLOOD PRESSURE: 75 MMHG | SYSTOLIC BLOOD PRESSURE: 124 MMHG | OXYGEN SATURATION: 93 % | HEART RATE: 69 BPM

## 2021-12-20 LAB
CULTURE RESULTS: SIGNIFICANT CHANGE UP
SPECIMEN SOURCE: SIGNIFICANT CHANGE UP

## 2021-12-20 PROCEDURE — 86900 BLOOD TYPING SEROLOGIC ABO: CPT

## 2021-12-20 PROCEDURE — 80048 BASIC METABOLIC PNL TOTAL CA: CPT

## 2021-12-20 PROCEDURE — 85025 COMPLETE CBC W/AUTO DIFF WBC: CPT

## 2021-12-20 PROCEDURE — 97116 GAIT TRAINING THERAPY: CPT

## 2021-12-20 PROCEDURE — 87070 CULTURE OTHR SPECIMN AEROBIC: CPT

## 2021-12-20 PROCEDURE — 36573 INSJ PICC RS&I 5 YR+: CPT

## 2021-12-20 PROCEDURE — 85730 THROMBOPLASTIN TIME PARTIAL: CPT

## 2021-12-20 PROCEDURE — 97110 THERAPEUTIC EXERCISES: CPT

## 2021-12-20 PROCEDURE — 86923 COMPATIBILITY TEST ELECTRIC: CPT

## 2021-12-20 PROCEDURE — 86803 HEPATITIS C AB TEST: CPT

## 2021-12-20 PROCEDURE — 80053 COMPREHEN METABOLIC PANEL: CPT

## 2021-12-20 PROCEDURE — 84100 ASSAY OF PHOSPHORUS: CPT

## 2021-12-20 PROCEDURE — 87086 URINE CULTURE/COLONY COUNT: CPT

## 2021-12-20 PROCEDURE — 88304 TISSUE EXAM BY PATHOLOGIST: CPT

## 2021-12-20 PROCEDURE — 99239 HOSP IP/OBS DSCHRG MGMT >30: CPT

## 2021-12-20 PROCEDURE — 87040 BLOOD CULTURE FOR BACTERIA: CPT

## 2021-12-20 PROCEDURE — 83605 ASSAY OF LACTIC ACID: CPT

## 2021-12-20 PROCEDURE — 81003 URINALYSIS AUTO W/O SCOPE: CPT

## 2021-12-20 PROCEDURE — 86140 C-REACTIVE PROTEIN: CPT

## 2021-12-20 PROCEDURE — 99024 POSTOP FOLLOW-UP VISIT: CPT

## 2021-12-20 PROCEDURE — 86901 BLOOD TYPING SEROLOGIC RH(D): CPT

## 2021-12-20 PROCEDURE — 36415 COLL VENOUS BLD VENIPUNCTURE: CPT

## 2021-12-20 PROCEDURE — 85610 PROTHROMBIN TIME: CPT

## 2021-12-20 PROCEDURE — 85652 RBC SED RATE AUTOMATED: CPT

## 2021-12-20 PROCEDURE — 73610 X-RAY EXAM OF ANKLE: CPT

## 2021-12-20 PROCEDURE — 87635 SARS-COV-2 COVID-19 AMP PRB: CPT

## 2021-12-20 PROCEDURE — 99285 EMERGENCY DEPT VISIT HI MDM: CPT | Mod: 25

## 2021-12-20 PROCEDURE — 76937 US GUIDE VASCULAR ACCESS: CPT

## 2021-12-20 PROCEDURE — 76937 US GUIDE VASCULAR ACCESS: CPT | Mod: 26

## 2021-12-20 PROCEDURE — 86850 RBC ANTIBODY SCREEN: CPT

## 2021-12-20 PROCEDURE — 83735 ASSAY OF MAGNESIUM: CPT

## 2021-12-20 PROCEDURE — 77001 FLUOROGUIDE FOR VEIN DEVICE: CPT

## 2021-12-20 PROCEDURE — 88311 DECALCIFY TISSUE: CPT

## 2021-12-20 PROCEDURE — 85027 COMPLETE CBC AUTOMATED: CPT

## 2021-12-20 PROCEDURE — 71045 X-RAY EXAM CHEST 1 VIEW: CPT

## 2021-12-20 PROCEDURE — 97162 PT EVAL MOD COMPLEX 30 MIN: CPT

## 2021-12-20 PROCEDURE — 97112 NEUROMUSCULAR REEDUCATION: CPT

## 2021-12-20 PROCEDURE — C1751: CPT

## 2021-12-20 PROCEDURE — 87075 CULTR BACTERIA EXCEPT BLOOD: CPT

## 2021-12-20 PROCEDURE — 93005 ELECTROCARDIOGRAM TRACING: CPT

## 2021-12-20 RX ORDER — CHLORHEXIDINE GLUCONATE 213 G/1000ML
1 SOLUTION TOPICAL
Refills: 0 | Status: DISCONTINUED | OUTPATIENT
Start: 2021-12-20 | End: 2021-12-20

## 2021-12-20 RX ORDER — SODIUM CHLORIDE 9 MG/ML
10 INJECTION INTRAMUSCULAR; INTRAVENOUS; SUBCUTANEOUS
Refills: 0 | Status: DISCONTINUED | OUTPATIENT
Start: 2021-12-20 | End: 2021-12-20

## 2021-12-20 RX ORDER — PIPERACILLIN AND TAZOBACTAM 4; .5 G/20ML; G/20ML
0 INJECTION, POWDER, LYOPHILIZED, FOR SOLUTION INTRAVENOUS
Qty: 0 | Refills: 0 | DISCHARGE

## 2021-12-20 RX ORDER — METFORMIN HYDROCHLORIDE 850 MG/1
1 TABLET ORAL
Qty: 0 | Refills: 0 | DISCHARGE

## 2021-12-20 RX ADMIN — PIPERACILLIN AND TAZOBACTAM 25 GRAM(S): 4; .5 INJECTION, POWDER, LYOPHILIZED, FOR SOLUTION INTRAVENOUS at 06:05

## 2021-12-20 RX ADMIN — ENOXAPARIN SODIUM 40 MILLIGRAM(S): 100 INJECTION SUBCUTANEOUS at 06:05

## 2021-12-20 RX ADMIN — PIPERACILLIN AND TAZOBACTAM 25 GRAM(S): 4; .5 INJECTION, POWDER, LYOPHILIZED, FOR SOLUTION INTRAVENOUS at 13:49

## 2021-12-20 RX ADMIN — Medication 1 TABLET(S): at 11:36

## 2021-12-20 NOTE — PROGRESS NOTE ADULT - PROBLEM SELECTOR PROBLEM 2
S/P peroneal tendon repair

## 2021-12-20 NOTE — PROGRESS NOTE ADULT - REASON FOR ADMISSION
Left Ankle infection

## 2021-12-20 NOTE — PROGRESS NOTE ADULT - PROBLEM SELECTOR PROBLEM 1
Surgical wound infection

## 2021-12-20 NOTE — PROGRESS NOTE ADULT - PROBLEM SELECTOR PLAN 1
s/p Left ankle surgical site/tendon debridement, fibular bone biopsy and pulse lavage pm 12/15/21  - Patient is s/p left peroneal tendon repair with surgical site infection in August 21  - Patient presented from wound care center with Hx of surgical wound infection in left ankle for Abx treatment and wound debridement.  - MRI Left Ankle: Soft tissue ulcer overlying the lateral aspect of the ankle with subjacent small abscess/phlegmon. Osseous edema with minimal T1 marrow signal abnormality within the lateral malleolus, which may represent osteitis or early osteomyelitis.  - On Zosyn  - BCx- NGTD  - leukocytosis likely reactive --> resolved  - ESR and CRP wnl  - s/p PICC line today  - D/w ID Dr. Cueto regarding IV Abx - stable to go home today for 4 weeks of Zosyn and may be discharged today prior to surgical path results, d/w CM - discharge with IV Zosyn via PICC, home WC services  - Podiatry following  - f/u surgical pathology

## 2021-12-20 NOTE — PROGRESS NOTE ADULT - PROBLEM SELECTOR PLAN 3
- Patient with morbid obesity  - BMI: 42.5  - Patient educated on diet and exercise.

## 2021-12-20 NOTE — PROGRESS NOTE ADULT - SUBJECTIVE AND OBJECTIVE BOX
LYLA BUENO is a 56yFemale , patient examined and chart reviewed.     INTERVAL HPI/ OVERNIGHT EVENTS:   NAD. Afebrile.  Sp PICC today.    PAST MEDICAL & SURGICAL HISTORY:  Obesity  COVID-19 december 2020 no hospitalization  Morbid obesity with BMI of 40.0-44.9, adult  Surgical wound infection  S/P tendon repair  S/P hysterectomy  2008  S/P left knee surgery  2019      For details regarding the patient's social history, family history, and other miscellaneous elements, please refer the initial infectious diseases consultation and/or the admitting history and physical examination for this admission.      ROS:  CONSTITUTIONAL:  Negative fever or chills  EYES:  Negative  blurry vision or double vision  CARDIOVASCULAR:  Negative for chest pain or palpitations  RESPIRATORY:  Negative for cough, wheezing, or SOB   GASTROINTESTINAL:  Negative for nausea, vomiting, diarrhea, constipation, or abdominal pain  GENITOURINARY:  Negative frequency, urgency or dysuria  NEUROLOGIC:  No headache, confusion, dizziness, lightheadedness  All other systems were reviewed and are negative         Current inpatient medications :    ANTIBIOTICS/RELEVANT:  piperacillin/tazobactam IVPB.. 3.375 Gram(s) IV Intermittent every 8 hours    MEDICATIONS  (STANDING):  chlorhexidine 4% Liquid 1 Application(s) Topical <User Schedule>  enoxaparin Injectable 40 milliGRAM(s) SubCutaneous two times a day  lactobacillus acidophilus 1 Tablet(s) Oral daily  senna 2 Tablet(s) Oral at bedtime  sodium chloride 0.9%. 1000 milliLiter(s) (60 mL/Hr) IV Continuous <Continuous>    MEDICATIONS  (PRN):  acetaminophen     Tablet .. 650 milliGRAM(s) Oral every 6 hours PRN Temp greater or equal to 38C (100.4F), Mild Pain (1 - 3)  aluminum hydroxide/magnesium hydroxide/simethicone Suspension 30 milliLiter(s) Oral every 4 hours PRN Dyspepsia  melatonin 3 milliGRAM(s) Oral at bedtime PRN Insomnia  ondansetron Injectable 4 milliGRAM(s) IV Push every 8 hours PRN Nausea and/or Vomiting  sodium chloride 0.9% lock flush 10 milliLiter(s) IV Push every 1 hour PRN Pre/post blood products, medications, blood draw, and to maintain line patency      Objective:  Vital Signs Last 24 Hrs  T(C): 36.9 (20 Dec 2021 12:24), Max: 36.9 (20 Dec 2021 12:24)  T(F): 98.4 (20 Dec 2021 12:24), Max: 98.4 (20 Dec 2021 12:24)  HR: 69 (20 Dec 2021 12:24) (60 - 76)  BP: 124/75 (20 Dec 2021 12:24) (117/68 - 127/64)  RR: 18 (20 Dec 2021 12:24) (17 - 18)  SpO2: 93% (20 Dec 2021 12:24) (93% - 98%)      Physical Exam:  General: no acute distress  Neck: supple, trachea midline  Lungs: clear, no wheeze/rhonchi  Cardiovascular: regular rate and rhythm, S1 S2  Abdomen: soft, nontender,  bowel sounds normal  Neurological: alert and oriented x3  Skin: no rash  Extremities:  Left foot drsg c/d/i      LABS:                        12.8   8.66  )-----------( 264      ( 19 Dec 2021 07:46 )             37.5   12-19    143  |  108  |  12  ----------------------------<  115<H>  3.8   |  29  |  0.74    Ca    9.0      19 Dec 2021 07:46  Phos  3.2     12-19  Mg     2.1     12-19      MICROBIOLOGY:  Culture - Tissue with Gram Stain (12.16.21 @ 02:02)    Gram Stain:   No polymorphonuclear leukocytes seen per low power field  No organisms seen per oil power field    Specimen Source: .Tissue Other, left fibula bone culture    Culture Results:   No growth at 5 days    Culture - Tissue with Gram Stain (12.16.21 @ 02:02)    Gram Stain:   No polymorphonuclear leukocytes seen per low power field  No organisms seen per oil power field    Specimen Source: .Tissue Other, left ankle soft tissue culture    Culture Results:   Rare Corynebacterium species "Susceptibilities not performed"    Culture - Urine (collected 15 Dec 2021 00:43)  Source: Clean Catch Clean Catch (Midstream)  Final Report (15 Dec 2021 21:45):    No growth    Culture - Blood (collected 14 Dec 2021 15:07)  Source: .Blood Blood-Peripheral  Preliminary Report (15 Dec 2021 16:01):    No growth to date.    Culture - Blood (collected 14 Dec 2021 15:07)  Source: .Blood Blood-Peripheral  Preliminary Report (15 Dec 2021 16:01):    No growth to date.    RADIOLOGY & ADDITIONAL STUDIES:  ACC: [id number] EXAM:  MR ANKLE WAW IC LT                          PROCEDURE DATE:  12/10/2021          INTERPRETATION:   History: Ankle ulcer, evaluate for osteomyelitis    Technique: Multiplanar and multisequence MRI images were obtained through   the left ankle without and with contrast. Approximately 10 cc intravenous   Gadavist was administered.    Comparison: Foot radiographs dated 11/10/2021    Findings:    There is a soft tissue ulcer overlying the lateral aspect of the ankle   with subjacent small abscess/phlegmon, measuring 1.9 x 1.1 cm   transaxially and up to 2.7 cm craniocaudal.    There is retro and inframalleolar tenosynovitis of the peroneal tendons.   There is susceptibility artifact tracking along the inframalleolar   peroneal tendons, which may be related to prior surgery or represent foci   of gas within the peroneal tenosynovial sheath from the overlying ulcer.   Tendinosis of the retromalleolar and inframalleolar peroneal tendons with   areas of irregular morphology,which may be postsurgical or be related to   partial tearing.    There is osseous edema with minimal T1 marrow signal abnormality within   the lateral malleolus, which may represent osteitis or early   osteomyelitis.    Mild chondral wear with subchondral cystic change at the second and third   tarsometatarsal joints.    Chronic scar remodeling of the anterior talofibular, posterior   talofibular, and calcaneofibular ligaments. Fluid with mild synovitis   along the anterolateral gutter, which canbe seen in setting of   anterolateral impingement.    Long flexor tendons are grossly intact. Long extensor tendons are grossly   intact. Moderate distal Achilles tendinosis. Plantar fasciitis noted.    Neurovascular structures are unremarkable.    Impression:    Soft tissue ulcer overlying the lateral aspect of the ankle with   subjacent small abscess/phlegmon.    Retro and inframalleolar tenosynovitis of the peroneal tendons, which may   represent an infectious tenosynovitis. Susceptibility artifact tracking   along the inframalleolar peroneal tendons, which may be related to prior   surgery or represent foci of gas within the peroneal tenosynovial sheath   from the overlying ulcer. Tendinosis of the retromalleolar and   inframalleolar peroneal tendons with areas of irregular morphology, which   may be postsurgical or be related to partial tearing.    Osseous edema with minimal T1 marrow signal abnormality within the   lateral malleolus, which may represent osteitis or early osteomyelitis.    Assessment :   57 y/o F with PMHx of obesity, COVID-19, repair of peroneal tendon of left ankle (8/6/21) complicated by wound dehisence with infection ( grew Staphylococcus pseudintermedius ) sp I&D 10/13/21 but per pt, wound has never healed and was [facility] now admitted sec infected wound with abscess.   MRI on 12/10 showing small abscess/phlegmon with  infectious tenosynovitis and osteitis or early osteomyelitis.   Sp I&D 12/15  OR cultures NTGTD  Path mild chronic osteo  Pt will benefit from IV antibiotic as she has had multiple courses of oral antibiotic outpt without resolution    Plan :   Cont Zosyn x 4 weeks till 1/11/22 ( [facility] )  Trend temps and cbc  Fu wound care closely outpt  Dc home    Continue with present regiment.  Appropriate use of antibiotics and adverse effects reviewed.    > 35 minutes were spent in direct patient care reviewing notes, medications ,labs data/ imaging , discussion with multidisciplinary team.    Thank you for allowing me to participate in care of your patient .    Mandy Cueto MD  Infectious Disease  [phone]

## 2021-12-20 NOTE — PROGRESS NOTE ADULT - ASSESSMENT
Patient is a 56 year old female with no significant PMH who is S/P Left peroneal tendon repair 08/06/21, with infection and non closure of surgical site referred from wound care center for admission, Abx treatment and wound debridement.   
s/p left ankle infected surgical site debridement and fibular biopsy (DOS: 12/15/21)
s/p left ankle infected surgical site debridement and fibular biopsy (DOS: 12/15/21) with Dr. Quevedo
s/p left ankle infected surgical site debridement and fibular biopsy (DOS: 12/15/21)
Patient is a 56 year old female with no significant PMH who is S/P Left peroneal tendon repair 08/06/21, with infection and non closure of surgical site referred from wound care center for admission, Abx treatment and wound debridement.   

## 2021-12-20 NOTE — PROGRESS NOTE ADULT - PROBLEM SELECTOR PLAN 2
- Patient is s/p left peroneal tendon repair with surgical site infection in August 21  - Underwent multiple round of Abx treatment, now admitted for wound debridement  - MRI left ankle shows possible osteitis or early osteomyelitis  - ID consulted, recs appreciated  - Podiatry on board
- Patient is s/p left peroneal tendon repair with surgical site infection in August 21  - Underwent multiple round of Abx treatment, now admitted for wound debridement  - MRI left ankle shows possible osteitis or early osteomyelitis  - ID consulted, recs appreciated  - Podiatry on board
- Patient is s/p left peroneal tendon repair with surgical site infection in August 21  - Underwent multiple round of Abx treatment, now being admitted for wound debridement  - MRI left ankle shows possible osteitis or early osteomyelitis  - ID consulted, will appreciate recs  - Podiatry on board
- Patient is s/p left peroneal tendon repair with surgical site infection in August 21  - Underwent multiple round of Abx treatment, now being admitted for wound debridement  - MRI left ankle shows possible osteitis or early osteomyelitis  - ID consulted, will appreciate recs  - Podiatry on board
- Patient is s/p left peroneal tendon repair with surgical site infection in August 21  - Underwent multiple round of Abx treatment, now admitted for wound debridement  - MRI left ankle shows possible osteitis or early osteomyelitis  - ID consulted, recs appreciated  - Podiatry on board
- Patient is s/p left peroneal tendon repair with surgical site infection in August 21  - Underwent multiple round of Abx treatment, now admitted for wound debridement  - MRI left ankle shows possible osteitis or early osteomyelitis  - ID consulted, recs appreciated  - Podiatry on board

## 2021-12-20 NOTE — DISCHARGE NOTE NURSING/CASE MANAGEMENT/SOCIAL WORK - NSDCPEFALRISK_GEN_ALL_CORE
For information on Fall & Injury Prevention, visit: [email or web address] OR  [email or web address] OR  https://www.cdc.gov/steadi/patient.html

## 2021-12-20 NOTE — PROGRESS NOTE ADULT - PROBLEM SELECTOR PLAN 1
s/p left ankle infected surgical site debridement and fibular biopsy POD #4  OR bone and soft tissue culture results as indicated above.  f/u OR fibular bone pathology.  d/w ID Dr. Cueto regarding IV Abx - stable to go home today for 4 weeks of Zosyn and may be discharged today prior to surgical path results, d/w CM - discharge with IV Zosyn via PICC, home WC services.  Applied wound vac today running continuously at 125mmHg.  Pt will be discharged with a home vac. Next change will be on Thurs 12/23/21.  Cont elevation to left LE.  Non weight bearing to the left LE with a rolling walker.  Stable for d/c from Podiatry standpoint.    WOUND VAC INSTRUCTIONS  1. Cleanse wound with sterile saline solution and gently pat dry.  2. Apply betadine periwound, with draping and black foam in the wound. DSD with ACE overlying. Wound vac to be set at 125mmHg continuous.  3. Change wound vac every 72 hrs and monitor for any signs of infection.  4. Patient is to follow up in [facility] with Dr. Augustin or Dr. Quevedo within 3-5 days upon discharge. Please call [phone] as soon as discharged.    ALTERNATE WOUND CARE INSTRUCTIONS IF WOUND VAC IS MALFUNCTIONING  1. Cleanse wound with sterile saline solution and gently pat dry.  2. Apply wet to dry dressing with ACE wrap.  3. Change every other day and monitor for any signs of infection until appointment. s/p left ankle infected surgical site debridement and fibular biopsy POD #4  OR bone and soft tissue culture results as indicated above.  f/u OR fibular bone pathology.  d/w ID Dr. Cueto regarding IV Abx - stable to go home today for 4 weeks of Zosyn and may be discharged today prior to surgical path results, d/w CM - discharge with IV Zosyn via PICC, home WC services.  Approx 50cc of drainage in the canister prior to change. Applied new wound vac running continuously at 125mmHg.  Pt will be discharged with a home vac. Next change will be on Thurs 12/23/21.  Cont elevation to left LE.  Non weight bearing to the left LE with a rolling walker.  Stable for d/c from Podiatry standpoint.    WOUND VAC INSTRUCTIONS  1. Cleanse wound with sterile saline solution and gently pat dry.  2. Apply betadine periwound, with draping and black foam in the wound. DSD with ACE overlying. Wound vac to be set at 125mmHg continuous.  3. Change wound vac every 72 hrs and monitor for any signs of infection.  4. Patient is to follow up in the [facility] with Dr. Augustin or Dr. Quevedo within 3-5 days upon discharge. Please call [phone] as soon as discharged.    ALTERNATE WOUND CARE INSTRUCTIONS IF WOUND VAC IS MALFUNCTIONING  1. Cleanse wound with sterile saline solution and gently pat dry.  2. Apply wet to dry dressing with ACE wrap.  3. Change every other day and monitor for any signs of infection until appointment.

## 2021-12-20 NOTE — PROGRESS NOTE ADULT - SUBJECTIVE AND OBJECTIVE BOX
HPI:  55 y/o F pt seen bedside s/p left ankle infected surgical site debridement and fibular biopsy (DOS: 12/15/21). She states that she has minimal pain in the ankle and has been elevating it as instructed. Was seen by Physical Therapy to be non weight bearing with a rolling walker. Wound vac in place. She denies nausea, vomiting, fever, chills, SOB, chest pain and calf pain.    Patient is a 56 year old female with no significant PMH who is S/P Left peroneal tendon repair 08/06/21, with infection and non closure of surgical site referred from wound care center for admission, abx treatment and wound debridement scheduled for 12/15/21. Patient informs that she has been treated with multiple round of Abx including Linezolid x 3 since August. An incision and drainage was performed in the [facility] last week, however pt needs OR debridement. Denies any fever, chills, nausea, vomiting, chest pain, shortness of breath, or calf pain at this time.       ED course:   Vitals:   BP: 142/86  HR:67  Temp:97.5  RR: 16, O2: 98% on RA   Labs:   CBC: WBC: 8.26  Hb:13.5 , Platlets: 300   CMP:  Lytes: WNL,  BUN/Creatinine: 9/0.73 , Glucose: 113     CXR: No abnormality detected (self read).   MRI Left Ankle: Soft tissue ulcer overlying the lateral aspect of the ankle with   subjacent small abscess/phlegmon. Retro and inframalleolar tenosynovitis of the peroneal tendons, which may represent an infectious tenosynovitis. Susceptibility artifact tracking   along the inframalleolar peroneal tendons, which may be related to prior surgery or represent foci of gas within the peroneal tenosynovial sheath from the overlying ulcer. Tendinosis of the retromalleolar and inframalleolar peroneal tendons with areas of irregular morphology, which may be postsurgical or be related to partial tearing. Osseous edema with minimal T1 marrow signal abnormality within the lateral malleolus, which may represent osteitis or early osteomyelitis.  EKG:  NSR with incomplete RBBB,   HR 63,  QT/QTc: 450/460  Patient received: Zosyn 3.375 G x 1, Vanco 1G X 1, 1600 ML X 1   (14 Dec 2021 12:11)    PAST MEDICAL & SURGICAL HISTORY:  Obesity    COVID-19 december 2020 no hospitalization    Morbid obesity with BMI of 40.0-44.9, adult    Surgical wound infection    S/P tendon repair    S/P hysterectomy  2008    S/P left knee surgery  2019        Allergies    No Known Allergies    Intolerances    MEDICATIONS  (STANDING):  chlorhexidine 4% Liquid 1 Application(s) Topical <User Schedule>  enoxaparin Injectable 40 milliGRAM(s) SubCutaneous two times a day  lactobacillus acidophilus 1 Tablet(s) Oral daily  piperacillin/tazobactam IVPB.. 3.375 Gram(s) IV Intermittent every 8 hours  senna 2 Tablet(s) Oral at bedtime  sodium chloride 0.9%. 1000 milliLiter(s) (60 mL/Hr) IV Continuous <Continuous>    MEDICATIONS  (PRN):  acetaminophen     Tablet .. 650 milliGRAM(s) Oral every 6 hours PRN Temp greater or equal to 38C (100.4F), Mild Pain (1 - 3)  aluminum hydroxide/magnesium hydroxide/simethicone Suspension 30 milliLiter(s) Oral every 4 hours PRN Dyspepsia  melatonin 3 milliGRAM(s) Oral at bedtime PRN Insomnia  ondansetron Injectable 4 milliGRAM(s) IV Push every 8 hours PRN Nausea and/or Vomiting  sodium chloride 0.9% lock flush 10 milliLiter(s) IV Push every 1 hour PRN Pre/post blood products, medications, blood draw, and to maintain line patency    Social History:  Tobacco: No  EtOH: Social  recreational Drug use: Never  Lives with: By herself  Ambulates: Independently  ADLs: No issues   Occupation:  Vaccinations: COVID Pfizer X 2 (14 Dec 2021 12:11)      FAMILY HISTORY:  FH: heart disease (Father)    Vital Signs Last 24 Hrs  T(C): 36.7 (20 Dec 2021 06:02), Max: 36.7 (19 Dec 2021 14:00)  T(F): 98.1 (20 Dec 2021 06:02), Max: 98.1 (20 Dec 2021 06:02)  HR: 60 (20 Dec 2021 06:02) (60 - 76)  BP: 127/64 (20 Dec 2021 06:02) (117/68 - 127/64)  BP(mean): --  RR: 17 (20 Dec 2021 06:02) (17 - 17)  SpO2: 98% (20 Dec 2021 06:02) (93% - 98%)    PHYSICAL EXAM:  Vascular: DP & PT palpable bilaterally, Capillary refill 3 seconds, Digital hair present bilaterally, left vel-malleolar non-pitting edema.  Neurological: Light touch sensation intact bilaterally.  Musculoskeletal: 5/5 strength in all quadrants bilaterally, AJ & STJ ROM intact.  Dermatological: Approx 5cm x 2cm x 0.5cm debrided surgical site with exposed peroneal tendons. Granular base. Sanguinous drainage. No purulence. No erythema. No malodor. Sutures intact at distal aspect of the incision. No dehiscence.    CBC Full  -  ( 19 Dec 2021 07:46 )  WBC Count : 8.66 K/uL  RBC Count : 4.17 M/uL  Hemoglobin : 12.8 g/dL  Hematocrit : 37.5 %  Platelet Count - Automated : 264 K/uL  Mean Cell Volume : 89.9 fl  Mean Cell Hemoglobin : 30.7 pg  Mean Cell Hemoglobin Concentration : 34.1 gm/dL  Auto Neutrophil # : 4.57 K/uL  Auto Lymphocyte # : 3.15 K/uL  Auto Monocyte # : 0.57 K/uL  Auto Eosinophil # : 0.29 K/uL  Auto Basophil # : 0.04 K/uL  Auto Neutrophil % : 52.7 %  Auto Lymphocyte % : 36.4 %  Auto Monocyte % : 6.6 %  Auto Eosinophil % : 3.3 %  Auto Basophil % : 0.5 %    12-19    143  |  108  |  12  ----------------------------<  115<H>  3.8   |  29  |  0.74    Ca    9.0      19 Dec 2021 07:46  Phos  3.2     12-19  Mg     2.1     12-19    Culture - Tissue with Gram Stain (12.16.21 @ 02:02)    Gram Stain:   No polymorphonuclear leukocytes seen per low power field  No organisms seen per oil power field    Specimen Source: .Tissue Other, left fibula bone culture    Culture Results:   No growth    Culture - Tissue with Gram Stain (12.16.21 @ 02:02)    Gram Stain:   No polymorphonuclear leukocytes seen per low power field  No organisms seen per oil power field    Specimen Source: .Tissue Other, left ankle soft tissue culture    Culture Results:   Rare Corynebacterium species "Susceptibilities not performed" HPI:  57 y/o F pt seen bedside s/p left ankle infected surgical site debridement and fibular biopsy (DOS: 12/15/21). She states that she has minimal pain in the ankle and has been elevating it as instructed. Was seen by Physical Therapy to be non weight bearing with a rolling walker. Wound vac in place. She denies nausea, vomiting, fever, chills, SOB, chest pain and calf pain.    Patient is a 56 year old female with no significant PMH who is S/P Left peroneal tendon repair 08/06/21, with infection and non closure of surgical site referred from wound care center for admission, abx treatment and wound debridement scheduled for 12/15/21. Patient informs that she has been treated with multiple round of Abx including Linezolid x 3 since August. An incision and drainage was performed in the Wound Care Center last week, however pt needs OR debridement. Denies any fever, chills, nausea, vomiting, chest pain, shortness of breath, or calf pain at this time.       ED course:   Vitals:   BP: 142/86  HR:67  Temp:97.5  RR: 16, O2: 98% on RA   Labs:   CBC: WBC: 8.26  Hb:13.5 , Platlets: 300   CMP:  Lytes: WNL,  BUN/Creatinine: 9/0.73 , Glucose: 113     CXR: No abnormality detected (self read).   MRI Left Ankle: Soft tissue ulcer overlying the lateral aspect of the ankle with   subjacent small abscess/phlegmon. Retro and inframalleolar tenosynovitis of the peroneal tendons, which may represent an infectious tenosynovitis. Susceptibility artifact tracking   along the inframalleolar peroneal tendons, which may be related to prior surgery or represent foci of gas within the peroneal tenosynovial sheath from the overlying ulcer. Tendinosis of the retromalleolar and inframalleolar peroneal tendons with areas of irregular morphology, which may be postsurgical or be related to partial tearing. Osseous edema with minimal T1 marrow signal abnormality within the lateral malleolus, which may represent osteitis or early osteomyelitis.  EKG:  NSR with incomplete RBBB,   HR 63,  QT/QTc: 450/460  Patient received: Zosyn 3.375 G x 1, Vanco 1G X 1, 1600 ML X 1   (14 Dec 2021 12:11)    PAST MEDICAL & SURGICAL HISTORY:  Obesity    COVID-19 december 2020 no hospitalization    Morbid obesity with BMI of 40.0-44.9, adult    Surgical wound infection    S/P tendon repair    S/P hysterectomy  2008    S/P left knee surgery  2019        Allergies    No Known Allergies    Intolerances    MEDICATIONS  (STANDING):  chlorhexidine 4% Liquid 1 Application(s) Topical <User Schedule>  enoxaparin Injectable 40 milliGRAM(s) SubCutaneous two times a day  lactobacillus acidophilus 1 Tablet(s) Oral daily  piperacillin/tazobactam IVPB.. 3.375 Gram(s) IV Intermittent every 8 hours  senna 2 Tablet(s) Oral at bedtime  sodium chloride 0.9%. 1000 milliLiter(s) (60 mL/Hr) IV Continuous <Continuous>    MEDICATIONS  (PRN):  acetaminophen     Tablet .. 650 milliGRAM(s) Oral every 6 hours PRN Temp greater or equal to 38C (100.4F), Mild Pain (1 - 3)  aluminum hydroxide/magnesium hydroxide/simethicone Suspension 30 milliLiter(s) Oral every 4 hours PRN Dyspepsia  melatonin 3 milliGRAM(s) Oral at bedtime PRN Insomnia  ondansetron Injectable 4 milliGRAM(s) IV Push every 8 hours PRN Nausea and/or Vomiting  sodium chloride 0.9% lock flush 10 milliLiter(s) IV Push every 1 hour PRN Pre/post blood products, medications, blood draw, and to maintain line patency    Social History:  Tobacco: No  EtOH: Social  recreational Drug use: Never  Lives with: By herself  Ambulates: Independently  ADLs: No issues   Occupation:  Vaccinations: COVID Pfizer X 2 (14 Dec 2021 12:11)      FAMILY HISTORY:  FH: heart disease (Father)    Vital Signs Last 24 Hrs  T(C): 36.7 (20 Dec 2021 06:02), Max: 36.7 (19 Dec 2021 14:00)  T(F): 98.1 (20 Dec 2021 06:02), Max: 98.1 (20 Dec 2021 06:02)  HR: 60 (20 Dec 2021 06:02) (60 - 76)  BP: 127/64 (20 Dec 2021 06:02) (117/68 - 127/64)  BP(mean): --  RR: 17 (20 Dec 2021 06:02) (17 - 17)  SpO2: 98% (20 Dec 2021 06:02) (93% - 98%)    PHYSICAL EXAM:  Vascular: DP & PT palpable bilaterally, Capillary refill 3 seconds, Digital hair present bilaterally, left vel-malleolar non-pitting edema.  Neurological: Light touch sensation intact bilaterally.  Musculoskeletal: 5/5 strength in all quadrants bilaterally, AJ & STJ ROM intact.  Dermatological: Approx 5cm x 2cm x 0.5cm debrided surgical site with exposed peroneal tendons. Granular base. Mildly maceration periwound. Sanguinous drainage. No purulence. No erythema. No malodor. Sutures intact at distal aspect of the incision. No dehiscence.    CBC Full  -  ( 19 Dec 2021 07:46 )  WBC Count : 8.66 K/uL  RBC Count : 4.17 M/uL  Hemoglobin : 12.8 g/dL  Hematocrit : 37.5 %  Platelet Count - Automated : 264 K/uL  Mean Cell Volume : 89.9 fl  Mean Cell Hemoglobin : 30.7 pg  Mean Cell Hemoglobin Concentration : 34.1 gm/dL  Auto Neutrophil # : 4.57 K/uL  Auto Lymphocyte # : 3.15 K/uL  Auto Monocyte # : 0.57 K/uL  Auto Eosinophil # : 0.29 K/uL  Auto Basophil # : 0.04 K/uL  Auto Neutrophil % : 52.7 %  Auto Lymphocyte % : 36.4 %  Auto Monocyte % : 6.6 %  Auto Eosinophil % : 3.3 %  Auto Basophil % : 0.5 %    12-19    143  |  108  |  12  ----------------------------<  115<H>  3.8   |  29  |  0.74    Ca    9.0      19 Dec 2021 07:46  Phos  3.2     12-19  Mg     2.1     12-19    Culture - Tissue with Gram Stain (12.16.21 @ 02:02)    Gram Stain:   No polymorphonuclear leukocytes seen per low power field  No organisms seen per oil power field    Specimen Source: .Tissue Other, left fibula bone culture    Culture Results:   No growth    Culture - Tissue with Gram Stain (12.16.21 @ 02:02)    Gram Stain:   No polymorphonuclear leukocytes seen per low power field  No organisms seen per oil power field    Specimen Source: .Tissue Other, left ankle soft tissue culture    Culture Results:   Rare Corynebacterium species "Susceptibilities not performed"

## 2021-12-20 NOTE — DISCHARGE NOTE NURSING/CASE MANAGEMENT/SOCIAL WORK - PATIENT PORTAL LINK FT
You can access the FollowMyHealth Patient Portal offered by [facility] by registering at the following website: [email or web address]. By joining [facility]’s FollowMyHealth portal, you will also be able to view your health information using other applications (apps) compatible with our system.

## 2021-12-20 NOTE — PROGRESS NOTE ADULT - SUBJECTIVE AND OBJECTIVE BOX
Patient is a 56y old  Female who presents with a chief complaint of Left Ankle infection (19 Dec 2021 11:02)    INTERVAL HPI/OVERNIGHT EVENTS:  Patient was seen and examined at bedside. No acute events overnight. She is feeling well, would like to go home when able. No complaints. Has not had BM.     MEDICATIONS  (STANDING):  chlorhexidine 4% Liquid 1 Application(s) Topical <User Schedule>  enoxaparin Injectable 40 milliGRAM(s) SubCutaneous two times a day  lactobacillus acidophilus 1 Tablet(s) Oral daily  piperacillin/tazobactam IVPB.. 3.375 Gram(s) IV Intermittent every 8 hours  senna 2 Tablet(s) Oral at bedtime  sodium chloride 0.9%. 1000 milliLiter(s) (60 mL/Hr) IV Continuous <Continuous>    MEDICATIONS  (PRN):  acetaminophen     Tablet .. 650 milliGRAM(s) Oral every 6 hours PRN Temp greater or equal to 38C (100.4F), Mild Pain (1 - 3)  aluminum hydroxide/magnesium hydroxide/simethicone Suspension 30 milliLiter(s) Oral every 4 hours PRN Dyspepsia  melatonin 3 milliGRAM(s) Oral at bedtime PRN Insomnia  ondansetron Injectable 4 milliGRAM(s) IV Push every 8 hours PRN Nausea and/or Vomiting  sodium chloride 0.9% lock flush 10 milliLiter(s) IV Push every 1 hour PRN Pre/post blood products, medications, blood draw, and to maintain line patency      Allergies    No Known Allergies    Intolerances      REVIEW OF SYSTEMS:  CONSTITUTIONAL: No fever or chills  HEENT: No headache, no sore throat  RESPIRATORY: No cough, wheezing, or shortness of breath  CARDIOVASCULAR: No chest pain, palpitations  GASTROINTESTINAL: No abd pain, nausea, vomiting, or diarrhea  GENITOURINARY: No dysuria, frequency, or hematuria  NEUROLOGICAL: No focal weakness or dizziness  MUSCULOSKELETAL: No myalgias     PHYSICAL EXAM:  GENERAL: NAD  HEENT:  anicteric, moist mucous membranes  CHEST/LUNG:  CTA b/l, no rales, wheezes, or rhonchi  HEART:  RRR, S1, S2  ABDOMEN:  BS+, soft, nontender, nondistended  EXTREMITIES: no edema, cyanosis, or calf tenderness. L ankle wrapped in clean dressing  NERVOUS SYSTEM: answers questions and follows commands appropriately    Vital Signs Last 24 Hrs  T(C): 36.7 (20 Dec 2021 06:02), Max: 36.7 (19 Dec 2021 14:00)  T(F): 98.1 (20 Dec 2021 06:02), Max: 98.1 (20 Dec 2021 06:02)  HR: 60 (20 Dec 2021 06:02) (60 - 76)  BP: 127/64 (20 Dec 2021 06:02) (117/68 - 127/64)  BP(mean): --  RR: 17 (20 Dec 2021 06:02) (17 - 17)  SpO2: 98% (20 Dec 2021 06:02) (93% - 98%)    LABS:    CBC Full  -  ( 19 Dec 2021 07:46 )  WBC Count : 8.66 K/uL  Hemoglobin : 12.8 g/dL  Hematocrit : 37.5 %  Platelet Count - Automated : 264 K/uL  Mean Cell Volume : 89.9 fl  Mean Cell Hemoglobin : 30.7 pg  Mean Cell Hemoglobin Concentration : 34.1 gm/dL  Auto Neutrophil # : 4.57 K/uL  Auto Lymphocyte # : 3.15 K/uL  Auto Monocyte # : 0.57 K/uL  Auto Eosinophil # : 0.29 K/uL  Auto Basophil # : 0.04 K/uL  Auto Neutrophil % : 52.7 %  Auto Lymphocyte % : 36.4 %  Auto Monocyte % : 6.6 %  Auto Eosinophil % : 3.3 %  Auto Basophil % : 0.5 %      Ca    9.0        19 Dec 2021 07:46          CAPILLARY BLOOD GLUCOSE            Culture - Tissue with Gram Stain (collected 12-16-21 @ 02:02)  Source: .Tissue Other, left fibula bone culture  Gram Stain (12-16-21 @ 03:53):    No polymorphonuclear leukocytes seen per low power field    No organisms seen per oil power field  Preliminary Report (12-16-21 @ 20:14):    No growth    Culture - Tissue with Gram Stain (collected 12-16-21 @ 02:02)  Source: .Tissue Other, left ankle soft tissue culture  Gram Stain (12-16-21 @ 03:54):    No polymorphonuclear leukocytes seen per low power field    No organisms seen per oil power field  Preliminary Report (12-17-21 @ 21:01):    Rare Corynebacterium species "Susceptibilities not performed"    Culture - Urine (collected 12-15-21 @ 00:43)  Source: Clean Catch Clean Catch (Midstream)  Final Report (12-15-21 @ 21:45):    No growth    Culture - Blood (collected 12-14-21 @ 15:07)  Source: .Blood Blood-Peripheral  Final Report (12-19-21 @ 16:00):    No Growth Final    Culture - Blood (collected 12-14-21 @ 15:07)  Source: .Blood Blood-Peripheral  Final Report (12-19-21 @ 16:00):    No Growth Final        RADIOLOGY & ADDITIONAL TESTS:    Personally reviewed.     Consultant(s) Notes Reviewed:  [x] YES  [ ] NO

## 2021-12-20 NOTE — PROGRESS NOTE ADULT - PROVIDER SPECIALTY LIST ADULT
Infectious Disease
Anesthesia
Hospitalist
Hospitalist
Infectious Disease
Hospitalist
Podiatry
Hospitalist

## 2021-12-20 NOTE — PROGRESS NOTE ADULT - PROBLEM SELECTOR PROBLEM 3
Obesity, morbid, BMI 40.0-49.9

## 2021-12-20 NOTE — PROGRESS NOTE ADULT - PROBLEM SELECTOR PLAN 5
IMPROVE VTE Individual Risk Assessment          RISK                                                          Points  [  ] Previous VTE                                                3  [  ] Thrombophilia                                             2  [  ] Lower limb paralysis                                   2        (unable to hold up >15 seconds)    [  ] Current Cancer                                             2         (within 6 months)  [  ] Immobilization > 24 hrs                              1  [  ] ICU/CCU stay > 24 hours                             1  [  ] Age > 60                                                         1    IMPROVE VTE Score: 0  Lovenox 40 qd
Lovenox 40 qd
IMPROVE VTE Individual Risk Assessment          RISK                                                          Points  [  ] Previous VTE                                                3  [  ] Thrombophilia                                             2  [  ] Lower limb paralysis                                   2        (unable to hold up >15 seconds)    [  ] Current Cancer                                             2         (within 6 months)  [  ] Immobilization > 24 hrs                              1  [  ] ICU/CCU stay > 24 hours                             1  [  ] Age > 60                                                         1    IMPROVE VTE Score: 0  Lovenox 40 qd

## 2021-12-21 ENCOUNTER — APPOINTMENT (OUTPATIENT)
Dept: HYPERBARIC MEDICINE | Facility: HOSPITAL | Age: 56
End: 2021-12-21

## 2021-12-22 ENCOUNTER — APPOINTMENT (OUTPATIENT)
Dept: HYPERBARIC MEDICINE | Facility: HOSPITAL | Age: 56
End: 2021-12-22

## 2021-12-23 ENCOUNTER — APPOINTMENT (OUTPATIENT)
Dept: HYPERBARIC MEDICINE | Facility: HOSPITAL | Age: 56
End: 2021-12-23

## 2021-12-26 ENCOUNTER — NON-APPOINTMENT (OUTPATIENT)
Age: 56
End: 2021-12-26

## 2021-12-27 ENCOUNTER — APPOINTMENT (OUTPATIENT)
Dept: HYPERBARIC MEDICINE | Facility: HOSPITAL | Age: 56
End: 2021-12-27

## 2021-12-27 ENCOUNTER — OUTPATIENT (OUTPATIENT)
Dept: OUTPATIENT SERVICES | Facility: HOSPITAL | Age: 56
LOS: 1 days | Discharge: HOME | End: 2021-12-27
Payer: COMMERCIAL

## 2021-12-27 ENCOUNTER — APPOINTMENT (OUTPATIENT)
Dept: OTOLARYNGOLOGY | Facility: CLINIC | Age: 56
End: 2021-12-27
Payer: COMMERCIAL

## 2021-12-27 ENCOUNTER — APPOINTMENT (OUTPATIENT)
Dept: HYPERBARIC MEDICINE | Facility: HOSPITAL | Age: 56
End: 2021-12-27
Payer: COMMERCIAL

## 2021-12-27 VITALS
DIASTOLIC BLOOD PRESSURE: 71 MMHG | BODY MASS INDEX: 43.41 KG/M2 | TEMPERATURE: 97.2 F | HEART RATE: 82 BPM | WEIGHT: 245 LBS | RESPIRATION RATE: 20 BRPM | OXYGEN SATURATION: 98 % | HEIGHT: 63 IN | SYSTOLIC BLOOD PRESSURE: 128 MMHG

## 2021-12-27 VITALS
SYSTOLIC BLOOD PRESSURE: 127 MMHG | BODY MASS INDEX: 43.41 KG/M2 | WEIGHT: 245 LBS | DIASTOLIC BLOOD PRESSURE: 82 MMHG | HEIGHT: 63 IN | HEART RATE: 72 BPM

## 2021-12-27 DIAGNOSIS — Z90.710 ACQUIRED ABSENCE OF BOTH CERVIX AND UTERUS: Chronic | ICD-10-CM

## 2021-12-27 DIAGNOSIS — E11.622 TYPE 2 DIABETES MELLITUS WITH OTHER SKIN ULCER: ICD-10-CM

## 2021-12-27 DIAGNOSIS — Z98.890 OTHER SPECIFIED POSTPROCEDURAL STATES: Chronic | ICD-10-CM

## 2021-12-27 DIAGNOSIS — H90.0 CONDUCTIVE HEARING LOSS, BILATERAL: ICD-10-CM

## 2021-12-27 LAB — SARS-COV-2 RNA SPEC QL NAA+PROBE: SIGNIFICANT CHANGE UP

## 2021-12-27 PROCEDURE — 92557 COMPREHENSIVE HEARING TEST: CPT

## 2021-12-27 PROCEDURE — 92567 TYMPANOMETRY: CPT

## 2021-12-27 PROCEDURE — U0003: CPT

## 2021-12-27 PROCEDURE — 99024 POSTOP FOLLOW-UP VISIT: CPT

## 2021-12-27 PROCEDURE — G0463: CPT

## 2021-12-27 PROCEDURE — U0005: CPT

## 2021-12-27 RX ORDER — DOXYCYCLINE HYCLATE 100 MG/1
100 CAPSULE ORAL
Qty: 20 | Refills: 0 | Status: DISCONTINUED | COMMUNITY
Start: 2021-08-31

## 2021-12-27 RX ORDER — NYSTATIN 100000 [USP'U]/ML
100000 SUSPENSION ORAL
Qty: 200 | Refills: 0 | Status: DISCONTINUED | COMMUNITY
Start: 2021-09-17

## 2021-12-27 RX ORDER — COLLAGENASE SANTYL 250 [ARB'U]/G
250 OINTMENT TOPICAL
Qty: 30 | Refills: 0 | Status: DISCONTINUED | COMMUNITY
Start: 2021-08-31

## 2021-12-27 RX ORDER — ASPIRIN 325 MG/1
325 TABLET, COATED ORAL
Qty: 28 | Refills: 0 | Status: DISCONTINUED | COMMUNITY
Start: 2021-10-18

## 2021-12-27 RX ORDER — OMEPRAZOLE 20 MG/1
20 CAPSULE, DELAYED RELEASE ORAL
Qty: 30 | Refills: 0 | Status: DISCONTINUED | COMMUNITY
Start: 2021-10-18

## 2021-12-27 RX ORDER — METFORMIN ER 500 MG 500 MG/1
500 TABLET ORAL
Qty: 90 | Refills: 0 | Status: DISCONTINUED | COMMUNITY
Start: 2021-11-13

## 2021-12-27 RX ORDER — SULFAMETHOXAZOLE AND TRIMETHOPRIM 800; 160 MG/1; MG/1
800-160 TABLET ORAL
Qty: 20 | Refills: 0 | Status: DISCONTINUED | COMMUNITY
Start: 2021-08-17

## 2021-12-27 RX ORDER — AMOXICILLIN AND CLAVULANATE POTASSIUM 500; 125 MG/1; 1/1
TABLET, FILM COATED ORAL
Refills: 0 | Status: DISCONTINUED | COMMUNITY
End: 2021-12-27

## 2021-12-27 RX ORDER — LINEZOLID 600 MG/1
600 TABLET, FILM COATED ORAL
Qty: 20 | Refills: 0 | Status: DISCONTINUED | COMMUNITY
Start: 2021-11-11

## 2021-12-27 RX ORDER — CEFADROXIL 500 MG/1
500 CAPSULE ORAL
Qty: 6 | Refills: 0 | Status: DISCONTINUED | COMMUNITY
Start: 2021-08-06

## 2021-12-27 RX ORDER — HYDROCODONE BITARTRATE AND ACETAMINOPHEN 5; 325 MG/1; MG/1
5-325 TABLET ORAL
Qty: 28 | Refills: 0 | Status: DISCONTINUED | COMMUNITY
Start: 2021-08-06

## 2021-12-27 RX ORDER — OXYCODONE 5 MG/1
5 TABLET ORAL
Qty: 30 | Refills: 0 | Status: DISCONTINUED | COMMUNITY
Start: 2021-10-18

## 2021-12-27 RX ORDER — AMOXICILLIN AND CLAVULANATE POTASSIUM 875; 125 MG/1; MG/1
875-125 TABLET, COATED ORAL
Qty: 14 | Refills: 0 | Status: DISCONTINUED | COMMUNITY
Start: 2021-12-07

## 2021-12-27 NOTE — PLAN
[FreeTextEntry1] : Patient examined and evaluated at this time.\par Will continue HBOT at this time.\par Continue PICC as per infectious disease.\par Patient is at risk for infection, sepsis, limb loss, and death.\par Spent 20 minutes for patient care and medical decision making.\par Patient to follow up in 1 week.\par

## 2021-12-27 NOTE — HISTORY OF PRESENT ILLNESS
[FreeTextEntry1] : Pt seen s/p left ankle ulcer debridement, currently on PICC line for osteomyelitis. Pt currently on a wound VAC. Denies any other complaints at this time.

## 2021-12-27 NOTE — ASSESSMENT
[Verbal] : Verbal [Written] : Written [Patient] : Patient [Good - alert, interested, motivated] : Good - alert, interested, motivated [Demonstrates independently] : demonstrates independently [Dressing changes] : dressing changes [Foot Care] : foot care [Signs and symptoms of infection] : sign and symptoms of infection [Nutrition] : nutrition [How and When to Call] : how and when to call [Off-loading] : off-loading [Compression Therapy] : compression therapy [Home Health] : home health [Patient responsibility to plan of care] : patient responsibility to plan of care [Glycemic Control] : glycemic control [] : Yes [Stable] : stable [Home] : Home [Ambulatory] : Ambulatory [Not Applicable - Long Term Care/Home Health Agency] : Long Term Care/Home Health Agency: Not Applicable [FreeTextEntry2] : MRI\par Compression Therapy \par Localized Wound Care \par HBOT\par Covid 19 swab\par F/U daily for HBOT\par Infection Prevention \par Restore Optimal Skin Integrity  [FreeTextEntry3] : \par  [FreeTextEntry4] : Patient has completed 5/30 HBOT\par Patient discharged from the hospital on 12/20/21 after surgery.  \par Patient is currently IV antibiotics- Zosyn, medication list reconciled.\par DPM recommended patient resume HBOT, Covid swab performed today\par Resume NPWT on 12/29/21\par F/U daily for HBOT

## 2021-12-27 NOTE — ASSESSMENT
[FreeTextEntry1] : stable s/p BMT\par \par  WNL w sl CHL @ 250HHz, w large volume tymps AU\par d/c ciprodex\par H2O precautions\par f/u 3-4 months\par \par \par cleared from ENT point of view to resume hyperbaric tx

## 2021-12-27 NOTE — VITALS
[Pain related to present condition?] : The patient's  pain is not related to present condition. [] : No [de-identified] : 0/10

## 2021-12-27 NOTE — REVIEW OF SYSTEMS
[Fever] : no fever [Chills] : no chills [Eye Pain] : no eye pain [Loss Of Hearing] : no hearing loss [Abdominal Pain] : no abdominal pain [Vomiting] : no vomiting [Joint Stiffness] : joint stiffness [Skin Wound] : skin wound [Anxiety] : no anxiety [Negative] : Heme/Lymph [de-identified] : left lateral ankle ulcer down to skin, subcutaneous tissue, fat, and bone (necrotic) [de-identified] : Possible prediabetic , elevated HgA1c and blood glucose , BMI 43 ^, patient now taking Metformin

## 2021-12-27 NOTE — DATA REVIEWED
[de-identified] : HX: PE TUBES AU\par -TYMPS: LARGE ECV (7.7) AD, CNS AS\par -SLIGHT CHL  HZ, HEARING -8000 HZ AU (AS POORER THAN AD 6K-8KHZ) \par RECS: 1) ENT F/U 2)RE-EVAL AS PER MD IN CONJUNCTION WITH MEDICAL MANAGEMENT

## 2021-12-27 NOTE — PHYSICAL EXAM
[4 x 4] : 4 x 4  [1+] : left 1+ [Ankle Swelling (On Exam)] : present [Ankle Swelling Bilaterally] : bilaterally  [Ankle Swelling On The Left] : moderate [Varicose Veins Of Lower Extremities] : bilaterally [Purpura] : no purpura  [Petechiae] : no petechiae [Skin Ulcer] : no ulcer [Alert] : alert [Oriented to Person] : oriented to person [Oriented to Place] : oriented to place [Oriented to Time] : oriented to time [Calm] : calm [de-identified] : A&Ox3, NAD [de-identified] : BMI 43 [de-identified] : s/p left peroneal tendon repair, 5/5 strength in all quadrants bilaterally [de-identified] : left lateral ankle ulcer down to skin, subcutaneous tissue, fat, and bone (necrotic) [de-identified] : wnl [de-identified] : all sutures removed by DPM \par Patient expressed comfort post compression application [FreeTextEntry1] : Lateral Ankle- intact sutures at distal aspect of wound  [FreeTextEntry2] : 8.1 [FreeTextEntry3] : 1.5 [de-identified] : serosanguineous [FreeTextEntry4] : 0.1-0.5 [de-identified] : moderately  [de-identified] : 80% [de-identified] : 20% [de-identified] : No neurovascular deficits noted. [de-identified] : Silver Alginate today; resume NPWT at 125 mm/Hg continuously with black foam  [de-identified] : Cleansed with Normal Saline. \par \par  [TWNoteComboBox1] : Left [TWNoteComboBox4] : Large [TWNoteComboBox5] : No [TWNoteComboBox6] : Surgical [de-identified] : No [de-identified] : Macerated [de-identified] : None [de-identified] : None [de-identified] : >75% [de-identified] : Yes [de-identified] : Tendon [de-identified] : Ace wraps [de-identified] : 3x Weekly [de-identified] : Primary Dressing

## 2021-12-27 NOTE — HISTORY OF PRESENT ILLNESS
[de-identified] : 56 yr old female was hospitalized for a week for wound care, now has a PIC line and wound vac\par s/p BMT 12/13/2021, has not been back for hyperbaric tx yet\par -otorrhea\par hearing has improved

## 2021-12-28 ENCOUNTER — OUTPATIENT (OUTPATIENT)
Dept: OUTPATIENT SERVICES | Facility: HOSPITAL | Age: 56
LOS: 1 days | Discharge: HOME | End: 2021-12-28
Payer: COMMERCIAL

## 2021-12-28 ENCOUNTER — APPOINTMENT (OUTPATIENT)
Dept: HYPERBARIC MEDICINE | Facility: HOSPITAL | Age: 56
End: 2021-12-28
Payer: COMMERCIAL

## 2021-12-28 VITALS
HEART RATE: 88 BPM | DIASTOLIC BLOOD PRESSURE: 74 MMHG | RESPIRATION RATE: 16 BRPM | TEMPERATURE: 98.5 F | SYSTOLIC BLOOD PRESSURE: 132 MMHG | OXYGEN SATURATION: 99 %

## 2021-12-28 VITALS
DIASTOLIC BLOOD PRESSURE: 78 MMHG | RESPIRATION RATE: 18 BRPM | OXYGEN SATURATION: 98 % | SYSTOLIC BLOOD PRESSURE: 126 MMHG | HEART RATE: 88 BPM | TEMPERATURE: 98.3 F

## 2021-12-28 DIAGNOSIS — Z98.890 OTHER SPECIFIED POSTPROCEDURAL STATES: Chronic | ICD-10-CM

## 2021-12-28 DIAGNOSIS — Z90.710 ACQUIRED ABSENCE OF BOTH CERVIX AND UTERUS: Chronic | ICD-10-CM

## 2021-12-28 DIAGNOSIS — E11.622 TYPE 2 DIABETES MELLITUS WITH OTHER SKIN ULCER: ICD-10-CM

## 2021-12-28 DIAGNOSIS — L97.323 NON-PRESSURE CHRONIC ULCER OF LEFT ANKLE WITH NECROSIS OF MUSCLE: ICD-10-CM

## 2021-12-28 PROCEDURE — 82962 GLUCOSE BLOOD TEST: CPT

## 2021-12-28 PROCEDURE — G0277: CPT

## 2021-12-28 PROCEDURE — 99183 HYPERBARIC OXYGEN THERAPY: CPT

## 2021-12-29 ENCOUNTER — APPOINTMENT (OUTPATIENT)
Dept: HYPERBARIC MEDICINE | Facility: HOSPITAL | Age: 56
End: 2021-12-29
Payer: COMMERCIAL

## 2021-12-29 ENCOUNTER — OUTPATIENT (OUTPATIENT)
Dept: OUTPATIENT SERVICES | Facility: HOSPITAL | Age: 56
LOS: 1 days | Discharge: HOME | End: 2021-12-29
Payer: COMMERCIAL

## 2021-12-29 DIAGNOSIS — Z90.710 ACQUIRED ABSENCE OF BOTH CERVIX AND UTERUS: Chronic | ICD-10-CM

## 2021-12-29 DIAGNOSIS — E11.622 TYPE 2 DIABETES MELLITUS WITH OTHER SKIN ULCER: ICD-10-CM

## 2021-12-29 DIAGNOSIS — Z98.890 OTHER SPECIFIED POSTPROCEDURAL STATES: ICD-10-CM

## 2021-12-29 DIAGNOSIS — Z98.890 OTHER SPECIFIED POSTPROCEDURAL STATES: Chronic | ICD-10-CM

## 2021-12-29 DIAGNOSIS — Z85.038 PERSONAL HISTORY OF OTHER MALIGNANT NEOPLASM OF LARGE INTESTINE: ICD-10-CM

## 2021-12-29 DIAGNOSIS — Z90.710 ACQUIRED ABSENCE OF BOTH CERVIX AND UTERUS: ICD-10-CM

## 2021-12-29 DIAGNOSIS — Z86.16 PERSONAL HISTORY OF COVID-19: ICD-10-CM

## 2021-12-29 DIAGNOSIS — Z82.49 FAMILY HISTORY OF ISCHEMIC HEART DISEASE AND OTHER DISEASES OF THE CIRCULATORY SYSTEM: ICD-10-CM

## 2021-12-29 DIAGNOSIS — Z79.51 LONG TERM (CURRENT) USE OF INHALED STEROIDS: ICD-10-CM

## 2021-12-29 DIAGNOSIS — L97.323 NON-PRESSURE CHRONIC ULCER OF LEFT ANKLE WITH NECROSIS OF MUSCLE: ICD-10-CM

## 2021-12-29 DIAGNOSIS — Z48.02 ENCOUNTER FOR REMOVAL OF SUTURES: ICD-10-CM

## 2021-12-29 DIAGNOSIS — Z86.010 PERSONAL HISTORY OF COLONIC POLYPS: ICD-10-CM

## 2021-12-29 DIAGNOSIS — Z79.899 OTHER LONG TERM (CURRENT) DRUG THERAPY: ICD-10-CM

## 2021-12-29 LAB
CULTURE RESULTS: SIGNIFICANT CHANGE UP
SPECIMEN SOURCE: SIGNIFICANT CHANGE UP

## 2021-12-29 PROCEDURE — G0277: CPT

## 2021-12-29 PROCEDURE — 99183 HYPERBARIC OXYGEN THERAPY: CPT

## 2021-12-29 PROCEDURE — 82962 GLUCOSE BLOOD TEST: CPT

## 2021-12-29 NOTE — PROCEDURE
[Outpatient] : Outpatient [Ambulatory] : Patient is ambulatory. [Other: ___] : [unfilled] [THIS CHAMBER HAS BEEN CLEANED / DISINFECTED] : This chamber has been cleaned / disinfected according to local and hospital policy and procedure prior to this treatment. [____] : Post-Dive: Time - [unfilled] [___] : Post-Dive: Value - [unfilled] mg/dL [Patient demonstrated and verbalized proper technique for using air break mask] : Patient demonstrated and verbalized proper technique for using air break mask [Patient educated on the risks of SMOKING prior to HBOT with understanding] : Patient educated on the risks of SMOKING prior to HBOT with understanding [Patient educated on the risks of CONSUMING ALCOHOL prior to HBOT with understanding] : Patient educated on the risks of CONSUMING ALCOHOL prior to HBOT with understanding [100% Cotton] : 100% cotton [Empty all pockets] : empty all pockets [No hair oils, wigs, hairpieces, pins] : no hair oils, wigs, hairpieces, pins  [Pre tx medications] : pre tx medications  [No make-up, creams] : no make-up, creams  [No jewelry] : no jewelry  [No matches, cigarettes, lighters] : no matches, cigarettes, lighters  [Hearing aid removed] : hearing aid removed [Dentures removed] : dentures removed [Ground bracelet on pt's wrist] : ground bracelet on pt's wrist  [Contacts removed] : contacts removed  [Remove nail polish] : remove nail polish  [No reading material] : no reading material  [Bra, undergarments removed] : bra, undergarments removed  [No contraindicated dressings] : no contraindicated dressings [Ground Wire - VISUAL Verification - Intact/Free of Obstruction] : Ground Wire - VISUAL Verification - Intact/Free of Obstruction  [Ground Continuity - Verified < 1 ohm w/ Wrist Strap Enrrique] : Ground Continuity - Verified < 1 ohm w/ Wrist Strap Enrrique [Clear all fields] : clear all fields [Number: ___] : Number: [unfilled] [Diagnosis: ___] : Diagnosis: [unfilled] [] : No [FreeTextEntry4] : 100 [FreeTextEntry6] : 3070 [FreeTextEntry8] : 1814 [de-identified] : 1846 [de-identified] : 1851 [de-identified] : 1922 [de-identified] : 1927 [de-identified] : 1957 [de-identified] : 2007 [de-identified] : 122 MINUTES  [de-identified] : Demetria, Post-tx.

## 2021-12-29 NOTE — PROCEDURE
[Outpatient] : Outpatient [Ambulatory] : Patient is ambulatory. [THIS CHAMBER HAS BEEN CLEANED / DISINFECTED] : This chamber has been cleaned / disinfected according to local and hospital policy and procedure prior to this treatment. [Patient demonstrated and verbalized proper technique for using air break mask] : Patient demonstrated and verbalized proper technique for using air break mask [Patient educated on the risks of SMOKING prior to HBOT with understanding] : Patient educated on the risks of SMOKING prior to HBOT with understanding [Patient educated on the risks of CONSUMING ALCOHOL prior to HBOT with understanding] : Patient educated on the risks of CONSUMING ALCOHOL prior to HBOT with understanding [100% Cotton] : 100% cotton [Empty all pockets] : empty all pockets [No hair oils, wigs, hairpieces, pins] : no hair oils, wigs, hairpieces, pins  [Pre tx medications] : pre tx medications  [No make-up, creams] : no make-up, creams  [No jewelry] : no jewelry  [No matches, cigarettes, lighters] : no matches, cigarettes, lighters  [Hearing aid removed] : hearing aid removed [Dentures removed] : dentures removed [Ground bracelet on pt's wrist] : ground bracelet on pt's wrist  [Contacts removed] : contacts removed  [Remove nail polish] : remove nail polish  [No reading material] : no reading material  [Bra, undergarments removed] : bra, undergarments removed  [No contraindicated dressings] : no contraindicated dressings [Ground Wire - VISUAL Verification - Intact/Free of Obstruction] : Ground Wire - VISUAL Verification - Intact/Free of Obstruction  [Ground Continuity - Verified < 1 ohm w/ Wrist Strap Enrrique] : Ground Continuity - Verified < 1 ohm w/ Wrist Strap Enrrique [Diagnosis: ___] : Diagnosis: [unfilled] [____] : Recheck: Time - [unfilled] [___] : Recheck: Value - [unfilled] mg/dL [Number: ___] : Number: [unfilled] [Clear all fields] : clear all fields [] : No [FreeTextEntry3] : 90 [FreeTextEntry5] : 1816 [FreeTextEntry7] : 1825 [FreeTextEntry9] : 1958 [de-identified] : 2007 [de-identified] : 108 MINUTES

## 2021-12-29 NOTE — ADDENDUM
[FreeTextEntry1] : PT ARRIVED A&OX3\par ALL VITALS WITHIN PARAMETERS FOR HBOT\par PT MADE HT AWARE OF MILD CONGESTION PRIOR TO TX\par PT DESCENDED TO 2.0 RAIMUNDO @ 1.75 PSI/MIN IN CHAMBER #3 \par @ 10 PSI, PT EXPERIENCED MILD BILATERAL EAR PAIN\par PT WAS BROUGHT BACK TO PREVIOUS POINT OF EQUALIZATION TO ALLOW FOR EXTRA TIME TO CLEAR\par PT CLEARED SUCCESSFULLY, AND EXPRESSED WILLINGNESS TO CONTINUE\par PT RESTING AT TX DEPTH WITH VISIBLE CHEST RISE AND FALL OBSERVED CHAMBER SIDE \par PT ASCENDED FROM 2.0 RAIMUNDO @ 1.75 PSI/MIN WITHOUT INCIDENT\par PT TOLERATED TX WELL

## 2021-12-29 NOTE — ADDENDUM
[FreeTextEntry1] : PT ARRIVED A&OX3 \par ALL VITALS WITHIN PARAMETERS WITH THE EXCEPTION OF BGL\par PT WAS GIVEN 16 GRAMS OF SUGAR VIA 2 JUICE DPM NOTIFIED \par PT WAS RETESTED AND NOT FOUND TO BE WITHIN ACCEPTABLE LIMITS\par DPM NOTIFIED AS PER ORDERS PT WAS SENT INTO THE CHAMBER, CHEWING 1 GLUCOSE TABLET EVERY 20 MINUTES\par PT DESCENDED TO 2.0 RAIMUNDO @ 1.75 PSI/MIN IN CHAMBER #2 WITHOUT INCIDENT\par PT RESTING AT TX DEPTH  WITH VISIBLE CHEST RISE AND FALL OBSERVED CHAMBER SIDE \par PT ASCENDED  FROM 2.0 RAIMUNDO @ 1.75 PSI/MIN WITHOUT INCIDENT\par PT TOLERATED TX WELL. PT Recieved WC BY RN POST TX. \par

## 2021-12-30 ENCOUNTER — APPOINTMENT (OUTPATIENT)
Dept: HYPERBARIC MEDICINE | Facility: HOSPITAL | Age: 56
End: 2021-12-30
Payer: COMMERCIAL

## 2021-12-30 ENCOUNTER — OUTPATIENT (OUTPATIENT)
Dept: OUTPATIENT SERVICES | Facility: HOSPITAL | Age: 56
LOS: 1 days | Discharge: HOME | End: 2021-12-30
Payer: COMMERCIAL

## 2021-12-30 VITALS
DIASTOLIC BLOOD PRESSURE: 88 MMHG | OXYGEN SATURATION: 98 % | TEMPERATURE: 98 F | HEART RATE: 88 BPM | RESPIRATION RATE: 16 BRPM | SYSTOLIC BLOOD PRESSURE: 136 MMHG

## 2021-12-30 VITALS
SYSTOLIC BLOOD PRESSURE: 124 MMHG | TEMPERATURE: 98.3 F | HEART RATE: 88 BPM | DIASTOLIC BLOOD PRESSURE: 64 MMHG | RESPIRATION RATE: 18 BRPM | OXYGEN SATURATION: 98 %

## 2021-12-30 DIAGNOSIS — E11.622 TYPE 2 DIABETES MELLITUS WITH OTHER SKIN ULCER: ICD-10-CM

## 2021-12-30 DIAGNOSIS — Z90.710 ACQUIRED ABSENCE OF BOTH CERVIX AND UTERUS: Chronic | ICD-10-CM

## 2021-12-30 DIAGNOSIS — Z98.890 OTHER SPECIFIED POSTPROCEDURAL STATES: Chronic | ICD-10-CM

## 2021-12-30 DIAGNOSIS — L97.323 NON-PRESSURE CHRONIC ULCER OF LEFT ANKLE WITH NECROSIS OF MUSCLE: ICD-10-CM

## 2021-12-30 PROCEDURE — G0277: CPT

## 2021-12-30 PROCEDURE — U0003: CPT

## 2021-12-30 PROCEDURE — U0005: CPT

## 2021-12-30 PROCEDURE — 99183 HYPERBARIC OXYGEN THERAPY: CPT

## 2021-12-30 PROCEDURE — 82962 GLUCOSE BLOOD TEST: CPT

## 2021-12-30 NOTE — PROCEDURE
[Outpatient] : Outpatient [Ambulatory] : Patient is ambulatory. [THIS CHAMBER HAS BEEN CLEANED / DISINFECTED] : This chamber has been cleaned / disinfected according to local and hospital policy and procedure prior to this treatment. [____] : Post-Dive: Time - [unfilled] [___] : Post-Dive: Value - [unfilled] mg/dL [Patient demonstrated and verbalized proper technique for using air break mask] : Patient demonstrated and verbalized proper technique for using air break mask [Patient educated on the risks of SMOKING prior to HBOT with understanding] : Patient educated on the risks of SMOKING prior to HBOT with understanding [Patient educated on the risks of CONSUMING ALCOHOL prior to HBOT with understanding] : Patient educated on the risks of CONSUMING ALCOHOL prior to HBOT with understanding [100% Cotton] : 100% cotton [Empty all pockets] : empty all pockets [No hair oils, wigs, hairpieces, pins] : no hair oils, wigs, hairpieces, pins  [Pre tx medications] : pre tx medications  [No make-up, creams] : no make-up, creams  [No jewelry] : no jewelry  [No matches, cigarettes, lighters] : no matches, cigarettes, lighters  [Hearing aid removed] : hearing aid removed [Dentures removed] : dentures removed [Ground bracelet on pt's wrist] : ground bracelet on pt's wrist  [Contacts removed] : contacts removed  [Remove nail polish] : remove nail polish  [No reading material] : no reading material  [Bra, undergarments removed] : bra, undergarments removed  [No contraindicated dressings] : no contraindicated dressings [Ground Wire - VISUAL Verification - Intact/Free of Obstruction] : Ground Wire - VISUAL Verification - Intact/Free of Obstruction  [Ground Continuity - Verified < 1 ohm w/ Wrist Strap Enrrique] : Ground Continuity - Verified < 1 ohm w/ Wrist Strap Enrrique [Clear all fields] : clear all fields [Number: ___] : Number: [unfilled] [Diagnosis: ___] : Diagnosis: [unfilled] [] : No [FreeTextEntry3] : 90 [FreeTextEntry5] : 8426 [FreeTextEntry7] : 1816 [FreeTextEntry9] : 1944 [de-identified] : 1953 [de-identified] : 1

## 2021-12-30 NOTE — ASSESSMENT
[No change from previous assessment] : No change from previous assessment [Patient descended without problem for 9 minutes] : Patient descended without problem for 9 minutes [No dizziness or thirst] :  No dizziness or thirst [No ear problems] : No ear problems [Vital signs stable] : Vital signs stable [Tolerating dive well] : Tolerating dive well [No Chest Pain, shortness of breath] : No Chest Pain, shortness of breath [Respiratory Rate Stable] : Respiratory Rate Stable [No chest pain, shortness of breath, or ear pain] :  No chest pain, shortness of breath, or ear pain  [Tolerated Ascent well] : Tolerated Ascent well [Vital Signs stable] : Vital Signs stable [A physician was present throughout the entire HBOT] : A physician was present throughout the entire HBOT [No] : No [Clinically Stable] : Clinically stable [Continue Treatment Plan] : Continue treatment plan [Patient prepared for dive] : Patient prepared for dive

## 2021-12-30 NOTE — ADDENDUM
[FreeTextEntry1] : PT ARRIVED TO [facility] AXOX3 ASSISTED  WITH KNEE SCOOTER \par PT VITALS WITHIN PARAMETERS FOR HBOT\par PT DESCENDED TO TX DEPTH OF 2.0 RAIMUNDO @1.75PSI/MIN IN CHAMBER 1 WITHOUT INCIDENT\par PT RESTING AT TX DEPTH WITH VISIBLE CHEST RISE AND FALL OBSERVED\par PT ASCENDED TO SURFACE PRESSURE WITHOUT INCIDENT\par PT RECEIVED WOUND CARE POST HBOT

## 2021-12-31 DIAGNOSIS — Z20.822 CONTACT WITH AND (SUSPECTED) EXPOSURE TO COVID-19: ICD-10-CM

## 2021-12-31 DIAGNOSIS — E11.622 TYPE 2 DIABETES MELLITUS WITH OTHER SKIN ULCER: ICD-10-CM

## 2021-12-31 DIAGNOSIS — L97.323 NON-PRESSURE CHRONIC ULCER OF LEFT ANKLE WITH NECROSIS OF MUSCLE: ICD-10-CM

## 2021-12-31 LAB — SARS-COV-2 RNA SPEC QL NAA+PROBE: SIGNIFICANT CHANGE UP

## 2022-01-03 ENCOUNTER — OUTPATIENT (OUTPATIENT)
Dept: OUTPATIENT SERVICES | Facility: HOSPITAL | Age: 57
LOS: 1 days | Discharge: HOME | End: 2022-01-03
Payer: COMMERCIAL

## 2022-01-03 ENCOUNTER — APPOINTMENT (OUTPATIENT)
Dept: HYPERBARIC MEDICINE | Facility: HOSPITAL | Age: 57
End: 2022-01-03
Payer: COMMERCIAL

## 2022-01-03 VITALS
OXYGEN SATURATION: 96 % | HEART RATE: 79 BPM | DIASTOLIC BLOOD PRESSURE: 76 MMHG | SYSTOLIC BLOOD PRESSURE: 135 MMHG | TEMPERATURE: 97.3 F | RESPIRATION RATE: 20 BRPM

## 2022-01-03 VITALS
HEART RATE: 82 BPM | DIASTOLIC BLOOD PRESSURE: 82 MMHG | OXYGEN SATURATION: 98 % | SYSTOLIC BLOOD PRESSURE: 134 MMHG | RESPIRATION RATE: 20 BRPM | TEMPERATURE: 97.9 F

## 2022-01-03 DIAGNOSIS — Z98.890 OTHER SPECIFIED POSTPROCEDURAL STATES: Chronic | ICD-10-CM

## 2022-01-03 DIAGNOSIS — E11.622 TYPE 2 DIABETES MELLITUS WITH OTHER SKIN ULCER: ICD-10-CM

## 2022-01-03 DIAGNOSIS — Z90.710 ACQUIRED ABSENCE OF BOTH CERVIX AND UTERUS: Chronic | ICD-10-CM

## 2022-01-03 PROCEDURE — 82962 GLUCOSE BLOOD TEST: CPT

## 2022-01-03 PROCEDURE — G0277: CPT

## 2022-01-03 PROCEDURE — 99183 HYPERBARIC OXYGEN THERAPY: CPT

## 2022-01-03 NOTE — PROCEDURE
[Outpatient] : Outpatient [Ambulatory] : Patient is ambulatory. [THIS CHAMBER HAS BEEN CLEANED / DISINFECTED] : This chamber has been cleaned / disinfected according to local and hospital policy and procedure prior to this treatment. [____] : Recheck: Time - [unfilled] [Patient demonstrated and verbalized proper technique for using air break mask] : Patient demonstrated and verbalized proper technique for using air break mask [Patient educated on the risks of SMOKING prior to HBOT with understanding] : Patient educated on the risks of SMOKING prior to HBOT with understanding [Patient educated on the risks of CONSUMING ALCOHOL prior to HBOT with understanding] : Patient educated on the risks of CONSUMING ALCOHOL prior to HBOT with understanding [100% Cotton] : 100% cotton [Empty all pockets] : empty all pockets [No hair oils, wigs, hairpieces, pins] : no hair oils, wigs, hairpieces, pins  [Pre tx medications] : pre tx medications  [No make-up, creams] : no make-up, creams  [No jewelry] : no jewelry  [No matches, cigarettes, lighters] : no matches, cigarettes, lighters  [Hearing aid removed] : hearing aid removed [Dentures removed] : dentures removed [Ground bracelet on pt's wrist] : ground bracelet on pt's wrist  [Contacts removed] : contacts removed  [Remove nail polish] : remove nail polish  [No reading material] : no reading material  [Bra, undergarments removed] : bra, undergarments removed  [No contraindicated dressings] : no contraindicated dressings [Ground Wire - VISUAL Verification - Intact/Free of Obstruction] : Ground Wire - VISUAL Verification - Intact/Free of Obstruction  [Ground Continuity - Verified < 1 ohm w/ Wrist Strap Enrrique] : Ground Continuity - Verified < 1 ohm w/ Wrist Strap Enrrique [Clear all fields] : clear all fields [Number: ___] : Number: [unfilled] [Diagnosis: ___] : Diagnosis: [unfilled] [___] : Recheck: Value - [unfilled] mg/dL [] : No [FreeTextEntry3] : 90 [FreeTextEntry5] : 1833 [FreeTextEntry7] : 1849 [FreeTextEntry9] : 2011 [de-identified] : 2020 [de-identified] : 108 Mins

## 2022-01-03 NOTE — ADDENDUM
[FreeTextEntry1] : Pt's BGL low. MD notified. Pt given 16g of glucose via 8oz juice Pt BGL retested. Pt BGL still low. MD notified. Pt given Pt given 16g of glucose via 4 glucose tablets. Pt BGL retested Pt BGL now within acceptable limits for HBOT tx.\par \par Pt descended to 2.0 RAIMUNDO @ 1.75 PSI/min without incident in chamber #4\par Pt resting @ depth with chest rise and fall observed throughout tx. \par Pt ascended from 2.0 RAIMUNDO @ 1.75 PSI/min without incident. \par Pt tolerated tx well.\par Pt Recieved Covid-19 swab by RN  post tx. \par \par \par

## 2022-01-04 ENCOUNTER — OUTPATIENT (OUTPATIENT)
Dept: OUTPATIENT SERVICES | Facility: HOSPITAL | Age: 57
LOS: 1 days | Discharge: HOME | End: 2022-01-04
Payer: COMMERCIAL

## 2022-01-04 ENCOUNTER — APPOINTMENT (OUTPATIENT)
Dept: HYPERBARIC MEDICINE | Facility: HOSPITAL | Age: 57
End: 2022-01-04
Payer: COMMERCIAL

## 2022-01-04 VITALS
OXYGEN SATURATION: 97 % | HEART RATE: 73 BPM | RESPIRATION RATE: 16 BRPM | DIASTOLIC BLOOD PRESSURE: 73 MMHG | SYSTOLIC BLOOD PRESSURE: 126 MMHG | TEMPERATURE: 97.7 F

## 2022-01-04 VITALS
HEART RATE: 65 BPM | TEMPERATURE: 97.6 F | DIASTOLIC BLOOD PRESSURE: 68 MMHG | RESPIRATION RATE: 18 BRPM | SYSTOLIC BLOOD PRESSURE: 120 MMHG | OXYGEN SATURATION: 100 %

## 2022-01-04 DIAGNOSIS — L97.323 NON-PRESSURE CHRONIC ULCER OF LEFT ANKLE WITH NECROSIS OF MUSCLE: ICD-10-CM

## 2022-01-04 DIAGNOSIS — E11.622 TYPE 2 DIABETES MELLITUS WITH OTHER SKIN ULCER: ICD-10-CM

## 2022-01-04 DIAGNOSIS — Z90.710 ACQUIRED ABSENCE OF BOTH CERVIX AND UTERUS: Chronic | ICD-10-CM

## 2022-01-04 DIAGNOSIS — Z98.890 OTHER SPECIFIED POSTPROCEDURAL STATES: Chronic | ICD-10-CM

## 2022-01-04 PROCEDURE — 99183 HYPERBARIC OXYGEN THERAPY: CPT

## 2022-01-04 PROCEDURE — 82962 GLUCOSE BLOOD TEST: CPT

## 2022-01-04 PROCEDURE — G0277: CPT

## 2022-01-04 NOTE — PROCEDURE
[Outpatient] : Outpatient [Ambulatory] : Patient is ambulatory. [Other: ___] : [unfilled] [THIS CHAMBER HAS BEEN CLEANED / DISINFECTED] : This chamber has been cleaned / disinfected according to local and hospital policy and procedure prior to this treatment. [Patient demonstrated and verbalized proper technique for using air break mask] : Patient demonstrated and verbalized proper technique for using air break mask [Patient educated on the risks of SMOKING prior to HBOT with understanding] : Patient educated on the risks of SMOKING prior to HBOT with understanding [Patient educated on the risks of CONSUMING ALCOHOL prior to HBOT with understanding] : Patient educated on the risks of CONSUMING ALCOHOL prior to HBOT with understanding [100% Cotton] : 100% cotton [Empty all pockets] : empty all pockets [No hair oils, wigs, hairpieces, pins] : no hair oils, wigs, hairpieces, pins  [Pre tx medications] : pre tx medications  [No make-up, creams] : no make-up, creams  [No jewelry] : no jewelry  [No matches, cigarettes, lighters] : no matches, cigarettes, lighters  [Hearing aid removed] : hearing aid removed [Dentures removed] : dentures removed [Ground bracelet on pt's wrist] : ground bracelet on pt's wrist  [Contacts removed] : contacts removed  [Remove nail polish] : remove nail polish  [No reading material] : no reading material  [Bra, undergarments removed] : bra, undergarments removed  [No contraindicated dressings] : no contraindicated dressings [Ground Wire - VISUAL Verification - Intact/Free of Obstruction] : Ground Wire - VISUAL Verification - Intact/Free of Obstruction  [Ground Continuity - Verified < 1 ohm w/ Wrist Strap Enrrique] : Ground Continuity - Verified < 1 ohm w/ Wrist Strap Enrrique [Number: ___] : Number: [unfilled] [Diagnosis: ___] : Diagnosis: [unfilled] [____] : Post-Dive: Time - [unfilled] [___] : Post-Dive: Value - [unfilled] mg/dL [Clear all fields] : clear all fields [] : No [FreeTextEntry5] : 1839 [FreeTextEntry3] : 90 [FreeTextEntry7] : 1832 [FreeTextEntry9] : 2009 [de-identified] : 2019 [de-identified] : 108 MINUTES

## 2022-01-04 NOTE — ADDENDUM
[FreeTextEntry1] : PTS BGL WAS NOT WITHIN ACCEPTABLE LIMITS PRE HBOT. PT WAS GIVEN 16 G OF SUGAR VIA 2 JUICES. AFTER 1ST INTERVENTION PTS BGL WAS WITHIN  ACCEPTABLE LIMITS FOR HBOT\par PT DESCENDED TO 2.0 RAIMUNDO @ 1.75 PSI/MIN WITHOUT INCIDENT IN CHAMBER #4\par PT RESTING AT TX DEPTH WITH VISIBLE CHEST RISE AND FALL OBSERVED CHAMBERSIDE \par PT ASCENDED FROM TX DEPTH WITHOUT INCIDENT IN CHAMBER #4\par PT TOLERATED TX WELL\par PTS WOUND VAC HALTED DUE TO MACERATION \par PT RECEIVED DRESSING CHANGE BY RN POST HBOT \par

## 2022-01-05 ENCOUNTER — APPOINTMENT (OUTPATIENT)
Dept: WOUND CARE | Facility: HOSPITAL | Age: 57
End: 2022-01-05

## 2022-01-05 ENCOUNTER — OUTPATIENT (OUTPATIENT)
Dept: OUTPATIENT SERVICES | Facility: HOSPITAL | Age: 57
LOS: 1 days | Discharge: HOME | End: 2022-01-05
Payer: COMMERCIAL

## 2022-01-05 VITALS
OXYGEN SATURATION: 97 % | RESPIRATION RATE: 18 BRPM | WEIGHT: 245 LBS | DIASTOLIC BLOOD PRESSURE: 76 MMHG | HEART RATE: 77 BPM | SYSTOLIC BLOOD PRESSURE: 149 MMHG | TEMPERATURE: 99.2 F | HEIGHT: 63 IN | BODY MASS INDEX: 43.41 KG/M2

## 2022-01-05 DIAGNOSIS — E11.622 TYPE 2 DIABETES MELLITUS WITH OTHER SKIN ULCER: ICD-10-CM

## 2022-01-05 DIAGNOSIS — L97.323 NON-PRESSURE CHRONIC ULCER OF LEFT ANKLE WITH NECROSIS OF MUSCLE: ICD-10-CM

## 2022-01-05 DIAGNOSIS — Z98.890 OTHER SPECIFIED POSTPROCEDURAL STATES: Chronic | ICD-10-CM

## 2022-01-05 DIAGNOSIS — Z90.710 ACQUIRED ABSENCE OF BOTH CERVIX AND UTERUS: Chronic | ICD-10-CM

## 2022-01-05 PROCEDURE — G0463: CPT

## 2022-01-05 NOTE — ADDENDUM
[FreeTextEntry1] : Dressing changed prior to descent.\par Pt's BGL low prior to descent. Pt received (2) 4 oz juices via 16g of sugar. After 15 minute intervention, Pt's BGL within desired range for HBOT. Pt put in chamber.\par Pt descended to 2.0 RAIMUNDO @ 1.75 PSI/MIN without incident in chamber # 1\par Pt's resting at tx depth with chest rise and fall observed chamber side. \par Care transferred to chamber side tech @ 1930 hrs.\par PT ASCENDED FROM 2.0 RAIMUNDO @1.75 PSI/MIN WITHOUT INCIDENT\par PT TOLERATED TX WELL

## 2022-01-05 NOTE — ASSESSMENT
[] : Yes [Verbal] : Verbal [Written] : Written [Demo] : Demo [Patient] : Patient [Good - alert, interested, motivated] : Good - alert, interested, motivated [Demonstrates independently] : demonstrates independently [Dressing changes] : dressing changes [Foot Care] : foot care [Skin Care] : skin care [Signs and symptoms of infection] : sign and symptoms of infection [Nutrition] : nutrition [How and When to Call] : how and when to call [Pain Management] : pain management [Hyperbaric Therapy] : hyperbaric therapy [Off-loading] : off-loading [Compression Therapy] : compression therapy [Patient responsibility to plan of care] : patient responsibility to plan of care [Glycemic Control] : glycemic control [Stable] : stable [Home] : Home [Other: ____] : [unfilled] [Not Applicable - Long Term Care/Home Health Agency] : Long Term Care/Home Health Agency: Not Applicable [FreeTextEntry2] : Infection prevention \par Wound care (dressing changes)\par Maintain optimal skin integrity to high pressure areas\par Compression therapy\par Elevation and low sodium compliance.\par Weight reduction strategies\par HBOT\par Nutrition and wound healing [FreeTextEntry4] : 11/30 HBOT completed. No additional treatments ordered at this time. Additional treatments to be discussed at next assessment.\par Pt reports completing 2 weeks of I.V. abt therapy. Pt states the last day of I.V. abt therapy is slated for 01/11/2022. Pt denies side effects related to abt. Pt's denies pain/discomfort at I.V. insertion site.\par Auth submitted for Apligraf # 1 \par F/u tomorrow, 1/6/22 for HBOT. Assessment in 1 week.

## 2022-01-05 NOTE — PHYSICAL EXAM
[4 x 4] : 4 x 4  [1+] : left 1+ [Ankle Swelling (On Exam)] : present [Ankle Swelling Bilaterally] : bilaterally  [Ankle Swelling On The Left] : moderate [Varicose Veins Of Lower Extremities] : bilaterally [Purpura] : no purpura  [Petechiae] : no petechiae [Skin Ulcer] : no ulcer [Alert] : alert [Oriented to Person] : oriented to person [Oriented to Place] : oriented to place [Oriented to Time] : oriented to time [Calm] : calm [de-identified] : A&Ox3, NAD [de-identified] : BMI 43 [de-identified] : s/p left peroneal tendon repair, 5/5 strength in all quadrants bilaterally [de-identified] : left lateral ankle ulcer down to skin, subcutaneous tissue, fat, and bone (necrotic) [de-identified] : wnl [FreeTextEntry1] : Left Lateral Ankle [FreeTextEntry2] : 5.0 [FreeTextEntry3] : 1.1 [FreeTextEntry4] : 0.3 [de-identified] : serosanguineous [de-identified] : 90% [de-identified] : 10% [de-identified] : None [de-identified] : None [de-identified] : Pt expressed comfort post ACE application. Circ/Neuromuscular functions WNL post application. [de-identified] : Cristiane, Adaptic touch [de-identified] : NSC\par DD\par  [TWNoteComboBox4] : Moderate [TWNoteComboBox5] : No [TWNoteComboBox6] : Surgical [de-identified] : No [de-identified] : Normal [de-identified] : None [de-identified] : None [de-identified] : Ace wraps [de-identified] : Every other day [de-identified] : Primary Dressing

## 2022-01-05 NOTE — REVIEW OF SYSTEMS
[Fever] : no fever [Chills] : no chills [Eye Pain] : no eye pain [Loss Of Hearing] : no hearing loss [Abdominal Pain] : no abdominal pain [Vomiting] : no vomiting [Joint Stiffness] : joint stiffness [Skin Wound] : skin wound [Anxiety] : no anxiety [Negative] : Heme/Lymph [de-identified] : left lateral ankle ulcer s/p I&D , clean , granular , no soi  [de-identified] : Possible prediabetic , elevated HgA1c and blood glucose , BMI 43 ^, patient now taking Metformin

## 2022-01-05 NOTE — PROCEDURE
[Outpatient] : Outpatient [Ambulatory] : Patient is ambulatory. [Other: ___] : [unfilled] [THIS CHAMBER HAS BEEN CLEANED / DISINFECTED] : This chamber has been cleaned / disinfected according to local and hospital policy and procedure prior to this treatment. [Patient demonstrated and verbalized proper technique for using air break mask] : Patient demonstrated and verbalized proper technique for using air break mask [Patient educated on the risks of SMOKING prior to HBOT with understanding] : Patient educated on the risks of SMOKING prior to HBOT with understanding [Patient educated on the risks of CONSUMING ALCOHOL prior to HBOT with understanding] : Patient educated on the risks of CONSUMING ALCOHOL prior to HBOT with understanding [100% Cotton] : 100% cotton [Empty all pockets] : empty all pockets [No hair oils, wigs, hairpieces, pins] : no hair oils, wigs, hairpieces, pins  [Pre tx medications] : pre tx medications  [No make-up, creams] : no make-up, creams  [No jewelry] : no jewelry  [No matches, cigarettes, lighters] : no matches, cigarettes, lighters  [Hearing aid removed] : hearing aid removed [Dentures removed] : dentures removed [Ground bracelet on pt's wrist] : ground bracelet on pt's wrist  [Contacts removed] : contacts removed  [Remove nail polish] : remove nail polish  [No reading material] : no reading material  [Bra, undergarments removed] : bra, undergarments removed  [No contraindicated dressings] : no contraindicated dressings [Ground Wire - VISUAL Verification - Intact/Free of Obstruction] : Ground Wire - VISUAL Verification - Intact/Free of Obstruction  [Ground Continuity - Verified < 1 ohm w/ Wrist Strap Enrrique] : Ground Continuity - Verified < 1 ohm w/ Wrist Strap Enrrique [Number: ___] : Number: [unfilled] [Diagnosis: ___] : Diagnosis: [unfilled] [____] : Post-Dive: Time - [unfilled] [___] : Post-Dive: Value - [unfilled] mg/dL [Clear all fields] : clear all fields [] : No [FreeTextEntry3] : 90 [FreeTextEntry5] : 1824 [FreeTextEntry7] : 1834 [FreeTextEntry9] : 2003 [de-identified] : 2012 [de-identified] : 108 MINUTES

## 2022-01-05 NOTE — PLAN
[FreeTextEntry1] : continue HBOT , wound care and off weight bearing  , apligraf ordered   Spent 20 minutes for patient care and medical decision making.\par

## 2022-01-06 ENCOUNTER — OUTPATIENT (OUTPATIENT)
Dept: OUTPATIENT SERVICES | Facility: HOSPITAL | Age: 57
LOS: 1 days | Discharge: HOME | End: 2022-01-06
Payer: COMMERCIAL

## 2022-01-06 ENCOUNTER — APPOINTMENT (OUTPATIENT)
Dept: HYPERBARIC MEDICINE | Facility: HOSPITAL | Age: 57
End: 2022-01-06
Payer: COMMERCIAL

## 2022-01-06 VITALS
TEMPERATURE: 97.1 F | SYSTOLIC BLOOD PRESSURE: 143 MMHG | OXYGEN SATURATION: 98 % | RESPIRATION RATE: 16 BRPM | DIASTOLIC BLOOD PRESSURE: 81 MMHG | HEART RATE: 86 BPM

## 2022-01-06 VITALS
TEMPERATURE: 97.6 F | HEART RATE: 84 BPM | DIASTOLIC BLOOD PRESSURE: 77 MMHG | RESPIRATION RATE: 18 BRPM | OXYGEN SATURATION: 96 % | SYSTOLIC BLOOD PRESSURE: 131 MMHG

## 2022-01-06 DIAGNOSIS — Z82.49 FAMILY HISTORY OF ISCHEMIC HEART DISEASE AND OTHER DISEASES OF THE CIRCULATORY SYSTEM: ICD-10-CM

## 2022-01-06 DIAGNOSIS — E11.622 TYPE 2 DIABETES MELLITUS WITH OTHER SKIN ULCER: ICD-10-CM

## 2022-01-06 DIAGNOSIS — Z98.890 OTHER SPECIFIED POSTPROCEDURAL STATES: Chronic | ICD-10-CM

## 2022-01-06 DIAGNOSIS — Z86.16 PERSONAL HISTORY OF COVID-19: ICD-10-CM

## 2022-01-06 DIAGNOSIS — L97.323 NON-PRESSURE CHRONIC ULCER OF LEFT ANKLE WITH NECROSIS OF MUSCLE: ICD-10-CM

## 2022-01-06 DIAGNOSIS — Z79.899 OTHER LONG TERM (CURRENT) DRUG THERAPY: ICD-10-CM

## 2022-01-06 DIAGNOSIS — Z85.038 PERSONAL HISTORY OF OTHER MALIGNANT NEOPLASM OF LARGE INTESTINE: ICD-10-CM

## 2022-01-06 DIAGNOSIS — Z90.710 ACQUIRED ABSENCE OF BOTH CERVIX AND UTERUS: ICD-10-CM

## 2022-01-06 DIAGNOSIS — Z79.51 LONG TERM (CURRENT) USE OF INHALED STEROIDS: ICD-10-CM

## 2022-01-06 DIAGNOSIS — Z98.890 OTHER SPECIFIED POSTPROCEDURAL STATES: ICD-10-CM

## 2022-01-06 DIAGNOSIS — Z86.010 PERSONAL HISTORY OF COLONIC POLYPS: ICD-10-CM

## 2022-01-06 DIAGNOSIS — Z90.710 ACQUIRED ABSENCE OF BOTH CERVIX AND UTERUS: Chronic | ICD-10-CM

## 2022-01-06 PROCEDURE — G0277: CPT

## 2022-01-06 PROCEDURE — 82962 GLUCOSE BLOOD TEST: CPT

## 2022-01-06 PROCEDURE — 99183 HYPERBARIC OXYGEN THERAPY: CPT

## 2022-01-07 ENCOUNTER — OUTPATIENT (OUTPATIENT)
Dept: OUTPATIENT SERVICES | Facility: HOSPITAL | Age: 57
LOS: 1 days | Discharge: HOME | End: 2022-01-07
Payer: COMMERCIAL

## 2022-01-07 ENCOUNTER — APPOINTMENT (OUTPATIENT)
Dept: HYPERBARIC MEDICINE | Facility: HOSPITAL | Age: 57
End: 2022-01-07
Payer: COMMERCIAL

## 2022-01-07 ENCOUNTER — NON-APPOINTMENT (OUTPATIENT)
Age: 57
End: 2022-01-07

## 2022-01-07 ENCOUNTER — APPOINTMENT (OUTPATIENT)
Dept: HYPERBARIC MEDICINE | Facility: HOSPITAL | Age: 57
End: 2022-01-07

## 2022-01-07 VITALS
SYSTOLIC BLOOD PRESSURE: 125 MMHG | TEMPERATURE: 98.1 F | RESPIRATION RATE: 16 BRPM | HEART RATE: 76 BPM | OXYGEN SATURATION: 96 % | DIASTOLIC BLOOD PRESSURE: 70 MMHG

## 2022-01-07 DIAGNOSIS — Z90.710 ACQUIRED ABSENCE OF BOTH CERVIX AND UTERUS: Chronic | ICD-10-CM

## 2022-01-07 DIAGNOSIS — E11.622 TYPE 2 DIABETES MELLITUS WITH OTHER SKIN ULCER: ICD-10-CM

## 2022-01-07 DIAGNOSIS — Z98.890 OTHER SPECIFIED POSTPROCEDURAL STATES: Chronic | ICD-10-CM

## 2022-01-07 LAB — SARS-COV-2 RNA SPEC QL NAA+PROBE: SIGNIFICANT CHANGE UP

## 2022-01-07 PROCEDURE — 99183 HYPERBARIC OXYGEN THERAPY: CPT

## 2022-01-07 PROCEDURE — 82962 GLUCOSE BLOOD TEST: CPT

## 2022-01-07 PROCEDURE — G0277: CPT

## 2022-01-07 PROCEDURE — U0005: CPT

## 2022-01-07 PROCEDURE — U0003: CPT

## 2022-01-08 ENCOUNTER — OUTPATIENT (OUTPATIENT)
Dept: OUTPATIENT SERVICES | Facility: HOSPITAL | Age: 57
LOS: 1 days | End: 2022-01-08
Payer: COMMERCIAL

## 2022-01-08 ENCOUNTER — APPOINTMENT (OUTPATIENT)
Dept: HYPERBARIC MEDICINE | Facility: HOSPITAL | Age: 57
End: 2022-01-08
Payer: COMMERCIAL

## 2022-01-08 VITALS
OXYGEN SATURATION: 97 % | RESPIRATION RATE: 18 BRPM | SYSTOLIC BLOOD PRESSURE: 131 MMHG | TEMPERATURE: 97.1 F | HEART RATE: 76 BPM | DIASTOLIC BLOOD PRESSURE: 68 MMHG

## 2022-01-08 VITALS
HEART RATE: 70 BPM | DIASTOLIC BLOOD PRESSURE: 70 MMHG | RESPIRATION RATE: 18 BRPM | OXYGEN SATURATION: 99 % | SYSTOLIC BLOOD PRESSURE: 138 MMHG

## 2022-01-08 DIAGNOSIS — Z98.890 OTHER SPECIFIED POSTPROCEDURAL STATES: Chronic | ICD-10-CM

## 2022-01-08 DIAGNOSIS — E11.622 TYPE 2 DIABETES MELLITUS WITH OTHER SKIN ULCER: ICD-10-CM

## 2022-01-08 DIAGNOSIS — Z90.710 ACQUIRED ABSENCE OF BOTH CERVIX AND UTERUS: Chronic | ICD-10-CM

## 2022-01-08 PROCEDURE — 82962 GLUCOSE BLOOD TEST: CPT

## 2022-01-08 PROCEDURE — 99183 HYPERBARIC OXYGEN THERAPY: CPT

## 2022-01-08 NOTE — ADDENDUM
[FreeTextEntry1] : PT ARRIVED A&OX3 \par ALL VITALS WITHIN PARAMETERS FOR HBOT\par PT DESCENDED TO 2.0 RAIMUNDO @ 1.75 PSI/MIN IN CHAMBER #2 WITHOUT INCIDENT\par PT RESTING AT TX DEPTH WITH VISIBLE CHEST RISE AND FALL OBSERVED CHAMBER SIDE \par PT ASCENDED FROM 2.0 RAIMUNDO @ 1.75 PSI/MIN WITHOUT INCIDENT\par PT TOLERATED TX WELL

## 2022-01-08 NOTE — PROCEDURE
[Outpatient] : Outpatient [Ambulatory] : Patient is ambulatory. [Other: ___] : [unfilled] [THIS CHAMBER HAS BEEN CLEANED / DISINFECTED] : This chamber has been cleaned / disinfected according to local and hospital policy and procedure prior to this treatment. [Patient demonstrated and verbalized proper technique for using air break mask] : Patient demonstrated and verbalized proper technique for using air break mask [Patient educated on the risks of SMOKING prior to HBOT with understanding] : Patient educated on the risks of SMOKING prior to HBOT with understanding [Patient educated on the risks of CONSUMING ALCOHOL prior to HBOT with understanding] : Patient educated on the risks of CONSUMING ALCOHOL prior to HBOT with understanding [100% Cotton] : 100% cotton [Empty all pockets] : empty all pockets [No hair oils, wigs, hairpieces, pins] : no hair oils, wigs, hairpieces, pins  [Pre tx medications] : pre tx medications  [No make-up, creams] : no make-up, creams  [No jewelry] : no jewelry  [No matches, cigarettes, lighters] : no matches, cigarettes, lighters  [Hearing aid removed] : hearing aid removed [Dentures removed] : dentures removed [Ground bracelet on pt's wrist] : ground bracelet on pt's wrist  [Contacts removed] : contacts removed  [Remove nail polish] : remove nail polish  [No reading material] : no reading material  [Bra, undergarments removed] : bra, undergarments removed  [No contraindicated dressings] : no contraindicated dressings [Ground Wire - VISUAL Verification - Intact/Free of Obstruction] : Ground Wire - VISUAL Verification - Intact/Free of Obstruction  [Ground Continuity - Verified < 1 ohm w/ Wrist Strap Enrrique] : Ground Continuity - Verified < 1 ohm w/ Wrist Strap Enrrique [Number: ___] : Number: [unfilled] [Diagnosis: ___] : Diagnosis: [unfilled] [____] : Post-Dive: Time - [unfilled] [___] : Post-Dive: Value - [unfilled] mg/dL [Clear all fields] : clear all fields [] : No [FreeTextEntry5] : 0805 [FreeTextEntry7] : 9482 [de-identified] : 950 [FreeTextEntry9] : 052 [de-identified] : 108 MINUTES

## 2022-01-09 DIAGNOSIS — E11.622 TYPE 2 DIABETES MELLITUS WITH OTHER SKIN ULCER: ICD-10-CM

## 2022-01-09 DIAGNOSIS — L97.323 NON-PRESSURE CHRONIC ULCER OF LEFT ANKLE WITH NECROSIS OF MUSCLE: ICD-10-CM

## 2022-01-09 DIAGNOSIS — Z20.822 CONTACT WITH AND (SUSPECTED) EXPOSURE TO COVID-19: ICD-10-CM

## 2022-01-10 ENCOUNTER — OUTPATIENT (OUTPATIENT)
Dept: OUTPATIENT SERVICES | Facility: HOSPITAL | Age: 57
LOS: 1 days | Discharge: HOME | End: 2022-01-10
Payer: COMMERCIAL

## 2022-01-10 ENCOUNTER — APPOINTMENT (OUTPATIENT)
Dept: HYPERBARIC MEDICINE | Facility: HOSPITAL | Age: 57
End: 2022-01-10
Payer: COMMERCIAL

## 2022-01-10 VITALS
RESPIRATION RATE: 18 BRPM | SYSTOLIC BLOOD PRESSURE: 140 MMHG | HEART RATE: 77 BPM | TEMPERATURE: 97.5 F | DIASTOLIC BLOOD PRESSURE: 76 MMHG | OXYGEN SATURATION: 100 %

## 2022-01-10 VITALS
OXYGEN SATURATION: 97 % | TEMPERATURE: 97.6 F | DIASTOLIC BLOOD PRESSURE: 79 MMHG | HEART RATE: 92 BPM | RESPIRATION RATE: 18 BRPM | SYSTOLIC BLOOD PRESSURE: 149 MMHG

## 2022-01-10 DIAGNOSIS — Z98.890 OTHER SPECIFIED POSTPROCEDURAL STATES: Chronic | ICD-10-CM

## 2022-01-10 DIAGNOSIS — E11.622 TYPE 2 DIABETES MELLITUS WITH OTHER SKIN ULCER: ICD-10-CM

## 2022-01-10 DIAGNOSIS — Z90.710 ACQUIRED ABSENCE OF BOTH CERVIX AND UTERUS: Chronic | ICD-10-CM

## 2022-01-10 PROCEDURE — G0277: CPT

## 2022-01-10 PROCEDURE — 99183 HYPERBARIC OXYGEN THERAPY: CPT

## 2022-01-10 PROCEDURE — 82962 GLUCOSE BLOOD TEST: CPT

## 2022-01-11 ENCOUNTER — APPOINTMENT (OUTPATIENT)
Dept: HYPERBARIC MEDICINE | Facility: HOSPITAL | Age: 57
End: 2022-01-11
Payer: COMMERCIAL

## 2022-01-11 ENCOUNTER — OUTPATIENT (OUTPATIENT)
Dept: OUTPATIENT SERVICES | Facility: HOSPITAL | Age: 57
LOS: 1 days | Discharge: HOME | End: 2022-01-11
Payer: COMMERCIAL

## 2022-01-11 VITALS
TEMPERATURE: 97.2 F | OXYGEN SATURATION: 100 % | SYSTOLIC BLOOD PRESSURE: 140 MMHG | HEART RATE: 82 BPM | DIASTOLIC BLOOD PRESSURE: 72 MMHG | RESPIRATION RATE: 18 BRPM

## 2022-01-11 DIAGNOSIS — Z79.51 LONG TERM (CURRENT) USE OF INHALED STEROIDS: ICD-10-CM

## 2022-01-11 DIAGNOSIS — E11.622 TYPE 2 DIABETES MELLITUS WITH OTHER SKIN ULCER: ICD-10-CM

## 2022-01-11 DIAGNOSIS — Z90.710 ACQUIRED ABSENCE OF BOTH CERVIX AND UTERUS: Chronic | ICD-10-CM

## 2022-01-11 DIAGNOSIS — L97.323 NON-PRESSURE CHRONIC ULCER OF LEFT ANKLE WITH NECROSIS OF MUSCLE: ICD-10-CM

## 2022-01-11 DIAGNOSIS — Z98.890 OTHER SPECIFIED POSTPROCEDURAL STATES: ICD-10-CM

## 2022-01-11 DIAGNOSIS — Z82.49 FAMILY HISTORY OF ISCHEMIC HEART DISEASE AND OTHER DISEASES OF THE CIRCULATORY SYSTEM: ICD-10-CM

## 2022-01-11 DIAGNOSIS — Z85.038 PERSONAL HISTORY OF OTHER MALIGNANT NEOPLASM OF LARGE INTESTINE: ICD-10-CM

## 2022-01-11 DIAGNOSIS — Z86.16 PERSONAL HISTORY OF COVID-19: ICD-10-CM

## 2022-01-11 DIAGNOSIS — Z98.890 OTHER SPECIFIED POSTPROCEDURAL STATES: Chronic | ICD-10-CM

## 2022-01-11 DIAGNOSIS — Z90.710 ACQUIRED ABSENCE OF BOTH CERVIX AND UTERUS: ICD-10-CM

## 2022-01-11 DIAGNOSIS — Z79.899 OTHER LONG TERM (CURRENT) DRUG THERAPY: ICD-10-CM

## 2022-01-11 DIAGNOSIS — Z86.010 PERSONAL HISTORY OF COLONIC POLYPS: ICD-10-CM

## 2022-01-11 PROCEDURE — 15271 SKIN SUB GRAFT TRNK/ARM/LEG: CPT

## 2022-01-11 PROCEDURE — 15271 SKIN SUB GRAFT TRNK/ARM/LEG: CPT | Mod: 58

## 2022-01-11 NOTE — PROCEDURE
[Outpatient] : Outpatient [Ambulatory] : Patient is ambulatory. [Other: ___] : [unfilled] [THIS CHAMBER HAS BEEN CLEANED / DISINFECTED] : This chamber has been cleaned / disinfected according to local and hospital policy and procedure prior to this treatment. [____] : Pre-Dive: Time - [unfilled] [___] : Pre-Dive: Value - [unfilled] mg/dL [Patient demonstrated and verbalized proper technique for using air break mask] : Patient demonstrated and verbalized proper technique for using air break mask [Patient educated on the risks of SMOKING prior to HBOT with understanding] : Patient educated on the risks of SMOKING prior to HBOT with understanding [Patient educated on the risks of CONSUMING ALCOHOL prior to HBOT with understanding] : Patient educated on the risks of CONSUMING ALCOHOL prior to HBOT with understanding [100% Cotton] : 100% cotton [Empty all pockets] : empty all pockets [No hair oils, wigs, hairpieces, pins] : no hair oils, wigs, hairpieces, pins  [Pre tx medications] : pre tx medications  [No make-up, creams] : no make-up, creams  [No jewelry] : no jewelry  [No matches, cigarettes, lighters] : no matches, cigarettes, lighters  [Hearing aid removed] : hearing aid removed [Dentures removed] : dentures removed [Ground bracelet on pt's wrist] : ground bracelet on pt's wrist  [Contacts removed] : contacts removed  [Remove nail polish] : remove nail polish  [No reading material] : no reading material  [Bra, undergarments removed] : bra, undergarments removed  [No contraindicated dressings] : no contraindicated dressings [Ground Wire - VISUAL Verification - Intact/Free of Obstruction] : Ground Wire - VISUAL Verification - Intact/Free of Obstruction  [Ground Continuity - Verified < 1 ohm w/ Wrist Strap Enrrique] : Ground Continuity - Verified < 1 ohm w/ Wrist Strap Enrrique [Number: ___] : Number: [unfilled] [Diagnosis: ___] : Diagnosis: [unfilled] [Clear all fields] : clear all fields [] : No [FreeTextEntry3] : 90 [FreeTextEntry5] : 18 : 10 [FreeTextEntry7] : 18 : 19 [FreeTextEntry9] : 1949 [de-identified] : 1958 [de-identified] : 108 MINUTES

## 2022-01-11 NOTE — ADDENDUM
[FreeTextEntry1] : The pt. presented to [facility] ambulating with knee scooter and A&Ox3 for scheduled HBOT.\par The pt's pre-dive screening was found to be within acceptable limits to begin HBOT. \par The pt. descended @ 1.75 PSI/min. to Rx'd HBOT tx. depth of 2.0 RAIMUNDO in chamber # 4 without incident.\par The pt. was observed with visible chest motion and without incident for the duration of HBOT.\par PT ASCENDED FROM 2.0 RAIMUNDO @ 1.75 PSI/MIN WITHOUT INCIDENT\par PT TOLERATED TX WELL\par The pt. is scheduled to receive wound care post-HBOT. \par

## 2022-01-12 ENCOUNTER — APPOINTMENT (OUTPATIENT)
Dept: HYPERBARIC MEDICINE | Facility: HOSPITAL | Age: 57
End: 2022-01-12
Payer: COMMERCIAL

## 2022-01-12 ENCOUNTER — OUTPATIENT (OUTPATIENT)
Dept: OUTPATIENT SERVICES | Facility: HOSPITAL | Age: 57
LOS: 1 days | Discharge: HOME | End: 2022-01-12
Payer: COMMERCIAL

## 2022-01-12 VITALS
RESPIRATION RATE: 18 BRPM | SYSTOLIC BLOOD PRESSURE: 143 MMHG | OXYGEN SATURATION: 100 % | DIASTOLIC BLOOD PRESSURE: 82 MMHG | HEART RATE: 68 BPM | TEMPERATURE: 97.4 F

## 2022-01-12 VITALS
DIASTOLIC BLOOD PRESSURE: 80 MMHG | HEART RATE: 72 BPM | SYSTOLIC BLOOD PRESSURE: 137 MMHG | RESPIRATION RATE: 18 BRPM | TEMPERATURE: 97.4 F | OXYGEN SATURATION: 97 %

## 2022-01-12 DIAGNOSIS — Z90.710 ACQUIRED ABSENCE OF BOTH CERVIX AND UTERUS: Chronic | ICD-10-CM

## 2022-01-12 DIAGNOSIS — Z98.890 OTHER SPECIFIED POSTPROCEDURAL STATES: Chronic | ICD-10-CM

## 2022-01-12 DIAGNOSIS — E11.622 TYPE 2 DIABETES MELLITUS WITH OTHER SKIN ULCER: ICD-10-CM

## 2022-01-12 PROCEDURE — 99183 HYPERBARIC OXYGEN THERAPY: CPT

## 2022-01-12 PROCEDURE — 82962 GLUCOSE BLOOD TEST: CPT

## 2022-01-12 PROCEDURE — G0277: CPT

## 2022-01-12 NOTE — PROCEDURE
[] : No [FreeTextEntry3] : 90 MIN [FreeTextEntry5] : 9088 [FreeTextEntry7] : 1521 [FreeTextEntry9] : 1603 [de-identified] : 9423 [de-identified] : 108 MIN

## 2022-01-12 NOTE — ADDENDUM
[FreeTextEntry1] : PT ARRIVED TO [facility] AXOX3 ASSISTED WITH KNEE SCOOTER\par PT RECEIVED DRESSING CHANGE BY RN PRIOR TO HBOT \par \par PTS BGL WAS NOT WITHIN ACCEPTABLE LIMITS PRE HBOT.\par PT WAS GIVEN 16 G OF SUGAR VIA 2 JUICES. \par \par AFTER SECOND RETEST PTS BGL WAS STILL NOT WITHIN ACCEPTABLE LIMITS PRE HBOT.\par PT WAS GIVEN 16 G OF SUGAR VIA 2 JUICES. \par \par AFTER 2ND INTERVENTION PTS BGL WAS WITHIN  ACCEPTABLE LIMITS FOR HBOT\par \par PT DESCENDED TO 2.0 RAIMUNDO @ 1.75 PSI/MIN WITHOUT INCIDENT IN CHAMBER #4\par PT RESTING AT TX DEPTH WITH VISIBLE CHEST RISE AND FALL OBSERVED CHAMBERSIDE \par PT ASCENDED FROM TX DEPTH WITHOUT INCIDENT IN CHAMBER #4\par \par

## 2022-01-13 ENCOUNTER — APPOINTMENT (OUTPATIENT)
Dept: HYPERBARIC MEDICINE | Facility: HOSPITAL | Age: 57
End: 2022-01-13
Payer: COMMERCIAL

## 2022-01-13 ENCOUNTER — OUTPATIENT (OUTPATIENT)
Dept: OUTPATIENT SERVICES | Facility: HOSPITAL | Age: 57
LOS: 1 days | End: 2022-01-13
Payer: COMMERCIAL

## 2022-01-13 VITALS
SYSTOLIC BLOOD PRESSURE: 145 MMHG | DIASTOLIC BLOOD PRESSURE: 88 MMHG | OXYGEN SATURATION: 98 % | HEART RATE: 88 BPM | TEMPERATURE: 98.3 F | RESPIRATION RATE: 18 BRPM

## 2022-01-13 VITALS
SYSTOLIC BLOOD PRESSURE: 132 MMHG | DIASTOLIC BLOOD PRESSURE: 88 MMHG | TEMPERATURE: 98 F | HEART RATE: 68 BPM | OXYGEN SATURATION: 99 % | RESPIRATION RATE: 16 BRPM

## 2022-01-13 DIAGNOSIS — Z98.890 OTHER SPECIFIED POSTPROCEDURAL STATES: Chronic | ICD-10-CM

## 2022-01-13 DIAGNOSIS — Z90.710 ACQUIRED ABSENCE OF BOTH CERVIX AND UTERUS: Chronic | ICD-10-CM

## 2022-01-13 DIAGNOSIS — L97.323 NON-PRESSURE CHRONIC ULCER OF LEFT ANKLE WITH NECROSIS OF MUSCLE: ICD-10-CM

## 2022-01-13 DIAGNOSIS — E11.622 TYPE 2 DIABETES MELLITUS WITH OTHER SKIN ULCER: ICD-10-CM

## 2022-01-13 PROCEDURE — 99183 HYPERBARIC OXYGEN THERAPY: CPT

## 2022-01-13 PROCEDURE — 82962 GLUCOSE BLOOD TEST: CPT

## 2022-01-13 NOTE — PROCEDURE
[Outpatient] : Outpatient [Ambulatory] : Patient is ambulatory. [Other: ___] : [unfilled] [THIS CHAMBER HAS BEEN CLEANED / DISINFECTED] : This chamber has been cleaned / disinfected according to local and hospital policy and procedure prior to this treatment. [____] : Post-Dive: Time - [unfilled] [___] : Post-Dive: Value - [unfilled] mg/dL [Patient demonstrated and verbalized proper technique for using air break mask] : Patient demonstrated and verbalized proper technique for using air break mask [Patient educated on the risks of SMOKING prior to HBOT with understanding] : Patient educated on the risks of SMOKING prior to HBOT with understanding [Patient educated on the risks of CONSUMING ALCOHOL prior to HBOT with understanding] : Patient educated on the risks of CONSUMING ALCOHOL prior to HBOT with understanding [100% Cotton] : 100% cotton [Empty all pockets] : empty all pockets [No hair oils, wigs, hairpieces, pins] : no hair oils, wigs, hairpieces, pins  [Pre tx medications] : pre tx medications  [No make-up, creams] : no make-up, creams  [No jewelry] : no jewelry  [No matches, cigarettes, lighters] : no matches, cigarettes, lighters  [Hearing aid removed] : hearing aid removed [Dentures removed] : dentures removed [Ground bracelet on pt's wrist] : ground bracelet on pt's wrist  [Contacts removed] : contacts removed  [Remove nail polish] : remove nail polish  [No reading material] : no reading material  [Bra, undergarments removed] : bra, undergarments removed  [No contraindicated dressings] : no contraindicated dressings [Ground Wire - VISUAL Verification - Intact/Free of Obstruction] : Ground Wire - VISUAL Verification - Intact/Free of Obstruction  [Ground Continuity - Verified < 1 ohm w/ Wrist Strap Enrrique] : Ground Continuity - Verified < 1 ohm w/ Wrist Strap Enrrique [Clear all fields] : clear all fields [Number: ___] : Number: [unfilled] [Diagnosis: ___] : Diagnosis: [unfilled] [] : No [FreeTextEntry3] : 90 [FreeTextEntry5] : 1821 [FreeTextEntry7] : 1831 [FreeTextEntry9] : 2000 [de-identified] : 2009 [de-identified] : 108 MINUTES

## 2022-01-13 NOTE — ADDENDUM
[FreeTextEntry1] : PT ARRIVED A&OX3 \par ALL VITALS WITHIN PARAMETERS FOR HBOT\par PT DESCENDED TO 2.0 RAIMUNDO @ 1.75 PSI/MIN IN CHAMBER #4 WITHOUT INCIDENT\par PT RESTING AT TX DEPTH WITH VISIBLE CHEST RISE AND FALL OBSERVED CHAMBER SIDE\par PT ASCENDED FROM 2.0 RAIMUNDO @ 1.75 PSI/MIN WITHOUT INCIDENT\par PT TOLERATED TX WELL

## 2022-01-14 ENCOUNTER — OUTPATIENT (OUTPATIENT)
Dept: OUTPATIENT SERVICES | Facility: HOSPITAL | Age: 57
LOS: 1 days | Discharge: HOME | End: 2022-01-14
Payer: COMMERCIAL

## 2022-01-14 ENCOUNTER — APPOINTMENT (OUTPATIENT)
Dept: HYPERBARIC MEDICINE | Facility: HOSPITAL | Age: 57
End: 2022-01-14
Payer: COMMERCIAL

## 2022-01-14 VITALS
OXYGEN SATURATION: 98 % | DIASTOLIC BLOOD PRESSURE: 88 MMHG | HEART RATE: 66 BPM | SYSTOLIC BLOOD PRESSURE: 128 MMHG | TEMPERATURE: 98.6 F | RESPIRATION RATE: 16 BRPM

## 2022-01-14 VITALS
HEART RATE: 88 BPM | SYSTOLIC BLOOD PRESSURE: 136 MMHG | RESPIRATION RATE: 16 BRPM | OXYGEN SATURATION: 99 % | TEMPERATURE: 98.6 F | DIASTOLIC BLOOD PRESSURE: 88 MMHG

## 2022-01-14 DIAGNOSIS — Z90.710 ACQUIRED ABSENCE OF BOTH CERVIX AND UTERUS: Chronic | ICD-10-CM

## 2022-01-14 DIAGNOSIS — E11.622 TYPE 2 DIABETES MELLITUS WITH OTHER SKIN ULCER: ICD-10-CM

## 2022-01-14 DIAGNOSIS — Z98.890 OTHER SPECIFIED POSTPROCEDURAL STATES: Chronic | ICD-10-CM

## 2022-01-14 LAB — SARS-COV-2 RNA SPEC QL NAA+PROBE: SIGNIFICANT CHANGE UP

## 2022-01-14 PROCEDURE — G0277: CPT

## 2022-01-14 PROCEDURE — 99183 HYPERBARIC OXYGEN THERAPY: CPT

## 2022-01-14 PROCEDURE — 82962 GLUCOSE BLOOD TEST: CPT

## 2022-01-14 PROCEDURE — U0003: CPT

## 2022-01-14 PROCEDURE — U0005: CPT

## 2022-01-14 NOTE — PROCEDURE
[Outpatient] : Outpatient [Ambulatory] : Patient is ambulatory. [Other: ___] : [unfilled] [THIS CHAMBER HAS BEEN CLEANED / DISINFECTED] : This chamber has been cleaned / disinfected according to local and hospital policy and procedure prior to this treatment. [____] : Post-Dive: Time - [unfilled] [___] : Post-Dive: Value - [unfilled] mg/dL [Patient demonstrated and verbalized proper technique for using air break mask] : Patient demonstrated and verbalized proper technique for using air break mask [Patient educated on the risks of SMOKING prior to HBOT with understanding] : Patient educated on the risks of SMOKING prior to HBOT with understanding [Patient educated on the risks of CONSUMING ALCOHOL prior to HBOT with understanding] : Patient educated on the risks of CONSUMING ALCOHOL prior to HBOT with understanding [100% Cotton] : 100% cotton [Empty all pockets] : empty all pockets [No hair oils, wigs, hairpieces, pins] : no hair oils, wigs, hairpieces, pins  [Pre tx medications] : pre tx medications  [No make-up, creams] : no make-up, creams  [No jewelry] : no jewelry  [No matches, cigarettes, lighters] : no matches, cigarettes, lighters  [Hearing aid removed] : hearing aid removed [Dentures removed] : dentures removed [Ground bracelet on pt's wrist] : ground bracelet on pt's wrist  [Contacts removed] : contacts removed  [Remove nail polish] : remove nail polish  [No reading material] : no reading material  [Bra, undergarments removed] : bra, undergarments removed  [No contraindicated dressings] : no contraindicated dressings [Ground Wire - VISUAL Verification - Intact/Free of Obstruction] : Ground Wire - VISUAL Verification - Intact/Free of Obstruction  [Ground Continuity - Verified < 1 ohm w/ Wrist Strap Enrrique] : Ground Continuity - Verified < 1 ohm w/ Wrist Strap Enrrique [Clear all fields] : clear all fields [Number: ___] : Number: [unfilled] [Diagnosis: ___] : Diagnosis: [unfilled] [] : No [FreeTextEntry3] : 90 [FreeTextEntry5] : 0446 [FreeTextEntry7] : 1813 [FreeTextEntry9] : 1945 [de-identified] : 1954 [de-identified] : 108 MINUTES

## 2022-01-14 NOTE — ASSESSMENT
[No change from previous assessment] : No change from previous assessment [Patient descended without problem for 9 minutes] : Patient descended without problem for 9 minutes [No dizziness or thirst] :  No dizziness or thirst [No ear problems] : No ear problems [Vital signs stable] : Vital signs stable [Tolerating dive well] : Tolerating dive well [No Chest Pain, shortness of breath] : No Chest Pain, shortness of breath [Respiratory Rate Stable] : Respiratory Rate Stable [No chest pain, shortness of breath, or ear pain] :  No chest pain, shortness of breath, or ear pain  [Tolerated Ascent well] : Tolerated Ascent well [Vital Signs stable] : Vital Signs stable [A physician was present throughout the entire HBOT] : A physician was present throughout the entire HBOT [No] : No [Clinically Stable] : Clinically stable [Continue Treatment Plan] : Continue treatment plan

## 2022-01-17 ENCOUNTER — OUTPATIENT (OUTPATIENT)
Dept: OUTPATIENT SERVICES | Facility: HOSPITAL | Age: 57
LOS: 1 days | Discharge: HOME | End: 2022-01-17
Payer: COMMERCIAL

## 2022-01-17 ENCOUNTER — APPOINTMENT (OUTPATIENT)
Dept: HYPERBARIC MEDICINE | Facility: HOSPITAL | Age: 57
End: 2022-01-17
Payer: COMMERCIAL

## 2022-01-17 VITALS
SYSTOLIC BLOOD PRESSURE: 147 MMHG | OXYGEN SATURATION: 100 % | HEART RATE: 77 BPM | TEMPERATURE: 97.2 F | RESPIRATION RATE: 18 BRPM | DIASTOLIC BLOOD PRESSURE: 82 MMHG

## 2022-01-17 VITALS
HEART RATE: 72 BPM | RESPIRATION RATE: 18 BRPM | DIASTOLIC BLOOD PRESSURE: 76 MMHG | OXYGEN SATURATION: 96 % | TEMPERATURE: 97.7 F | SYSTOLIC BLOOD PRESSURE: 136 MMHG

## 2022-01-17 DIAGNOSIS — L97.323 NON-PRESSURE CHRONIC ULCER OF LEFT ANKLE WITH NECROSIS OF MUSCLE: ICD-10-CM

## 2022-01-17 DIAGNOSIS — E11.622 TYPE 2 DIABETES MELLITUS WITH OTHER SKIN ULCER: ICD-10-CM

## 2022-01-17 DIAGNOSIS — Z98.890 OTHER SPECIFIED POSTPROCEDURAL STATES: Chronic | ICD-10-CM

## 2022-01-17 DIAGNOSIS — Z90.710 ACQUIRED ABSENCE OF BOTH CERVIX AND UTERUS: Chronic | ICD-10-CM

## 2022-01-17 PROCEDURE — 99183 HYPERBARIC OXYGEN THERAPY: CPT

## 2022-01-17 PROCEDURE — 82962 GLUCOSE BLOOD TEST: CPT

## 2022-01-17 PROCEDURE — G0277: CPT

## 2022-01-17 NOTE — ADDENDUM
[FreeTextEntry1] : PT ARRIVED AXOX3 ASSISTED WITH KNEE SCOOTER\par ALL PT VITALS WITHIN PARAMETERS FOR HBOT\par YELLOW DRAINAGE SEEN ON PT DRESSING PRIOR TO HBOT RN NOTIFIED\par WOUND CARE TO FOLLOW HBOT\par PT DESCENDED TO 2.0 RAIMUNDO AT 1.75 PSI/MIN IN CHAMBER 1 WITHOUT INCIDENT\par PT RESTING AT DEPTH, VISIBLE CHEST RISE AND FALL OBSERVED CHAMBERSIDE\par PT ASCENDED TO SURFACE PRESSURE AT 1.75PSI/MIN IN CHAMBER 1\par PT RECEIVED OUTER DRESSING CHANGE VIA RN POST HBOT

## 2022-01-17 NOTE — PROCEDURE
[Outpatient] : Outpatient [Ambulatory] : Patient is ambulatory. [Other: ___] : [unfilled] [THIS CHAMBER HAS BEEN CLEANED / DISINFECTED] : This chamber has been cleaned / disinfected according to local and hospital policy and procedure prior to this treatment. [Patient demonstrated and verbalized proper technique for using air break mask] : Patient demonstrated and verbalized proper technique for using air break mask [Patient educated on the risks of SMOKING prior to HBOT with understanding] : Patient educated on the risks of SMOKING prior to HBOT with understanding [Patient educated on the risks of CONSUMING ALCOHOL prior to HBOT with understanding] : Patient educated on the risks of CONSUMING ALCOHOL prior to HBOT with understanding [100% Cotton] : 100% cotton [Empty all pockets] : empty all pockets [No hair oils, wigs, hairpieces, pins] : no hair oils, wigs, hairpieces, pins  [Pre tx medications] : pre tx medications  [No make-up, creams] : no make-up, creams  [No jewelry] : no jewelry  [No matches, cigarettes, lighters] : no matches, cigarettes, lighters  [Hearing aid removed] : hearing aid removed [Dentures removed] : dentures removed [Ground bracelet on pt's wrist] : ground bracelet on pt's wrist  [Contacts removed] : contacts removed  [Remove nail polish] : remove nail polish  [No reading material] : no reading material  [Bra, undergarments removed] : bra, undergarments removed  [No contraindicated dressings] : no contraindicated dressings [Ground Wire - VISUAL Verification - Intact/Free of Obstruction] : Ground Wire - VISUAL Verification - Intact/Free of Obstruction  [Ground Continuity - Verified < 1 ohm w/ Wrist Strap Enrrique] : Ground Continuity - Verified < 1 ohm w/ Wrist Strap Enrrique [Number: ___] : Number: [unfilled] [Diagnosis: ___] : Diagnosis: [unfilled] [____] : Recheck: Time - [unfilled] [___] : Recheck: Value - [unfilled] mg/dL [Clear all fields] : clear all fields [] : No [FreeTextEntry3] : 90 [FreeTextEntry5] : 1826 [FreeTextEntry7] : 1838 [FreeTextEntry9] : 2005 [de-identified] : 2014 [de-identified] : 108 MINUTES

## 2022-01-17 NOTE — PROCEDURE
[Outpatient] : Outpatient [Ambulatory] : Patient is ambulatory. [Other: ___] : [unfilled] [THIS CHAMBER HAS BEEN CLEANED / DISINFECTED] : This chamber has been cleaned / disinfected according to local and hospital policy and procedure prior to this treatment. [Patient demonstrated and verbalized proper technique for using air break mask] : Patient demonstrated and verbalized proper technique for using air break mask [Patient educated on the risks of SMOKING prior to HBOT with understanding] : Patient educated on the risks of SMOKING prior to HBOT with understanding [Patient educated on the risks of CONSUMING ALCOHOL prior to HBOT with understanding] : Patient educated on the risks of CONSUMING ALCOHOL prior to HBOT with understanding [100% Cotton] : 100% cotton [Empty all pockets] : empty all pockets [No hair oils, wigs, hairpieces, pins] : no hair oils, wigs, hairpieces, pins  [Pre tx medications] : pre tx medications  [No make-up, creams] : no make-up, creams  [No jewelry] : no jewelry  [No matches, cigarettes, lighters] : no matches, cigarettes, lighters  [Hearing aid removed] : hearing aid removed [Dentures removed] : dentures removed [Ground bracelet on pt's wrist] : ground bracelet on pt's wrist  [Contacts removed] : contacts removed  [Remove nail polish] : remove nail polish  [No reading material] : no reading material  [Bra, undergarments removed] : bra, undergarments removed  [No contraindicated dressings] : no contraindicated dressings [Ground Wire - VISUAL Verification - Intact/Free of Obstruction] : Ground Wire - VISUAL Verification - Intact/Free of Obstruction  [Ground Continuity - Verified < 1 ohm w/ Wrist Strap Enrrique] : Ground Continuity - Verified < 1 ohm w/ Wrist Strap Enrrique [Number: ___] : Number: [unfilled] [Diagnosis: ___] : Diagnosis: [unfilled] [____] : Post-Dive: Time - [unfilled] [___] : Post-Dive: Value - [unfilled] mg/dL [Clear all fields] : clear all fields [FreeTextEntry3] : 90 MIN [FreeTextEntry5] : 6211 [FreeTextEntry7] : 1812 [FreeTextEntry9] : 1944 [de-identified] : 1953 [de-identified] : 108 MIN

## 2022-01-17 NOTE — ADDENDUM
[FreeTextEntry1] : Pt's BGL low. DPM notified. Pt given 16g of glucose via 8oz juice Pt BGL retested. Pt BGL still low. as per  DPM  pt is  to take one 4 gram tablet every 20 Mins while in treatment. \par Pt received c dressing change and covid-19 swab by RN prior to tx. \par Pt descended to 2.0 RAIMUNDO @ 1.75 PSI/min without incident in chamber #3\par Pt resting @ depth with chest rise and fall observed throughout tx. \par Pt ascended from 2.0 RAIMUNDO @ 1.75 PSI/min without incident. \par Pt tolerated tx well.\par

## 2022-01-18 ENCOUNTER — APPOINTMENT (OUTPATIENT)
Dept: WOUND CARE | Facility: HOSPITAL | Age: 57
End: 2022-01-18
Payer: COMMERCIAL

## 2022-01-18 ENCOUNTER — OUTPATIENT (OUTPATIENT)
Dept: OUTPATIENT SERVICES | Facility: HOSPITAL | Age: 57
LOS: 1 days | Discharge: HOME | End: 2022-01-18
Payer: COMMERCIAL

## 2022-01-18 VITALS
OXYGEN SATURATION: 98 % | HEIGHT: 63 IN | DIASTOLIC BLOOD PRESSURE: 74 MMHG | BODY MASS INDEX: 43.41 KG/M2 | WEIGHT: 245 LBS | TEMPERATURE: 97.6 F | RESPIRATION RATE: 24 BRPM | HEART RATE: 65 BPM | SYSTOLIC BLOOD PRESSURE: 161 MMHG

## 2022-01-18 DIAGNOSIS — Z98.890 OTHER SPECIFIED POSTPROCEDURAL STATES: Chronic | ICD-10-CM

## 2022-01-18 DIAGNOSIS — E11.622 TYPE 2 DIABETES MELLITUS WITH OTHER SKIN ULCER: ICD-10-CM

## 2022-01-18 DIAGNOSIS — Z90.710 ACQUIRED ABSENCE OF BOTH CERVIX AND UTERUS: Chronic | ICD-10-CM

## 2022-01-18 PROCEDURE — 15271 SKIN SUB GRAFT TRNK/ARM/LEG: CPT

## 2022-01-18 PROCEDURE — 15275 SKIN SUB GRAFT FACE/NK/HF/G: CPT | Mod: 58

## 2022-01-19 ENCOUNTER — OUTPATIENT (OUTPATIENT)
Dept: OUTPATIENT SERVICES | Facility: HOSPITAL | Age: 57
LOS: 1 days | Discharge: HOME | End: 2022-01-19
Payer: COMMERCIAL

## 2022-01-19 ENCOUNTER — APPOINTMENT (OUTPATIENT)
Dept: HYPERBARIC MEDICINE | Facility: HOSPITAL | Age: 57
End: 2022-01-19
Payer: COMMERCIAL

## 2022-01-19 VITALS
HEART RATE: 66 BPM | RESPIRATION RATE: 18 BRPM | OXYGEN SATURATION: 100 % | DIASTOLIC BLOOD PRESSURE: 75 MMHG | SYSTOLIC BLOOD PRESSURE: 135 MMHG | TEMPERATURE: 97.8 F

## 2022-01-19 VITALS
RESPIRATION RATE: 18 BRPM | OXYGEN SATURATION: 18 % | HEART RATE: 76 BPM | DIASTOLIC BLOOD PRESSURE: 85 MMHG | SYSTOLIC BLOOD PRESSURE: 137 MMHG | TEMPERATURE: 97 F

## 2022-01-19 DIAGNOSIS — Z90.710 ACQUIRED ABSENCE OF BOTH CERVIX AND UTERUS: Chronic | ICD-10-CM

## 2022-01-19 DIAGNOSIS — Z90.710 ACQUIRED ABSENCE OF BOTH CERVIX AND UTERUS: ICD-10-CM

## 2022-01-19 DIAGNOSIS — E11.622 TYPE 2 DIABETES MELLITUS WITH OTHER SKIN ULCER: ICD-10-CM

## 2022-01-19 DIAGNOSIS — Z85.038 PERSONAL HISTORY OF OTHER MALIGNANT NEOPLASM OF LARGE INTESTINE: ICD-10-CM

## 2022-01-19 DIAGNOSIS — Z98.890 OTHER SPECIFIED POSTPROCEDURAL STATES: ICD-10-CM

## 2022-01-19 DIAGNOSIS — Z86.16 PERSONAL HISTORY OF COVID-19: ICD-10-CM

## 2022-01-19 DIAGNOSIS — L97.323 NON-PRESSURE CHRONIC ULCER OF LEFT ANKLE WITH NECROSIS OF MUSCLE: ICD-10-CM

## 2022-01-19 DIAGNOSIS — Z79.899 OTHER LONG TERM (CURRENT) DRUG THERAPY: ICD-10-CM

## 2022-01-19 DIAGNOSIS — Z79.51 LONG TERM (CURRENT) USE OF INHALED STEROIDS: ICD-10-CM

## 2022-01-19 DIAGNOSIS — Z98.890 OTHER SPECIFIED POSTPROCEDURAL STATES: Chronic | ICD-10-CM

## 2022-01-19 DIAGNOSIS — Z86.010 PERSONAL HISTORY OF COLONIC POLYPS: ICD-10-CM

## 2022-01-19 PROCEDURE — G0277: CPT

## 2022-01-19 PROCEDURE — 99183 HYPERBARIC OXYGEN THERAPY: CPT

## 2022-01-19 PROCEDURE — 82962 GLUCOSE BLOOD TEST: CPT

## 2022-01-20 ENCOUNTER — OUTPATIENT (OUTPATIENT)
Dept: OUTPATIENT SERVICES | Facility: HOSPITAL | Age: 57
LOS: 1 days | Discharge: HOME | End: 2022-01-20
Payer: COMMERCIAL

## 2022-01-20 ENCOUNTER — APPOINTMENT (OUTPATIENT)
Dept: HYPERBARIC MEDICINE | Facility: HOSPITAL | Age: 57
End: 2022-01-20
Payer: COMMERCIAL

## 2022-01-20 VITALS
SYSTOLIC BLOOD PRESSURE: 127 MMHG | OXYGEN SATURATION: 99 % | TEMPERATURE: 98.6 F | RESPIRATION RATE: 16 BRPM | DIASTOLIC BLOOD PRESSURE: 76 MMHG | HEART RATE: 88 BPM

## 2022-01-20 VITALS
SYSTOLIC BLOOD PRESSURE: 127 MMHG | TEMPERATURE: 98.6 F | HEART RATE: 88 BPM | OXYGEN SATURATION: 98 % | RESPIRATION RATE: 16 BRPM | DIASTOLIC BLOOD PRESSURE: 88 MMHG

## 2022-01-20 DIAGNOSIS — L97.323 NON-PRESSURE CHRONIC ULCER OF LEFT ANKLE WITH NECROSIS OF MUSCLE: ICD-10-CM

## 2022-01-20 DIAGNOSIS — E11.622 TYPE 2 DIABETES MELLITUS WITH OTHER SKIN ULCER: ICD-10-CM

## 2022-01-20 DIAGNOSIS — Z90.710 ACQUIRED ABSENCE OF BOTH CERVIX AND UTERUS: Chronic | ICD-10-CM

## 2022-01-20 DIAGNOSIS — Z98.890 OTHER SPECIFIED POSTPROCEDURAL STATES: Chronic | ICD-10-CM

## 2022-01-20 PROCEDURE — 82962 GLUCOSE BLOOD TEST: CPT

## 2022-01-20 PROCEDURE — G0277: CPT

## 2022-01-20 PROCEDURE — 99183 HYPERBARIC OXYGEN THERAPY: CPT

## 2022-01-20 NOTE — PROCEDURE
[Outpatient] : Outpatient [Other: ___] : [unfilled] [THIS CHAMBER HAS BEEN CLEANED / DISINFECTED] : This chamber has been cleaned / disinfected according to local and hospital policy and procedure prior to this treatment. [Patient demonstrated and verbalized proper technique for using air break mask] : Patient demonstrated and verbalized proper technique for using air break mask [Patient educated on the risks of SMOKING prior to HBOT with understanding] : Patient educated on the risks of SMOKING prior to HBOT with understanding [Patient educated on the risks of CONSUMING ALCOHOL prior to HBOT with understanding] : Patient educated on the risks of CONSUMING ALCOHOL prior to HBOT with understanding [100% Cotton] : 100% cotton [Empty all pockets] : empty all pockets [No hair oils, wigs, hairpieces, pins] : no hair oils, wigs, hairpieces, pins  [Pre tx medications] : pre tx medications  [No make-up, creams] : no make-up, creams  [No jewelry] : no jewelry  [No matches, cigarettes, lighters] : no matches, cigarettes, lighters  [Hearing aid removed] : hearing aid removed [Dentures removed] : dentures removed [Ground bracelet on pt's wrist] : ground bracelet on pt's wrist  [Contacts removed] : contacts removed  [Remove nail polish] : remove nail polish  [No reading material] : no reading material  [Bra, undergarments removed] : bra, undergarments removed  [No contraindicated dressings] : no contraindicated dressings [Ground Wire - VISUAL Verification - Intact/Free of Obstruction] : Ground Wire - VISUAL Verification - Intact/Free of Obstruction  [Ground Continuity - Verified < 1 ohm w/ Wrist Strap Enrrique] : Ground Continuity - Verified < 1 ohm w/ Wrist Strap Enrrique [Number: ___] : Number: [unfilled] [Diagnosis: ___] : Diagnosis: [unfilled] [____] : Post-Dive: Time - [unfilled] [___] : Post-Dive: Value - [unfilled] mg/dL [Clear all fields] : clear all fields [] : No [FreeTextEntry3] : 90 minutes [FreeTextEntry5] : 8248 [FreeTextEntry7] : 1640 [FreeTextEntry9] : 1926 [de-identified] : 1935 [de-identified] : 108 minutes

## 2022-01-20 NOTE — ADDENDUM
[FreeTextEntry1] : pt descended to tx depth 2.0 efrain @1.75 psi/min without incident in chamber #4 \par pt's resting at tx depth with visible chest rise and fall as observed chamber side \par pt ascended from tx depth without incident in chamber #4 \par pt tolerated tx well\par pt received ace wrap post hbot without incident

## 2022-01-21 ENCOUNTER — APPOINTMENT (OUTPATIENT)
Dept: HYPERBARIC MEDICINE | Facility: HOSPITAL | Age: 57
End: 2022-01-21
Payer: COMMERCIAL

## 2022-01-21 ENCOUNTER — OUTPATIENT (OUTPATIENT)
Dept: OUTPATIENT SERVICES | Facility: HOSPITAL | Age: 57
LOS: 1 days | Discharge: HOME | End: 2022-01-21
Payer: COMMERCIAL

## 2022-01-21 VITALS
TEMPERATURE: 98.6 F | SYSTOLIC BLOOD PRESSURE: 126 MMHG | RESPIRATION RATE: 16 BRPM | OXYGEN SATURATION: 98 % | DIASTOLIC BLOOD PRESSURE: 78 MMHG | HEART RATE: 74 BPM

## 2022-01-21 VITALS
TEMPERATURE: 98.6 F | OXYGEN SATURATION: 98 % | DIASTOLIC BLOOD PRESSURE: 78 MMHG | RESPIRATION RATE: 16 BRPM | HEART RATE: 90 BPM | SYSTOLIC BLOOD PRESSURE: 138 MMHG

## 2022-01-21 DIAGNOSIS — E11.622 TYPE 2 DIABETES MELLITUS WITH OTHER SKIN ULCER: ICD-10-CM

## 2022-01-21 DIAGNOSIS — Z98.890 OTHER SPECIFIED POSTPROCEDURAL STATES: Chronic | ICD-10-CM

## 2022-01-21 DIAGNOSIS — Z90.710 ACQUIRED ABSENCE OF BOTH CERVIX AND UTERUS: Chronic | ICD-10-CM

## 2022-01-21 DIAGNOSIS — L97.323 NON-PRESSURE CHRONIC ULCER OF LEFT ANKLE WITH NECROSIS OF MUSCLE: ICD-10-CM

## 2022-01-21 LAB — SARS-COV-2 RNA SPEC QL NAA+PROBE: SIGNIFICANT CHANGE UP

## 2022-01-21 PROCEDURE — 99183 HYPERBARIC OXYGEN THERAPY: CPT

## 2022-01-21 PROCEDURE — G0277: CPT

## 2022-01-21 PROCEDURE — U0003: CPT

## 2022-01-21 PROCEDURE — 82962 GLUCOSE BLOOD TEST: CPT

## 2022-01-21 PROCEDURE — U0005: CPT

## 2022-01-22 ENCOUNTER — APPOINTMENT (OUTPATIENT)
Dept: HYPERBARIC MEDICINE | Facility: HOSPITAL | Age: 57
End: 2022-01-22
Payer: COMMERCIAL

## 2022-01-22 ENCOUNTER — OUTPATIENT (OUTPATIENT)
Dept: OUTPATIENT SERVICES | Facility: HOSPITAL | Age: 57
LOS: 1 days | Discharge: HOME | End: 2022-01-22
Payer: COMMERCIAL

## 2022-01-22 VITALS
HEART RATE: 81 BPM | DIASTOLIC BLOOD PRESSURE: 78 MMHG | SYSTOLIC BLOOD PRESSURE: 138 MMHG | OXYGEN SATURATION: 98 % | TEMPERATURE: 98.6 F | RESPIRATION RATE: 18 BRPM

## 2022-01-22 VITALS
SYSTOLIC BLOOD PRESSURE: 139 MMHG | OXYGEN SATURATION: 98 % | RESPIRATION RATE: 16 BRPM | DIASTOLIC BLOOD PRESSURE: 76 MMHG | HEART RATE: 64 BPM | TEMPERATURE: 98.6 F

## 2022-01-22 DIAGNOSIS — E11.622 TYPE 2 DIABETES MELLITUS WITH OTHER SKIN ULCER: ICD-10-CM

## 2022-01-22 DIAGNOSIS — Z20.822 CONTACT WITH AND (SUSPECTED) EXPOSURE TO COVID-19: ICD-10-CM

## 2022-01-22 DIAGNOSIS — L97.323 NON-PRESSURE CHRONIC ULCER OF LEFT ANKLE WITH NECROSIS OF MUSCLE: ICD-10-CM

## 2022-01-22 DIAGNOSIS — Z98.890 OTHER SPECIFIED POSTPROCEDURAL STATES: Chronic | ICD-10-CM

## 2022-01-22 DIAGNOSIS — Z90.710 ACQUIRED ABSENCE OF BOTH CERVIX AND UTERUS: Chronic | ICD-10-CM

## 2022-01-22 PROCEDURE — 99183 HYPERBARIC OXYGEN THERAPY: CPT

## 2022-01-22 PROCEDURE — 82962 GLUCOSE BLOOD TEST: CPT

## 2022-01-22 PROCEDURE — G0277: CPT

## 2022-01-22 NOTE — REVIEW OF SYSTEMS
[Joint Stiffness] : joint stiffness [Skin Wound] : skin wound [Negative] : Heme/Lymph [FreeTextEntry1] : 1831hr [Fever] : no fever [Chills] : no chills [Eye Pain] : no eye pain [Loss Of Hearing] : no hearing loss [Abdominal Pain] : no abdominal pain [Vomiting] : no vomiting [Anxiety] : no anxiety [de-identified] : left lateral ankle ulcer down to skin, subcutaneous tissue, fat, and tendon [de-identified] : Possible prediabetic , elevated HgA1c and blood glucose , BMI 43 ^, patient now taking Metformin

## 2022-01-22 NOTE — ASSESSMENT
[Verbal] : Verbal [Written] : Written [Demo] : Demo [Patient] : Patient [Good - alert, interested, motivated] : Good - alert, interested, motivated [Verbalizes knowledge/Understanding] : Verbalizes knowledge/understanding [Dressing changes] : dressing changes [Foot Care] : foot care [Skin Care] : skin care [Signs and symptoms of infection] : sign and symptoms of infection [How and When to Call] : how and when to call [Pain Management] : pain management [Hyperbaric Therapy] : hyperbaric therapy [Compression Therapy] : compression therapy [Patient responsibility to plan of care] : patient responsibility to plan of care [Stable] : stable [Home] : Home [Other: ____] : [unfilled] [Not Applicable - Long Term Care/Home Health Agency] : Long Term Care/Home Health Agency: Not Applicable [] : No [FreeTextEntry2] : Infection prevention\par Localized wound care \par Goal remaining pain free regarding wounds\par Skin substitute therapy \par Hyperbaric oxygen therapy  [FreeTextEntry4] : 2nd apligraf applied\par Auth submitted for Apligraf # 3 \par Assessment in 1 week

## 2022-01-22 NOTE — ASSESSMENT
[Verbal] : Verbal [Written] : Written [Demo] : Demo [Patient] : Patient [Good - alert, interested, motivated] : Good - alert, interested, motivated [Verbalizes knowledge/Understanding] : Verbalizes knowledge/understanding [Dressing changes] : dressing changes [Skin Care] : skin care [Signs and symptoms of infection] : sign and symptoms of infection [Nutrition] : nutrition [How and When to Call] : how and when to call [Pain Management] : pain management [Hyperbaric Therapy] : hyperbaric therapy [Compression Therapy] : compression therapy [Patient responsibility to plan of care] : patient responsibility to plan of care [Stable] : stable [Home] : Home [Other: ____] : [unfilled] [Not Applicable - Long Term Care/Home Health Agency] : Long Term Care/Home Health Agency: Not Applicable [] : No [FreeTextEntry2] : Infection prevention\par Localized wound care \par Goal remaining pain free regarding wounds\par Hyperbaric oxygen therapy \par Skin substitute therapy  [FreeTextEntry3] : Wound the same in status \par  [FreeTextEntry4] : Auth submitted for Apligraf #2\par Assessment in 1 week

## 2022-01-22 NOTE — REVIEW OF SYSTEMS
[FreeTextEntry1] : 1821hr [Fever] : no fever [Chills] : no chills [Eye Pain] : no eye pain [Loss Of Hearing] : no hearing loss [Abdominal Pain] : no abdominal pain [Vomiting] : no vomiting [Joint Stiffness] : joint stiffness [Skin Wound] : skin wound [Anxiety] : no anxiety [Negative] : Heme/Lymph [de-identified] : left lateral ankle ulcer down to skin, subcutaneous tissue, fat, and tendon [de-identified] : Possible prediabetic , elevated HgA1c and blood glucose , BMI 43 ^, patient now taking Metformin

## 2022-01-22 NOTE — PHYSICAL EXAM
[4 x 4] : 4 x 4  [Abdominal Pad] : Abdominal Pad [1+] : left 1+ [Ankle Swelling (On Exam)] : present [Ankle Swelling Bilaterally] : bilaterally  [Ankle Swelling On The Left] : moderate [Alert] : alert [Oriented to Person] : oriented to person [Oriented to Place] : oriented to place [Oriented to Time] : oriented to time [Calm] : calm [Varicose Veins Of Lower Extremities] : not present [Purpura] : no purpura  [Petechiae] : no petechiae [Skin Ulcer] : no ulcer [de-identified] : A&Ox3, NAD [de-identified] : BMI 43 [de-identified] : s/p left peroneal tendon repair, 5/5 strength in all quadrants bilaterally [de-identified] : left lateral ankle ulcer down to skin, subcutaneous tissue, fat, and tendon [de-identified] : wnl [FreeTextEntry1] : Left lateral ankle  [FreeTextEntry2] : 5 [FreeTextEntry3] : 1.1 [FreeTextEntry4] : 0.3 [de-identified] : Serous/sanguinous [de-identified] : 80%  [de-identified] : Apligraf  [de-identified] : Expressed comfort post ACE application. [de-identified] : apligraf, Calcium alginate, adaptic touch  [de-identified] : Cleansed with NS\par Kerlix \par * Bandage applied by DPM \par Post debridement measurements: 5.1/1.2/0.4 \par \par 1 unit of 44sq/cm Apligraf applied.\par 40% used, 60% discarded \par ITM: [id number]\par EXP: 1/14/22\par \par Irrigated with 0.9% Normal saline \par Lot: 28 - 209 - [id number] - 01\par EXP: 4/1/24  [TWNoteComboBox4] : Small [de-identified] : Normal [de-identified] : None [de-identified] : None [de-identified] : <20% [de-identified] : Yes [de-identified] : 2.5% Lidocaine Topical [TWNoteComboBox7] : Sonido [de-identified] : Application of skin substitute [de-identified] : Ace wraps [de-identified] : Weekly [de-identified] : Primary Dressing

## 2022-01-22 NOTE — PROCEDURE
[Saline] : saline [Skin] : skin [Subcutaneous Tissue] : subcutaneous tissue [Fenestrated] : fenestrated [Hydrated with saline] : hydrated with saline [Apligraf] : apligraf [____ % was used] : and [unfilled] % was used [____ % was discarded] : and [unfilled] % was discarded [FreeTextEntry1] : adaptic, calcium alginate, dry dressing, ACE [FreeTextEntry9] : 1849hr [de-identified] : left lateral ankle ulcer down to skin, subcutaneous tissue, fat, and tendon [de-identified] : nitesh [de-identified] : compa [FreeTextEntry6] : left lateral ankle ulcer down to skin, subcutaneous tissue, fat, and tendon [FreeTextEntry7] : left lateral ankle ulcer down to skin, subcutaneous tissue, fat, and tendon [de-identified] : skin, subcutaneous tissue, fat

## 2022-01-22 NOTE — PROCEDURE
[Saline] : saline [Skin] : skin [Subcutaneous Tissue] : subcutaneous tissue [Fenestrated] : fenestrated [Hydrated with saline] : hydrated with saline [Apligraf] : apligraf [____ % was used] : and [unfilled] % was used [____ % was discarded] : and [unfilled] % was discarded [FreeTextEntry1] : adaptic, calcium alginate, dry dressing, ACE [FreeTextEntry9] : 1839hr [de-identified] : left lateral ankle ulcer down to skin, subcutaneous tissue, fat, and tendon [de-identified] : nitesh [de-identified] : compa [FreeTextEntry6] : left lateral ankle ulcer down to skin, subcutaneous tissue, fat, and tendon [FreeTextEntry7] : left lateral ankle ulcer down to skin, subcutaneous tissue, fat, and tendon [de-identified] : skin, subcutaneous tissue, fat

## 2022-01-22 NOTE — PROCEDURE
[Outpatient] : Outpatient [Ambulatory] : Patient is ambulatory. [Other: ___] : [unfilled] [THIS CHAMBER HAS BEEN CLEANED / DISINFECTED] : This chamber has been cleaned / disinfected according to local and hospital policy and procedure prior to this treatment. [Patient demonstrated and verbalized proper technique for using air break mask] : Patient demonstrated and verbalized proper technique for using air break mask [Patient educated on the risks of SMOKING prior to HBOT with understanding] : Patient educated on the risks of SMOKING prior to HBOT with understanding [Patient educated on the risks of CONSUMING ALCOHOL prior to HBOT with understanding] : Patient educated on the risks of CONSUMING ALCOHOL prior to HBOT with understanding [100% Cotton] : 100% cotton [Empty all pockets] : empty all pockets [No hair oils, wigs, hairpieces, pins] : no hair oils, wigs, hairpieces, pins  [Pre tx medications] : pre tx medications  [No make-up, creams] : no make-up, creams  [No jewelry] : no jewelry  [No matches, cigarettes, lighters] : no matches, cigarettes, lighters  [Hearing aid removed] : hearing aid removed [Dentures removed] : dentures removed [Ground bracelet on pt's wrist] : ground bracelet on pt's wrist  [Contacts removed] : contacts removed  [Remove nail polish] : remove nail polish  [No reading material] : no reading material  [Bra, undergarments removed] : bra, undergarments removed  [No contraindicated dressings] : no contraindicated dressings [Ground Wire - VISUAL Verification - Intact/Free of Obstruction] : Ground Wire - VISUAL Verification - Intact/Free of Obstruction  [Ground Continuity - Verified < 1 ohm w/ Wrist Strap Enrrique] : Ground Continuity - Verified < 1 ohm w/ Wrist Strap Enrrique [Number: ___] : Number: [unfilled] [Diagnosis: ___] : Diagnosis: [unfilled] [____] : Post-Dive: Time - [unfilled] [___] : Post-Dive: Value - [unfilled] mg/dL [Clear all fields] : clear all fields [] : No [FreeTextEntry1] : 2.0 [FreeTextEntry3] : 90 MINS [FreeTextEntry5] : 0997 [FreeTextEntry7] : 7011 [FreeTextEntry9] : 1035 [de-identified] : 1049 [de-identified] : 108 MINS

## 2022-01-22 NOTE — ADDENDUM
[FreeTextEntry1] : Due to delay pt's BGL was assessed  2nd time and found within  normal limits for HBOT tx. \par PT ARRIVED TO [facility] AXOX3 ASSISTED WITH KNEE SCOOTER\par PT VITALS DETERMINED TO BE WITHIN PARAMETERS FOR HBOT\par PT DRESSING SEEN TO HAVE NO VISIBLE DRAINAGE ON OUTER DRESSING\par PT DESCENDED TO 2.0ATA @ 1.75 PSI/MIN IN CHAMBER 1 WITHOUT INCIDENT\par PT SEEN RESTING AT DEPTH WITH VISIBLE CHEST RISE AND FALL OBSERVED CHAMBERSIDE.\par Pt ascended from 2.0 RAIMUNDO @ 1.75 PSI/min without incident. \par Pt tolerated tx well. pt Recieved Ace wrap by RN post tx. \par

## 2022-01-22 NOTE — ADDENDUM
[FreeTextEntry1] : pt received Covid-19 swab prior to tx. \par pt descended to tx depth 2.0 efrain @1.75 psi/min without incident in chamber #2\par pt's resting at tx depth with visible chest rise and fall as observed chamber side \par pt ascended from tx depth without incident in chamber #2\par pt tolerated tx well\par Pt received ace wrap by Rn post tx. \par

## 2022-01-22 NOTE — PHYSICAL EXAM
[4 x 4] : 4 x 4  [Abdominal Pad] : Abdominal Pad [1+] : left 1+ [Ankle Swelling (On Exam)] : present [Ankle Swelling Bilaterally] : bilaterally  [Ankle Swelling On The Left] : moderate [Varicose Veins Of Lower Extremities] : bilaterally [Purpura] : no purpura  [Petechiae] : no petechiae [Skin Ulcer] : no ulcer [Alert] : alert [Oriented to Person] : oriented to person [Oriented to Place] : oriented to place [Oriented to Time] : oriented to time [Calm] : calm [de-identified] : A&Ox3, NAD [de-identified] : BMI 43 [de-identified] : s/p left peroneal tendon repair, 5/5 strength in all quadrants bilaterally [de-identified] : left lateral ankle ulcer down to skin, subcutaneous tissue, fat, and tendon [de-identified] : wnl [FreeTextEntry1] : Left lateral ankle  [FreeTextEntry2] : 4 [FreeTextEntry3] : 1.5 [FreeTextEntry4] : 0.3 [de-identified] : Serous/sanguinous [de-identified] : 80%  [de-identified] : Apligraf  [de-identified] : Expressed comfort post ACE application. [de-identified] : apligraf, Calcium alginate, adaptic touch  [de-identified] : Cleansed with NS\par Kerlix \par * Bandage applied by DPM \par Post debridement measurements: 4.1/1.6/0.4 \par \par 1 unit of 44sq/cm Apligraf applied.\par 40% used, 60% discarded \par ITM: [id number]\par EXP: 1/25/22\par \par Irrigated with 0.9% Normal saline \par Lot: 28 - 209 - 4B - 01\par EXP: 4/1/24  [TWNoteComboBox4] : Small [de-identified] : Normal [de-identified] : None [de-identified] : None [de-identified] : <20% [de-identified] : Yes [de-identified] : False [TWNoteComboBox7] : Sonido [de-identified] : Application of skin substitute [de-identified] : Ace wraps [de-identified] : Weekly [de-identified] : Primary Dressing

## 2022-01-22 NOTE — PROCEDURE
[Outpatient] : Outpatient [Other: ___] : [unfilled] [THIS CHAMBER HAS BEEN CLEANED / DISINFECTED] : This chamber has been cleaned / disinfected according to local and hospital policy and procedure prior to this treatment. [____] : Post-Dive: Time - [unfilled] [___] : Post-Dive: Value - [unfilled] mg/dL [Patient demonstrated and verbalized proper technique for using air break mask] : Patient demonstrated and verbalized proper technique for using air break mask [Patient educated on the risks of SMOKING prior to HBOT with understanding] : Patient educated on the risks of SMOKING prior to HBOT with understanding [Patient educated on the risks of CONSUMING ALCOHOL prior to HBOT with understanding] : Patient educated on the risks of CONSUMING ALCOHOL prior to HBOT with understanding [100% Cotton] : 100% cotton [Empty all pockets] : empty all pockets [No hair oils, wigs, hairpieces, pins] : no hair oils, wigs, hairpieces, pins  [Pre tx medications] : pre tx medications  [No make-up, creams] : no make-up, creams  [No jewelry] : no jewelry  [No matches, cigarettes, lighters] : no matches, cigarettes, lighters  [Hearing aid removed] : hearing aid removed [Dentures removed] : dentures removed [Ground bracelet on pt's wrist] : ground bracelet on pt's wrist  [Contacts removed] : contacts removed  [Remove nail polish] : remove nail polish  [No reading material] : no reading material  [Bra, undergarments removed] : bra, undergarments removed  [No contraindicated dressings] : no contraindicated dressings [Ground Wire - VISUAL Verification - Intact/Free of Obstruction] : Ground Wire - VISUAL Verification - Intact/Free of Obstruction  [Ground Continuity - Verified < 1 ohm w/ Wrist Strap Enrrique] : Ground Continuity - Verified < 1 ohm w/ Wrist Strap Enrrique [Clear all fields] : clear all fields [Diagnosis: ___] : Diagnosis: [unfilled] [Number: ___] : Number: [unfilled] [] : No [FreeTextEntry3] : 90 minutes [FreeTextEntry5] : 7197 [FreeTextEntry7] : 8900 [FreeTextEntry9] : 1830 [de-identified] : 1839 [de-identified] : 108 minutes

## 2022-01-24 ENCOUNTER — OUTPATIENT (OUTPATIENT)
Dept: OUTPATIENT SERVICES | Facility: HOSPITAL | Age: 57
LOS: 1 days | Discharge: HOME | End: 2022-01-24
Payer: COMMERCIAL

## 2022-01-24 ENCOUNTER — APPOINTMENT (OUTPATIENT)
Dept: HYPERBARIC MEDICINE | Facility: HOSPITAL | Age: 57
End: 2022-01-24
Payer: COMMERCIAL

## 2022-01-24 VITALS
SYSTOLIC BLOOD PRESSURE: 141 MMHG | OXYGEN SATURATION: 97 % | TEMPERATURE: 97.4 F | RESPIRATION RATE: 18 BRPM | HEART RATE: 97 BPM | DIASTOLIC BLOOD PRESSURE: 74 MMHG

## 2022-01-24 VITALS
HEART RATE: 58 BPM | RESPIRATION RATE: 18 BRPM | DIASTOLIC BLOOD PRESSURE: 70 MMHG | OXYGEN SATURATION: 100 % | TEMPERATURE: 98.1 F | SYSTOLIC BLOOD PRESSURE: 148 MMHG

## 2022-01-24 DIAGNOSIS — L97.323 NON-PRESSURE CHRONIC ULCER OF LEFT ANKLE WITH NECROSIS OF MUSCLE: ICD-10-CM

## 2022-01-24 DIAGNOSIS — Z98.890 OTHER SPECIFIED POSTPROCEDURAL STATES: Chronic | ICD-10-CM

## 2022-01-24 DIAGNOSIS — E11.622 TYPE 2 DIABETES MELLITUS WITH OTHER SKIN ULCER: ICD-10-CM

## 2022-01-24 DIAGNOSIS — Z90.710 ACQUIRED ABSENCE OF BOTH CERVIX AND UTERUS: Chronic | ICD-10-CM

## 2022-01-24 PROCEDURE — G0277: CPT

## 2022-01-24 PROCEDURE — 82962 GLUCOSE BLOOD TEST: CPT

## 2022-01-24 PROCEDURE — 99183 HYPERBARIC OXYGEN THERAPY: CPT

## 2022-01-25 ENCOUNTER — OUTPATIENT (OUTPATIENT)
Dept: OUTPATIENT SERVICES | Facility: HOSPITAL | Age: 57
LOS: 1 days | Discharge: HOME | End: 2022-01-25
Payer: COMMERCIAL

## 2022-01-25 ENCOUNTER — APPOINTMENT (OUTPATIENT)
Dept: HYPERBARIC MEDICINE | Facility: HOSPITAL | Age: 57
End: 2022-01-25
Payer: COMMERCIAL

## 2022-01-25 VITALS
BODY MASS INDEX: 23.92 KG/M2 | HEIGHT: 63 IN | SYSTOLIC BLOOD PRESSURE: 148 MMHG | DIASTOLIC BLOOD PRESSURE: 80 MMHG | RESPIRATION RATE: 18 BRPM | WEIGHT: 135 LBS | HEART RATE: 68 BPM | TEMPERATURE: 97.2 F | OXYGEN SATURATION: 99 %

## 2022-01-25 DIAGNOSIS — Z98.890 OTHER SPECIFIED POSTPROCEDURAL STATES: Chronic | ICD-10-CM

## 2022-01-25 DIAGNOSIS — E11.622 TYPE 2 DIABETES MELLITUS WITH OTHER SKIN ULCER: ICD-10-CM

## 2022-01-25 DIAGNOSIS — Z90.710 ACQUIRED ABSENCE OF BOTH CERVIX AND UTERUS: Chronic | ICD-10-CM

## 2022-01-25 PROCEDURE — 11043 DBRDMT MUSC&/FSCA 1ST 20/<: CPT

## 2022-01-25 PROCEDURE — 11042 DBRDMT SUBQ TIS 1ST 20SQCM/<: CPT | Mod: 58

## 2022-01-25 RX ORDER — AMOXICILLIN AND CLAVULANATE POTASSIUM 875; 125 MG/1; MG/1
875-125 TABLET, COATED ORAL
Qty: 14 | Refills: 0 | Status: COMPLETED | COMMUNITY
Start: 2022-01-25 | End: 2022-02-01

## 2022-01-26 ENCOUNTER — OUTPATIENT (OUTPATIENT)
Dept: OUTPATIENT SERVICES | Facility: HOSPITAL | Age: 57
LOS: 1 days | Discharge: HOME | End: 2022-01-26
Payer: COMMERCIAL

## 2022-01-26 ENCOUNTER — APPOINTMENT (OUTPATIENT)
Dept: HYPERBARIC MEDICINE | Facility: HOSPITAL | Age: 57
End: 2022-01-26
Payer: COMMERCIAL

## 2022-01-26 VITALS
TEMPERATURE: 98.6 F | RESPIRATION RATE: 16 BRPM | DIASTOLIC BLOOD PRESSURE: 78 MMHG | OXYGEN SATURATION: 98 % | SYSTOLIC BLOOD PRESSURE: 124 MMHG | HEART RATE: 74 BPM

## 2022-01-26 VITALS
RESPIRATION RATE: 16 BRPM | OXYGEN SATURATION: 99 % | SYSTOLIC BLOOD PRESSURE: 134 MMHG | HEART RATE: 88 BPM | TEMPERATURE: 98.6 F | DIASTOLIC BLOOD PRESSURE: 88 MMHG

## 2022-01-26 DIAGNOSIS — E11.622 TYPE 2 DIABETES MELLITUS WITH OTHER SKIN ULCER: ICD-10-CM

## 2022-01-26 DIAGNOSIS — Z98.890 OTHER SPECIFIED POSTPROCEDURAL STATES: Chronic | ICD-10-CM

## 2022-01-26 DIAGNOSIS — L97.323 NON-PRESSURE CHRONIC ULCER OF LEFT ANKLE WITH NECROSIS OF MUSCLE: ICD-10-CM

## 2022-01-26 DIAGNOSIS — Z90.710 ACQUIRED ABSENCE OF BOTH CERVIX AND UTERUS: Chronic | ICD-10-CM

## 2022-01-26 PROCEDURE — 82962 GLUCOSE BLOOD TEST: CPT

## 2022-01-26 PROCEDURE — G0277: CPT

## 2022-01-26 PROCEDURE — 99183 HYPERBARIC OXYGEN THERAPY: CPT

## 2022-01-27 ENCOUNTER — APPOINTMENT (OUTPATIENT)
Dept: HYPERBARIC MEDICINE | Facility: HOSPITAL | Age: 57
End: 2022-01-27
Payer: COMMERCIAL

## 2022-01-27 ENCOUNTER — OUTPATIENT (OUTPATIENT)
Dept: OUTPATIENT SERVICES | Facility: HOSPITAL | Age: 57
LOS: 1 days | Discharge: HOME | End: 2022-01-27
Payer: COMMERCIAL

## 2022-01-27 VITALS
TEMPERATURE: 98.6 F | SYSTOLIC BLOOD PRESSURE: 150 MMHG | RESPIRATION RATE: 18 BRPM | HEART RATE: 88 BPM | OXYGEN SATURATION: 98 % | DIASTOLIC BLOOD PRESSURE: 70 MMHG

## 2022-01-27 VITALS
RESPIRATION RATE: 16 BRPM | OXYGEN SATURATION: 99 % | TEMPERATURE: 98.3 F | HEART RATE: 88 BPM | DIASTOLIC BLOOD PRESSURE: 88 MMHG | SYSTOLIC BLOOD PRESSURE: 164 MMHG

## 2022-01-27 DIAGNOSIS — L97.323 NON-PRESSURE CHRONIC ULCER OF LEFT ANKLE WITH NECROSIS OF MUSCLE: ICD-10-CM

## 2022-01-27 DIAGNOSIS — E11.622 TYPE 2 DIABETES MELLITUS WITH OTHER SKIN ULCER: ICD-10-CM

## 2022-01-27 DIAGNOSIS — Z98.890 OTHER SPECIFIED POSTPROCEDURAL STATES: Chronic | ICD-10-CM

## 2022-01-27 DIAGNOSIS — Z90.710 ACQUIRED ABSENCE OF BOTH CERVIX AND UTERUS: Chronic | ICD-10-CM

## 2022-01-27 PROCEDURE — 82962 GLUCOSE BLOOD TEST: CPT

## 2022-01-27 PROCEDURE — 99183 HYPERBARIC OXYGEN THERAPY: CPT

## 2022-01-27 PROCEDURE — G0277: CPT

## 2022-01-27 NOTE — ADDENDUM
[FreeTextEntry1] : pt descended to tx depth 2.0 efrain @1.75 psi/min without incident in chamber #2\par pt's resting at tx depth with visible chest rise and fall as observed chamber side \par pt ascended from tx depth without incident in chamber #2\par pt tolerated tx well\par pt received Wc and  ace wrap post hbot without incident

## 2022-01-27 NOTE — PROCEDURE
[Outpatient] : Outpatient [Other: ___] : [unfilled] [THIS CHAMBER HAS BEEN CLEANED / DISINFECTED] : This chamber has been cleaned / disinfected according to local and hospital policy and procedure prior to this treatment. [____] : Post-Dive: Time - [unfilled] [___] : Post-Dive: Value - [unfilled] mg/dL [Patient demonstrated and verbalized proper technique for using air break mask] : Patient demonstrated and verbalized proper technique for using air break mask [Patient educated on the risks of SMOKING prior to HBOT with understanding] : Patient educated on the risks of SMOKING prior to HBOT with understanding [Patient educated on the risks of CONSUMING ALCOHOL prior to HBOT with understanding] : Patient educated on the risks of CONSUMING ALCOHOL prior to HBOT with understanding [100% Cotton] : 100% cotton [Empty all pockets] : empty all pockets [No hair oils, wigs, hairpieces, pins] : no hair oils, wigs, hairpieces, pins  [Pre tx medications] : pre tx medications  [No make-up, creams] : no make-up, creams  [No jewelry] : no jewelry  [No matches, cigarettes, lighters] : no matches, cigarettes, lighters  [Hearing aid removed] : hearing aid removed [Dentures removed] : dentures removed [Ground bracelet on pt's wrist] : ground bracelet on pt's wrist  [Contacts removed] : contacts removed  [Remove nail polish] : remove nail polish  [No reading material] : no reading material  [Bra, undergarments removed] : bra, undergarments removed  [No contraindicated dressings] : no contraindicated dressings [Ground Wire - VISUAL Verification - Intact/Free of Obstruction] : Ground Wire - VISUAL Verification - Intact/Free of Obstruction  [Ground Continuity - Verified < 1 ohm w/ Wrist Strap Enrrique] : Ground Continuity - Verified < 1 ohm w/ Wrist Strap Enrrique [Clear all fields] : clear all fields [Number: ___] : Number: [unfilled] [Diagnosis: ___] : Diagnosis: [unfilled] [] : No [FreeTextEntry3] : 90 minutes [FreeTextEntry5] : 3863 [[facility]] : 2731 [FreeTextEntry9] : 1940 [de-identified] : 1949 [de-identified] : 108 minutes

## 2022-01-27 NOTE — ADDENDUM
[FreeTextEntry1] : PT ARRIVED A&OX3 WITH THE USE OF A WALKER\par ALL VITALS WITHIN PARAMETERS FOR HBOT WITH THE EXCEPTION OF BGL\par AS PER DPM, PT IS TO TAKE 1, 4 GRAM TABLET EVERY 20 MINUTES WHILE IN TREATMENT\par PT DESCENDED TO 2.0 RAIMUNDO @ 1.75 PSI/MIN IN CHAMBER #4 WITHOUT INCIDENT\par PT RESTING AT TX DEPTH WITH VISIBLE CHEST RISE AND FALL OBSERVED CHAMBER SIDE \par PT ASCENDED FROM TX DEPTH WITHOUT INCIDENT. PT TOLERATED TX WELL.

## 2022-01-27 NOTE — PROCEDURE
[Outpatient] : Outpatient [Ambulatory] : Patient is ambulatory. [Other: ___] : [unfilled] [Patient demonstrated and verbalized proper technique for using air break mask] : Patient demonstrated and verbalized proper technique for using air break mask [Patient educated on the risks of SMOKING prior to HBOT with understanding] : Patient educated on the risks of SMOKING prior to HBOT with understanding [Patient educated on the risks of CONSUMING ALCOHOL prior to HBOT with understanding] : Patient educated on the risks of CONSUMING ALCOHOL prior to HBOT with understanding [100% Cotton] : 100% cotton [Empty all pockets] : empty all pockets [No hair oils, wigs, hairpieces, pins] : no hair oils, wigs, hairpieces, pins  [Pre tx medications] : pre tx medications  [No make-up, creams] : no make-up, creams  [No jewelry] : no jewelry  [No matches, cigarettes, lighters] : no matches, cigarettes, lighters  [Hearing aid removed] : hearing aid removed [Dentures removed] : dentures removed [Ground bracelet on pt's wrist] : ground bracelet on pt's wrist  [Contacts removed] : contacts removed  [Remove nail polish] : remove nail polish  [No reading material] : no reading material  [Bra, undergarments removed] : bra, undergarments removed  [No contraindicated dressings] : no contraindicated dressings [Ground Wire - VISUAL Verification - Intact/Free of Obstruction] : Ground Wire - VISUAL Verification - Intact/Free of Obstruction  [Ground Continuity - Verified < 1 ohm w/ Wrist Strap Enrrique] : Ground Continuity - Verified < 1 ohm w/ Wrist Strap Enrrique [Number: ___] : Number: [unfilled] [Diagnosis: ___] : Diagnosis: [unfilled] [____] : Post-Dive: Time - [unfilled] [___] : Post-Dive: Value - [unfilled] mg/dL [Clear all fields] : clear all fields [] : No [FreeTextEntry3] : 90 [FreeTextEntry5] : 8967 [FreeTextEntry7] : 1812 [FreeTextEntry9] : 1944 [de-identified] : 1953 [de-identified] : 108 MIN

## 2022-01-28 ENCOUNTER — APPOINTMENT (OUTPATIENT)
Dept: HYPERBARIC MEDICINE | Facility: HOSPITAL | Age: 57
End: 2022-01-28
Payer: COMMERCIAL

## 2022-01-28 ENCOUNTER — OUTPATIENT (OUTPATIENT)
Dept: OUTPATIENT SERVICES | Facility: HOSPITAL | Age: 57
LOS: 1 days | Discharge: HOME | End: 2022-01-28
Payer: COMMERCIAL

## 2022-01-28 VITALS
TEMPERATURE: 97.9 F | OXYGEN SATURATION: 97 % | HEART RATE: 82 BPM | RESPIRATION RATE: 18 BRPM | DIASTOLIC BLOOD PRESSURE: 86 MMHG | SYSTOLIC BLOOD PRESSURE: 120 MMHG

## 2022-01-28 VITALS
RESPIRATION RATE: 18 BRPM | OXYGEN SATURATION: 100 % | TEMPERATURE: 99 F | DIASTOLIC BLOOD PRESSURE: 82 MMHG | SYSTOLIC BLOOD PRESSURE: 136 MMHG | HEART RATE: 62 BPM

## 2022-01-28 DIAGNOSIS — Z90.710 ACQUIRED ABSENCE OF BOTH CERVIX AND UTERUS: Chronic | ICD-10-CM

## 2022-01-28 DIAGNOSIS — E11.622 TYPE 2 DIABETES MELLITUS WITH OTHER SKIN ULCER: ICD-10-CM

## 2022-01-28 DIAGNOSIS — Z98.890 OTHER SPECIFIED POSTPROCEDURAL STATES: Chronic | ICD-10-CM

## 2022-01-28 LAB — SARS-COV-2 RNA SPEC QL NAA+PROBE: SIGNIFICANT CHANGE UP

## 2022-01-28 PROCEDURE — G0277: CPT

## 2022-01-28 PROCEDURE — U0003: CPT

## 2022-01-28 PROCEDURE — 99183 HYPERBARIC OXYGEN THERAPY: CPT

## 2022-01-28 PROCEDURE — 82962 GLUCOSE BLOOD TEST: CPT

## 2022-01-28 PROCEDURE — U0005: CPT

## 2022-01-29 DIAGNOSIS — Z20.822 CONTACT WITH AND (SUSPECTED) EXPOSURE TO COVID-19: ICD-10-CM

## 2022-01-29 DIAGNOSIS — E11.622 TYPE 2 DIABETES MELLITUS WITH OTHER SKIN ULCER: ICD-10-CM

## 2022-01-29 DIAGNOSIS — L97.323 NON-PRESSURE CHRONIC ULCER OF LEFT ANKLE WITH NECROSIS OF MUSCLE: ICD-10-CM

## 2022-01-29 NOTE — ADDENDUM
[FreeTextEntry1] : MD made aware of pt late arrival from regular scheduled 16:15 today only 1/28/21 \par as per MD pt to receive 1 HR tx today 1/28/21 \par Order for 1 hr tx was made and signed by supervising MD\par pt made aware and was fine with the tx time\par pt descended to tx depth 2.0 efrain @1.75 psi/min without incident in chamber #1 \par pt's resting at tx depth with visible chest rise and fall as observed chamber side\par pt ascended from tx depth without incident in chamber #1 \par pt tolerated tx well \par pt received wc post hbot without incident \par pt received covid swab post hbot without incident \par

## 2022-01-29 NOTE — PROCEDURE
[Outpatient] : Outpatient [Other: ___] : [unfilled] [THIS CHAMBER HAS BEEN CLEANED / DISINFECTED] : This chamber has been cleaned / disinfected according to local and hospital policy and procedure prior to this treatment. [Patient demonstrated and verbalized proper technique for using air break mask] : Patient demonstrated and verbalized proper technique for using air break mask [Patient educated on the risks of SMOKING prior to HBOT with understanding] : Patient educated on the risks of SMOKING prior to HBOT with understanding [Patient educated on the risks of CONSUMING ALCOHOL prior to HBOT with understanding] : Patient educated on the risks of CONSUMING ALCOHOL prior to HBOT with understanding [100% Cotton] : 100% cotton [Empty all pockets] : empty all pockets [No hair oils, wigs, hairpieces, pins] : no hair oils, wigs, hairpieces, pins  [Pre tx medications] : pre tx medications  [No make-up, creams] : no make-up, creams  [No jewelry] : no jewelry  [No matches, cigarettes, lighters] : no matches, cigarettes, lighters  [Hearing aid removed] : hearing aid removed [Dentures removed] : dentures removed [Ground bracelet on pt's wrist] : ground bracelet on pt's wrist  [Contacts removed] : contacts removed  [Remove nail polish] : remove nail polish  [No reading material] : no reading material  [Bra, undergarments removed] : bra, undergarments removed  [No contraindicated dressings] : no contraindicated dressings [Ground Wire - VISUAL Verification - Intact/Free of Obstruction] : Ground Wire - VISUAL Verification - Intact/Free of Obstruction  [Ground Continuity - Verified < 1 ohm w/ Wrist Strap Enrrique] : Ground Continuity - Verified < 1 ohm w/ Wrist Strap Enrrique [Number: ___] : Number: [unfilled] [Diagnosis: ___] : Diagnosis: [unfilled] [____] : Post-Dive: Time - [unfilled] [___] : Post-Dive: Value - [unfilled] mg/dL [Clear all fields] : clear all fields [] : No [FreeTextEntry3] : 60 minutes [Free[facility]Entry[facility]] : 6597 [FreeTextEntry7] : 7019 [FreeTextEntry9] : 1843 [de-identified] : 1755 [de-identified] : 78 minutes

## 2022-01-29 NOTE — ASSESSMENT
[No change from previous assessment] : No change from previous assessment [Patient prepared for dive] : Patient prepared for dive [Patient descended without problem for 9 minutes] : Patient descended without problem for 9 minutes [No dizziness or thirst] :  No dizziness or thirst [No ear problems] : No ear problems [Vital signs stable] : Vital signs stable [Tolerating dive well] : Tolerating dive well [No Chest Pain, shortness of breath] : No Chest Pain, shortness of breath [Respiratory Rate Stable] : Respiratory Rate Stable [No chest pain, shortness of breath, or ear pain] :  No chest pain, shortness of breath, or ear pain  [Tolerated Ascent well] : Tolerated Ascent well [Vital Signs stable] : Vital Signs stable [A physician was present throughout the entire HBOT] : A physician was present throughout the entire HBOT [No] : No [Clinically Stable] : Clinically stable [Continue Treatment Plan] : Continue treatment plan [FreeTextEntry2] : None\par Patient arrived late and received shorter HBOT treatment today\par Vitals pre and post dive within acceptable limits.\par Tolerated compression and decompression well

## 2022-01-31 ENCOUNTER — OUTPATIENT (OUTPATIENT)
Dept: OUTPATIENT SERVICES | Facility: HOSPITAL | Age: 57
LOS: 1 days | Discharge: HOME | End: 2022-01-31
Payer: COMMERCIAL

## 2022-01-31 ENCOUNTER — APPOINTMENT (OUTPATIENT)
Dept: HYPERBARIC MEDICINE | Facility: HOSPITAL | Age: 57
End: 2022-01-31
Payer: COMMERCIAL

## 2022-01-31 VITALS
DIASTOLIC BLOOD PRESSURE: 82 MMHG | TEMPERATURE: 97.7 F | RESPIRATION RATE: 18 BRPM | OXYGEN SATURATION: 100 % | HEART RATE: 75 BPM | SYSTOLIC BLOOD PRESSURE: 150 MMHG

## 2022-01-31 VITALS
TEMPERATURE: 98.1 F | OXYGEN SATURATION: 100 % | RESPIRATION RATE: 18 BRPM | SYSTOLIC BLOOD PRESSURE: 143 MMHG | DIASTOLIC BLOOD PRESSURE: 80 MMHG | HEART RATE: 84 BPM

## 2022-01-31 DIAGNOSIS — Z98.890 OTHER SPECIFIED POSTPROCEDURAL STATES: Chronic | ICD-10-CM

## 2022-01-31 DIAGNOSIS — E11.622 TYPE 2 DIABETES MELLITUS WITH OTHER SKIN ULCER: ICD-10-CM

## 2022-01-31 DIAGNOSIS — L97.323 NON-PRESSURE CHRONIC ULCER OF LEFT ANKLE WITH NECROSIS OF MUSCLE: ICD-10-CM

## 2022-01-31 DIAGNOSIS — Z90.710 ACQUIRED ABSENCE OF BOTH CERVIX AND UTERUS: Chronic | ICD-10-CM

## 2022-01-31 PROCEDURE — 82962 GLUCOSE BLOOD TEST: CPT

## 2022-01-31 PROCEDURE — 99183 HYPERBARIC OXYGEN THERAPY: CPT

## 2022-01-31 PROCEDURE — G0277: CPT

## 2022-01-31 NOTE — PROCEDURE
[Outpatient] : Outpatient [Ambulatory] : Patient is ambulatory. [Other: ___] : [unfilled] [THIS CHAMBER HAS BEEN CLEANED / DISINFECTED] : This chamber has been cleaned / disinfected according to local and hospital policy and procedure prior to this treatment. [____] : Recheck: Time - [unfilled] [___] : Recheck: Value - [unfilled] mg/dL [Patient demonstrated and verbalized proper technique for using air break mask] : Patient demonstrated and verbalized proper technique for using air break mask [Patient educated on the risks of SMOKING prior to HBOT with understanding] : Patient educated on the risks of SMOKING prior to HBOT with understanding [Patient educated on the risks of CONSUMING ALCOHOL prior to HBOT with understanding] : Patient educated on the risks of CONSUMING ALCOHOL prior to HBOT with understanding [100% Cotton] : 100% cotton [Empty all pockets] : empty all pockets [No hair oils, wigs, hairpieces, pins] : no hair oils, wigs, hairpieces, pins  [Pre tx medications] : pre tx medications  [No make-up, creams] : no make-up, creams  [No jewelry] : no jewelry  [No matches, cigarettes, lighters] : no matches, cigarettes, lighters  [Hearing aid removed] : hearing aid removed [Dentures removed] : dentures removed [Ground bracelet on pt's wrist] : ground bracelet on pt's wrist  [Contacts removed] : contacts removed  [Remove nail polish] : remove nail polish  [No reading material] : no reading material  [Bra, undergarments removed] : bra, undergarments removed  [No contraindicated dressings] : no contraindicated dressings [Ground Wire - VISUAL Verification - Intact/Free of Obstruction] : Ground Wire - VISUAL Verification - Intact/Free of Obstruction  [Ground Continuity - Verified < 1 ohm w/ Wrist Strap Enrrique] : Ground Continuity - Verified < 1 ohm w/ Wrist Strap Enrrique [Diagnosis: ___] : Diagnosis: [unfilled] [Number: ___] : Number: [unfilled] [Clear all fields] : clear all fields [] : No [FreeTextEntry1] : 2.0 [FreeTextEntry3] : 90 MINS [FreeTextEntry5] : 4147 [FreeTextEntry7] : 3504 [FreeTextEntry9] : 1925 [de-identified] : 1934 [de-identified] : 108 MINS

## 2022-01-31 NOTE — PROCEDURE
[Outpatient] : Outpatient [Ambulatory] : Patient is ambulatory. [Other: ___] : [unfilled] [THIS CHAMBER HAS BEEN CLEANED / DISINFECTED] : This chamber has been cleaned / disinfected according to local and hospital policy and procedure prior to this treatment. [Patient demonstrated and verbalized proper technique for using air break mask] : Patient demonstrated and verbalized proper technique for using air break mask [Patient educated on the risks of SMOKING prior to HBOT with understanding] : Patient educated on the risks of SMOKING prior to HBOT with understanding [Patient educated on the risks of CONSUMING ALCOHOL prior to HBOT with understanding] : Patient educated on the risks of CONSUMING ALCOHOL prior to HBOT with understanding [100% Cotton] : 100% cotton [Empty all pockets] : empty all pockets [No hair oils, wigs, hairpieces, pins] : no hair oils, wigs, hairpieces, pins  [Pre tx medications] : pre tx medications  [No make-up, creams] : no make-up, creams  [No jewelry] : no jewelry  [No matches, cigarettes, lighters] : no matches, cigarettes, lighters  [Hearing aid removed] : hearing aid removed [Dentures removed] : dentures removed [Ground bracelet on pt's wrist] : ground bracelet on pt's wrist  [Contacts removed] : contacts removed  [Remove nail polish] : remove nail polish  [No reading material] : no reading material  [Bra, undergarments removed] : bra, undergarments removed  [No contraindicated dressings] : no contraindicated dressings [Ground Wire - VISUAL Verification - Intact/Free of Obstruction] : Ground Wire - VISUAL Verification - Intact/Free of Obstruction  [Ground Continuity - Verified < 1 ohm w/ Wrist Strap Enrrique] : Ground Continuity - Verified < 1 ohm w/ Wrist Strap Enrrique [Number: ___] : Number: [unfilled] [Diagnosis: ___] : Diagnosis: [unfilled] [____] : Post-Dive: Time - [unfilled] [___] : Post-Dive: Value - [unfilled] mg/dL [Clear all fields] : clear all fields [] : No [FreeTextEntry3] : 100 MIN [FreeTextEntry5] : 5425 [FreeTextEntry7] : 1816 [FreeTextEntry9] : 1947 [de-identified] : 1956 [de-identified] : 108 MIN [FreeTextEntry8] : 1

## 2022-01-31 NOTE — PHYSICAL EXAM
[4 x 4] : 4 x 4  [Abdominal Pad] : Abdominal Pad [1+] : left 1+ [Ankle Swelling (On Exam)] : present [Ankle Swelling Bilaterally] : bilaterally  [Ankle Swelling On The Left] : moderate [Alert] : alert [Oriented to Person] : oriented to person [Oriented to Place] : oriented to place [Oriented to Time] : oriented to time [Calm] : calm [Varicose Veins Of Lower Extremities] : not present [Purpura] : no purpura  [Petechiae] : no petechiae [Skin Ulcer] : no ulcer [de-identified] : A&Ox3, NAD [de-identified] : BMI 43 [de-identified] : s/p left peroneal tendon repair, 5/5 strength in all quadrants bilaterally [de-identified] : left lateral ankle ulcer down to skin, subcutaneous tissue, fat, and tendon [de-identified] : wnl [FreeTextEntry1] : Left lateral ankle  [FreeTextEntry2] : 3.7 [FreeTextEntry3] : 1.5 [FreeTextEntry4] : 0.3 [de-identified] : Serous/sanguinous [de-identified] : mild maceration [de-identified] : 50% [de-identified] : Expressed comfort post ACE application. [de-identified] : jitendra [de-identified] : Cleansed with NS\par Kerlix \par \par Post debridement measurements: \par 3.8 x 1.6 x 0.4\par  [TWNoteComboBox4] : Small [de-identified] : other [de-identified] : None [de-identified] : None [de-identified] : 50% [de-identified] : Yes [TWNoteComboBox7] : Sonido [de-identified] : Debridement performed of all devitalized tissue to bleeding viable tissue [de-identified] : Ace wraps [de-identified] : 3x Weekly [de-identified] : Primary Dressing

## 2022-01-31 NOTE — PROCEDURE
[Saline] : saline [Fibrotic] : fibrotic [Slough] : slough [Sharp curette] : sharp curette [Skin] : skin [Fat] : fat [Subcutaneous tissue] : subcutaneous tissue [Sent to Lab] : which was entirely removed and was sent to the laboratory for [FreeTextEntry2] : left lateral ankle ulcer down to skin, subcutaneous tissue, fat, and tendon [FreeTextEntry1] : jitendra, dry dressing, ACE [FreeTextEntry9] : 1945 [de-identified] : left lateral ankle ulcer down to skin, subcutaneous tissue, fat, and tendon [de-identified] : nitesh [de-identified] : larios [FreeTextEntry6] : left lateral ankle ulcer down to skin, subcutaneous tissue, fat, and tendon [FreeTextEntry7] : left lateral ankle ulcer down to skin, subcutaneous tissue, fat, and tendon [de-identified] : skin, subcutaneous tissue, fat

## 2022-01-31 NOTE — REVIEW OF SYSTEMS
[Joint Stiffness] : joint stiffness [Skin Wound] : skin wound [Negative] : Heme/Lymph [FreeTextEntry1] : 1915 [Fever] : no fever [Chills] : no chills [Eye Pain] : no eye pain [Loss Of Hearing] : no hearing loss [Abdominal Pain] : no abdominal pain [Vomiting] : no vomiting [Anxiety] : no anxiety [de-identified] : left lateral ankle ulcer down to skin, subcutaneous tissue, fat, and tendon [de-identified] : Possible prediabetic , elevated HgA1c and blood glucose , BMI 43 ^, patient now taking Metformin

## 2022-01-31 NOTE — PROCEDURE
[Outpatient] : Outpatient [Ambulatory] : Patient is ambulatory. [Other: ___] : [unfilled] [THIS CHAMBER HAS BEEN CLEANED / DISINFECTED] : This chamber has been cleaned / disinfected according to local and hospital policy and procedure prior to this treatment. [____] : Recheck: Time - [unfilled] [___] : Recheck: Value - [unfilled] mg/dL [Patient demonstrated and verbalized proper technique for using air break mask] : Patient demonstrated and verbalized proper technique for using air break mask [Patient educated on the risks of SMOKING prior to HBOT with understanding] : Patient educated on the risks of SMOKING prior to HBOT with understanding [Patient educated on the risks of CONSUMING ALCOHOL prior to HBOT with understanding] : Patient educated on the risks of CONSUMING ALCOHOL prior to HBOT with understanding [100% Cotton] : 100% cotton [Empty all pockets] : empty all pockets [No hair oils, wigs, hairpieces, pins] : no hair oils, wigs, hairpieces, pins  [Pre tx medications] : pre tx medications  [No make-up, creams] : no make-up, creams  [No jewelry] : no jewelry  [No matches, cigarettes, lighters] : no matches, cigarettes, lighters  [Hearing aid removed] : hearing aid removed [Dentures removed] : dentures removed [Ground bracelet on pt's wrist] : ground bracelet on pt's wrist  [Contacts removed] : contacts removed  [Remove nail polish] : remove nail polish  [No reading material] : no reading material  [Bra, undergarments removed] : bra, undergarments removed  [No contraindicated dressings] : no contraindicated dressings [Ground Wire - VISUAL Verification - Intact/Free of Obstruction] : Ground Wire - VISUAL Verification - Intact/Free of Obstruction  [Ground Continuity - Verified < 1 ohm w/ Wrist Strap Enrrique] : Ground Continuity - Verified < 1 ohm w/ Wrist Strap Enrrique [Diagnosis: ___] : Diagnosis: [unfilled] [Number: ___] : Number: [unfilled] [Clear all fields] : clear all fields [] : No [FreeTextEntry1] : 2.0 [FreeTextEntry3] : 90 MINS [FreeTextEntry5] : 3467 [FreeTextEntry7] : 1812 [FreeTextEntry9] : 1947 [de-identified] : 1957 [de-identified] : 108 MINS

## 2022-01-31 NOTE — ASSESSMENT
[Verbal] : Verbal [Written] : Written [Demo] : Demo [Patient] : Patient [Good - alert, interested, motivated] : Good - alert, interested, motivated [Verbalizes knowledge/Understanding] : Verbalizes knowledge/understanding [Dressing changes] : dressing changes [Foot Care] : foot care [Skin Care] : skin care [Signs and symptoms of infection] : sign and symptoms of infection [How and When to Call] : how and when to call [Pain Management] : pain management [Hyperbaric Therapy] : hyperbaric therapy [Compression Therapy] : compression therapy [Patient responsibility to plan of care] : patient responsibility to plan of care [Stable] : stable [Home] : Home [Other: ____] : [unfilled] [Not Applicable - Long Term Care/Home Health Agency] : Long Term Care/Home Health Agency: Not Applicable [] : No [FreeTextEntry2] : Infection prevention\par Localized wound care \par Goal remaining pain free regarding wounds\par Hyperbaric oxygen therapy \par compression compliance\par weight reduction strategies [FreeTextEntry4] : Apligraf #3 held as per DPM (periwound had mild maceration)\par Wound to be assessed next week for apligraf application\par F/U 1 week and daily for HBOT\par HBOT 22/30 completed thus far, 10 additional requested\par Augmentin sent to pt pharmacy

## 2022-01-31 NOTE — ADDENDUM
[FreeTextEntry1] : PT ARRIVED AXOX3 ASSISTED WITH KNEE SCOOTER\par ALL PT VITALS WITHIN PARAMETERS FOR HBOT\par PT DESCENDED TO 2.0 RAIMUNDO AT 1.75 PSI/MIN IN CHAMBER 3 WITHOUT INCIDENT\par PT RESTING AT DEPTH, VISIBLE CHEST RISE AND FALL OBSERVED CHAMBERSIDE\par PT ASCENDED TO SURFACE PRESSURE WITHOUT INCIDENT\par PT RECEIVED OUTER DRESSING CHANGE POST HBOT

## 2022-01-31 NOTE — ADDENDUM
[FreeTextEntry1] : Pt descended to 2.0 RAIMUNDO @ 1.75 PSI/min without incident in chamber #.1\par Pt resting @ depth with chest rise and fall observed throughout tx. \par Pt ascended from 2.0 RAIMUNDO @ 1.75 PSI/min without incident. \par Pt tolerated tx well.\par Pt received Ace wrap by RN post tx. \par

## 2022-02-01 ENCOUNTER — APPOINTMENT (OUTPATIENT)
Dept: HYPERBARIC MEDICINE | Facility: HOSPITAL | Age: 57
End: 2022-02-01
Payer: COMMERCIAL

## 2022-02-01 ENCOUNTER — OUTPATIENT (OUTPATIENT)
Dept: OUTPATIENT SERVICES | Facility: HOSPITAL | Age: 57
LOS: 1 days | Discharge: HOME | End: 2022-02-01
Payer: COMMERCIAL

## 2022-02-01 VITALS
TEMPERATURE: 97.8 F | BODY MASS INDEX: 23.92 KG/M2 | HEIGHT: 63 IN | WEIGHT: 135 LBS | RESPIRATION RATE: 18 BRPM | HEART RATE: 70 BPM | DIASTOLIC BLOOD PRESSURE: 82 MMHG | OXYGEN SATURATION: 96 % | SYSTOLIC BLOOD PRESSURE: 143 MMHG

## 2022-02-01 DIAGNOSIS — Z98.890 OTHER SPECIFIED POSTPROCEDURAL STATES: Chronic | ICD-10-CM

## 2022-02-01 DIAGNOSIS — Z90.710 ACQUIRED ABSENCE OF BOTH CERVIX AND UTERUS: Chronic | ICD-10-CM

## 2022-02-01 DIAGNOSIS — E11.622 TYPE 2 DIABETES MELLITUS WITH OTHER SKIN ULCER: ICD-10-CM

## 2022-02-01 PROCEDURE — 99213 OFFICE O/P EST LOW 20 MIN: CPT

## 2022-02-01 PROCEDURE — G0463: CPT

## 2022-02-02 ENCOUNTER — OUTPATIENT (OUTPATIENT)
Dept: OUTPATIENT SERVICES | Facility: HOSPITAL | Age: 57
LOS: 1 days | Discharge: HOME | End: 2022-02-02
Payer: COMMERCIAL

## 2022-02-02 ENCOUNTER — APPOINTMENT (OUTPATIENT)
Dept: HYPERBARIC MEDICINE | Facility: HOSPITAL | Age: 57
End: 2022-02-02
Payer: COMMERCIAL

## 2022-02-02 VITALS
TEMPERATURE: 98.4 F | SYSTOLIC BLOOD PRESSURE: 129 MMHG | DIASTOLIC BLOOD PRESSURE: 57 MMHG | OXYGEN SATURATION: 96 % | HEART RATE: 73 BPM | RESPIRATION RATE: 18 BRPM

## 2022-02-02 VITALS
RESPIRATION RATE: 16 BRPM | DIASTOLIC BLOOD PRESSURE: 88 MMHG | HEART RATE: 66 BPM | OXYGEN SATURATION: 98 % | SYSTOLIC BLOOD PRESSURE: 126 MMHG | TEMPERATURE: 98.2 F

## 2022-02-02 DIAGNOSIS — Z82.49 FAMILY HISTORY OF ISCHEMIC HEART DISEASE AND OTHER DISEASES OF THE CIRCULATORY SYSTEM: ICD-10-CM

## 2022-02-02 DIAGNOSIS — Z79.899 OTHER LONG TERM (CURRENT) DRUG THERAPY: ICD-10-CM

## 2022-02-02 DIAGNOSIS — E11.622 TYPE 2 DIABETES MELLITUS WITH OTHER SKIN ULCER: ICD-10-CM

## 2022-02-02 DIAGNOSIS — L97.323 NON-PRESSURE CHRONIC ULCER OF LEFT ANKLE WITH NECROSIS OF MUSCLE: ICD-10-CM

## 2022-02-02 DIAGNOSIS — Z98.890 OTHER SPECIFIED POSTPROCEDURAL STATES: ICD-10-CM

## 2022-02-02 DIAGNOSIS — Z86.16 PERSONAL HISTORY OF COVID-19: ICD-10-CM

## 2022-02-02 DIAGNOSIS — Z79.51 LONG TERM (CURRENT) USE OF INHALED STEROIDS: ICD-10-CM

## 2022-02-02 DIAGNOSIS — Z90.710 ACQUIRED ABSENCE OF BOTH CERVIX AND UTERUS: Chronic | ICD-10-CM

## 2022-02-02 DIAGNOSIS — Z86.010 PERSONAL HISTORY OF COLONIC POLYPS: ICD-10-CM

## 2022-02-02 DIAGNOSIS — Z85.038 PERSONAL HISTORY OF OTHER MALIGNANT NEOPLASM OF LARGE INTESTINE: ICD-10-CM

## 2022-02-02 DIAGNOSIS — Z98.890 OTHER SPECIFIED POSTPROCEDURAL STATES: Chronic | ICD-10-CM

## 2022-02-02 PROCEDURE — G0277: CPT

## 2022-02-02 PROCEDURE — 99183 HYPERBARIC OXYGEN THERAPY: CPT

## 2022-02-02 PROCEDURE — 82962 GLUCOSE BLOOD TEST: CPT

## 2022-02-02 NOTE — ASSESSMENT
[Verbal] : Verbal [Written] : Written [Demo] : Demo [Patient] : Patient [Good - alert, interested, motivated] : Good - alert, interested, motivated [Verbalizes knowledge/Understanding] : Verbalizes knowledge/understanding [Dressing changes] : dressing changes [Foot Care] : foot care [Skin Care] : skin care [Signs and symptoms of infection] : sign and symptoms of infection [How and When to Call] : how and when to call [Pain Management] : pain management [Hyperbaric Therapy] : hyperbaric therapy [Compression Therapy] : compression therapy [Patient responsibility to plan of care] : patient responsibility to plan of care [Stable] : stable [Home] : Home [Other: ____] : [unfilled] [Not Applicable - Long Term Care/Home Health Agency] : Long Term Care/Home Health Agency: Not Applicable [] : No [FreeTextEntry2] : Infection prevention\par Localized wound care \par Goal remaining pain free regarding wounds\par Hyperbaric oxygen therapy \par compression compliance\par weight reduction strategies [FreeTextEntry4] : Apligraf #3 held as per DPM (periwound had mild maceration)\par Wound to be assessed next week for apligraf #3 application\par F/U 1 week and daily for HBOT\par HBOT 26/40 completed thus far\par Augmentin  to be finished on 2/2/22, no additional refills ordered

## 2022-02-02 NOTE — HISTORY OF PRESENT ILLNESS
[FreeTextEntry1] : 57 yo WF, here for f/u of chronic left lateral ankle ulcer/comp flap. Presently receiving HBO tx. Has had several apligrafs, but held recently due to maceration of periwound. Presently on augmentin.

## 2022-02-02 NOTE — REVIEW OF SYSTEMS
[Joint Stiffness] : joint stiffness [Skin Wound] : skin wound [Negative] : Heme/Lymph [Fever] : no fever [Chills] : no chills [Eye Pain] : no eye pain [Loss Of Hearing] : no hearing loss [Abdominal Pain] : no abdominal pain [Vomiting] : no vomiting [Anxiety] : no anxiety [de-identified] : dehiscence of surgical wound lateral left foot /ankle  [de-identified] : Possible prediabetic , elevated HgA1c and blood glucose , BMI 43 ^

## 2022-02-02 NOTE — PROCEDURE
[Outpatient] : Outpatient [Ambulatory] : Patient is ambulatory. [Other: ___] : [unfilled] [THIS CHAMBER HAS BEEN CLEANED / DISINFECTED] : This chamber has been cleaned / disinfected according to local and hospital policy and procedure prior to this treatment. [Patient demonstrated and verbalized proper technique for using air break mask] : Patient demonstrated and verbalized proper technique for using air break mask [Patient educated on the risks of SMOKING prior to HBOT with understanding] : Patient educated on the risks of SMOKING prior to HBOT with understanding [Patient educated on the risks of CONSUMING ALCOHOL prior to HBOT with understanding] : Patient educated on the risks of CONSUMING ALCOHOL prior to HBOT with understanding [100% Cotton] : 100% cotton [Empty all pockets] : empty all pockets [No hair oils, wigs, hairpieces, pins] : no hair oils, wigs, hairpieces, pins  [Pre tx medications] : pre tx medications  [No make-up, creams] : no make-up, creams  [No jewelry] : no jewelry  [No matches, cigarettes, lighters] : no matches, cigarettes, lighters  [Hearing aid removed] : hearing aid removed [Dentures removed] : dentures removed [Ground bracelet on pt's wrist] : ground bracelet on pt's wrist  [Contacts removed] : contacts removed  [Remove nail polish] : remove nail polish  [No reading material] : no reading material  [Bra, undergarments removed] : bra, undergarments removed  [No contraindicated dressings] : no contraindicated dressings [Ground Wire - VISUAL Verification - Intact/Free of Obstruction] : Ground Wire - VISUAL Verification - Intact/Free of Obstruction  [Ground Continuity - Verified < 1 ohm w/ Wrist Strap Enrrique] : Ground Continuity - Verified < 1 ohm w/ Wrist Strap Enrrique [Number: ___] : Number: [unfilled] [Diagnosis: ___] : Diagnosis: [unfilled] [____] : Post-Dive: Time - [unfilled] [___] : Post-Dive: Value - [unfilled] mg/dL [Clear all fields] : clear all fields [] : No [FreeTextEntry3] : 90 MIN [FreeTextEntry5] : 1814 [FreeTextEntry7] : 1824 [FreeTextEntry9] : 1951 [de-identified] : 2000 [de-identified] : 108 MIN

## 2022-02-02 NOTE — PHYSICAL EXAM
[4 x 4] : 4 x 4  [Abdominal Pad] : Abdominal Pad [Normal Thyroid] : the thyroid was normal [Normal Breath Sounds] : Normal breath sounds [Normal Heart Sounds] : normal heart sounds [Normal Rate and Rhythm] : normal rate and rhythm [Alert] : alert [Oriented to Place] : oriented to place [Oriented to Time] : oriented to time [Calm] : calm [JVD] : no jugular venous distention  [Abdomen Masses] : No abdominal massess [Abdomen Tenderness] : ~T ~M No abdominal tenderness [Tender] : nontender [Enlarged] : not enlarged [de-identified] : adult WF, NAD, alert, Ox3. [FreeTextEntry1] : Left lateral ankle  [FreeTextEntry2] : 3.1 [FreeTextEntry3] : 1.5 [FreeTextEntry4] : 0.3 [de-identified] : Serous/sanguinous [de-identified] : mild maceration [de-identified] : 80% [de-identified] : 20% [de-identified] : Expressed comfort post ACE application. [de-identified] : jitendra/alginate [de-identified] : Cleansed with NS\par Kerlix \par \par  [TWNoteComboBox4] : Small [de-identified] : other [de-identified] : None [de-identified] : None [de-identified] : >75% [de-identified] : Yes [TWNoteComboBox7] : False [de-identified] : False [de-identified] : Ace wraps [de-identified] : 3x Weekly [de-identified] : Primary Dressing

## 2022-02-02 NOTE — ADDENDUM
[FreeTextEntry1] : PT ARRIVED AXOX3 ASSISTED WITH KNEE SCOOTER\par PT RECEIVED WOUND CARE PRIOR TO HBOT\par ALL PT VITALS WITHIN PARAMETERS FOR HBOT\par PT DESCENDED TO 2.0 RAIMUNDO @ 1.75 PSI/MIN IN CHAMBER 2 WITHOUT INCIDENT\par PT RESTING AT DEPTH, VISIBLE CHEST RISE AND FALL OBSERVED CHAMBERSIDE\par PT ASCENDED TO SURFACE PRESSURE WITHOUT INCIDENT\par

## 2022-02-03 ENCOUNTER — OUTPATIENT (OUTPATIENT)
Dept: OUTPATIENT SERVICES | Facility: HOSPITAL | Age: 57
LOS: 1 days | Discharge: HOME | End: 2022-02-03
Payer: COMMERCIAL

## 2022-02-03 ENCOUNTER — APPOINTMENT (OUTPATIENT)
Dept: HYPERBARIC MEDICINE | Facility: HOSPITAL | Age: 57
End: 2022-02-03
Payer: COMMERCIAL

## 2022-02-03 VITALS
SYSTOLIC BLOOD PRESSURE: 145 MMHG | HEART RATE: 78 BPM | TEMPERATURE: 97.2 F | RESPIRATION RATE: 18 BRPM | DIASTOLIC BLOOD PRESSURE: 77 MMHG | OXYGEN SATURATION: 96 %

## 2022-02-03 VITALS
RESPIRATION RATE: 20 BRPM | DIASTOLIC BLOOD PRESSURE: 76 MMHG | HEART RATE: 68 BPM | OXYGEN SATURATION: 99 % | SYSTOLIC BLOOD PRESSURE: 160 MMHG | TEMPERATURE: 97.5 F

## 2022-02-03 DIAGNOSIS — E11.622 TYPE 2 DIABETES MELLITUS WITH OTHER SKIN ULCER: ICD-10-CM

## 2022-02-03 DIAGNOSIS — Z90.710 ACQUIRED ABSENCE OF BOTH CERVIX AND UTERUS: Chronic | ICD-10-CM

## 2022-02-03 DIAGNOSIS — Z98.890 OTHER SPECIFIED POSTPROCEDURAL STATES: Chronic | ICD-10-CM

## 2022-02-03 PROCEDURE — 82962 GLUCOSE BLOOD TEST: CPT

## 2022-02-03 PROCEDURE — 99183 HYPERBARIC OXYGEN THERAPY: CPT

## 2022-02-03 NOTE — ADDENDUM
[FreeTextEntry1] : pt descended to tx depth 2.0 efrain @1.75 psi/min without incident in chamber #3 \par pt's resting at tx depth with visible chest rise and fall as observed chamber side \par pt ascended from tx depth without incident in chamber #3 \par pt tolerated tx well\par pt received ace wrap post hbot without incident

## 2022-02-03 NOTE — PROCEDURE
[Outpatient] : Outpatient [Other: ___] : [unfilled] [THIS CHAMBER HAS BEEN CLEANED / DISINFECTED] : This chamber has been cleaned / disinfected according to local and hospital policy and procedure prior to this treatment. [Patient demonstrated and verbalized proper technique for using air break mask] : Patient demonstrated and verbalized proper technique for using air break mask [Patient educated on the risks of SMOKING prior to HBOT with understanding] : Patient educated on the risks of SMOKING prior to HBOT with understanding [Patient educated on the risks of CONSUMING ALCOHOL prior to HBOT with understanding] : Patient educated on the risks of CONSUMING ALCOHOL prior to HBOT with understanding [100% Cotton] : 100% cotton [Empty all pockets] : empty all pockets [No hair oils, wigs, hairpieces, pins] : no hair oils, wigs, hairpieces, pins  [Pre tx medications] : pre tx medications  [No make-up, creams] : no make-up, creams  [No jewelry] : no jewelry  [No matches, cigarettes, lighters] : no matches, cigarettes, lighters  [Hearing aid removed] : hearing aid removed [Dentures removed] : dentures removed [Ground bracelet on pt's wrist] : ground bracelet on pt's wrist  [Contacts removed] : contacts removed  [Remove nail polish] : remove nail polish  [No reading material] : no reading material  [Bra, undergarments removed] : bra, undergarments removed  [No contraindicated dressings] : no contraindicated dressings [Ground Wire - VISUAL Verification - Intact/Free of Obstruction] : Ground Wire - VISUAL Verification - Intact/Free of Obstruction  [Ground Continuity - Verified < 1 ohm w/ Wrist Strap Enrrique] : Ground Continuity - Verified < 1 ohm w/ Wrist Strap Enrrique [Clear all fields] : clear all fields [Number: ___] : Number: [unfilled] [Diagnosis: ___] : Diagnosis: [unfilled] [____] : Post-Dive: Time - [unfilled] [___] : Post-Dive: Value - [unfilled] mg/dL [] : No [FreeTextEntry3] : 90 minutes [FreeTextEntry5] : 9535 [FreeTextEntry7] : 1813 [FreeTextEntry9] : 1941 [de-identified] : 1950 [de-identified] : 108 minutes

## 2022-02-04 ENCOUNTER — OUTPATIENT (OUTPATIENT)
Dept: OUTPATIENT SERVICES | Facility: HOSPITAL | Age: 57
LOS: 1 days | End: 2022-02-04
Payer: COMMERCIAL

## 2022-02-04 ENCOUNTER — APPOINTMENT (OUTPATIENT)
Dept: HYPERBARIC MEDICINE | Facility: HOSPITAL | Age: 57
End: 2022-02-04
Payer: COMMERCIAL

## 2022-02-04 VITALS
OXYGEN SATURATION: 98 % | RESPIRATION RATE: 16 BRPM | TEMPERATURE: 97.9 F | DIASTOLIC BLOOD PRESSURE: 80 MMHG | HEART RATE: 68 BPM | SYSTOLIC BLOOD PRESSURE: 147 MMHG

## 2022-02-04 VITALS
DIASTOLIC BLOOD PRESSURE: 70 MMHG | SYSTOLIC BLOOD PRESSURE: 144 MMHG | TEMPERATURE: 98.1 F | RESPIRATION RATE: 18 BRPM | HEART RATE: 63 BPM | OXYGEN SATURATION: 100 %

## 2022-02-04 DIAGNOSIS — E11.622 TYPE 2 DIABETES MELLITUS WITH OTHER SKIN ULCER: ICD-10-CM

## 2022-02-04 DIAGNOSIS — Z90.710 ACQUIRED ABSENCE OF BOTH CERVIX AND UTERUS: Chronic | ICD-10-CM

## 2022-02-04 DIAGNOSIS — Z98.890 OTHER SPECIFIED POSTPROCEDURAL STATES: Chronic | ICD-10-CM

## 2022-02-04 LAB — SARS-COV-2 RNA SPEC QL NAA+PROBE: SIGNIFICANT CHANGE UP

## 2022-02-04 PROCEDURE — U0003: CPT

## 2022-02-04 PROCEDURE — 99183 HYPERBARIC OXYGEN THERAPY: CPT

## 2022-02-04 PROCEDURE — 82962 GLUCOSE BLOOD TEST: CPT

## 2022-02-04 PROCEDURE — U0005: CPT

## 2022-02-04 NOTE — PROCEDURE
[Outpatient] : Outpatient [Ambulatory] : Patient is ambulatory. [Other: ___] : [unfilled] [THIS CHAMBER HAS BEEN CLEANED / DISINFECTED] : This chamber has been cleaned / disinfected according to local and hospital policy and procedure prior to this treatment. [Patient demonstrated and verbalized proper technique for using air break mask] : Patient demonstrated and verbalized proper technique for using air break mask [Patient educated on the risks of SMOKING prior to HBOT with understanding] : Patient educated on the risks of SMOKING prior to HBOT with understanding [Patient educated on the risks of CONSUMING ALCOHOL prior to HBOT with understanding] : Patient educated on the risks of CONSUMING ALCOHOL prior to HBOT with understanding [100% Cotton] : 100% cotton [Empty all pockets] : empty all pockets [No hair oils, wigs, hairpieces, pins] : no hair oils, wigs, hairpieces, pins  [Pre tx medications] : pre tx medications  [No make-up, creams] : no make-up, creams  [No jewelry] : no jewelry  [No matches, cigarettes, lighters] : no matches, cigarettes, lighters  [Hearing aid removed] : hearing aid removed [Dentures removed] : dentures removed [Ground bracelet on pt's wrist] : ground bracelet on pt's wrist  [Contacts removed] : contacts removed  [Remove nail polish] : remove nail polish  [No reading material] : no reading material  [Bra, undergarments removed] : bra, undergarments removed  [No contraindicated dressings] : no contraindicated dressings [Ground Wire - VISUAL Verification - Intact/Free of Obstruction] : Ground Wire - VISUAL Verification - Intact/Free of Obstruction  [Ground Continuity - Verified < 1 ohm w/ Wrist Strap Enrrique] : Ground Continuity - Verified < 1 ohm w/ Wrist Strap Enrrique [Number: ___] : Number: [unfilled] [Diagnosis: ___] : Diagnosis: [unfilled] [____] : Post-Dive: Time - [unfilled] [___] : Post-Dive: Value - [unfilled] mg/dL [Clear all fields] : clear all fields [] : No [FreeTextEntry3] : 90 [FreeTextEntry5] : 6032 [FreeTextEntry7] : 1819 [FreeTextEntry9] : 1945 [de-identified] : 1954 [de-identified] : 108 MIN

## 2022-02-04 NOTE — ADDENDUM
[FreeTextEntry1] : PT ARRIVED TO [facility] AXOX3 ASSISTED  WITH KNEE SCOOTER \par PT VITALS WITHIN PARAMETERS FOR HBOT WITH EXCEPTION OF BGL\par PT GIVEN 1 JUICE\par BGL NOW WITHIN PARAMETERS FOR HBOT\par PT RECEIVED WOUND CARE PRE TX\par PT DESCENDED TO TX DEPTH OF 2.0 RAIMUNDO @1.75PSI/MIN IN CHAMBER 2 WITHOUT INCIDENT\par PT RESTING AT TX DEPTH WITH VISIBLE CHEST RISE AND FALL OBSERVED\par PT ASCENDED FROM TX PRESSURE WITHOUT INCIDENT

## 2022-02-05 DIAGNOSIS — Z85.038 PERSONAL HISTORY OF OTHER MALIGNANT NEOPLASM OF LARGE INTESTINE: ICD-10-CM

## 2022-02-05 DIAGNOSIS — L97.323 NON-PRESSURE CHRONIC ULCER OF LEFT ANKLE WITH NECROSIS OF MUSCLE: ICD-10-CM

## 2022-02-05 DIAGNOSIS — E11.621 TYPE 2 DIABETES MELLITUS WITH FOOT ULCER: ICD-10-CM

## 2022-02-05 DIAGNOSIS — Z86.010 PERSONAL HISTORY OF COLONIC POLYPS: ICD-10-CM

## 2022-02-05 DIAGNOSIS — Z86.16 PERSONAL HISTORY OF COVID-19: ICD-10-CM

## 2022-02-05 DIAGNOSIS — Z79.51 LONG TERM (CURRENT) USE OF INHALED STEROIDS: ICD-10-CM

## 2022-02-05 DIAGNOSIS — Z98.890 OTHER SPECIFIED POSTPROCEDURAL STATES: ICD-10-CM

## 2022-02-05 DIAGNOSIS — Z79.899 OTHER LONG TERM (CURRENT) DRUG THERAPY: ICD-10-CM

## 2022-02-05 DIAGNOSIS — Z90.710 ACQUIRED ABSENCE OF BOTH CERVIX AND UTERUS: ICD-10-CM

## 2022-02-07 ENCOUNTER — APPOINTMENT (OUTPATIENT)
Dept: HYPERBARIC MEDICINE | Facility: HOSPITAL | Age: 57
End: 2022-02-07
Payer: COMMERCIAL

## 2022-02-07 ENCOUNTER — OUTPATIENT (OUTPATIENT)
Dept: OUTPATIENT SERVICES | Facility: HOSPITAL | Age: 57
LOS: 1 days | Discharge: HOME | End: 2022-02-07
Payer: COMMERCIAL

## 2022-02-07 ENCOUNTER — NON-APPOINTMENT (OUTPATIENT)
Age: 57
End: 2022-02-07

## 2022-02-07 VITALS
TEMPERATURE: 98.2 F | RESPIRATION RATE: 20 BRPM | DIASTOLIC BLOOD PRESSURE: 81 MMHG | OXYGEN SATURATION: 99 % | SYSTOLIC BLOOD PRESSURE: 158 MMHG | HEART RATE: 69 BPM

## 2022-02-07 VITALS
HEART RATE: 64 BPM | DIASTOLIC BLOOD PRESSURE: 68 MMHG | OXYGEN SATURATION: 99 % | SYSTOLIC BLOOD PRESSURE: 133 MMHG | RESPIRATION RATE: 20 BRPM | TEMPERATURE: 97.8 F

## 2022-02-07 DIAGNOSIS — Z98.890 OTHER SPECIFIED POSTPROCEDURAL STATES: Chronic | ICD-10-CM

## 2022-02-07 DIAGNOSIS — E11.622 TYPE 2 DIABETES MELLITUS WITH OTHER SKIN ULCER: ICD-10-CM

## 2022-02-07 DIAGNOSIS — Z90.710 ACQUIRED ABSENCE OF BOTH CERVIX AND UTERUS: Chronic | ICD-10-CM

## 2022-02-07 PROCEDURE — 99183 HYPERBARIC OXYGEN THERAPY: CPT

## 2022-02-07 PROCEDURE — G0277: CPT

## 2022-02-07 PROCEDURE — 82962 GLUCOSE BLOOD TEST: CPT

## 2022-02-08 ENCOUNTER — APPOINTMENT (OUTPATIENT)
Dept: HYPERBARIC MEDICINE | Facility: HOSPITAL | Age: 57
End: 2022-02-08
Payer: COMMERCIAL

## 2022-02-08 ENCOUNTER — OUTPATIENT (OUTPATIENT)
Dept: OUTPATIENT SERVICES | Facility: HOSPITAL | Age: 57
LOS: 1 days | Discharge: HOME | End: 2022-02-08
Payer: COMMERCIAL

## 2022-02-08 VITALS
BODY MASS INDEX: 23.92 KG/M2 | HEART RATE: 69 BPM | HEIGHT: 63 IN | WEIGHT: 135 LBS | TEMPERATURE: 97.7 F | RESPIRATION RATE: 18 BRPM | DIASTOLIC BLOOD PRESSURE: 84 MMHG | OXYGEN SATURATION: 98 % | SYSTOLIC BLOOD PRESSURE: 150 MMHG

## 2022-02-08 DIAGNOSIS — Z98.890 OTHER SPECIFIED POSTPROCEDURAL STATES: Chronic | ICD-10-CM

## 2022-02-08 DIAGNOSIS — L97.323 NON-PRESSURE CHRONIC ULCER OF LEFT ANKLE WITH NECROSIS OF MUSCLE: ICD-10-CM

## 2022-02-08 DIAGNOSIS — E11.622 TYPE 2 DIABETES MELLITUS WITH OTHER SKIN ULCER: ICD-10-CM

## 2022-02-08 DIAGNOSIS — Z90.710 ACQUIRED ABSENCE OF BOTH CERVIX AND UTERUS: Chronic | ICD-10-CM

## 2022-02-08 PROCEDURE — 15271 SKIN SUB GRAFT TRNK/ARM/LEG: CPT | Mod: 58

## 2022-02-08 PROCEDURE — 15271 SKIN SUB GRAFT TRNK/ARM/LEG: CPT

## 2022-02-09 ENCOUNTER — APPOINTMENT (OUTPATIENT)
Dept: HYPERBARIC MEDICINE | Facility: HOSPITAL | Age: 57
End: 2022-02-09
Payer: COMMERCIAL

## 2022-02-09 ENCOUNTER — OUTPATIENT (OUTPATIENT)
Dept: OUTPATIENT SERVICES | Facility: HOSPITAL | Age: 57
LOS: 1 days | Discharge: HOME | End: 2022-02-09
Payer: COMMERCIAL

## 2022-02-09 VITALS
RESPIRATION RATE: 16 BRPM | TEMPERATURE: 98 F | HEART RATE: 88 BPM | DIASTOLIC BLOOD PRESSURE: 78 MMHG | OXYGEN SATURATION: 99 % | SYSTOLIC BLOOD PRESSURE: 145 MMHG

## 2022-02-09 VITALS
TEMPERATURE: 98.6 F | HEART RATE: 67 BPM | SYSTOLIC BLOOD PRESSURE: 159 MMHG | OXYGEN SATURATION: 99 % | DIASTOLIC BLOOD PRESSURE: 74 MMHG | RESPIRATION RATE: 16 BRPM

## 2022-02-09 DIAGNOSIS — E11.622 TYPE 2 DIABETES MELLITUS WITH OTHER SKIN ULCER: ICD-10-CM

## 2022-02-09 DIAGNOSIS — Z90.710 ACQUIRED ABSENCE OF BOTH CERVIX AND UTERUS: Chronic | ICD-10-CM

## 2022-02-09 DIAGNOSIS — Z98.890 OTHER SPECIFIED POSTPROCEDURAL STATES: Chronic | ICD-10-CM

## 2022-02-09 PROCEDURE — 82962 GLUCOSE BLOOD TEST: CPT

## 2022-02-09 PROCEDURE — 99183 HYPERBARIC OXYGEN THERAPY: CPT

## 2022-02-09 PROCEDURE — G0277: CPT

## 2022-02-09 NOTE — ASSESSMENT
[Verbal] : Verbal [Written] : Written [Patient] : Patient [Good - alert, interested, motivated] : Good - alert, interested, motivated [Verbalizes knowledge/Understanding] : Verbalizes knowledge/understanding [Dressing changes] : dressing changes [Skin Care] : skin care [Pressure relief] : pressure relief [Signs and symptoms of infection] : sign and symptoms of infection [Nutrition] : nutrition [How and When to Call] : how and when to call [Pain Management] : pain management [Off-loading] : off-loading [Compression Therapy] : compression therapy [Home Health] : home health [Patient responsibility to plan of care] : patient responsibility to plan of care [Glycemic Control] : glycemic control [Stable] : stable [Home] : Home [Other: ____] : [unfilled] [Not Applicable - Long Term Care/Home Health Agency] : Long Term Care/Home Health Agency: Not Applicable [] : No [FreeTextEntry2] : Infection Prevention\par Promote Skin Integrity\par Offloading\par Elevation\par Compression Compliance\par Low Na+ Diet\par Maintain acceptable levels of pain\par Demonstrates use of both pharmacological and nonpharmacological pain management interventions\par Weight reduction strategies\par Glycemic Control\par CAM Boot to offload  [FreeTextEntry4] : F/U 1 week for assessment\par Daily HBOT, 30/40 completed\par preauth for sharps debridement and Nushield #2 next assessment\par

## 2022-02-09 NOTE — REVIEW OF SYSTEMS
[FreeTextEntry1] : 1810 [Fever] : no fever [Chills] : no chills [Eye Pain] : no eye pain [Loss Of Hearing] : no hearing loss [Abdominal Pain] : no abdominal pain [Vomiting] : no vomiting [Joint Stiffness] : joint stiffness [Skin Wound] : skin wound [Anxiety] : no anxiety [Negative] : Heme/Lymph [de-identified] : left lateral ankle ulcer down to skin, subcutaneous tissue, fat, and tendon [de-identified] : Possible prediabetic , elevated HgA1c and blood glucose , BMI 43 ^, patient now taking Metformin

## 2022-02-09 NOTE — REVIEW OF SYSTEMS
[FreeTextEntry1] : 1813 [Fever] : no fever [Chills] : no chills [Eye Pain] : no eye pain [Loss Of Hearing] : no hearing loss [Abdominal Pain] : no abdominal pain [Vomiting] : no vomiting [Joint Stiffness] : joint stiffness [Skin Wound] : skin wound [Anxiety] : no anxiety [Negative] : Heme/Lymph [de-identified] : left lateral ankle ulcer down to skin, subcutaneous tissue, fat, and tendon [de-identified] : Possible prediabetic , elevated HgA1c and blood glucose , BMI 43 ^, patient now taking Metformin

## 2022-02-09 NOTE — PROCEDURE
[Outpatient] : Outpatient [Ambulatory] : Patient is ambulatory. [Other: ___] : [unfilled] [THIS CHAMBER HAS BEEN CLEANED / DISINFECTED] : This chamber has been cleaned / disinfected according to local and hospital policy and procedure prior to this treatment. [Patient demonstrated and verbalized proper technique for using air break mask] : Patient demonstrated and verbalized proper technique for using air break mask [Patient educated on the risks of SMOKING prior to HBOT with understanding] : Patient educated on the risks of SMOKING prior to HBOT with understanding [Patient educated on the risks of CONSUMING ALCOHOL prior to HBOT with understanding] : Patient educated on the risks of CONSUMING ALCOHOL prior to HBOT with understanding [100% Cotton] : 100% cotton [Empty all pockets] : empty all pockets [No hair oils, wigs, hairpieces, pins] : no hair oils, wigs, hairpieces, pins  [Pre tx medications] : pre tx medications  [No make-up, creams] : no make-up, creams  [No jewelry] : no jewelry  [No matches, cigarettes, lighters] : no matches, cigarettes, lighters  [Hearing aid removed] : hearing aid removed [Dentures removed] : dentures removed [Ground bracelet on pt's wrist] : ground bracelet on pt's wrist  [Contacts removed] : contacts removed  [Remove nail polish] : remove nail polish  [No reading material] : no reading material  [Bra, undergarments removed] : bra, undergarments removed  [No contraindicated dressings] : no contraindicated dressings [Ground Wire - VISUAL Verification - Intact/Free of Obstruction] : Ground Wire - VISUAL Verification - Intact/Free of Obstruction  [Ground Continuity - Verified < 1 ohm w/ Wrist Strap Enrrique] : Ground Continuity - Verified < 1 ohm w/ Wrist Strap Enrrique [Number: ___] : Number: [unfilled] [Diagnosis: ___] : Diagnosis: [unfilled] [Clear all fields] : clear all fields [____] : Post-Dive: Time - [unfilled] [___] : Post-Dive: Value - [unfilled] mg/dL [] : No [FreeTextEntry3] : 90 MIN [FreeTextEntry5] : 1812 [FreeTextEntry7] : 1827 [FreeTextEntry9] : 1950 [de-identified] : 1959 [de-identified] : 108 MIN

## 2022-02-09 NOTE — PHYSICAL EXAM
[4 x 4] : 4 x 4  [Abdominal Pad] : Abdominal Pad [1+] : left 1+ [Ankle Swelling (On Exam)] : present [Ankle Swelling Bilaterally] : bilaterally  [Ankle Swelling On The Left] : moderate [Varicose Veins Of Lower Extremities] : bilaterally [Purpura] : no purpura  [Petechiae] : no petechiae [Skin Ulcer] : no ulcer [Alert] : alert [Oriented to Person] : oriented to person [Oriented to Place] : oriented to place [Oriented to Time] : oriented to time [Calm] : calm [de-identified] : A&Ox3, NAD [de-identified] : BMI 43 [de-identified] : s/p left peroneal tendon repair, 5/5 strength in all quadrants bilaterally [de-identified] : left lateral ankle ulcer down to skin, subcutaneous tissue, fat, and tendon [de-identified] : wnl [de-identified] : Circ neurovascular function WNL post ace compression, pt expressed comfort.\par  [FreeTextEntry1] : Left Lateral Ankle [FreeTextEntry2] : 2.8 [FreeTextEntry3] : 1.7 [FreeTextEntry4] : 0.5 [de-identified] : moderate serous/sanguineous [de-identified] : mild maceration [de-identified] : 60% [de-identified] : 40% [de-identified] : [facility] 4 x 4  [de-identified] : Cristiane Valadez, Adaptic Touch, Calcium Alginate [de-identified] : Cleansed with NS\par Lot: 28 - 209 - 4B - 01\par Exp: 04/01/2024\par \par Nushield 4 x 4 \par [id number]\par Exp: 09/08/2026\par 50% Implanted \par 50% Discarded\par \par post debridement measurements:\par \par 2.9 x 1.8 x 0.6 [TWNoteComboBox5] : No [TWNoteComboBox6] : Surgical [de-identified] : No [de-identified] : other [de-identified] : None [de-identified] : None [de-identified] : False [TWNoteComboBox7] : Sonido [de-identified] : Application of skin substitute [de-identified] : Ace wraps [de-identified] : Weekly [de-identified] : Primary Dressing

## 2022-02-09 NOTE — PROCEDURE
[Saline] : saline [Other:___] : [unfilled] [Skin] : skin [Subcutaneous Tissue] : subcutaneous tissue [Fenestrated] : fenestrated [Hydrated with saline] : hydrated with saline [____ % was used] : and [unfilled] % was used [____ % was discarded] : and [unfilled] % was discarded [FreeTextEntry1] : jitendra, adaptic, calcium alginate, dry dressing, ACE [FreeTextEntry9] : 1420 [de-identified] : left lateral ankle ulcer down to skin, subcutaneous tissue, fat, and tendon [de-identified] : nitesh [de-identified] : larios [FreeTextEntry6] : left lateral ankle ulcer down to skin, subcutaneous tissue, fat, and tendon [FreeTextEntry7] : left lateral ankle ulcer down to skin, subcutaneous tissue, fat, and tendon [de-identified] : skin, subcutaneous tissue, fat

## 2022-02-09 NOTE — PHYSICAL EXAM
[4 x 4] : 4 x 4  [Abdominal Pad] : Abdominal Pad [1+] : left 1+ [Ankle Swelling (On Exam)] : present [Ankle Swelling Bilaterally] : bilaterally  [Ankle Swelling On The Left] : moderate [Varicose Veins Of Lower Extremities] : bilaterally [Purpura] : no purpura  [Petechiae] : no petechiae [Skin Ulcer] : no ulcer [Alert] : alert [Oriented to Person] : oriented to person [Oriented to Place] : oriented to place [Oriented to Time] : oriented to time [Calm] : calm [de-identified] : A&Ox3, NAD [de-identified] : BMI 43 [de-identified] : s/p left peroneal tendon repair, 5/5 strength in all quadrants bilaterally [de-identified] : left lateral ankle ulcer down to skin, subcutaneous tissue, fat, and tendon [de-identified] : wnl [de-identified] : Circ neurovascular function WNL post ace compression, pt expressed comfort.\par  [FreeTextEntry1] : Left Lateral Ankle [FreeTextEntry2] : 2.8 [FreeTextEntry3] : 1.7 [FreeTextEntry4] : 0.5 [de-identified] : moderate serous/sanguineous [de-identified] : mild maceration [de-identified] : 60% [de-identified] : 40% [de-identified] : [facility] 4 x 4  [de-identified] : Cristiane Valadez, Adaptic Touch, Calcium Alginate [de-identified] : Cleansed with NS\par Lot: 28 - 209 - 4B - 01\par Exp: 04/01/2024\par \par Nushield 4 x 4 \par [id number]\par Exp: 09/08/2026\par 50% Implanted \par 50% Discarded\par \par post debridement measurements:\par \par 2.9 x 1.8 x 0.6 [TWNoteComboBox5] : No [TWNoteComboBox6] : Surgical [de-identified] : No [de-identified] : other [de-identified] : None [de-identified] : None [de-identified] : False [TWNoteComboBox7] : Sonido [de-identified] : Application of skin substitute [de-identified] : Ace wraps [de-identified] : Weekly [de-identified] : Primary Dressing

## 2022-02-09 NOTE — PROCEDURE
[Saline] : saline [Other:___] : [unfilled] [Skin] : skin [Subcutaneous Tissue] : subcutaneous tissue [Fenestrated] : fenestrated [Hydrated with saline] : hydrated with saline [____ % was used] : and [unfilled] % was used [____ % was discarded] : and [unfilled] % was discarded [FreeTextEntry1] : jitendra, adaptic, calcium alginate, dry dressing, ACE [FreeTextEntry9] : 3169 [de-identified] : left lateral ankle ulcer down to skin, subcutaneous tissue, fat, and tendon [de-identified] : nitesh [de-identified] : larios [FreeTextEntry6] : left lateral ankle ulcer down to skin, subcutaneous tissue, fat, and tendon [FreeTextEntry7] : left lateral ankle ulcer down to skin, subcutaneous tissue, fat, and tendon [de-identified] : skin, subcutaneous tissue, fat

## 2022-02-10 ENCOUNTER — OUTPATIENT (OUTPATIENT)
Dept: OUTPATIENT SERVICES | Facility: HOSPITAL | Age: 57
LOS: 1 days | Discharge: HOME | End: 2022-02-10
Payer: COMMERCIAL

## 2022-02-10 ENCOUNTER — APPOINTMENT (OUTPATIENT)
Dept: HYPERBARIC MEDICINE | Facility: HOSPITAL | Age: 57
End: 2022-02-10
Payer: COMMERCIAL

## 2022-02-10 VITALS
OXYGEN SATURATION: 99 % | TEMPERATURE: 98 F | HEART RATE: 88 BPM | SYSTOLIC BLOOD PRESSURE: 130 MMHG | DIASTOLIC BLOOD PRESSURE: 70 MMHG | RESPIRATION RATE: 16 BRPM

## 2022-02-10 VITALS
TEMPERATURE: 98.6 F | HEART RATE: 70 BPM | RESPIRATION RATE: 16 BRPM | SYSTOLIC BLOOD PRESSURE: 114 MMHG | DIASTOLIC BLOOD PRESSURE: 76 MMHG | OXYGEN SATURATION: 99 %

## 2022-02-10 DIAGNOSIS — E11.622 TYPE 2 DIABETES MELLITUS WITH OTHER SKIN ULCER: ICD-10-CM

## 2022-02-10 DIAGNOSIS — L97.323 NON-PRESSURE CHRONIC ULCER OF LEFT ANKLE WITH NECROSIS OF MUSCLE: ICD-10-CM

## 2022-02-10 DIAGNOSIS — Z98.890 OTHER SPECIFIED POSTPROCEDURAL STATES: Chronic | ICD-10-CM

## 2022-02-10 DIAGNOSIS — Z90.710 ACQUIRED ABSENCE OF BOTH CERVIX AND UTERUS: Chronic | ICD-10-CM

## 2022-02-10 PROCEDURE — G0277: CPT

## 2022-02-10 PROCEDURE — 82962 GLUCOSE BLOOD TEST: CPT

## 2022-02-10 PROCEDURE — 99183 HYPERBARIC OXYGEN THERAPY: CPT

## 2022-02-10 NOTE — PROCEDURE
[Outpatient] : Outpatient [Ambulatory] : Patient is ambulatory. [Other: ___] : [unfilled] [THIS CHAMBER HAS BEEN CLEANED / DISINFECTED] : This chamber has been cleaned / disinfected according to local and hospital policy and procedure prior to this treatment. [____] : Recheck: Time - [unfilled] [___] : Recheck: Value - [unfilled] mg/dL [Patient demonstrated and verbalized proper technique for using air break mask] : Patient demonstrated and verbalized proper technique for using air break mask [Patient educated on the risks of SMOKING prior to HBOT with understanding] : Patient educated on the risks of SMOKING prior to HBOT with understanding [Patient educated on the risks of CONSUMING ALCOHOL prior to HBOT with understanding] : Patient educated on the risks of CONSUMING ALCOHOL prior to HBOT with understanding # T2DM on home insulin  Home regimen Lantus 15 BID and Januvia 50 mg qd.   - Lantus 15 qhs   - lispro 5 mg TID with meals  -MDSSI  - f/u A1c [100% Cotton] : 100% cotton [Empty all pockets] : empty all pockets [No hair oils, wigs, hairpieces, pins] : no hair oils, wigs, hairpieces, pins  [Pre tx medications] : pre tx medications  [No make-up, creams] : no make-up, creams  [No jewelry] : no jewelry  [No matches, cigarettes, lighters] : no matches, cigarettes, lighters  [Hearing aid removed] : hearing aid removed [Dentures removed] : dentures removed [Ground bracelet on pt's wrist] : ground bracelet on pt's wrist  [Contacts removed] : contacts removed  [Remove nail polish] : remove nail polish  [No reading material] : no reading material  [Bra, undergarments removed] : bra, undergarments removed  # T2DM on home insulin  Home regimen Lantus 15 BID and Januvia 50 mg qd.   - Lantus 15 qhs   - lispro 5 mg TID with meals  -MDSSI [No contraindicated dressings] : no contraindicated dressings [Ground Wire - VISUAL Verification - Intact/Free of Obstruction] : Ground Wire - VISUAL Verification - Intact/Free of Obstruction  [Ground Continuity - Verified < 1 ohm w/ Wrist Strap Enrrique] : Ground Continuity - Verified < 1 ohm w/ Wrist Strap Enrrique [Diagnosis: ___] : Diagnosis: [unfilled] [Number: ___] : Number: [unfilled] [Clear all fields] : clear all fields [] : No [FreeTextEntry3] : 90 MIN [FreeTextEntry5] : 1812 [FreeTextEntry7] : 182 [FreeTextEntry9] : 1950 [de-identified] : 1959 [de-identified] : 108 MIN

## 2022-02-10 NOTE — ADDENDUM
[FreeTextEntry1] : Pt's BGL low. MD notified. Pt given 16g of glucose via 8oz juice Pt BGL retested. Pt BGL still low. MD notified. aas per order pt was dive without any further interventions. \par Pt received Wc by LPN prior to tx. \par Pt descended to 2.0 RAIMUNDO @ 1.75 PSI/min without incident in chamber #3\par Pt resting @ depth with chest rise and fall observed throughout tx. \par Pt ascended from 2.0 RAIMUNDO @ 1.75 PSI/min without incident. \par Pt tolerated tx well.\par Pt Recieved Ace wrap by RN post tx. \par .\par

## 2022-02-11 ENCOUNTER — APPOINTMENT (OUTPATIENT)
Dept: HYPERBARIC MEDICINE | Facility: HOSPITAL | Age: 57
End: 2022-02-11
Payer: COMMERCIAL

## 2022-02-11 ENCOUNTER — OUTPATIENT (OUTPATIENT)
Dept: OUTPATIENT SERVICES | Facility: HOSPITAL | Age: 57
LOS: 1 days | Discharge: HOME | End: 2022-02-11
Payer: COMMERCIAL

## 2022-02-11 VITALS
DIASTOLIC BLOOD PRESSURE: 81 MMHG | OXYGEN SATURATION: 100 % | TEMPERATURE: 98.2 F | SYSTOLIC BLOOD PRESSURE: 144 MMHG | WEIGHT: 235 LBS | HEART RATE: 66 BPM | RESPIRATION RATE: 20 BRPM | BODY MASS INDEX: 41.64 KG/M2 | HEIGHT: 63 IN

## 2022-02-11 VITALS
RESPIRATION RATE: 18 BRPM | OXYGEN SATURATION: 97 % | TEMPERATURE: 97.2 F | SYSTOLIC BLOOD PRESSURE: 149 MMHG | HEART RATE: 70 BPM | DIASTOLIC BLOOD PRESSURE: 76 MMHG

## 2022-02-11 DIAGNOSIS — Z98.890 OTHER SPECIFIED POSTPROCEDURAL STATES: Chronic | ICD-10-CM

## 2022-02-11 DIAGNOSIS — Z90.710 ACQUIRED ABSENCE OF BOTH CERVIX AND UTERUS: Chronic | ICD-10-CM

## 2022-02-11 DIAGNOSIS — E11.622 TYPE 2 DIABETES MELLITUS WITH OTHER SKIN ULCER: ICD-10-CM

## 2022-02-11 LAB — SARS-COV-2 RNA SPEC QL NAA+PROBE: SIGNIFICANT CHANGE UP

## 2022-02-11 PROCEDURE — 29581 APPL MULTLAYER CMPRN SYS LEG: CPT | Mod: RT

## 2022-02-11 PROCEDURE — U0003: CPT

## 2022-02-11 PROCEDURE — 99183 HYPERBARIC OXYGEN THERAPY: CPT

## 2022-02-11 PROCEDURE — G0277: CPT

## 2022-02-11 PROCEDURE — 82962 GLUCOSE BLOOD TEST: CPT

## 2022-02-11 PROCEDURE — U0005: CPT

## 2022-02-12 DIAGNOSIS — L97.323 NON-PRESSURE CHRONIC ULCER OF LEFT ANKLE WITH NECROSIS OF MUSCLE: ICD-10-CM

## 2022-02-12 DIAGNOSIS — E11.622 TYPE 2 DIABETES MELLITUS WITH OTHER SKIN ULCER: ICD-10-CM

## 2022-02-14 ENCOUNTER — OUTPATIENT (OUTPATIENT)
Dept: OUTPATIENT SERVICES | Facility: HOSPITAL | Age: 57
LOS: 1 days | Discharge: HOME | End: 2022-02-14
Payer: COMMERCIAL

## 2022-02-14 ENCOUNTER — APPOINTMENT (OUTPATIENT)
Dept: HYPERBARIC MEDICINE | Facility: HOSPITAL | Age: 57
End: 2022-02-14
Payer: COMMERCIAL

## 2022-02-14 VITALS
DIASTOLIC BLOOD PRESSURE: 88 MMHG | OXYGEN SATURATION: 96 % | TEMPERATURE: 97.6 F | RESPIRATION RATE: 18 BRPM | SYSTOLIC BLOOD PRESSURE: 162 MMHG | HEART RATE: 66 BPM

## 2022-02-14 DIAGNOSIS — L97.323 NON-PRESSURE CHRONIC ULCER OF LEFT ANKLE WITH NECROSIS OF MUSCLE: ICD-10-CM

## 2022-02-14 DIAGNOSIS — E11.622 TYPE 2 DIABETES MELLITUS WITH OTHER SKIN ULCER: ICD-10-CM

## 2022-02-14 DIAGNOSIS — Z86.16 PERSONAL HISTORY OF COVID-19: ICD-10-CM

## 2022-02-14 DIAGNOSIS — Z90.710 ACQUIRED ABSENCE OF BOTH CERVIX AND UTERUS: ICD-10-CM

## 2022-02-14 DIAGNOSIS — Z98.890 OTHER SPECIFIED POSTPROCEDURAL STATES: Chronic | ICD-10-CM

## 2022-02-14 DIAGNOSIS — Z79.51 LONG TERM (CURRENT) USE OF INHALED STEROIDS: ICD-10-CM

## 2022-02-14 DIAGNOSIS — Z90.710 ACQUIRED ABSENCE OF BOTH CERVIX AND UTERUS: Chronic | ICD-10-CM

## 2022-02-14 DIAGNOSIS — Z79.899 OTHER LONG TERM (CURRENT) DRUG THERAPY: ICD-10-CM

## 2022-02-14 DIAGNOSIS — Z85.038 PERSONAL HISTORY OF OTHER MALIGNANT NEOPLASM OF LARGE INTESTINE: ICD-10-CM

## 2022-02-14 DIAGNOSIS — Z86.010 PERSONAL HISTORY OF COLONIC POLYPS: ICD-10-CM

## 2022-02-14 DIAGNOSIS — Z98.890 OTHER SPECIFIED POSTPROCEDURAL STATES: ICD-10-CM

## 2022-02-14 PROCEDURE — 99183 HYPERBARIC OXYGEN THERAPY: CPT

## 2022-02-14 PROCEDURE — G0277: CPT

## 2022-02-14 PROCEDURE — 82962 GLUCOSE BLOOD TEST: CPT

## 2022-02-15 ENCOUNTER — APPOINTMENT (OUTPATIENT)
Dept: WOUND CARE | Facility: HOSPITAL | Age: 57
End: 2022-02-15
Payer: COMMERCIAL

## 2022-02-15 ENCOUNTER — OUTPATIENT (OUTPATIENT)
Dept: OUTPATIENT SERVICES | Facility: HOSPITAL | Age: 57
LOS: 1 days | Discharge: HOME | End: 2022-02-15
Payer: COMMERCIAL

## 2022-02-15 VITALS
OXYGEN SATURATION: 99 % | SYSTOLIC BLOOD PRESSURE: 160 MMHG | RESPIRATION RATE: 19 BRPM | DIASTOLIC BLOOD PRESSURE: 85 MMHG | TEMPERATURE: 98.1 F | HEART RATE: 69 BPM

## 2022-02-15 DIAGNOSIS — E11.622 TYPE 2 DIABETES MELLITUS WITH OTHER SKIN ULCER: ICD-10-CM

## 2022-02-15 DIAGNOSIS — Z98.890 OTHER SPECIFIED POSTPROCEDURAL STATES: Chronic | ICD-10-CM

## 2022-02-15 DIAGNOSIS — Z90.710 ACQUIRED ABSENCE OF BOTH CERVIX AND UTERUS: Chronic | ICD-10-CM

## 2022-02-15 PROCEDURE — 15271 SKIN SUB GRAFT TRNK/ARM/LEG: CPT | Mod: 58

## 2022-02-15 PROCEDURE — G0277: CPT

## 2022-02-15 NOTE — ADDENDUM
[FreeTextEntry1] : Pt's BGL low. DPM notified. Pt given 16g of glucose oral sugar tablets. as per orders pt was dive without any further interventions. \par . \par Pt descended to 2.0 RAIMUNDO @ 1.75 PSI/min without incident in chamber #3\par Pt resting @ depth with chest rise and fall observed throughout tx. \par Pt ascended from 2.0 RAIMUNDO @ 1.75 PSI/min without incident. \par Pt tolerated tx well.\par Pt Recieved Ace wrap by RN post tx. \par .\par

## 2022-02-15 NOTE — PROCEDURE
[Outpatient] : Outpatient [Ambulatory] : Patient is ambulatory. [Other: ___] : [unfilled] [THIS CHAMBER HAS BEEN CLEANED / DISINFECTED] : This chamber has been cleaned / disinfected according to local and hospital policy and procedure prior to this treatment. [____] : Recheck: Time - [unfilled] [___] : Recheck: Value - [unfilled] mg/dL [Patient demonstrated and verbalized proper technique for using air break mask] : Patient demonstrated and verbalized proper technique for using air break mask [Patient educated on the risks of SMOKING prior to HBOT with understanding] : Patient educated on the risks of SMOKING prior to HBOT with understanding [Patient educated on the risks of CONSUMING ALCOHOL prior to HBOT with understanding] : Patient educated on the risks of CONSUMING ALCOHOL prior to HBOT with understanding [100% Cotton] : 100% cotton [Empty all pockets] : empty all pockets [No hair oils, wigs, hairpieces, pins] : no hair oils, wigs, hairpieces, pins  [Pre tx medications] : pre tx medications  [No make-up, creams] : no make-up, creams  [No jewelry] : no jewelry  [No matches, cigarettes, lighters] : no matches, cigarettes, lighters  [Hearing aid removed] : hearing aid removed [Dentures removed] : dentures removed [Ground bracelet on pt's wrist] : ground bracelet on pt's wrist  [Contacts removed] : contacts removed  [Remove nail polish] : remove nail polish  [No reading material] : no reading material  [Bra, undergarments removed] : bra, undergarments removed  [No contraindicated dressings] : no contraindicated dressings [Ground Wire - VISUAL Verification - Intact/Free of Obstruction] : Ground Wire - VISUAL Verification - Intact/Free of Obstruction  [Ground Continuity - Verified < 1 ohm w/ Wrist Strap Enrrique] : Ground Continuity - Verified < 1 ohm w/ Wrist Strap Enrrique [Diagnosis: ___] : Diagnosis: [unfilled] [Number: ___] : Number: [unfilled] [Clear all fields] : clear all fields [] : No [FreeTextEntry3] : 90 MIN [FreeTextEntry5] : 7729 [FreeTextEntry7] : 0922 [FreeTextEntry9] : 1935 [de-identified] : 1944 [de-identified] : 108 MIN

## 2022-02-16 ENCOUNTER — OUTPATIENT (OUTPATIENT)
Dept: OUTPATIENT SERVICES | Facility: HOSPITAL | Age: 57
LOS: 1 days | Discharge: HOME | End: 2022-02-16
Payer: COMMERCIAL

## 2022-02-16 ENCOUNTER — APPOINTMENT (OUTPATIENT)
Dept: HYPERBARIC MEDICINE | Facility: HOSPITAL | Age: 57
End: 2022-02-16
Payer: COMMERCIAL

## 2022-02-16 VITALS
TEMPERATURE: 98.6 F | HEART RATE: 88 BPM | SYSTOLIC BLOOD PRESSURE: 122 MMHG | OXYGEN SATURATION: 99 % | RESPIRATION RATE: 16 BRPM | DIASTOLIC BLOOD PRESSURE: 88 MMHG

## 2022-02-16 VITALS
HEART RATE: 78 BPM | DIASTOLIC BLOOD PRESSURE: 78 MMHG | TEMPERATURE: 97.9 F | OXYGEN SATURATION: 98 % | SYSTOLIC BLOOD PRESSURE: 174 MMHG | RESPIRATION RATE: 18 BRPM

## 2022-02-16 DIAGNOSIS — E11.622 TYPE 2 DIABETES MELLITUS WITH OTHER SKIN ULCER: ICD-10-CM

## 2022-02-16 DIAGNOSIS — Z90.710 ACQUIRED ABSENCE OF BOTH CERVIX AND UTERUS: Chronic | ICD-10-CM

## 2022-02-16 DIAGNOSIS — Z98.890 OTHER SPECIFIED POSTPROCEDURAL STATES: Chronic | ICD-10-CM

## 2022-02-16 PROCEDURE — G0277: CPT

## 2022-02-16 PROCEDURE — 82962 GLUCOSE BLOOD TEST: CPT

## 2022-02-16 PROCEDURE — 99183 HYPERBARIC OXYGEN THERAPY: CPT

## 2022-02-17 ENCOUNTER — OUTPATIENT (OUTPATIENT)
Dept: OUTPATIENT SERVICES | Facility: HOSPITAL | Age: 57
LOS: 1 days | Discharge: HOME | End: 2022-02-17
Payer: COMMERCIAL

## 2022-02-17 ENCOUNTER — APPOINTMENT (OUTPATIENT)
Dept: HYPERBARIC MEDICINE | Facility: HOSPITAL | Age: 57
End: 2022-02-17
Payer: COMMERCIAL

## 2022-02-17 VITALS
OXYGEN SATURATION: 99 % | HEART RATE: 88 BPM | SYSTOLIC BLOOD PRESSURE: 138 MMHG | TEMPERATURE: 98.3 F | RESPIRATION RATE: 16 BRPM | DIASTOLIC BLOOD PRESSURE: 74 MMHG

## 2022-02-17 VITALS
SYSTOLIC BLOOD PRESSURE: 147 MMHG | RESPIRATION RATE: 16 BRPM | TEMPERATURE: 98.3 F | OXYGEN SATURATION: 99 % | DIASTOLIC BLOOD PRESSURE: 74 MMHG | HEART RATE: 60 BPM

## 2022-02-17 DIAGNOSIS — E11.622 TYPE 2 DIABETES MELLITUS WITH OTHER SKIN ULCER: ICD-10-CM

## 2022-02-17 DIAGNOSIS — Z90.710 ACQUIRED ABSENCE OF BOTH CERVIX AND UTERUS: Chronic | ICD-10-CM

## 2022-02-17 DIAGNOSIS — Z98.890 OTHER SPECIFIED POSTPROCEDURAL STATES: Chronic | ICD-10-CM

## 2022-02-17 PROCEDURE — G0277: CPT

## 2022-02-17 PROCEDURE — 99183 HYPERBARIC OXYGEN THERAPY: CPT

## 2022-02-17 PROCEDURE — 82962 GLUCOSE BLOOD TEST: CPT

## 2022-02-18 ENCOUNTER — APPOINTMENT (OUTPATIENT)
Dept: HYPERBARIC MEDICINE | Facility: HOSPITAL | Age: 57
End: 2022-02-18
Payer: COMMERCIAL

## 2022-02-18 ENCOUNTER — OUTPATIENT (OUTPATIENT)
Dept: OUTPATIENT SERVICES | Facility: HOSPITAL | Age: 57
LOS: 1 days | Discharge: HOME | End: 2022-02-18
Payer: COMMERCIAL

## 2022-02-18 VITALS
TEMPERATURE: 98.2 F | SYSTOLIC BLOOD PRESSURE: 153 MMHG | DIASTOLIC BLOOD PRESSURE: 77 MMHG | RESPIRATION RATE: 18 BRPM | HEART RATE: 82 BPM | OXYGEN SATURATION: 96 %

## 2022-02-18 VITALS
DIASTOLIC BLOOD PRESSURE: 70 MMHG | TEMPERATURE: 97.9 F | OXYGEN SATURATION: 97 % | RESPIRATION RATE: 18 BRPM | HEART RATE: 71 BPM | SYSTOLIC BLOOD PRESSURE: 136 MMHG

## 2022-02-18 DIAGNOSIS — E11.622 TYPE 2 DIABETES MELLITUS WITH OTHER SKIN ULCER: ICD-10-CM

## 2022-02-18 DIAGNOSIS — Z90.710 ACQUIRED ABSENCE OF BOTH CERVIX AND UTERUS: Chronic | ICD-10-CM

## 2022-02-18 DIAGNOSIS — Z98.890 OTHER SPECIFIED POSTPROCEDURAL STATES: Chronic | ICD-10-CM

## 2022-02-18 LAB — SARS-COV-2 RNA SPEC QL NAA+PROBE: SIGNIFICANT CHANGE UP

## 2022-02-18 PROCEDURE — U0005: CPT

## 2022-02-18 PROCEDURE — U0003: CPT

## 2022-02-18 PROCEDURE — 82962 GLUCOSE BLOOD TEST: CPT

## 2022-02-18 PROCEDURE — 99183 HYPERBARIC OXYGEN THERAPY: CPT

## 2022-02-18 PROCEDURE — G0277: CPT

## 2022-02-18 NOTE — ASSESSMENT
[No change from previous assessment] : No change from previous assessment [Patient prepared for dive] : Patient prepared for dive [Patient descended without problem for 9 minutes] : Patient descended without problem for 9 minutes [No dizziness or thirst] :  No dizziness or thirst [No ear problems] : No ear problems [Vital signs stable] : Vital signs stable [Tolerating dive well] : Tolerating dive well [No Chest Pain, shortness of breath] : No Chest Pain, shortness of breath [Respiratory Rate Stable] : Respiratory Rate Stable [Tolerated Ascent well] : Tolerated Ascent well [No chest pain, shortness of breath, or ear pain] :  No chest pain, shortness of breath, or ear pain  [Vital Signs stable] : Vital Signs stable [A physician was present throughout the entire HBOT] : A physician was present throughout the entire HBOT [No] : No [Clinically Stable] : Clinically stable [Continue Treatment Plan] : Continue treatment plan [0] : 0 out of 10

## 2022-02-18 NOTE — PHYSICAL EXAM
[4 x 4] : 4 x 4  [Abdominal Pad] : Abdominal Pad [1+] : left 1+ [Ankle Swelling (On Exam)] : present [Ankle Swelling Bilaterally] : bilaterally  [Ankle Swelling On The Left] : moderate [Varicose Veins Of Lower Extremities] : bilaterally [Purpura] : no purpura  [Petechiae] : no petechiae [Skin Ulcer] : no ulcer [Alert] : alert [Oriented to Person] : oriented to person [Oriented to Place] : oriented to place [Oriented to Time] : oriented to time [Calm] : calm [de-identified] : A&Ox3, NAD [de-identified] : BMI 43 [de-identified] : s/p left peroneal tendon repair, 5/5 strength in all quadrants bilaterally [de-identified] : left lateral ankle ulcer down to skin, subcutaneous tissue, fat, and tendon [de-identified] : wnl [de-identified] : \par  [FreeTextEntry1] : Left Lateral Ankle [FreeTextEntry2] : 2.5 [FreeTextEntry3] : 1.2 [FreeTextEntry4] : 0.5 [de-identified] : moderate serous/sanguineous [de-identified] : mild  [de-identified] : 40% [de-identified] : 60% [de-identified] : Allyson 2cm x 3cm  [de-identified] : Circulatory and neuromuscular function WNL post ACE Application  [de-identified] : Cristiane Valadez, Adaptic Touch, Calcium Alginate [de-identified] : Cleansed with Normal Saline. \par Lot: [id number] [id number] - 01\par Exp: 04/01/2024\par \par * bandage applied by DPM\par \par Nushield #2  (2cm x 3cm) \par [id number]\par Exp: 09/04/2026\par 50% Implanted \par 50% Discarded\par \par post debridement measurements:\par \par 2.6 x 1.3 x 0.6 [TWNoteComboBox5] : No [TWNoteComboBox6] : Surgical [de-identified] : No [de-identified] : Macerated [de-identified] : None [de-identified] : None [TWNoteComboBox7] : Sonido [de-identified] : Application of skin substitute [de-identified] : Multilayer other compression wrap [de-identified] : Weekly [de-identified] : Primary Dressing

## 2022-02-18 NOTE — ADDENDUM
[FreeTextEntry1] : Pt descended to 2.0 RAIMUNDO @ 1.75 PSI/min without incident in chamber #3\par Pt resting @ depth with chest rise and fall observed throughout tx. \par Pt ascended from 2.0 RAIMUNDO @ 1.75 PSI/min without incident. \par Pt tolerated tx well.\par \par \par \par

## 2022-02-18 NOTE — ASSESSMENT
[No change from previous assessment] : No change from previous assessment [Patient prepared for dive] : Patient prepared for dive [Patient descended without problem for 9 minutes] : Patient descended without problem for 9 minutes [No dizziness or thirst] :  No dizziness or thirst [No ear problems] : No ear problems [Vital signs stable] : Vital signs stable [Tolerating dive well] : Tolerating dive well [No Chest Pain, shortness of breath] : No Chest Pain, shortness of breath [Respiratory Rate Stable] : Respiratory Rate Stable [No chest pain, shortness of breath, or ear pain] :  No chest pain, shortness of breath, or ear pain  [Tolerated Ascent well] : Tolerated Ascent well [Vital Signs stable] : Vital Signs stable [A physician was present throughout the entire HBOT] : A physician was present throughout the entire HBOT [No] : No

## 2022-02-18 NOTE — REVIEW OF SYSTEMS
[FreeTextEntry1] : 1750hr [Fever] : no fever [Chills] : no chills [Eye Pain] : no eye pain [Loss Of Hearing] : no hearing loss [Abdominal Pain] : no abdominal pain [Vomiting] : no vomiting [Joint Stiffness] : joint stiffness [Skin Wound] : skin wound [Anxiety] : no anxiety [Negative] : Heme/Lymph [de-identified] : left lateral ankle ulcer down to skin, subcutaneous tissue, fat, and tendon [de-identified] : Possible prediabetic , elevated HgA1c and blood glucose , BMI 43 ^, patient now taking Metformin

## 2022-02-18 NOTE — PROCEDURE
[Saline] : saline [Other:___] : [unfilled] [Skin] : skin [Subcutaneous Tissue] : subcutaneous tissue [Fenestrated] : fenestrated [Hydrated with saline] : hydrated with saline [____ % was used] : and [unfilled] % was used [____ % was discarded] : and [unfilled] % was discarded [FreeTextEntry1] : jitendra, adaptic, calcium alginate, dry dressing, ACE [FreeTextEntry9] : 1822hr [de-identified] : left lateral ankle ulcer down to skin, subcutaneous tissue, fat, and tendon [de-identified] : nitesh [de-identified] : ronald [FreeTextEntry6] : left lateral ankle ulcer down to skin, subcutaneous tissue, fat, and tendon [FreeTextEntry7] : left lateral ankle ulcer down to skin, subcutaneous tissue, fat, and tendon [de-identified] : skin, subcutaneous tissue, fat

## 2022-02-18 NOTE — ASSESSMENT
[No change from previous assessment] : No change from previous assessment [Patient prepared for dive] : Patient prepared for dive [No dizziness or thirst] :  No dizziness or thirst [Patient descended without problem for 9 minutes] : Patient descended without problem for 9 minutes [No ear problems] : No ear problems [Vital signs stable] : Vital signs stable [Tolerating dive well] : Tolerating dive well [No Chest Pain, shortness of breath] : No Chest Pain, shortness of breath [Respiratory Rate Stable] : Respiratory Rate Stable [No chest pain, shortness of breath, or ear pain] :  No chest pain, shortness of breath, or ear pain  [Tolerated Ascent well] : Tolerated Ascent well [Vital Signs stable] : Vital Signs stable [A physician was present throughout the entire HBOT] : A physician was present throughout the entire HBOT [No] : No [Continue Treatment Plan] : Continue treatment plan [Clinically Stable] : Clinically stable [0] : 0 out of 10

## 2022-02-18 NOTE — PROCEDURE
[Outpatient] : Outpatient [Ambulatory] : Patient is ambulatory. [Other: ___] : [unfilled] [THIS CHAMBER HAS BEEN CLEANED / DISINFECTED] : This chamber has been cleaned / disinfected according to local and hospital policy and procedure prior to this treatment. [Patient demonstrated and verbalized proper technique for using air break mask] : Patient demonstrated and verbalized proper technique for using air break mask [Patient educated on the risks of SMOKING prior to HBOT with understanding] : Patient educated on the risks of SMOKING prior to HBOT with understanding [Patient educated on the risks of CONSUMING ALCOHOL prior to HBOT with understanding] : Patient educated on the risks of CONSUMING ALCOHOL prior to HBOT with understanding [100% Cotton] : 100% cotton [Empty all pockets] : empty all pockets [No hair oils, wigs, hairpieces, pins] : no hair oils, wigs, hairpieces, pins  [Pre tx medications] : pre tx medications  [No make-up, creams] : no make-up, creams  [No jewelry] : no jewelry  [No matches, cigarettes, lighters] : no matches, cigarettes, lighters  [Hearing aid removed] : hearing aid removed [Dentures removed] : dentures removed [Ground bracelet on pt's wrist] : ground bracelet on pt's wrist  [Contacts removed] : contacts removed  [Remove nail polish] : remove nail polish  [No reading material] : no reading material  [Bra, undergarments removed] : bra, undergarments removed  [No contraindicated dressings] : no contraindicated dressings [Ground Wire - VISUAL Verification - Intact/Free of Obstruction] : Ground Wire - VISUAL Verification - Intact/Free of Obstruction  [Ground Continuity - Verified < 1 ohm w/ Wrist Strap Enrrique] : Ground Continuity - Verified < 1 ohm w/ Wrist Strap Enrrique [Number: ___] : Number: [unfilled] [Diagnosis: ___] : Diagnosis: [unfilled] [____] : Post-Dive: Time - [unfilled] [___] : Post-Dive: Value - [unfilled] mg/dL [Clear all fields] : clear all fields [] : No [FreeTextEntry3] : 90 [FreeTextEntry5] : 9173 [FreeTextEntry7] : 7949 [FreeTextEntry9] : 1831 [de-identified] : 1844 [de-identified] : 108

## 2022-02-18 NOTE — ADDENDUM
[FreeTextEntry1] : PT ARRIVED A&OX3 \par ALL VITALS WITIN PARAMETERS FOR HBOT\par PT DESCENDED TO 2.0 RAIMUNDO @ 1.75 PSI/MIN IN CHAMBER #1  WITHOUT INCIDENT\par PT RESTING AT TX DEPTH WITH VISIBLE CHEST RISE AND FALL OBSERVED CHAMBER SIDE\par

## 2022-02-18 NOTE — PROCEDURE
[Outpatient] : Outpatient [Ambulatory] : Patient is ambulatory. [Other: ___] : [unfilled] [THIS CHAMBER HAS BEEN CLEANED / DISINFECTED] : This chamber has been cleaned / disinfected according to local and hospital policy and procedure prior to this treatment. [____] : Post-Dive: Time - [unfilled] [___] : Post-Dive: Value - [unfilled] mg/dL [Patient demonstrated and verbalized proper technique for using air break mask] : Patient demonstrated and verbalized proper technique for using air break mask [Patient educated on the risks of CONSUMING ALCOHOL prior to HBOT with understanding] : Patient educated on the risks of CONSUMING ALCOHOL prior to HBOT with understanding [Patient educated on the risks of SMOKING prior to HBOT with understanding] : Patient educated on the risks of SMOKING prior to HBOT with understanding [Empty all pockets] : empty all pockets [100% Cotton] : 100% cotton [No hair oils, wigs, hairpieces, pins] : no hair oils, wigs, hairpieces, pins  [Pre tx medications] : pre tx medications  [No make-up, creams] : no make-up, creams  [No jewelry] : no jewelry  [Hearing aid removed] : hearing aid removed [No matches, cigarettes, lighters] : no matches, cigarettes, lighters  [Dentures removed] : dentures removed [Ground bracelet on pt's wrist] : ground bracelet on pt's wrist  [Contacts removed] : contacts removed  [Remove nail polish] : remove nail polish  [No reading material] : no reading material  [Bra, undergarments removed] : bra, undergarments removed  [No contraindicated dressings] : no contraindicated dressings [Ground Wire - VISUAL Verification - Intact/Free of Obstruction] : Ground Wire - VISUAL Verification - Intact/Free of Obstruction  [Ground Continuity - Verified < 1 ohm w/ Wrist Strap Enrrique] : Ground Continuity - Verified < 1 ohm w/ Wrist Strap Enrrique [Clear all fields] : clear all fields [Number: ___] : Number: [unfilled] [Diagnosis: ___] : Diagnosis: [unfilled] [] : No [FreeTextEntry3] : 90 [FreeTextEntry5] : 7945 [[facility]otEntry7] : 9821 [FreeTextEntry9] : 1940 [de-identified] : 1949 [de-identified] : 108 MINUTES

## 2022-02-18 NOTE — PROCEDURE
[Outpatient] : Outpatient [Ambulatory] : Patient is ambulatory. [Other: ___] : [unfilled] [THIS CHAMBER HAS BEEN CLEANED / DISINFECTED] : This chamber has been cleaned / disinfected according to local and hospital policy and procedure prior to this treatment. [____] : Post-Dive: Time - [unfilled] [___] : Post-Dive: Value - [unfilled] mg/dL [Patient demonstrated and verbalized proper technique for using air break mask] : Patient demonstrated and verbalized proper technique for using air break mask [Patient educated on the risks of SMOKING prior to HBOT with understanding] : Patient educated on the risks of SMOKING prior to HBOT with understanding [Patient educated on the risks of CONSUMING ALCOHOL prior to HBOT with understanding] : Patient educated on the risks of CONSUMING ALCOHOL prior to HBOT with understanding [100% Cotton] : 100% cotton [Empty all pockets] : empty all pockets [No hair oils, wigs, hairpieces, pins] : no hair oils, wigs, hairpieces, pins  [Pre tx medications] : pre tx medications  [No make-up, creams] : no make-up, creams  [No jewelry] : no jewelry  [No matches, cigarettes, lighters] : no matches, cigarettes, lighters  [Dentures removed] : dentures removed [Hearing aid removed] : hearing aid removed [Ground bracelet on pt's wrist] : ground bracelet on pt's wrist  [Contacts removed] : contacts removed  [No reading material] : no reading material  [Remove nail polish] : remove nail polish  [Bra, undergarments removed] : bra, undergarments removed  [No contraindicated dressings] : no contraindicated dressings [Ground Continuity - Verified < 1 ohm w/ Wrist Strap Enrrique] : Ground Continuity - Verified < 1 ohm w/ Wrist Strap Enrrique [Ground Wire - VISUAL Verification - Intact/Free of Obstruction] : Ground Wire - VISUAL Verification - Intact/Free of Obstruction  [Diagnosis: ___] : Diagnosis: [unfilled] [Clear all fields] : clear all fields [Number: ___] : Number: [unfilled] [] : No [FreeTextEntry3] : 90  mins [FreeTextEntry5] : 1748 [FreeTextEntry7] : 1810 [FreeTextEntry9] : 1946 [de-identified] : 1955 [de-identified] : 108 mins

## 2022-02-19 ENCOUNTER — APPOINTMENT (OUTPATIENT)
Dept: HYPERBARIC MEDICINE | Facility: HOSPITAL | Age: 57
End: 2022-02-19
Payer: COMMERCIAL

## 2022-02-19 ENCOUNTER — OUTPATIENT (OUTPATIENT)
Dept: OUTPATIENT SERVICES | Facility: HOSPITAL | Age: 57
LOS: 1 days | Discharge: HOME | End: 2022-02-19
Payer: COMMERCIAL

## 2022-02-19 VITALS
HEART RATE: 64 BPM | OXYGEN SATURATION: 98 % | DIASTOLIC BLOOD PRESSURE: 80 MMHG | RESPIRATION RATE: 16 BRPM | TEMPERATURE: 97.1 F | SYSTOLIC BLOOD PRESSURE: 132 MMHG

## 2022-02-19 VITALS
OXYGEN SATURATION: 98 % | DIASTOLIC BLOOD PRESSURE: 70 MMHG | RESPIRATION RATE: 16 BRPM | HEART RATE: 78 BPM | TEMPERATURE: 98.6 F | SYSTOLIC BLOOD PRESSURE: 166 MMHG

## 2022-02-19 DIAGNOSIS — E11.622 TYPE 2 DIABETES MELLITUS WITH OTHER SKIN ULCER: ICD-10-CM

## 2022-02-19 DIAGNOSIS — Z98.890 OTHER SPECIFIED POSTPROCEDURAL STATES: Chronic | ICD-10-CM

## 2022-02-19 DIAGNOSIS — Z90.710 ACQUIRED ABSENCE OF BOTH CERVIX AND UTERUS: Chronic | ICD-10-CM

## 2022-02-19 PROCEDURE — G0277: CPT

## 2022-02-19 PROCEDURE — 99183 HYPERBARIC OXYGEN THERAPY: CPT

## 2022-02-19 PROCEDURE — 82962 GLUCOSE BLOOD TEST: CPT

## 2022-02-19 NOTE — ASSESSMENT
[No change from previous assessment] : No change from previous assessment [Time MD/Provider assessed Patient:_______] : Time MD/Provider assessed Patient: [unfilled] [Patient prepared for dive] : Patient prepared for dive [Patient undergoing HBO treatment for __________] : Patient undergoing HBO treatment for [unfilled] [Patient descended without problem for 9 minutes] : Patient descended without problem for 9 minutes [No dizziness or thirst] :  No dizziness or thirst [No ear problems] : No ear problems [Vital signs stable] : Vital signs stable [Tolerating dive well] : Tolerating dive well [No Chest Pain, shortness of breath] : No Chest Pain, shortness of breath [Respiratory Rate Stable] : Respiratory Rate Stable [No chest pain, shortness of breath, or ear pain] :  No chest pain, shortness of breath, or ear pain  [Tolerated Ascent well] : Tolerated Ascent well [Vital Signs stable] : Vital Signs stable [No] : No [A physician was present throughout the entire HBOT] : A physician was present throughout the entire HBOT [Clinically Stable] : Clinically stable [Continue Treatment Plan] : Continue treatment plan

## 2022-02-19 NOTE — PROCEDURE
[Outpatient] : Outpatient [Other: ___] : [unfilled] [THIS CHAMBER HAS BEEN CLEANED / DISINFECTED] : This chamber has been cleaned / disinfected according to local and hospital policy and procedure prior to this treatment. [____] : Post-Dive: Time - [unfilled] [___] : Post-Dive: Value - [unfilled] mg/dL [Patient educated on the risks of SMOKING prior to HBOT with understanding] : Patient educated on the risks of SMOKING prior to HBOT with understanding [Patient demonstrated and verbalized proper technique for using air break mask] : Patient demonstrated and verbalized proper technique for using air break mask [Patient educated on the risks of CONSUMING ALCOHOL prior to HBOT with understanding] : Patient educated on the risks of CONSUMING ALCOHOL prior to HBOT with understanding [100% Cotton] : 100% cotton [Empty all pockets] : empty all pockets [No hair oils, wigs, hairpieces, pins] : no hair oils, wigs, hairpieces, pins  [Pre tx medications] : pre tx medications  [No make-up, creams] : no make-up, creams  [No jewelry] : no jewelry  [No matches, cigarettes, lighters] : no matches, cigarettes, lighters  [Hearing aid removed] : hearing aid removed [Dentures removed] : dentures removed [Ground bracelet on pt's wrist] : ground bracelet on pt's wrist  [Contacts removed] : contacts removed  [Remove nail polish] : remove nail polish  [No reading material] : no reading material  [Bra, undergarments removed] : bra, undergarments removed  [No contraindicated dressings] : no contraindicated dressings [Ground Wire - VISUAL Verification - Intact/Free of Obstruction] : Ground Wire - VISUAL Verification - Intact/Free of Obstruction  [Ground Continuity - Verified < 1 ohm w/ Wrist Strap Enrrique] : Ground Continuity - Verified < 1 ohm w/ Wrist Strap Enrrique [Diagnosis: ___] : Diagnosis: [unfilled] [Number: ___] : Number: [unfilled] [Clear all fields] : clear all fields [] : No [FreeTextEntry3] : 90 minutes [FreeTextEntry5] : 5015 [FreeTextEntry7] : 2638 [FreeTextEntry9] : 8092 [de-identified] : 0975 [de-identified] : 108 minutes

## 2022-02-19 NOTE — ADDENDUM
[FreeTextEntry1] : Pt descended to 2.0 RAIMUNDO @ 1.75 PSI/min without incident in chamber #2\par Pt resting @ depth with chest rise and fall observed throughout tx. \par Pt ascended from 2.0 RAIMUNDO @ 1.75 PSI/min without incident. \par Pt tolerated tx well.\par

## 2022-02-20 DIAGNOSIS — L97.323 NON-PRESSURE CHRONIC ULCER OF LEFT ANKLE WITH NECROSIS OF MUSCLE: ICD-10-CM

## 2022-02-20 DIAGNOSIS — E11.622 TYPE 2 DIABETES MELLITUS WITH OTHER SKIN ULCER: ICD-10-CM

## 2022-02-20 DIAGNOSIS — Z20.822 CONTACT WITH AND (SUSPECTED) EXPOSURE TO COVID-19: ICD-10-CM

## 2022-02-20 NOTE — ADDENDUM
[FreeTextEntry1] : pt descended to tx depth 2.0 efrain @1.75 psi/min without incident in chamber #3 \par pt's resting at tx depth with visible chest rise and fall as observed chamber side \par pt ascended from tx depth without incident in chamber #3 \par pt tolerated tx well\par pt received covid swab pre hbot without incident

## 2022-02-20 NOTE — PROCEDURE
[Outpatient] : Outpatient [Other: ___] : [unfilled] [THIS CHAMBER HAS BEEN CLEANED / DISINFECTED] : This chamber has been cleaned / disinfected according to local and hospital policy and procedure prior to this treatment. [Patient demonstrated and verbalized proper technique for using air break mask] : Patient demonstrated and verbalized proper technique for using air break mask [Patient educated on the risks of SMOKING prior to HBOT with understanding] : Patient educated on the risks of SMOKING prior to HBOT with understanding [Patient educated on the risks of CONSUMING ALCOHOL prior to HBOT with understanding] : Patient educated on the risks of CONSUMING ALCOHOL prior to HBOT with understanding [100% Cotton] : 100% cotton [Empty all pockets] : empty all pockets [No hair oils, wigs, hairpieces, pins] : no hair oils, wigs, hairpieces, pins  [Pre tx medications] : pre tx medications  [No make-up, creams] : no make-up, creams  [No jewelry] : no jewelry  [No matches, cigarettes, lighters] : no matches, cigarettes, lighters  [Hearing aid removed] : hearing aid removed [Dentures removed] : dentures removed [Ground bracelet on pt's wrist] : ground bracelet on pt's wrist  [Contacts removed] : contacts removed  [Remove nail polish] : remove nail polish  [No reading material] : no reading material  [Bra, undergarments removed] : bra, undergarments removed  [No contraindicated dressings] : no contraindicated dressings [Ground Wire - VISUAL Verification - Intact/Free of Obstruction] : Ground Wire - VISUAL Verification - Intact/Free of Obstruction  [Ground Continuity - Verified < 1 ohm w/ Wrist Strap Enrrique] : Ground Continuity - Verified < 1 ohm w/ Wrist Strap Enrrique [Number: ___] : Number: [unfilled] [Diagnosis: ___] : Diagnosis: [unfilled] [____] : Post-Dive: Time - [unfilled] [___] : Post-Dive: Value - [unfilled] mg/dL [Clear all fields] : clear all fields [] : No [FreeTextEntry3] : 90 minutes [FreeTextEntry5] : 1757 [[facility]] : 4373 [FreeTextEntry9] : 1824 [de-identified] : 1832 [de-identified] : 108 minutes

## 2022-02-21 ENCOUNTER — NON-APPOINTMENT (OUTPATIENT)
Age: 57
End: 2022-02-21

## 2022-02-21 ENCOUNTER — APPOINTMENT (OUTPATIENT)
Dept: HYPERBARIC MEDICINE | Facility: HOSPITAL | Age: 57
End: 2022-02-21

## 2022-02-21 NOTE — PROCEDURE
[Outpatient] : Outpatient [Other: ___] : [unfilled] [THIS CHAMBER HAS BEEN CLEANED / DISINFECTED] : This chamber has been cleaned / disinfected according to local and hospital policy and procedure prior to this treatment. [100% Cotton] : 100% cotton [Empty all pockets] : empty all pockets [No hair oils, wigs, hairpieces, pins] : no hair oils, wigs, hairpieces, pins  [Pre tx medications] : pre tx medications  [No make-up, creams] : no make-up, creams  [No jewelry] : no jewelry  [No matches, cigarettes, lighters] : no matches, cigarettes, lighters  [Hearing aid removed] : hearing aid removed [Dentures removed] : dentures removed [Ground bracelet on pt's wrist] : ground bracelet on pt's wrist  [Contacts removed] : contacts removed  [Remove nail polish] : remove nail polish  [No reading material] : no reading material  [Bra, undergarments removed] : bra, undergarments removed  [No contraindicated dressings] : no contraindicated dressings [Ground Wire - VISUAL Verification - Intact/Free of Obstruction] : Ground Wire - VISUAL Verification - Intact/Free of Obstruction  [Ground Continuity - Verified < 1 ohm w/ Wrist Strap Enrrique] : Ground Continuity - Verified < 1 ohm w/ Wrist Strap Enrrique [Number: ___] : Number: [unfilled] [Diagnosis: ___] : Diagnosis: [unfilled] [____] : Post-Dive: Time - [unfilled] [___] : Post-Dive: Value - [unfilled] mg/dL [Clear all fields] : clear all fields [] : No [FreeTextEntry3] : 90 MIN [FreeTextEntry5] : 0772 [facility]] : 4298 [FreeTextEntry9] : 1915 [de-identified] : 1924 [de-identified] : 108 min

## 2022-02-21 NOTE — ADDENDUM
[FreeTextEntry1] : PT ARRIVED TO [facility] AXOX3 ASSISTED  WITH KNEE SCOOTER \par PT VITALS WITHIN PARAMETERS FOR HBOT WITH EXCEPTION OF BGL\par PT GIVEN 1 JUICE\par BGL NOT WITHIN PARAMETERS FOR HBOT\par MD ADVISED AND STATED PT OKAY FOR HBOT\par PT ASSESSED BY MD PRE TX\par PT RECEIVED WOUND CARE PRE TX\par PT RECEIVED COVID PCR SWAB PRE TX\par PT DESCENDED TO TX DEPTH OF 2.0 RAIMUNDO @1.75PSI/MIN IN CHAMBER 1 WITHOUT INCIDENT\par PT RESTING AT TX DEPTH WITH VISIBLE CHEST RISE AND FALL OBSERVED\par PT ASCENDED TO SURFACE PRESSURE WITHOUT INCIDENT

## 2022-02-22 ENCOUNTER — OUTPATIENT (OUTPATIENT)
Dept: OUTPATIENT SERVICES | Facility: HOSPITAL | Age: 57
LOS: 1 days | Discharge: HOME | End: 2022-02-22
Payer: COMMERCIAL

## 2022-02-22 ENCOUNTER — APPOINTMENT (OUTPATIENT)
Dept: HYPERBARIC MEDICINE | Facility: HOSPITAL | Age: 57
End: 2022-02-22
Payer: COMMERCIAL

## 2022-02-22 VITALS
TEMPERATURE: 97 F | WEIGHT: 235 LBS | HEIGHT: 63 IN | BODY MASS INDEX: 41.64 KG/M2 | SYSTOLIC BLOOD PRESSURE: 136 MMHG | OXYGEN SATURATION: 95 % | HEART RATE: 77 BPM | RESPIRATION RATE: 20 BRPM | DIASTOLIC BLOOD PRESSURE: 74 MMHG

## 2022-02-22 DIAGNOSIS — Z90.710 ACQUIRED ABSENCE OF BOTH CERVIX AND UTERUS: Chronic | ICD-10-CM

## 2022-02-22 DIAGNOSIS — Z98.890 OTHER SPECIFIED POSTPROCEDURAL STATES: Chronic | ICD-10-CM

## 2022-02-22 DIAGNOSIS — E11.622 TYPE 2 DIABETES MELLITUS WITH OTHER SKIN ULCER: ICD-10-CM

## 2022-02-22 PROCEDURE — 99024 POSTOP FOLLOW-UP VISIT: CPT

## 2022-02-22 PROCEDURE — G0463: CPT

## 2022-02-22 NOTE — PLAN
[FreeTextEntry1] : Patient examined and evaluated at this time.\par Continue local wound care and offloading.\par Continue HBOT.\par Pt remains at risk for infection, amputation, limb loss, sepsis, and death.\par Spent 20 minutes for patient care and medical decision making.\par Patient to follow up in 1 week.\par

## 2022-02-22 NOTE — HISTORY OF PRESENT ILLNESS
[FreeTextEntry1] : Pt seen for left ankle ulcer down to skin, subcutaneous tissue, fat, and tendon. Pt has finished course of IV abx with PICC and currently in HBOT. Denies any other complaints at this time.

## 2022-02-22 NOTE — REVIEW OF SYSTEMS
[Fever] : no fever [Chills] : no chills [Eye Pain] : no eye pain [Loss Of Hearing] : no hearing loss [Abdominal Pain] : no abdominal pain [Vomiting] : no vomiting [Joint Stiffness] : joint stiffness [Skin Wound] : skin wound [Anxiety] : no anxiety [Negative] : Heme/Lymph [de-identified] : left lateral ankle ulcer down to skin, subcutaneous tissue, fat, and tendon [de-identified] : Possible prediabetic , elevated HgA1c and blood glucose , BMI 43 ^, patient now taking Metformin

## 2022-02-22 NOTE — REVIEW OF SYSTEMS
[Fever] : no fever [Chills] : no chills [Eye Pain] : no eye pain [Loss Of Hearing] : no hearing loss [Abdominal Pain] : no abdominal pain [Vomiting] : no vomiting [Joint Stiffness] : joint stiffness [Skin Wound] : skin wound [Anxiety] : no anxiety [Negative] : Heme/Lymph [de-identified] : left lateral ankle ulcer down to skin, subcutaneous tissue, fat, and tendon [de-identified] : Possible prediabetic , elevated HgA1c and blood glucose , BMI 43 ^, patient now taking Metformin

## 2022-02-22 NOTE — PHYSICAL EXAM
[4 x 4] : 4 x 4  [Abdominal Pad] : Abdominal Pad [1+] : left 1+ [Ankle Swelling (On Exam)] : present [Ankle Swelling Bilaterally] : bilaterally  [Ankle Swelling On The Left] : moderate [Varicose Veins Of Lower Extremities] : bilaterally [Purpura] : no purpura  [Petechiae] : no petechiae [Skin Ulcer] : no ulcer [Alert] : alert [Oriented to Person] : oriented to person [Oriented to Place] : oriented to place [Oriented to Time] : oriented to time [Calm] : calm [de-identified] : A&Ox3, NAD [de-identified] : BMI 43 [de-identified] : s/p left peroneal tendon repair, 5/5 strength in all quadrants bilaterally [de-identified] : left lateral ankle ulcer down to skin, subcutaneous tissue, fat, and tendon [de-identified] : wnl [FreeTextEntry1] : Left lateral ankle  [FreeTextEntry2] : 2.5 [FreeTextEntry3] : 1.2 [FreeTextEntry4] : 0.5 [de-identified] : Serous/sanguinous [de-identified] : Expressed comfort post Coban application [de-identified] : Cristiane placed on dry, Lotrisone to vel wound  [de-identified] : Cleansed with NS\par Kerlix  [TWNoteComboBox4] : Moderate [de-identified] : Macerated [de-identified] : None [de-identified] : None [de-identified] : >75% [de-identified] : No [de-identified] : Multilayer other compression wrap [de-identified] : 3x Weekly [de-identified] : Primary Dressing

## 2022-02-22 NOTE — ASSESSMENT
[Verbal] : Verbal [Written] : Written [Demo] : Demo [Patient] : Patient [Good - alert, interested, motivated] : Good - alert, interested, motivated [Verbalizes knowledge/Understanding] : Verbalizes knowledge/understanding [Dressing changes] : dressing changes [Skin Care] : skin care [Signs and symptoms of infection] : sign and symptoms of infection [Venous Disease] : venous disease [How and When to Call] : how and when to call [Pain Management] : pain management [Hyperbaric Therapy] : hyperbaric therapy [Compression Therapy] : compression therapy [Patient responsibility to plan of care] : patient responsibility to plan of care [Stable] : stable [Home] : Home [Ambulatory] : Ambulatory [Not Applicable - Long Term Care/Home Health Agency] : Long Term Care/Home Health Agency: Not Applicable [] : No [FreeTextEntry2] : Infection prevention\par Localized wound care \par Goal remaining pain free regarding wounds\par Collagen matrix therapy \par Hyperbaric oxygen therapy  [FreeTextEntry3] : Wound the same in status  [FreeTextEntry4] : [facility] on hold per DPM due to maceration \par 38/50 HBOT completed \par Assessment in 1 week

## 2022-02-22 NOTE — ASSESSMENT
[Verbal] : Verbal [Written] : Written [Demo] : Demo [Patient] : Patient [Good - alert, interested, motivated] : Good - alert, interested, motivated [Verbalizes knowledge/Understanding] : Verbalizes knowledge/understanding [Dressing changes] : dressing changes [Skin Care] : skin care [Signs and symptoms of infection] : sign and symptoms of infection [Venous Disease] : venous disease [Pain Management] : pain management [How and When to Call] : how and when to call [Hyperbaric Therapy] : hyperbaric therapy [Compression Therapy] : compression therapy [Patient responsibility to plan of care] : patient responsibility to plan of care [Stable] : stable [Home] : Home [Ambulatory] : Ambulatory [Not Applicable - Long Term Care/Home Health Agency] : Long Term Care/Home Health Agency: Not Applicable [] : No [FreeTextEntry3] : Wound the same in status  [FreeTextEntry2] : Infection prevention\par Localized wound care \par Goal remaining pain free regarding wounds\par Collagen matrix therapy \par Hyperbaric oxygen therapy  [FreeTextEntry4] : [facility] on hold per DPM due to maceration \par 38/50 HBOT completed \par Assessment in 1 week

## 2022-02-22 NOTE — PHYSICAL EXAM
[4 x 4] : 4 x 4  [Abdominal Pad] : Abdominal Pad [1+] : left 1+ [Ankle Swelling (On Exam)] : present [Ankle Swelling Bilaterally] : bilaterally  [Ankle Swelling On The Left] : moderate [Varicose Veins Of Lower Extremities] : bilaterally [Purpura] : no purpura  [Petechiae] : no petechiae [Skin Ulcer] : no ulcer [Alert] : alert [Oriented to Person] : oriented to person [Oriented to Place] : oriented to place [Oriented to Time] : oriented to time [Calm] : calm [de-identified] : A&Ox3, NAD [de-identified] : BMI 43 [de-identified] : s/p left peroneal tendon repair, 5/5 strength in all quadrants bilaterally [de-identified] : left lateral ankle ulcer down to skin, subcutaneous tissue, fat, and tendon [de-identified] : wnl [FreeTextEntry1] : Left lateral ankle  [FreeTextEntry2] : 2.5 [FreeTextEntry3] : 1.2 [FreeTextEntry4] : 0.5 [de-identified] : Serous/sanguinous [de-identified] : Expressed comfort post Coban application [de-identified] : Cristiane placed on dry, Lotrisone to vel wound  [de-identified] : Cleansed with NS\par Kerlix  [TWNoteComboBox4] : Moderate [de-identified] : Macerated [de-identified] : None [de-identified] : None [de-identified] : >75% [de-identified] : No [de-identified] : Multilayer other compression wrap [de-identified] : 3x Weekly [de-identified] : Primary Dressing

## 2022-02-23 ENCOUNTER — APPOINTMENT (OUTPATIENT)
Dept: HYPERBARIC MEDICINE | Facility: HOSPITAL | Age: 57
End: 2022-02-23
Payer: COMMERCIAL

## 2022-02-23 ENCOUNTER — OUTPATIENT (OUTPATIENT)
Dept: OUTPATIENT SERVICES | Facility: HOSPITAL | Age: 57
LOS: 1 days | Discharge: HOME | End: 2022-02-23
Payer: COMMERCIAL

## 2022-02-23 VITALS
OXYGEN SATURATION: 98 % | RESPIRATION RATE: 16 BRPM | HEART RATE: 65 BPM | SYSTOLIC BLOOD PRESSURE: 150 MMHG | TEMPERATURE: 98.6 F | DIASTOLIC BLOOD PRESSURE: 70 MMHG

## 2022-02-23 VITALS
OXYGEN SATURATION: 98 % | DIASTOLIC BLOOD PRESSURE: 84 MMHG | TEMPERATURE: 98.1 F | RESPIRATION RATE: 18 BRPM | HEART RATE: 70 BPM | SYSTOLIC BLOOD PRESSURE: 138 MMHG

## 2022-02-23 DIAGNOSIS — E11.622 TYPE 2 DIABETES MELLITUS WITH OTHER SKIN ULCER: ICD-10-CM

## 2022-02-23 DIAGNOSIS — Z98.890 OTHER SPECIFIED POSTPROCEDURAL STATES: Chronic | ICD-10-CM

## 2022-02-23 DIAGNOSIS — Z90.710 ACQUIRED ABSENCE OF BOTH CERVIX AND UTERUS: Chronic | ICD-10-CM

## 2022-02-23 PROCEDURE — G0277: CPT

## 2022-02-23 PROCEDURE — 82962 GLUCOSE BLOOD TEST: CPT

## 2022-02-23 PROCEDURE — 99183 HYPERBARIC OXYGEN THERAPY: CPT

## 2022-02-23 NOTE — PROCEDURE
[Outpatient] : Outpatient [Other: ___] : [unfilled] [Ambulatory] : Patient is ambulatory. [THIS CHAMBER HAS BEEN CLEANED / DISINFECTED] : This chamber has been cleaned / disinfected according to local and hospital policy and procedure prior to this treatment. [____] : Post-Dive: Time - [unfilled] [___] : Post-Dive: Value - [unfilled] mg/dL [Patient demonstrated and verbalized proper technique for using air break mask] : Patient demonstrated and verbalized proper technique for using air break mask [Patient educated on the risks of SMOKING prior to HBOT with understanding] : Patient educated on the risks of SMOKING prior to HBOT with understanding [Patient educated on the risks of CONSUMING ALCOHOL prior to HBOT with understanding] : Patient educated on the risks of CONSUMING ALCOHOL prior to HBOT with understanding [100% Cotton] : 100% cotton [Empty all pockets] : empty all pockets [No hair oils, wigs, hairpieces, pins] : no hair oils, wigs, hairpieces, pins  [No make-up, creams] : no make-up, creams  [Pre tx medications] : pre tx medications  [No jewelry] : no jewelry  [No matches, cigarettes, lighters] : no matches, cigarettes, lighters  [Hearing aid removed] : hearing aid removed [Ground bracelet on pt's wrist] : ground bracelet on pt's wrist  [Dentures removed] : dentures removed [Contacts removed] : contacts removed  [Remove nail polish] : remove nail polish  [No reading material] : no reading material  [Bra, undergarments removed] : bra, undergarments removed  [Ground Wire - VISUAL Verification - Intact/Free of Obstruction] : Ground Wire - VISUAL Verification - Intact/Free of Obstruction  [No contraindicated dressings] : no contraindicated dressings [Ground Continuity - Verified < 1 ohm w/ Wrist Strap Enrrique] : Ground Continuity - Verified < 1 ohm w/ Wrist Strap Enrrique [Diagnosis: ___] : Diagnosis: [unfilled] [Number: ___] : Number: [unfilled] [Clear all fields] : clear all fields [] : No [FreeTextEntry3] : 60 mins [FreeTextEntry5] : 1821 [[facility]] : 2724 [FreeTextEntry9] : 0164 [de-identified] : 7798 [de-identified] : 78 mins

## 2022-02-23 NOTE — ADDENDUM
[FreeTextEntry1] : Pt's BGL low. as per DPM orders. Pt given 16g of glucose via 8oz juice and dive for tx. \par Pt requested to receive total 78 mins today. DPM notified. \par Pt descended to 2.0 RAIMUNDO @ 1.75 PSI/min without incident in chamber #3 \par Pt resting @ depth with chest rise and fall observed throughout tx. \par Pt ascended from 2.0 RAIMUNDO @ 1.75 PSI/min without incident. \par Pt tolerated tx well.\par \par \par

## 2022-02-24 ENCOUNTER — APPOINTMENT (OUTPATIENT)
Dept: HYPERBARIC MEDICINE | Facility: HOSPITAL | Age: 57
End: 2022-02-24
Payer: COMMERCIAL

## 2022-02-24 ENCOUNTER — NON-APPOINTMENT (OUTPATIENT)
Age: 57
End: 2022-02-24

## 2022-02-24 ENCOUNTER — OUTPATIENT (OUTPATIENT)
Dept: OUTPATIENT SERVICES | Facility: HOSPITAL | Age: 57
LOS: 1 days | Discharge: HOME | End: 2022-02-24
Payer: COMMERCIAL

## 2022-02-24 VITALS
OXYGEN SATURATION: 99 % | RESPIRATION RATE: 16 BRPM | DIASTOLIC BLOOD PRESSURE: 76 MMHG | HEART RATE: 74 BPM | SYSTOLIC BLOOD PRESSURE: 148 MMHG | TEMPERATURE: 98.6 F

## 2022-02-24 VITALS
SYSTOLIC BLOOD PRESSURE: 154 MMHG | OXYGEN SATURATION: 99 % | RESPIRATION RATE: 16 BRPM | DIASTOLIC BLOOD PRESSURE: 86 MMHG | HEART RATE: 76 BPM | TEMPERATURE: 98.6 F

## 2022-02-24 DIAGNOSIS — E11.622 TYPE 2 DIABETES MELLITUS WITH OTHER SKIN ULCER: ICD-10-CM

## 2022-02-24 DIAGNOSIS — L97.323 NON-PRESSURE CHRONIC ULCER OF LEFT ANKLE WITH NECROSIS OF MUSCLE: ICD-10-CM

## 2022-02-24 DIAGNOSIS — Z90.710 ACQUIRED ABSENCE OF BOTH CERVIX AND UTERUS: Chronic | ICD-10-CM

## 2022-02-24 DIAGNOSIS — Z98.890 OTHER SPECIFIED POSTPROCEDURAL STATES: ICD-10-CM

## 2022-02-24 DIAGNOSIS — Z90.710 ACQUIRED ABSENCE OF BOTH CERVIX AND UTERUS: ICD-10-CM

## 2022-02-24 DIAGNOSIS — Z86.16 PERSONAL HISTORY OF COVID-19: ICD-10-CM

## 2022-02-24 DIAGNOSIS — Z82.49 FAMILY HISTORY OF ISCHEMIC HEART DISEASE AND OTHER DISEASES OF THE CIRCULATORY SYSTEM: ICD-10-CM

## 2022-02-24 DIAGNOSIS — Z79.890 HORMONE REPLACEMENT THERAPY: ICD-10-CM

## 2022-02-24 DIAGNOSIS — Z79.51 LONG TERM (CURRENT) USE OF INHALED STEROIDS: ICD-10-CM

## 2022-02-24 DIAGNOSIS — Z86.010 PERSONAL HISTORY OF COLONIC POLYPS: ICD-10-CM

## 2022-02-24 DIAGNOSIS — Z98.890 OTHER SPECIFIED POSTPROCEDURAL STATES: Chronic | ICD-10-CM

## 2022-02-24 DIAGNOSIS — Z85.038 PERSONAL HISTORY OF OTHER MALIGNANT NEOPLASM OF LARGE INTESTINE: ICD-10-CM

## 2022-02-24 PROCEDURE — 82962 GLUCOSE BLOOD TEST: CPT

## 2022-02-24 PROCEDURE — 29581 APPL MULTLAYER CMPRN SYS LEG: CPT | Mod: LT

## 2022-02-24 PROCEDURE — G0277: CPT

## 2022-02-24 PROCEDURE — 99183 HYPERBARIC OXYGEN THERAPY: CPT

## 2022-02-24 NOTE — PROCEDURE
[Outpatient] : Outpatient [Other: ___] : [unfilled] [THIS CHAMBER HAS BEEN CLEANED / DISINFECTED] : This chamber has been cleaned / disinfected according to local and hospital policy and procedure prior to this treatment. [____] : Post-Dive: Time - [unfilled] [___] : Post-Dive: Value - [unfilled] mg/dL [Patient demonstrated and verbalized proper technique for using air break mask] : Patient demonstrated and verbalized proper technique for using air break mask [Patient educated on the risks of SMOKING prior to HBOT with understanding] : Patient educated on the risks of SMOKING prior to HBOT with understanding [Patient educated on the risks of CONSUMING ALCOHOL prior to HBOT with understanding] : Patient educated on the risks of CONSUMING ALCOHOL prior to HBOT with understanding [100% Cotton] : 100% cotton [Empty all pockets] : empty all pockets [No hair oils, wigs, hairpieces, pins] : no hair oils, wigs, hairpieces, pins  [Pre tx medications] : pre tx medications  [No make-up, creams] : no make-up, creams  [No jewelry] : no jewelry  [No matches, cigarettes, lighters] : no matches, cigarettes, lighters  [Hearing aid removed] : hearing aid removed [Dentures removed] : dentures removed [Ground bracelet on pt's wrist] : ground bracelet on pt's wrist  [Contacts removed] : contacts removed  [Remove nail polish] : remove nail polish  [No reading material] : no reading material  [Bra, undergarments removed] : bra, undergarments removed  [No contraindicated dressings] : no contraindicated dressings [Ground Wire - VISUAL Verification - Intact/Free of Obstruction] : Ground Wire - VISUAL Verification - Intact/Free of Obstruction  [Ground Continuity - Verified < 1 ohm w/ Wrist Strap Enrrique] : Ground Continuity - Verified < 1 ohm w/ Wrist Strap Enrrique [Diagnosis: ___] : Diagnosis: [unfilled] [Number: ___] : Number: [unfilled] [Clear all fields] : clear all fields [] : No [FreeTextEntry3] : 90 minutes [FreeTextEntry5] : 1800 [FreeTextEntry7] : 3806 [FreeTextEntry9] : 1939 [de-identified] : 1948 [de-identified] : 108 minutes

## 2022-02-24 NOTE — ASSESSMENT
[No change from previous assessment] : No change from previous assessment [Time MD/Provider assessed Patient:_______] : Time MD/Provider assessed Patient: [unfilled] [Patient prepared for dive] : Patient prepared for dive [Patient undergoing HBO treatment for __________] : Patient undergoing HBO treatment for [unfilled] [Patient descended without problem for 9 minutes] : Patient descended without problem for 9 minutes [No dizziness or thirst] :  No dizziness or thirst [No ear problems] : No ear problems [Vital signs stable] : Vital signs stable [Tolerating dive well] : Tolerating dive well [No Chest Pain, shortness of breath] : No Chest Pain, shortness of breath [Respiratory Rate Stable] : Respiratory Rate Stable [No chest pain, shortness of breath, or ear pain] :  No chest pain, shortness of breath, or ear pain  [Tolerated Ascent well] : Tolerated Ascent well [Vital Signs stable] : Vital Signs stable [A physician was present throughout the entire HBOT] : A physician was present throughout the entire HBOT [No] : No [Continue Treatment Plan] : Continue treatment plan [Clinically Stable] : Clinically stable [0] : 0 out of 10

## 2022-02-25 ENCOUNTER — OUTPATIENT (OUTPATIENT)
Dept: OUTPATIENT SERVICES | Facility: HOSPITAL | Age: 57
LOS: 1 days | Discharge: HOME | End: 2022-02-25
Payer: COMMERCIAL

## 2022-02-25 ENCOUNTER — APPOINTMENT (OUTPATIENT)
Dept: HYPERBARIC MEDICINE | Facility: HOSPITAL | Age: 57
End: 2022-02-25
Payer: COMMERCIAL

## 2022-02-25 VITALS
OXYGEN SATURATION: 98 % | SYSTOLIC BLOOD PRESSURE: 155 MMHG | TEMPERATURE: 97.6 F | RESPIRATION RATE: 18 BRPM | HEART RATE: 73 BPM | DIASTOLIC BLOOD PRESSURE: 76 MMHG

## 2022-02-25 VITALS
SYSTOLIC BLOOD PRESSURE: 142 MMHG | OXYGEN SATURATION: 100 % | TEMPERATURE: 97.8 F | DIASTOLIC BLOOD PRESSURE: 80 MMHG | HEART RATE: 65 BPM | RESPIRATION RATE: 20 BRPM

## 2022-02-25 DIAGNOSIS — Z90.710 ACQUIRED ABSENCE OF BOTH CERVIX AND UTERUS: Chronic | ICD-10-CM

## 2022-02-25 DIAGNOSIS — L97.323 NON-PRESSURE CHRONIC ULCER OF LEFT ANKLE WITH NECROSIS OF MUSCLE: ICD-10-CM

## 2022-02-25 DIAGNOSIS — E11.622 TYPE 2 DIABETES MELLITUS WITH OTHER SKIN ULCER: ICD-10-CM

## 2022-02-25 DIAGNOSIS — Z98.890 OTHER SPECIFIED POSTPROCEDURAL STATES: Chronic | ICD-10-CM

## 2022-02-25 LAB — SARS-COV-2 RNA SPEC QL NAA+PROBE: SIGNIFICANT CHANGE UP

## 2022-02-25 PROCEDURE — U0003: CPT

## 2022-02-25 PROCEDURE — G0277: CPT

## 2022-02-25 PROCEDURE — 82962 GLUCOSE BLOOD TEST: CPT

## 2022-02-25 PROCEDURE — 99183 HYPERBARIC OXYGEN THERAPY: CPT

## 2022-02-25 PROCEDURE — U0005: CPT

## 2022-02-25 RX ORDER — AMOXICILLIN AND CLAVULANATE POTASSIUM 875; 125 MG/1; MG/1
875-125 TABLET, COATED ORAL
Qty: 14 | Refills: 0 | Status: COMPLETED | COMMUNITY
Start: 2022-02-25 | End: 2022-03-04

## 2022-02-25 NOTE — ADDENDUM
[FreeTextEntry1] : Pt descended to 2.0 RAIMUNDO @ 1.75 PSI/min without incident in chamber #2\par Pt resting @ depth with chest rise and fall observed throughout tx. \par Pt ascended from 2.0 RAIMUNDO @ 1.75 PSI/min without incident. \par Pt tolerated tx well. Pt received dressing by DPM and RN pos tx. \par

## 2022-02-25 NOTE — PROCEDURE
[Outpatient] : Outpatient [Other: ___] : [unfilled] [THIS CHAMBER HAS BEEN CLEANED / DISINFECTED] : This chamber has been cleaned / disinfected according to local and hospital policy and procedure prior to this treatment. [____] : Post-Dive: Time - [unfilled] [___] : Post-Dive: Value - [unfilled] mg/dL [Patient demonstrated and verbalized proper technique for using air break mask] : Patient demonstrated and verbalized proper technique for using air break mask [Patient educated on the risks of SMOKING prior to HBOT with understanding] : Patient educated on the risks of SMOKING prior to HBOT with understanding [Patient educated on the risks of CONSUMING ALCOHOL prior to HBOT with understanding] : Patient educated on the risks of CONSUMING ALCOHOL prior to HBOT with understanding [100% Cotton] : 100% cotton [Empty all pockets] : empty all pockets [No hair oils, wigs, hairpieces, pins] : no hair oils, wigs, hairpieces, pins  [Pre tx medications] : pre tx medications  [No make-up, creams] : no make-up, creams  [No jewelry] : no jewelry  [No matches, cigarettes, lighters] : no matches, cigarettes, lighters  [Hearing aid removed] : hearing aid removed [Dentures removed] : dentures removed [Ground bracelet on pt's wrist] : ground bracelet on pt's wrist  [Contacts removed] : contacts removed  [Remove nail polish] : remove nail polish  [No reading material] : no reading material  [Bra, undergarments removed] : bra, undergarments removed  [No contraindicated dressings] : no contraindicated dressings [Ground Wire - VISUAL Verification - Intact/Free of Obstruction] : Ground Wire - VISUAL Verification - Intact/Free of Obstruction  [Ground Continuity - Verified < 1 ohm w/ Wrist Strap Enrrique] : Ground Continuity - Verified < 1 ohm w/ Wrist Strap Enrrique [Diagnosis: ___] : Diagnosis: [unfilled] [Number: ___] : Number: [unfilled] [Clear all fields] : clear all fields [] : No [FreeTextEntry3] : 90 minutes [FreeTextEntry5] : 1671 [FreeTextEntry7] : 9257 [FreeTextEntry9] : 1931 [de-identified] : 1940 [de-identified] : 108 minutes

## 2022-02-26 ENCOUNTER — OUTPATIENT (OUTPATIENT)
Dept: OUTPATIENT SERVICES | Facility: HOSPITAL | Age: 57
LOS: 1 days | Discharge: HOME | End: 2022-02-26
Payer: COMMERCIAL

## 2022-02-26 ENCOUNTER — APPOINTMENT (OUTPATIENT)
Dept: HYPERBARIC MEDICINE | Facility: HOSPITAL | Age: 57
End: 2022-02-26
Payer: COMMERCIAL

## 2022-02-26 VITALS
RESPIRATION RATE: 16 BRPM | SYSTOLIC BLOOD PRESSURE: 132 MMHG | OXYGEN SATURATION: 99 % | TEMPERATURE: 98.9 F | HEART RATE: 60 BPM | DIASTOLIC BLOOD PRESSURE: 60 MMHG

## 2022-02-26 VITALS
TEMPERATURE: 98.6 F | RESPIRATION RATE: 16 BRPM | DIASTOLIC BLOOD PRESSURE: 68 MMHG | HEART RATE: 88 BPM | OXYGEN SATURATION: 99 % | SYSTOLIC BLOOD PRESSURE: 158 MMHG

## 2022-02-26 DIAGNOSIS — E11.622 TYPE 2 DIABETES MELLITUS WITH OTHER SKIN ULCER: ICD-10-CM

## 2022-02-26 DIAGNOSIS — E11.22 TYPE 2 DIABETES MELLITUS WITH DIABETIC CHRONIC KIDNEY DISEASE: ICD-10-CM

## 2022-02-26 DIAGNOSIS — E11.621 TYPE 2 DIABETES MELLITUS WITH FOOT ULCER: ICD-10-CM

## 2022-02-26 DIAGNOSIS — L97.323 NON-PRESSURE CHRONIC ULCER OF LEFT ANKLE WITH NECROSIS OF MUSCLE: ICD-10-CM

## 2022-02-26 DIAGNOSIS — Z98.890 OTHER SPECIFIED POSTPROCEDURAL STATES: Chronic | ICD-10-CM

## 2022-02-26 DIAGNOSIS — Z20.822 CONTACT WITH AND (SUSPECTED) EXPOSURE TO COVID-19: ICD-10-CM

## 2022-02-26 DIAGNOSIS — Z90.710 ACQUIRED ABSENCE OF BOTH CERVIX AND UTERUS: Chronic | ICD-10-CM

## 2022-02-26 PROCEDURE — G0277: CPT

## 2022-02-26 PROCEDURE — 29581 APPL MULTLAYER CMPRN SYS LEG: CPT | Mod: LT

## 2022-02-26 PROCEDURE — 99183 HYPERBARIC OXYGEN THERAPY: CPT

## 2022-02-26 PROCEDURE — 82962 GLUCOSE BLOOD TEST: CPT

## 2022-02-26 NOTE — PROCEDURE
[Outpatient] : Outpatient [Ambulatory] : Patient is ambulatory. [THIS CHAMBER HAS BEEN CLEANED / DISINFECTED] : This chamber has been cleaned / disinfected according to local and hospital policy and procedure prior to this treatment. [Patient demonstrated and verbalized proper technique for using air break mask] : Patient demonstrated and verbalized proper technique for using air break mask [Patient educated on the risks of SMOKING prior to HBOT with understanding] : Patient educated on the risks of SMOKING prior to HBOT with understanding [Patient educated on the risks of CONSUMING ALCOHOL prior to HBOT with understanding] : Patient educated on the risks of CONSUMING ALCOHOL prior to HBOT with understanding [100% Cotton] : 100% cotton [Empty all pockets] : empty all pockets [No hair oils, wigs, hairpieces, pins] : no hair oils, wigs, hairpieces, pins  [Pre tx medications] : pre tx medications  [No make-up, creams] : no make-up, creams  [No jewelry] : no jewelry  [No matches, cigarettes, lighters] : no matches, cigarettes, lighters  [Hearing aid removed] : hearing aid removed [Dentures removed] : dentures removed [Ground bracelet on pt's wrist] : ground bracelet on pt's wrist  [Contacts removed] : contacts removed  [Remove nail polish] : remove nail polish  [No reading material] : no reading material  [Bra, undergarments removed] : bra, undergarments removed  [No contraindicated dressings] : no contraindicated dressings [Ground Wire - VISUAL Verification - Intact/Free of Obstruction] : Ground Wire - VISUAL Verification - Intact/Free of Obstruction  [Ground Continuity - Verified < 1 ohm w/ Wrist Strap Enrrique] : Ground Continuity - Verified < 1 ohm w/ Wrist Strap Enrrique [Number: ___] : Number: [unfilled] [Diagnosis: ___] : Diagnosis: [unfilled] [____] : Post-Dive: Time - [unfilled] [___] : Post-Dive: Value - [unfilled] mg/dL [Clear all fields] : clear all fields [] : No [FreeTextEntry3] : 90 [FreeTextEntry5] : 0097 [FreeTextEntry9] : 9333 [[facility]] : 0622 [de-identified] : 9799 [de-identified] : 108 MINUTES

## 2022-02-26 NOTE — PROCEDURE
[Outpatient] : Outpatient [Ambulatory] : Patient is ambulatory. [THIS CHAMBER HAS BEEN CLEANED / DISINFECTED] : This chamber has been cleaned / disinfected according to local and hospital policy and procedure prior to this treatment. [____] : Post-Dive: Time - [unfilled] [___] : Post-Dive: Value - [unfilled] mg/dL [Patient demonstrated and verbalized proper technique for using air break mask] : Patient demonstrated and verbalized proper technique for using air break mask [Patient educated on the risks of SMOKING prior to HBOT with understanding] : Patient educated on the risks of SMOKING prior to HBOT with understanding [Patient educated on the risks of CONSUMING ALCOHOL prior to HBOT with understanding] : Patient educated on the risks of CONSUMING ALCOHOL prior to HBOT with understanding [100% Cotton] : 100% cotton [Empty all pockets] : empty all pockets [No hair oils, wigs, hairpieces, pins] : no hair oils, wigs, hairpieces, pins  [Pre tx medications] : pre tx medications  [No make-up, creams] : no make-up, creams  [No jewelry] : no jewelry  [No matches, cigarettes, lighters] : no matches, cigarettes, lighters  [Hearing aid removed] : hearing aid removed [Dentures removed] : dentures removed [Ground bracelet on pt's wrist] : ground bracelet on pt's wrist  [Contacts removed] : contacts removed  [Remove nail polish] : remove nail polish  [No reading material] : no reading material  [No contraindicated dressings] : no contraindicated dressings [Bra, undergarments removed] : bra, undergarments removed  [Ground Wire - VISUAL Verification - Intact/Free of Obstruction] : Ground Wire - VISUAL Verification - Intact/Free of Obstruction  [Ground Continuity - Verified < 1 ohm w/ Wrist Strap Enrrique] : Ground Continuity - Verified < 1 ohm w/ Wrist Strap Enrrique [Diagnosis: ___] : Diagnosis: [unfilled] [Number: ___] : Number: [unfilled] [Clear all fields] : clear all fields [] : No [FreeTextEntry3] : 90 [FreeTextEntry5] : 4439 [FreeTextEntry7] : 3045 [FreeTextEntry9] : 0995 [de-identified] : 5356 [de-identified] : 108 MINUTES

## 2022-02-26 NOTE — ADDENDUM
[FreeTextEntry1] : Pt descended to 2.0 RAIMUNDO @ 1.75 PSI/min without incident in chamber #1\par Pt resting @ depth with chest rise and fall observed throughout tx. \par Pt ascended from 2.0 RAIMUNDO @ 1.75 PSI/min without incident. \par Pt tolerated tx well.\par

## 2022-02-26 NOTE — ASSESSMENT
[No change from previous assessment] : No change from previous assessment [Patient descended without problem for 9 minutes] : Patient descended without problem for 9 minutes [Patient prepared for dive] : Patient prepared for dive [No dizziness or thirst] :  No dizziness or thirst [No ear problems] : No ear problems [Vital signs stable] : Vital signs stable [Tolerating dive well] : Tolerating dive well [No Chest Pain, shortness of breath] : No Chest Pain, shortness of breath [Respiratory Rate Stable] : Respiratory Rate Stable [No chest pain, shortness of breath, or ear pain] :  No chest pain, shortness of breath, or ear pain  [Vital Signs stable] : Vital Signs stable [Tolerated Ascent well] : Tolerated Ascent well [A physician was present throughout the entire HBOT] : A physician was present throughout the entire HBOT [No] : No [Clinically Stable] : Clinically stable [Continue Treatment Plan] : Continue treatment plan [0] : 0 out of 10

## 2022-02-26 NOTE — ADDENDUM
[FreeTextEntry1] : PT DESCENDED TO 2.0 RAIMUNDO @ 1.75 PSI/MIN WITHOUT INCIDENT IN CHAMBER #1\par PT RESTING AT TX DEPTH WITH VISIBLE CHEST RISE AND FALL OBSERVED CHAMBERSIDE \par PT ASCENDED FROM TX DEPTH WITHOUT INCIDENT IN CHAMBER #1\par PT TOLERATED TX WELL\par

## 2022-02-28 ENCOUNTER — OUTPATIENT (OUTPATIENT)
Dept: OUTPATIENT SERVICES | Facility: HOSPITAL | Age: 57
LOS: 1 days | Discharge: HOME | End: 2022-02-28
Payer: COMMERCIAL

## 2022-02-28 ENCOUNTER — APPOINTMENT (OUTPATIENT)
Dept: HYPERBARIC MEDICINE | Facility: HOSPITAL | Age: 57
End: 2022-02-28
Payer: COMMERCIAL

## 2022-02-28 VITALS
SYSTOLIC BLOOD PRESSURE: 155 MMHG | HEART RATE: 64 BPM | DIASTOLIC BLOOD PRESSURE: 79 MMHG | TEMPERATURE: 98.1 F | OXYGEN SATURATION: 95 % | RESPIRATION RATE: 18 BRPM

## 2022-02-28 VITALS
DIASTOLIC BLOOD PRESSURE: 77 MMHG | TEMPERATURE: 97 F | HEART RATE: 70 BPM | OXYGEN SATURATION: 96 % | SYSTOLIC BLOOD PRESSURE: 153 MMHG | RESPIRATION RATE: 18 BRPM

## 2022-02-28 DIAGNOSIS — Z98.890 OTHER SPECIFIED POSTPROCEDURAL STATES: Chronic | ICD-10-CM

## 2022-02-28 DIAGNOSIS — E11.622 TYPE 2 DIABETES MELLITUS WITH OTHER SKIN ULCER: ICD-10-CM

## 2022-02-28 DIAGNOSIS — L97.323 NON-PRESSURE CHRONIC ULCER OF LEFT ANKLE WITH NECROSIS OF MUSCLE: ICD-10-CM

## 2022-02-28 DIAGNOSIS — Z90.710 ACQUIRED ABSENCE OF BOTH CERVIX AND UTERUS: Chronic | ICD-10-CM

## 2022-02-28 PROCEDURE — 99183 HYPERBARIC OXYGEN THERAPY: CPT

## 2022-02-28 PROCEDURE — G0277: CPT

## 2022-02-28 PROCEDURE — 29581 APPL MULTLAYER CMPRN SYS LEG: CPT | Mod: LT

## 2022-02-28 PROCEDURE — 82962 GLUCOSE BLOOD TEST: CPT

## 2022-03-01 ENCOUNTER — APPOINTMENT (OUTPATIENT)
Dept: HYPERBARIC MEDICINE | Facility: HOSPITAL | Age: 57
End: 2022-03-01
Payer: COMMERCIAL

## 2022-03-01 ENCOUNTER — OUTPATIENT (OUTPATIENT)
Dept: OUTPATIENT SERVICES | Facility: HOSPITAL | Age: 57
LOS: 1 days | Discharge: HOME | End: 2022-03-01
Payer: COMMERCIAL

## 2022-03-01 VITALS
WEIGHT: 235 LBS | SYSTOLIC BLOOD PRESSURE: 158 MMHG | HEIGHT: 63 IN | OXYGEN SATURATION: 94 % | TEMPERATURE: 97.9 F | RESPIRATION RATE: 18 BRPM | HEART RATE: 74 BPM | BODY MASS INDEX: 41.64 KG/M2 | DIASTOLIC BLOOD PRESSURE: 82 MMHG

## 2022-03-01 DIAGNOSIS — Z90.710 ACQUIRED ABSENCE OF BOTH CERVIX AND UTERUS: Chronic | ICD-10-CM

## 2022-03-01 DIAGNOSIS — E11.622 TYPE 2 DIABETES MELLITUS WITH OTHER SKIN ULCER: ICD-10-CM

## 2022-03-01 DIAGNOSIS — Z98.890 OTHER SPECIFIED POSTPROCEDURAL STATES: Chronic | ICD-10-CM

## 2022-03-01 PROCEDURE — 29581 APPL MULTLAYER CMPRN SYS LEG: CPT | Mod: LT

## 2022-03-01 PROCEDURE — 99213 OFFICE O/P EST LOW 20 MIN: CPT

## 2022-03-01 NOTE — PHYSICAL EXAM
[4 x 4] : 4 x 4  [Abdominal Pad] : Abdominal Pad [JVD] : no jugular venous distention  [Normal Thyroid] : the thyroid was normal [Normal Breath Sounds] : Normal breath sounds [Normal Heart Sounds] : normal heart sounds [Normal Rate and Rhythm] : normal rate and rhythm [Ankle Swelling (On Exam)] : present [Ankle Swelling On The Left] : moderate [Abdomen Tenderness] : ~T ~M No abdominal tenderness [Abdomen Masses] : No abdominal massess [Tender] : nontender [Enlarged] : not enlarged [Alert] : alert [Oriented to Person] : oriented to person [Oriented to Place] : oriented to place [Oriented to Time] : oriented to time [Calm] : calm [de-identified] : adult WF, NAD, alert, Ox3. [FreeTextEntry1] : Left Lateral Ankle  [FreeTextEntry2] : 1.9 [FreeTextEntry3] : 1.0 [FreeTextEntry4] : 0.4 [de-identified] : Serous/sanguinous [de-identified] : Expressed comfort post Coban application [de-identified] : Cristiane placed on dry [de-identified] : Cleansed with Normal Saline. \par Kerlix  [TWNoteComboBox4] : Moderate [de-identified] : Macerated [de-identified] : None [de-identified] : None [de-identified] : 100% [de-identified] : No [de-identified] : Multilayer other compression wrap [de-identified] : 3x Weekly [de-identified] : False

## 2022-03-01 NOTE — HISTORY OF PRESENT ILLNESS
[FreeTextEntry1] : 57 yo WF, here for her weekly assessment. 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 Was to have nushield last wk but held off due to maceration. This week , the periwound is still macerated and therefore, will hold off on nushield until one more wk.

## 2022-03-01 NOTE — ASSESSMENT
[Verbal] : Verbal [Written] : Written [Demo] : Demo [Patient] : Patient [Good - alert, interested, motivated] : Good - alert, interested, motivated [Verbalizes knowledge/Understanding] : Verbalizes knowledge/understanding [Dressing changes] : dressing changes [Skin Care] : skin care [Signs and symptoms of infection] : sign and symptoms of infection [Venous Disease] : venous disease [How and When to Call] : how and when to call [Pain Management] : pain management [Hyperbaric Therapy] : hyperbaric therapy [Compression Therapy] : compression therapy [Patient responsibility to plan of care] : patient responsibility to plan of care [Stable] : stable [Home] : Home [Ambulatory] : Ambulatory [Not Applicable - Long Term Care/Home Health Agency] : Long Term Care/Home Health Agency: Not Applicable [] : Yes [FreeTextEntry2] : Collagen Matrix Therapy \par Hyperbaric oxygen therapy \par Restore Optimal Skin Integrity \par Infection Prevention  [FreeTextEntry4] : Nushield is on hold, as per MD due to Maceration \par Pt completed 43/50 HBOT, MD ordered an Additional 10 Treatments. Submitted paperwork for Authorization.   \par Pt to F/U to [facility] daily for HBOT & 1 Week for Assessment

## 2022-03-01 NOTE — PLAN
[FreeTextEntry1] : resume HBO\par jitendra, ANNIKA, coban\par hold on [facility] this wk\par f/u 1 wk\par \par time spent 25 mins.

## 2022-03-02 ENCOUNTER — OUTPATIENT (OUTPATIENT)
Dept: OUTPATIENT SERVICES | Facility: HOSPITAL | Age: 57
LOS: 1 days | Discharge: HOME | End: 2022-03-02
Payer: COMMERCIAL

## 2022-03-02 ENCOUNTER — APPOINTMENT (OUTPATIENT)
Dept: HYPERBARIC MEDICINE | Facility: HOSPITAL | Age: 57
End: 2022-03-02
Payer: COMMERCIAL

## 2022-03-02 VITALS
HEART RATE: 62 BPM | TEMPERATURE: 98.6 F | SYSTOLIC BLOOD PRESSURE: 150 MMHG | DIASTOLIC BLOOD PRESSURE: 80 MMHG | RESPIRATION RATE: 16 BRPM | OXYGEN SATURATION: 99 %

## 2022-03-02 VITALS
RESPIRATION RATE: 18 BRPM | TEMPERATURE: 97.4 F | DIASTOLIC BLOOD PRESSURE: 58 MMHG | OXYGEN SATURATION: 96 % | SYSTOLIC BLOOD PRESSURE: 134 MMHG | HEART RATE: 70 BPM

## 2022-03-02 DIAGNOSIS — Z90.710 ACQUIRED ABSENCE OF BOTH CERVIX AND UTERUS: Chronic | ICD-10-CM

## 2022-03-02 DIAGNOSIS — Z98.890 OTHER SPECIFIED POSTPROCEDURAL STATES: Chronic | ICD-10-CM

## 2022-03-02 DIAGNOSIS — Z86.16 PERSONAL HISTORY OF COVID-19: ICD-10-CM

## 2022-03-02 DIAGNOSIS — Z79.899 OTHER LONG TERM (CURRENT) DRUG THERAPY: ICD-10-CM

## 2022-03-02 DIAGNOSIS — L97.323 NON-PRESSURE CHRONIC ULCER OF LEFT ANKLE WITH NECROSIS OF MUSCLE: ICD-10-CM

## 2022-03-02 DIAGNOSIS — Z90.710 ACQUIRED ABSENCE OF BOTH CERVIX AND UTERUS: ICD-10-CM

## 2022-03-02 DIAGNOSIS — Z79.51 LONG TERM (CURRENT) USE OF INHALED STEROIDS: ICD-10-CM

## 2022-03-02 DIAGNOSIS — Z86.010 PERSONAL HISTORY OF COLONIC POLYPS: ICD-10-CM

## 2022-03-02 DIAGNOSIS — E11.622 TYPE 2 DIABETES MELLITUS WITH OTHER SKIN ULCER: ICD-10-CM

## 2022-03-02 DIAGNOSIS — Z85.038 PERSONAL HISTORY OF OTHER MALIGNANT NEOPLASM OF LARGE INTESTINE: ICD-10-CM

## 2022-03-02 DIAGNOSIS — Z82.49 FAMILY HISTORY OF ISCHEMIC HEART DISEASE AND OTHER DISEASES OF THE CIRCULATORY SYSTEM: ICD-10-CM

## 2022-03-02 DIAGNOSIS — J45.909 UNSPECIFIED ASTHMA, UNCOMPLICATED: ICD-10-CM

## 2022-03-02 DIAGNOSIS — Z98.890 OTHER SPECIFIED POSTPROCEDURAL STATES: ICD-10-CM

## 2022-03-02 PROCEDURE — 99183 HYPERBARIC OXYGEN THERAPY: CPT

## 2022-03-02 PROCEDURE — 82962 GLUCOSE BLOOD TEST: CPT

## 2022-03-02 PROCEDURE — G0277: CPT

## 2022-03-03 ENCOUNTER — APPOINTMENT (OUTPATIENT)
Dept: HYPERBARIC MEDICINE | Facility: HOSPITAL | Age: 57
End: 2022-03-03
Payer: COMMERCIAL

## 2022-03-03 ENCOUNTER — OUTPATIENT (OUTPATIENT)
Dept: OUTPATIENT SERVICES | Facility: HOSPITAL | Age: 57
LOS: 1 days | Discharge: HOME | End: 2022-03-03
Payer: COMMERCIAL

## 2022-03-03 VITALS
RESPIRATION RATE: 18 BRPM | OXYGEN SATURATION: 97 % | HEART RATE: 70 BPM | TEMPERATURE: 97 F | SYSTOLIC BLOOD PRESSURE: 136 MMHG | DIASTOLIC BLOOD PRESSURE: 62 MMHG

## 2022-03-03 VITALS
SYSTOLIC BLOOD PRESSURE: 153 MMHG | OXYGEN SATURATION: 100 % | RESPIRATION RATE: 16 BRPM | DIASTOLIC BLOOD PRESSURE: 68 MMHG | TEMPERATURE: 98.1 F | HEART RATE: 68 BPM

## 2022-03-03 DIAGNOSIS — Z90.710 ACQUIRED ABSENCE OF BOTH CERVIX AND UTERUS: Chronic | ICD-10-CM

## 2022-03-03 DIAGNOSIS — Z98.890 OTHER SPECIFIED POSTPROCEDURAL STATES: Chronic | ICD-10-CM

## 2022-03-03 DIAGNOSIS — E11.622 TYPE 2 DIABETES MELLITUS WITH OTHER SKIN ULCER: ICD-10-CM

## 2022-03-03 PROCEDURE — 82962 GLUCOSE BLOOD TEST: CPT

## 2022-03-03 PROCEDURE — 99183 HYPERBARIC OXYGEN THERAPY: CPT

## 2022-03-03 PROCEDURE — G0277: CPT

## 2022-03-03 NOTE — ADDENDUM
[FreeTextEntry1] : PT ARRIVED A&OX3 \par ALL VITALS WITIN PARAMETERS FOR HBOT\par PT DESCENDED TO 2.0 RAIMUNDO @ 1.75 PSI/MIN IN CHAMBER #3  WITHOUT INCIDENT\par PT RESTING AT TX DEPTH WITH VISIBLE CHEST RISE AND FALL OBSERVED CHAMBER SIDE. \par Pt ascended from 2.0 RAIMUNDO @ 1.75PSI/min without incident. \par Pt tolerated tx well.\par \par

## 2022-03-03 NOTE — PROCEDURE
[Outpatient] : Outpatient [Ambulatory] : Patient is ambulatory. [Other: ___] : [unfilled] [THIS CHAMBER HAS BEEN CLEANED / DISINFECTED] : This chamber has been cleaned / disinfected according to local and hospital policy and procedure prior to this treatment. [Patient demonstrated and verbalized proper technique for using air break mask] : Patient demonstrated and verbalized proper technique for using air break mask [Patient educated on the risks of SMOKING prior to HBOT with understanding] : Patient educated on the risks of SMOKING prior to HBOT with understanding [Patient educated on the risks of CONSUMING ALCOHOL prior to HBOT with understanding] : Patient educated on the risks of CONSUMING ALCOHOL prior to HBOT with understanding [100% Cotton] : 100% cotton [Empty all pockets] : empty all pockets [No hair oils, wigs, hairpieces, pins] : no hair oils, wigs, hairpieces, pins  [Pre tx medications] : pre tx medications  [No make-up, creams] : no make-up, creams  [No jewelry] : no jewelry  [No matches, cigarettes, lighters] : no matches, cigarettes, lighters  [Hearing aid removed] : hearing aid removed [Dentures removed] : dentures removed [Ground bracelet on pt's wrist] : ground bracelet on pt's wrist  [Contacts removed] : contacts removed  [Remove nail polish] : remove nail polish  [No reading material] : no reading material  [Bra, undergarments removed] : bra, undergarments removed  [No contraindicated dressings] : no contraindicated dressings [Ground Wire - VISUAL Verification - Intact/Free of Obstruction] : Ground Wire - VISUAL Verification - Intact/Free of Obstruction  [Ground Continuity - Verified < 1 ohm w/ Wrist Strap Enrrique] : Ground Continuity - Verified < 1 ohm w/ Wrist Strap Enrrique [Number: ___] : Number: [unfilled] [Diagnosis: ___] : Diagnosis: [unfilled] [____] : Post-Dive: Time - [unfilled] [___] : Post-Dive: Value - [unfilled] mg/dL [Clear all fields] : clear all fields [] : No [FreeTextEntry1] : 2.0 [FreeTextEntry3] : 90 [FreeTextEntry5] : 0569 [FreeTextEntry7] : 0916 [FreeTextEntry9] : 1823 [de-identified] : 1832 [de-identified] : 108 mins

## 2022-03-03 NOTE — ADDENDUM
[FreeTextEntry1] : PT ARRIVED A&OX3 \par ALL VITALS WITHIN PARAMETERS FOR HBOT WITH THE EXCEPTION OF BGL\par DPM NOTIFIED\par PT WAS GIVEN 16 GRAMS OF SUGAR VIA 4 GLUCOSE TABLETS TO TAKE EVERY 20 MINUTES WHILE UNDERGOING TREATMENT \par PT DESCENDED TO 2.0 RAIMUNDO @ 1.75 PSI/MIN IN CHAMBER #2  WITHOUT INCIDENT\par PT RESTING AT TX DEPTH WITH VISIBLE CHEST RISE AND FALL OBSERVED CHAMBER SIDE. \par Pt ascended from 2.0 RAIMUNDO @ 1.75 PSI/min without incident. \par Pt tolerated tx well.\par \par

## 2022-03-03 NOTE — PROCEDURE
[] : No [FreeTextEntry3] : 90 [[facility]ontEntry5] : 5718 [FreeTextEntry7] : 3488 [FreeTextEntry9] : 1820 [de-identified] : 1829 [de-identified] : 108 MINUTES

## 2022-03-03 NOTE — ADDENDUM
[FreeTextEntry1] : PT ARRIVED A&OX3 \par ALL VITALS WITIN PARAMETERS FOR HBOT\par PT DESCENDED TO 2.0 RAIMUNDO @ 1.75 PSI/MIN IN CHAMBER #1  WITHOUT INCIDENT\par PT RESTING AT TX DEPTH WITH VISIBLE CHEST RISE AND FALL OBSERVED CHAMBER SIDE\par PT ASCENDED FROM 2.0 RAIMUNDO @ 1.75 PSI/MIN WITHOUT INCIDENT\par PT TOLERATED TX WELL\par

## 2022-03-03 NOTE — PROCEDURE
[Outpatient] : Outpatient [Ambulatory] : Patient is ambulatory. [Other: ___] : [unfilled] [THIS CHAMBER HAS BEEN CLEANED / DISINFECTED] : This chamber has been cleaned / disinfected according to local and hospital policy and procedure prior to this treatment. [Patient demonstrated and verbalized proper technique for using air break mask] : Patient demonstrated and verbalized proper technique for using air break mask [Patient educated on the risks of SMOKING prior to HBOT with understanding] : Patient educated on the risks of SMOKING prior to HBOT with understanding [Patient educated on the risks of CONSUMING ALCOHOL prior to HBOT with understanding] : Patient educated on the risks of CONSUMING ALCOHOL prior to HBOT with understanding [100% Cotton] : 100% cotton [Empty all pockets] : empty all pockets [No hair oils, wigs, hairpieces, pins] : no hair oils, wigs, hairpieces, pins  [Pre tx medications] : pre tx medications  [No make-up, creams] : no make-up, creams  [No jewelry] : no jewelry  [No matches, cigarettes, lighters] : no matches, cigarettes, lighters  [Hearing aid removed] : hearing aid removed [Dentures removed] : dentures removed [Ground bracelet on pt's wrist] : ground bracelet on pt's wrist  [Contacts removed] : contacts removed  [Remove nail polish] : remove nail polish  [No reading material] : no reading material  [Bra, undergarments removed] : bra, undergarments removed  [No contraindicated dressings] : no contraindicated dressings [Ground Wire - VISUAL Verification - Intact/Free of Obstruction] : Ground Wire - VISUAL Verification - Intact/Free of Obstruction  [Ground Continuity - Verified < 1 ohm w/ Wrist Strap Enrrique] : Ground Continuity - Verified < 1 ohm w/ Wrist Strap Enrrique [Number: ___] : Number: [unfilled] [Diagnosis: ___] : Diagnosis: [unfilled] [____] : Post-Dive: Time - [unfilled] [___] : Post-Dive: Value - [unfilled] mg/dL [Clear all fields] : clear all fields [] : No [FreeTextEntry1] : 2.0 [FreeTextEntry3] : 90 mins [FreeTextEntry5] : 7695 [FreeTextEntry7] : 0546 [FreeTextEntry9] : 1832 [de-identified] : 1849 [de-identified] : 108 mins

## 2022-03-04 ENCOUNTER — OUTPATIENT (OUTPATIENT)
Dept: OUTPATIENT SERVICES | Facility: HOSPITAL | Age: 57
LOS: 1 days | Discharge: HOME | End: 2022-03-04
Payer: COMMERCIAL

## 2022-03-04 ENCOUNTER — APPOINTMENT (OUTPATIENT)
Dept: HYPERBARIC MEDICINE | Facility: HOSPITAL | Age: 57
End: 2022-03-04
Payer: COMMERCIAL

## 2022-03-04 VITALS
SYSTOLIC BLOOD PRESSURE: 122 MMHG | TEMPERATURE: 98.1 F | RESPIRATION RATE: 18 BRPM | DIASTOLIC BLOOD PRESSURE: 60 MMHG | HEART RATE: 78 BPM | OXYGEN SATURATION: 97 %

## 2022-03-04 VITALS
HEART RATE: 65 BPM | OXYGEN SATURATION: 99 % | DIASTOLIC BLOOD PRESSURE: 76 MMHG | RESPIRATION RATE: 18 BRPM | SYSTOLIC BLOOD PRESSURE: 124 MMHG | TEMPERATURE: 97.7 F

## 2022-03-04 DIAGNOSIS — E11.622 TYPE 2 DIABETES MELLITUS WITH OTHER SKIN ULCER: ICD-10-CM

## 2022-03-04 DIAGNOSIS — Z90.710 ACQUIRED ABSENCE OF BOTH CERVIX AND UTERUS: Chronic | ICD-10-CM

## 2022-03-04 DIAGNOSIS — L97.323 NON-PRESSURE CHRONIC ULCER OF LEFT ANKLE WITH NECROSIS OF MUSCLE: ICD-10-CM

## 2022-03-04 DIAGNOSIS — Z98.890 OTHER SPECIFIED POSTPROCEDURAL STATES: Chronic | ICD-10-CM

## 2022-03-04 LAB — SARS-COV-2 RNA SPEC QL NAA+PROBE: SIGNIFICANT CHANGE UP

## 2022-03-04 PROCEDURE — 99183 HYPERBARIC OXYGEN THERAPY: CPT

## 2022-03-04 PROCEDURE — 82962 GLUCOSE BLOOD TEST: CPT

## 2022-03-04 PROCEDURE — G0277: CPT

## 2022-03-04 PROCEDURE — U0003: CPT

## 2022-03-04 PROCEDURE — U0005: CPT

## 2022-03-05 NOTE — PROCEDURE
[Outpatient] : Outpatient [Ambulatory] : Patient is ambulatory. [Other: ___] : [unfilled] [THIS CHAMBER HAS BEEN CLEANED / DISINFECTED] : This chamber has been cleaned / disinfected according to local and hospital policy and procedure prior to this treatment. [Patient demonstrated and verbalized proper technique for using air break mask] : Patient demonstrated and verbalized proper technique for using air break mask [Patient educated on the risks of SMOKING prior to HBOT with understanding] : Patient educated on the risks of SMOKING prior to HBOT with understanding [Patient educated on the risks of CONSUMING ALCOHOL prior to HBOT with understanding] : Patient educated on the risks of CONSUMING ALCOHOL prior to HBOT with understanding [100% Cotton] : 100% cotton [Empty all pockets] : empty all pockets [No hair oils, wigs, hairpieces, pins] : no hair oils, wigs, hairpieces, pins  [Pre tx medications] : pre tx medications  [No make-up, creams] : no make-up, creams  [No jewelry] : no jewelry  [No matches, cigarettes, lighters] : no matches, cigarettes, lighters  [Hearing aid removed] : hearing aid removed [Dentures removed] : dentures removed [Ground bracelet on pt's wrist] : ground bracelet on pt's wrist  [Contacts removed] : contacts removed  [Remove nail polish] : remove nail polish  [No reading material] : no reading material  [Bra, undergarments removed] : bra, undergarments removed  [No contraindicated dressings] : no contraindicated dressings [Ground Wire - VISUAL Verification - Intact/Free of Obstruction] : Ground Wire - VISUAL Verification - Intact/Free of Obstruction  [Ground Continuity - Verified < 1 ohm w/ Wrist Strap Enrrique] : Ground Continuity - Verified < 1 ohm w/ Wrist Strap Enrrique [Number: ___] : Number: [unfilled] [Diagnosis: ___] : Diagnosis: [unfilled] [____] : Post-Dive: Time - [unfilled] [___] : Post-Dive: Value - [unfilled] mg/dL [Clear all fields] : clear all fields [] : No [FreeTextEntry3] : 90 MIN [FreeTextEntry5] : 8108 [[facility]Entry[facility]] : 7599 [de-identified] : 8604 [FreeTextEntry9] : 8782 [de-identified] : 108 MIN

## 2022-03-05 NOTE — ADDENDUM
[FreeTextEntry1] : PT ARRIVED AXOX3 TO [facility] ASSISTED WITH KNEE SCOOTER\par PT RECEIVED COVID19 PCR TEST PRE TX\par PT DESCENDED TO 2.0 RAIMUNDO @ 1.75 PSI/MIN WITHOUT INCIDENT IN CHAMBER #1\par PT RESTING AT TX DEPTH WITH VISIBLE CHEST RISE AND FALL OBSERVED CHAMBERSIDE\par PT ASCENDED TO SURFACE PRESSURE WITHOUT INCIDENT\par PT WOUND SEEN BY MD POST TX

## 2022-03-07 ENCOUNTER — NON-APPOINTMENT (OUTPATIENT)
Age: 57
End: 2022-03-07

## 2022-03-07 ENCOUNTER — OUTPATIENT (OUTPATIENT)
Dept: OUTPATIENT SERVICES | Facility: HOSPITAL | Age: 57
LOS: 1 days | Discharge: HOME | End: 2022-03-07
Payer: COMMERCIAL

## 2022-03-07 ENCOUNTER — APPOINTMENT (OUTPATIENT)
Dept: HYPERBARIC MEDICINE | Facility: HOSPITAL | Age: 57
End: 2022-03-07
Payer: COMMERCIAL

## 2022-03-07 VITALS
DIASTOLIC BLOOD PRESSURE: 58 MMHG | HEART RATE: 63 BPM | SYSTOLIC BLOOD PRESSURE: 138 MMHG | RESPIRATION RATE: 18 BRPM | TEMPERATURE: 97.4 F | OXYGEN SATURATION: 96 %

## 2022-03-07 VITALS
OXYGEN SATURATION: 95 % | SYSTOLIC BLOOD PRESSURE: 141 MMHG | DIASTOLIC BLOOD PRESSURE: 68 MMHG | TEMPERATURE: 97.4 F | HEART RATE: 69 BPM | RESPIRATION RATE: 18 BRPM

## 2022-03-07 DIAGNOSIS — Z98.890 OTHER SPECIFIED POSTPROCEDURAL STATES: Chronic | ICD-10-CM

## 2022-03-07 DIAGNOSIS — E11.622 TYPE 2 DIABETES MELLITUS WITH OTHER SKIN ULCER: ICD-10-CM

## 2022-03-07 DIAGNOSIS — L97.323 NON-PRESSURE CHRONIC ULCER OF LEFT ANKLE WITH NECROSIS OF MUSCLE: ICD-10-CM

## 2022-03-07 DIAGNOSIS — Z90.710 ACQUIRED ABSENCE OF BOTH CERVIX AND UTERUS: Chronic | ICD-10-CM

## 2022-03-07 PROCEDURE — 99183 HYPERBARIC OXYGEN THERAPY: CPT

## 2022-03-07 PROCEDURE — G0277: CPT

## 2022-03-07 PROCEDURE — 82962 GLUCOSE BLOOD TEST: CPT

## 2022-03-08 ENCOUNTER — OUTPATIENT (OUTPATIENT)
Dept: OUTPATIENT SERVICES | Facility: HOSPITAL | Age: 57
LOS: 1 days | Discharge: HOME | End: 2022-03-08
Payer: COMMERCIAL

## 2022-03-08 ENCOUNTER — APPOINTMENT (OUTPATIENT)
Dept: HYPERBARIC MEDICINE | Facility: HOSPITAL | Age: 57
End: 2022-03-08
Payer: COMMERCIAL

## 2022-03-08 VITALS
HEIGHT: 63 IN | WEIGHT: 235 LBS | HEART RATE: 67 BPM | TEMPERATURE: 97.5 F | DIASTOLIC BLOOD PRESSURE: 92 MMHG | BODY MASS INDEX: 41.64 KG/M2 | SYSTOLIC BLOOD PRESSURE: 179 MMHG | RESPIRATION RATE: 17 BRPM | OXYGEN SATURATION: 97 %

## 2022-03-08 DIAGNOSIS — Z90.710 ACQUIRED ABSENCE OF BOTH CERVIX AND UTERUS: Chronic | ICD-10-CM

## 2022-03-08 DIAGNOSIS — Z98.890 OTHER SPECIFIED POSTPROCEDURAL STATES: Chronic | ICD-10-CM

## 2022-03-08 DIAGNOSIS — E11.622 TYPE 2 DIABETES MELLITUS WITH OTHER SKIN ULCER: ICD-10-CM

## 2022-03-08 PROCEDURE — 99213 OFFICE O/P EST LOW 20 MIN: CPT

## 2022-03-08 PROCEDURE — 29581 APPL MULTLAYER CMPRN SYS LEG: CPT | Mod: LT

## 2022-03-08 NOTE — PHYSICAL EXAM
[4 x 4] : 4 x 4  [Abdominal Pad] : Abdominal Pad [1+] : left 1+ [Ankle Swelling (On Exam)] : present [Ankle Swelling Bilaterally] : bilaterally  [Ankle Swelling On The Left] : moderate [Varicose Veins Of Lower Extremities] : bilaterally [Purpura] : no purpura  [Petechiae] : no petechiae [Skin Ulcer] : no ulcer [Alert] : alert [Oriented to Person] : oriented to person [Oriented to Place] : oriented to place [Oriented to Time] : oriented to time [Calm] : calm [de-identified] : A&Ox3, NAD [de-identified] : BMI 43 [de-identified] : s/p left peroneal tendon repair, 5/5 strength in all quadrants bilaterally [de-identified] : left lateral ankle ulcer down to skin, subcutaneous tissue, fat, and tendon [de-identified] : wnl [de-identified] : Circ neurovascular function WNL post COBAN compression, pt expressed comfort.\par  [FreeTextEntry1] : Left Lateral Ankle  [FreeTextEntry2] : 1.5 [FreeTextEntry3] : 1.0 [FreeTextEntry4] : 0.4 [de-identified] : Serous/sanguinous [de-identified] : 90% [de-identified] : 10% [de-identified] : Cleansed with Normal Saline. \par Kerlix  [de-identified] : Cristiane placed on dry [TWNoteComboBox4] : Small [TWNoteComboBox5] : No [de-identified] : No [de-identified] : Macerated [de-identified] : None [de-identified] : None [de-identified] : >75% [de-identified] : Yes [de-identified] : Multilayer other compression wrap [de-identified] : 3x Weekly [de-identified] : Primary Dressing

## 2022-03-08 NOTE — ASSESSMENT
[Verbal] : Verbal [Written] : Written [Demo] : Demo [Patient] : Patient [Good - alert, interested, motivated] : Good - alert, interested, motivated [Verbalizes knowledge/Understanding] : Verbalizes knowledge/understanding [Dressing changes] : dressing changes [Skin Care] : skin care [Signs and symptoms of infection] : sign and symptoms of infection [Venous Disease] : venous disease [How and When to Call] : how and when to call [Hyperbaric Therapy] : hyperbaric therapy [Compression Therapy] : compression therapy [Patient responsibility to plan of care] : patient responsibility to plan of care [] : Yes [Stable] : stable [Home] : Home [Ambulatory] : Ambulatory [Not Applicable - Long Term Care/Home Health Agency] : Long Term Care/Home Health Agency: Not Applicable [Nutrition] : nutrition [Off-loading] : off-loading [Home Health] : home health [FreeTextEntry2] : Infection Prevention\par Promote Skin Integrity\par Offloading\par Elevation\par Compression Compliance\par Low Na+ Diet\par Maintain acceptable levels of pain\par Demonstrates use of both pharmacological and nonpharmacological pain management interventions\par Weight Reduction Strategies\par HBOT\par Use of CAM boot for offloading/pressure relief [FreeTextEntry4] : [facility] is on hold, as per MD due to Maceration \par Pt completed 46/60 HBOT, no additional ordered at this time\par Pt to F/U to [facility] daily for HBOT & 1 Week for Assessment\par COVID 19 PCR TO BE DONE EVERY FRIDAY

## 2022-03-08 NOTE — REVIEW OF SYSTEMS
[Fever] : no fever [Chills] : no chills [Eye Pain] : no eye pain [Loss Of Hearing] : no hearing loss [Abdominal Pain] : no abdominal pain [Vomiting] : no vomiting [Joint Stiffness] : joint stiffness [Skin Wound] : skin wound [Anxiety] : no anxiety [Negative] : Heme/Lymph [de-identified] : left lateral ankle ulcer down to skin, subcutaneous tissue, fat, and tendon [de-identified] : Possible prediabetic , elevated HgA1c and blood glucose , BMI 43 ^, patient now taking Metformin

## 2022-03-09 ENCOUNTER — OUTPATIENT (OUTPATIENT)
Dept: OUTPATIENT SERVICES | Facility: HOSPITAL | Age: 57
LOS: 1 days | Discharge: HOME | End: 2022-03-09
Payer: COMMERCIAL

## 2022-03-09 ENCOUNTER — APPOINTMENT (OUTPATIENT)
Dept: HYPERBARIC MEDICINE | Facility: HOSPITAL | Age: 57
End: 2022-03-09
Payer: COMMERCIAL

## 2022-03-09 VITALS
RESPIRATION RATE: 18 BRPM | SYSTOLIC BLOOD PRESSURE: 153 MMHG | TEMPERATURE: 98 F | OXYGEN SATURATION: 96 % | HEART RATE: 66 BPM | DIASTOLIC BLOOD PRESSURE: 72 MMHG

## 2022-03-09 VITALS
RESPIRATION RATE: 16 BRPM | OXYGEN SATURATION: 98 % | TEMPERATURE: 98.6 F | SYSTOLIC BLOOD PRESSURE: 157 MMHG | HEART RATE: 66 BPM | DIASTOLIC BLOOD PRESSURE: 78 MMHG

## 2022-03-09 DIAGNOSIS — Z79.899 OTHER LONG TERM (CURRENT) DRUG THERAPY: ICD-10-CM

## 2022-03-09 DIAGNOSIS — Z90.710 ACQUIRED ABSENCE OF BOTH CERVIX AND UTERUS: Chronic | ICD-10-CM

## 2022-03-09 DIAGNOSIS — Z82.49 FAMILY HISTORY OF ISCHEMIC HEART DISEASE AND OTHER DISEASES OF THE CIRCULATORY SYSTEM: ICD-10-CM

## 2022-03-09 DIAGNOSIS — Z90.710 ACQUIRED ABSENCE OF BOTH CERVIX AND UTERUS: ICD-10-CM

## 2022-03-09 DIAGNOSIS — E11.622 TYPE 2 DIABETES MELLITUS WITH OTHER SKIN ULCER: ICD-10-CM

## 2022-03-09 DIAGNOSIS — Z85.038 PERSONAL HISTORY OF OTHER MALIGNANT NEOPLASM OF LARGE INTESTINE: ICD-10-CM

## 2022-03-09 DIAGNOSIS — Z86.010 PERSONAL HISTORY OF COLONIC POLYPS: ICD-10-CM

## 2022-03-09 DIAGNOSIS — Z98.890 OTHER SPECIFIED POSTPROCEDURAL STATES: Chronic | ICD-10-CM

## 2022-03-09 DIAGNOSIS — J45.909 UNSPECIFIED ASTHMA, UNCOMPLICATED: ICD-10-CM

## 2022-03-09 DIAGNOSIS — Z79.51 LONG TERM (CURRENT) USE OF INHALED STEROIDS: ICD-10-CM

## 2022-03-09 DIAGNOSIS — Z98.890 OTHER SPECIFIED POSTPROCEDURAL STATES: ICD-10-CM

## 2022-03-09 DIAGNOSIS — L97.323 NON-PRESSURE CHRONIC ULCER OF LEFT ANKLE WITH NECROSIS OF MUSCLE: ICD-10-CM

## 2022-03-09 DIAGNOSIS — Z86.16 PERSONAL HISTORY OF COVID-19: ICD-10-CM

## 2022-03-09 PROCEDURE — 82962 GLUCOSE BLOOD TEST: CPT

## 2022-03-09 PROCEDURE — G0277: CPT

## 2022-03-09 PROCEDURE — 99183 HYPERBARIC OXYGEN THERAPY: CPT

## 2022-03-09 NOTE — ADDENDUM
[FreeTextEntry1] : PT ARRIVED A&OX3 \par ALL VITALS WITHIN PARAMETERS FOR HBOT\par PT DESCENDED TO 2.0 RAIMUNDO @ 1.75 PSI/MIN IN CHAMBER #3 WITHOUT INCIDENT\par PT RESTING AT TX DEPTH WITH VISIBLE CHEST RISE AND FALL OBSERVED CHAMBER SIDE\par Pt ascended from 2.0 RAIMUNDO @ 1.75 PSI/min without incident. \par Pt tolerated tx well.\par \par

## 2022-03-09 NOTE — PROCEDURE
[Outpatient] : Outpatient [Ambulatory] : Patient is ambulatory. [Other: ___] : [unfilled] [THIS CHAMBER HAS BEEN CLEANED / DISINFECTED] : This chamber has been cleaned / disinfected according to local and hospital policy and procedure prior to this treatment. [Patient demonstrated and verbalized proper technique for using air break mask] : Patient demonstrated and verbalized proper technique for using air break mask [Patient educated on the risks of SMOKING prior to HBOT with understanding] : Patient educated on the risks of SMOKING prior to HBOT with understanding [Patient educated on the risks of CONSUMING ALCOHOL prior to HBOT with understanding] : Patient educated on the risks of CONSUMING ALCOHOL prior to HBOT with understanding [100% Cotton] : 100% cotton [Empty all pockets] : empty all pockets [No hair oils, wigs, hairpieces, pins] : no hair oils, wigs, hairpieces, pins  [Pre tx medications] : pre tx medications  [No make-up, creams] : no make-up, creams  [No jewelry] : no jewelry  [No matches, cigarettes, lighters] : no matches, cigarettes, lighters  [Hearing aid removed] : hearing aid removed [Dentures removed] : dentures removed [Ground bracelet on pt's wrist] : ground bracelet on pt's wrist  [Contacts removed] : contacts removed  [Remove nail polish] : remove nail polish  [No reading material] : no reading material  [Bra, undergarments removed] : bra, undergarments removed  [No contraindicated dressings] : no contraindicated dressings [Ground Wire - VISUAL Verification - Intact/Free of Obstruction] : Ground Wire - VISUAL Verification - Intact/Free of Obstruction  [Ground Continuity - Verified < 1 ohm w/ Wrist Strap Enrrique] : Ground Continuity - Verified < 1 ohm w/ Wrist Strap Enrrique [Number: ___] : Number: [unfilled] [Diagnosis: ___] : Diagnosis: [unfilled] [____] : Post-Dive: Time - [unfilled] [___] : Post-Dive: Value - [unfilled] mg/dL [Clear all fields] : clear all fields [] : No [FreeTextEntry1] : 2.0 [FreeTextEntry3] : 90 mins [FreeTextEntry5] : 1523 [FreeTextEntry7] : 4063 [de-identified] : 1832 [FreeTextEntry9] : 1823 [de-identified] : 108 mins

## 2022-03-10 ENCOUNTER — APPOINTMENT (OUTPATIENT)
Dept: HYPERBARIC MEDICINE | Facility: HOSPITAL | Age: 57
End: 2022-03-10
Payer: COMMERCIAL

## 2022-03-10 ENCOUNTER — OUTPATIENT (OUTPATIENT)
Dept: OUTPATIENT SERVICES | Facility: HOSPITAL | Age: 57
LOS: 1 days | Discharge: HOME | End: 2022-03-10
Payer: COMMERCIAL

## 2022-03-10 VITALS
OXYGEN SATURATION: 96 % | RESPIRATION RATE: 18 BRPM | SYSTOLIC BLOOD PRESSURE: 146 MMHG | HEART RATE: 81 BPM | TEMPERATURE: 97.6 F | DIASTOLIC BLOOD PRESSURE: 70 MMHG

## 2022-03-10 VITALS
TEMPERATURE: 98.6 F | SYSTOLIC BLOOD PRESSURE: 146 MMHG | OXYGEN SATURATION: 98 % | DIASTOLIC BLOOD PRESSURE: 76 MMHG | HEART RATE: 80 BPM | RESPIRATION RATE: 16 BRPM

## 2022-03-10 DIAGNOSIS — L97.323 NON-PRESSURE CHRONIC ULCER OF LEFT ANKLE WITH NECROSIS OF MUSCLE: ICD-10-CM

## 2022-03-10 DIAGNOSIS — Z90.710 ACQUIRED ABSENCE OF BOTH CERVIX AND UTERUS: Chronic | ICD-10-CM

## 2022-03-10 DIAGNOSIS — E11.622 TYPE 2 DIABETES MELLITUS WITH OTHER SKIN ULCER: ICD-10-CM

## 2022-03-10 DIAGNOSIS — Z98.890 OTHER SPECIFIED POSTPROCEDURAL STATES: Chronic | ICD-10-CM

## 2022-03-10 PROCEDURE — 29581 APPL MULTLAYER CMPRN SYS LEG: CPT | Mod: LT

## 2022-03-10 PROCEDURE — G0277: CPT

## 2022-03-10 PROCEDURE — 99183 HYPERBARIC OXYGEN THERAPY: CPT

## 2022-03-10 PROCEDURE — 82962 GLUCOSE BLOOD TEST: CPT

## 2022-03-10 NOTE — ASSESSMENT
[No change from previous assessment] : No change from previous assessment [Patient prepared for dive] : Patient prepared for dive [Patient descended without problem for 9 minutes] : Patient descended without problem for 9 minutes [No dizziness or thirst] :  No dizziness or thirst [No ear problems] : No ear problems [Vital signs stable] : Vital signs stable [No Chest Pain, shortness of breath] : No Chest Pain, shortness of breath [Tolerating dive well] : Tolerating dive well [Respiratory Rate Stable] : Respiratory Rate Stable [No chest pain, shortness of breath, or ear pain] :  No chest pain, shortness of breath, or ear pain  [Tolerated Ascent well] : Tolerated Ascent well [Vital Signs stable] : Vital Signs stable [No] : No [A physician was present throughout the entire HBOT] : A physician was present throughout the entire HBOT [Clinically Stable] : Clinically stable [Continue Treatment Plan] : Continue treatment plan [0] : 0 out of 10

## 2022-03-10 NOTE — PROCEDURE
[Outpatient] : Outpatient [Ambulatory] : Patient is ambulatory. [Other: ___] : [unfilled] [THIS CHAMBER HAS BEEN CLEANED / DISINFECTED] : This chamber has been cleaned / disinfected according to local and hospital policy and procedure prior to this treatment. [Patient demonstrated and verbalized proper technique for using air break mask] : Patient demonstrated and verbalized proper technique for using air break mask [Patient educated on the risks of SMOKING prior to HBOT with understanding] : Patient educated on the risks of SMOKING prior to HBOT with understanding [Patient educated on the risks of CONSUMING ALCOHOL prior to HBOT with understanding] : Patient educated on the risks of CONSUMING ALCOHOL prior to HBOT with understanding [100% Cotton] : 100% cotton [Empty all pockets] : empty all pockets [No hair oils, wigs, hairpieces, pins] : no hair oils, wigs, hairpieces, pins  [Pre tx medications] : pre tx medications  [No make-up, creams] : no make-up, creams  [No jewelry] : no jewelry  [No matches, cigarettes, lighters] : no matches, cigarettes, lighters  [Hearing aid removed] : hearing aid removed [Dentures removed] : dentures removed [Ground bracelet on pt's wrist] : ground bracelet on pt's wrist  [Contacts removed] : contacts removed  [Remove nail polish] : remove nail polish  [No reading material] : no reading material  [Bra, undergarments removed] : bra, undergarments removed  [No contraindicated dressings] : no contraindicated dressings [Ground Wire - VISUAL Verification - Intact/Free of Obstruction] : Ground Wire - VISUAL Verification - Intact/Free of Obstruction  [Ground Continuity - Verified < 1 ohm w/ Wrist Strap Enrrique] : Ground Continuity - Verified < 1 ohm w/ Wrist Strap Enrrique [Number: ___] : Number: [unfilled] [Diagnosis: ___] : Diagnosis: [unfilled] [____] : Post-Dive: Time - [unfilled] [___] : Post-Dive: Value - [unfilled] mg/dL [Clear all fields] : clear all fields [] : No [FreeTextEntry3] : 90 [FreeTextEntry5] : 3507 [FreeTextEntry9] : 1823 [FreeTextEntry7] : 7778 [de-identified] : 1832 [de-identified] : 108 MINUTES

## 2022-03-10 NOTE — ADDENDUM
[FreeTextEntry1] : PT ARRIVED A&OX3 \par ALL VITALS WITIN PARAMETERS FOR HBOT WITH THE EXCEPTION OF BGL\par DPM NOTIFIED AND ADVISED THE PATIENT TO TAKE 16 GRAMS OF SUGAR VIA 4 TABLETS EVERY 20 MINUTES WHILE UNDERGOING TREATMENT\par PT DESCENDED TO 2.0 RAIMUNDO @ 1.75 PSI/MIN IN CHAMBER #3  WITHOUT INCIDENT\par PT RESTING AT TX DEPTH WITH VISIBLE CHEST RISE AND FALL OBSERVED CHAMBER SIDE\par PT ASCENDED FROM 2.0 RAIMUNDO @ 1.75 PSI/MIN WITHOUT INCIDENT\par PT TOLERATED TX WELL\par

## 2022-03-11 ENCOUNTER — OUTPATIENT (OUTPATIENT)
Dept: OUTPATIENT SERVICES | Facility: HOSPITAL | Age: 57
LOS: 1 days | Discharge: HOME | End: 2022-03-11
Payer: COMMERCIAL

## 2022-03-11 ENCOUNTER — APPOINTMENT (OUTPATIENT)
Dept: HYPERBARIC MEDICINE | Facility: HOSPITAL | Age: 57
End: 2022-03-11
Payer: COMMERCIAL

## 2022-03-11 VITALS
RESPIRATION RATE: 18 BRPM | SYSTOLIC BLOOD PRESSURE: 139 MMHG | OXYGEN SATURATION: 100 % | TEMPERATURE: 97.8 F | DIASTOLIC BLOOD PRESSURE: 78 MMHG | HEART RATE: 68 BPM

## 2022-03-11 VITALS
DIASTOLIC BLOOD PRESSURE: 83 MMHG | TEMPERATURE: 98.1 F | SYSTOLIC BLOOD PRESSURE: 154 MMHG | RESPIRATION RATE: 18 BRPM | OXYGEN SATURATION: 98 % | HEART RATE: 77 BPM

## 2022-03-11 DIAGNOSIS — E11.622 TYPE 2 DIABETES MELLITUS WITH OTHER SKIN ULCER: ICD-10-CM

## 2022-03-11 DIAGNOSIS — Z98.890 OTHER SPECIFIED POSTPROCEDURAL STATES: Chronic | ICD-10-CM

## 2022-03-11 DIAGNOSIS — Z90.710 ACQUIRED ABSENCE OF BOTH CERVIX AND UTERUS: Chronic | ICD-10-CM

## 2022-03-11 LAB — SARS-COV-2 RNA SPEC QL NAA+PROBE: SIGNIFICANT CHANGE UP

## 2022-03-11 PROCEDURE — 29581 APPL MULTLAYER CMPRN SYS LEG: CPT | Mod: LT

## 2022-03-11 PROCEDURE — 82962 GLUCOSE BLOOD TEST: CPT

## 2022-03-11 PROCEDURE — G0277: CPT

## 2022-03-11 PROCEDURE — U0003: CPT

## 2022-03-11 PROCEDURE — U0005: CPT

## 2022-03-11 PROCEDURE — 99183 HYPERBARIC OXYGEN THERAPY: CPT

## 2022-03-12 DIAGNOSIS — E11.622 TYPE 2 DIABETES MELLITUS WITH OTHER SKIN ULCER: ICD-10-CM

## 2022-03-12 DIAGNOSIS — L97.323 NON-PRESSURE CHRONIC ULCER OF LEFT ANKLE WITH NECROSIS OF MUSCLE: ICD-10-CM

## 2022-03-12 NOTE — ADDENDUM
[FreeTextEntry1] : PT ARRIVED AXOX3 TO [facility] ASSISTED WITH KNEE SCOOTER\par PT RECEIVED COVID19 PCR TEST PRE TX\par PT DESCENDED TO 2.0 RAIMUNDO @ 1.75 PSI/MIN WITHOUT INCIDENT IN CHAMBER #1\par PT RESTING AT TX DEPTH WITH VISIBLE CHEST RISE AND FALL OBSERVED CHAMBERSIDE\par PT ASCENDED TO SURFACE PRESSURE WITHOUT INCIDENT\par PT DRESSING CHANGED POST TX\par PT WOUND SEEN BY MD POST TX\par PT RECEIVED COVID PCR TEST POST TX

## 2022-03-12 NOTE — PROCEDURE
[Outpatient] : Outpatient [Ambulatory] : Patient is ambulatory. [THIS CHAMBER HAS BEEN CLEANED / DISINFECTED] : This chamber has been cleaned / disinfected according to local and hospital policy and procedure prior to this treatment. [Patient demonstrated and verbalized proper technique for using air break mask] : Patient demonstrated and verbalized proper technique for using air break mask [Patient educated on the risks of SMOKING prior to HBOT with understanding] : Patient educated on the risks of SMOKING prior to HBOT with understanding [Patient educated on the risks of CONSUMING ALCOHOL prior to HBOT with understanding] : Patient educated on the risks of CONSUMING ALCOHOL prior to HBOT with understanding [100% Cotton] : 100% cotton [Empty all pockets] : empty all pockets [No hair oils, wigs, hairpieces, pins] : no hair oils, wigs, hairpieces, pins  [Pre tx medications] : pre tx medications  [No make-up, creams] : no make-up, creams  [No jewelry] : no jewelry  [No matches, cigarettes, lighters] : no matches, cigarettes, lighters  [Hearing aid removed] : hearing aid removed [Dentures removed] : dentures removed [Ground bracelet on pt's wrist] : ground bracelet on pt's wrist  [Contacts removed] : contacts removed  [Remove nail polish] : remove nail polish  [No reading material] : no reading material  [Bra, undergarments removed] : bra, undergarments removed  [No contraindicated dressings] : no contraindicated dressings [Ground Wire - VISUAL Verification - Intact/Free of Obstruction] : Ground Wire - VISUAL Verification - Intact/Free of Obstruction  [Ground Continuity - Verified < 1 ohm w/ Wrist Strap Enrrique] : Ground Continuity - Verified < 1 ohm w/ Wrist Strap Enrrique [Clear all fields] : clear all fields [Number: ___] : Number: [unfilled] [Diagnosis: ___] : Diagnosis: [unfilled] [____] : Post-Dive: Time - [unfilled] [___] : Post-Dive: Value - [unfilled] mg/dL [] : No [FreeTextEntry3] : 90 MIN [FreeTextEntry5] : 0250 [FreeTextEntry7] : 1817 [FreeTextEntry9] : 1837 [de-identified] : 1841 [de-identified] : 108 MIN

## 2022-03-13 DIAGNOSIS — L97.323 NON-PRESSURE CHRONIC ULCER OF LEFT ANKLE WITH NECROSIS OF MUSCLE: ICD-10-CM

## 2022-03-13 DIAGNOSIS — E11.622 TYPE 2 DIABETES MELLITUS WITH OTHER SKIN ULCER: ICD-10-CM

## 2022-03-13 DIAGNOSIS — Z20.822 CONTACT WITH AND (SUSPECTED) EXPOSURE TO COVID-19: ICD-10-CM

## 2022-03-13 NOTE — ADDENDUM
[FreeTextEntry1] : PT ARRIVED A&OX3 \par ALL VITALS WITIN PARAMETERS FOR HBOT WITH THE EXCEPTION OF BGL\par DPM NOTIFIED AND CLEARED PT FOR TX WITHOUT FURTHER INTERVENTION\par PT DESCENDED TO 2.0 RAIMUNDO @ 1.75 PSI/MIN IN CHAMBER #3  WITHOUT INCIDENT\par PT RESTING AT TX DEPTH WITH VISIBLE CHEST RISE AND FALL OBSERVED CHAMBER SIDE\par Pt ascended from 2.0 RAIMUNDO @ 1.75 PSI/min without incident. \par Pt tolerated tx well. Pt received WC by Rn post tx. \par \par

## 2022-03-13 NOTE — PROCEDURE
[Outpatient] : Outpatient [Ambulatory] : Patient is ambulatory. [Other: ___] : [unfilled] [THIS CHAMBER HAS BEEN CLEANED / DISINFECTED] : This chamber has been cleaned / disinfected according to local and hospital policy and procedure prior to this treatment. [Patient demonstrated and verbalized proper technique for using air break mask] : Patient demonstrated and verbalized proper technique for using air break mask [Patient educated on the risks of SMOKING prior to HBOT with understanding] : Patient educated on the risks of SMOKING prior to HBOT with understanding [Patient educated on the risks of CONSUMING ALCOHOL prior to HBOT with understanding] : Patient educated on the risks of CONSUMING ALCOHOL prior to HBOT with understanding [100% Cotton] : 100% cotton [Empty all pockets] : empty all pockets [No hair oils, wigs, hairpieces, pins] : no hair oils, wigs, hairpieces, pins  [Pre tx medications] : pre tx medications  [No make-up, creams] : no make-up, creams  [No jewelry] : no jewelry  [No matches, cigarettes, lighters] : no matches, cigarettes, lighters  [Hearing aid removed] : hearing aid removed [Dentures removed] : dentures removed [Ground bracelet on pt's wrist] : ground bracelet on pt's wrist  [Contacts removed] : contacts removed  [Remove nail polish] : remove nail polish  [No reading material] : no reading material  [Bra, undergarments removed] : bra, undergarments removed  [No contraindicated dressings] : no contraindicated dressings [Ground Wire - VISUAL Verification - Intact/Free of Obstruction] : Ground Wire - VISUAL Verification - Intact/Free of Obstruction  [Ground Continuity - Verified < 1 ohm w/ Wrist Strap Enrrique] : Ground Continuity - Verified < 1 ohm w/ Wrist Strap Enrrique [Number: ___] : Number: [unfilled] [Diagnosis: ___] : Diagnosis: [unfilled] [____] : Post-Dive: Time - [unfilled] [___] : Post-Dive: Value - [unfilled] mg/dL [Clear all fields] : clear all fields [] : No [FreeTextEntry1] : 2.0 [FreeTextEntry3] : 90 mins [FreeTextEntry5] : 4522 [FreeTextEntry7] : 3380 [FreeTextEntry9] : 1823 [de-identified] : 1832 [de-identified] : 108 mins

## 2022-03-14 ENCOUNTER — APPOINTMENT (OUTPATIENT)
Dept: HYPERBARIC MEDICINE | Facility: HOSPITAL | Age: 57
End: 2022-03-14
Payer: COMMERCIAL

## 2022-03-14 ENCOUNTER — OUTPATIENT (OUTPATIENT)
Dept: OUTPATIENT SERVICES | Facility: HOSPITAL | Age: 57
LOS: 1 days | Discharge: HOME | End: 2022-03-14
Payer: COMMERCIAL

## 2022-03-14 VITALS
RESPIRATION RATE: 18 BRPM | HEART RATE: 76 BPM | DIASTOLIC BLOOD PRESSURE: 62 MMHG | SYSTOLIC BLOOD PRESSURE: 141 MMHG | TEMPERATURE: 97.9 F | OXYGEN SATURATION: 95 %

## 2022-03-14 VITALS
TEMPERATURE: 98.2 F | DIASTOLIC BLOOD PRESSURE: 73 MMHG | RESPIRATION RATE: 18 BRPM | HEART RATE: 65 BPM | OXYGEN SATURATION: 95 % | SYSTOLIC BLOOD PRESSURE: 133 MMHG

## 2022-03-14 DIAGNOSIS — L97.323 NON-PRESSURE CHRONIC ULCER OF LEFT ANKLE WITH NECROSIS OF MUSCLE: ICD-10-CM

## 2022-03-14 DIAGNOSIS — E11.622 TYPE 2 DIABETES MELLITUS WITH OTHER SKIN ULCER: ICD-10-CM

## 2022-03-14 DIAGNOSIS — Z90.710 ACQUIRED ABSENCE OF BOTH CERVIX AND UTERUS: Chronic | ICD-10-CM

## 2022-03-14 DIAGNOSIS — Z98.890 OTHER SPECIFIED POSTPROCEDURAL STATES: Chronic | ICD-10-CM

## 2022-03-14 PROCEDURE — 29581 APPL MULTLAYER CMPRN SYS LEG: CPT | Mod: LT

## 2022-03-14 PROCEDURE — 82962 GLUCOSE BLOOD TEST: CPT

## 2022-03-14 PROCEDURE — 99183 HYPERBARIC OXYGEN THERAPY: CPT

## 2022-03-14 PROCEDURE — G0277: CPT

## 2022-03-15 ENCOUNTER — OUTPATIENT (OUTPATIENT)
Dept: OUTPATIENT SERVICES | Facility: HOSPITAL | Age: 57
LOS: 1 days | Discharge: HOME | End: 2022-03-15
Payer: COMMERCIAL

## 2022-03-15 ENCOUNTER — APPOINTMENT (OUTPATIENT)
Dept: HYPERBARIC MEDICINE | Facility: HOSPITAL | Age: 57
End: 2022-03-15
Payer: COMMERCIAL

## 2022-03-15 VITALS
OXYGEN SATURATION: 94 % | SYSTOLIC BLOOD PRESSURE: 156 MMHG | HEIGHT: 63 IN | TEMPERATURE: 97.8 F | RESPIRATION RATE: 20 BRPM | HEART RATE: 87 BPM | WEIGHT: 235 LBS | DIASTOLIC BLOOD PRESSURE: 82 MMHG | BODY MASS INDEX: 41.64 KG/M2

## 2022-03-15 DIAGNOSIS — Z98.890 OTHER SPECIFIED POSTPROCEDURAL STATES: Chronic | ICD-10-CM

## 2022-03-15 DIAGNOSIS — E11.622 TYPE 2 DIABETES MELLITUS WITH OTHER SKIN ULCER: ICD-10-CM

## 2022-03-15 DIAGNOSIS — Z90.710 ACQUIRED ABSENCE OF BOTH CERVIX AND UTERUS: Chronic | ICD-10-CM

## 2022-03-15 PROCEDURE — 29581 APPL MULTLAYER CMPRN SYS LEG: CPT | Mod: LT

## 2022-03-15 PROCEDURE — 99183 HYPERBARIC OXYGEN THERAPY: CPT

## 2022-03-15 NOTE — ADDENDUM
[FreeTextEntry1] : PT ARRIVED A&OX3 \par ALL VITALS WITIN PARAMETERS FOR HBOT\par PT DESCENDED TO 2.0 RAIMUNDO @ 1.75 PSI/MIN IN CHAMBER #3  WITHOUT INCIDENT\par PT RESTING AT TX DEPTH WITH VISIBLE CHEST RISE AND FALL OBSERVED CHAMBER SIDE\par PT ASCENDED FROM 2.0 RAIMUNDO @ 1.75 PSI/MIN WITHOUT INCIDENT\par PT TOLERATED TX WELL\par

## 2022-03-15 NOTE — PROCEDURE
[Outpatient] : Outpatient [Ambulatory] : Patient is ambulatory. [Other: ___] : [unfilled] [THIS CHAMBER HAS BEEN CLEANED / DISINFECTED] : This chamber has been cleaned / disinfected according to local and hospital policy and procedure prior to this treatment. [Patient demonstrated and verbalized proper technique for using air break mask] : Patient demonstrated and verbalized proper technique for using air break mask [Patient educated on the risks of SMOKING prior to HBOT with understanding] : Patient educated on the risks of SMOKING prior to HBOT with understanding [Patient educated on the risks of CONSUMING ALCOHOL prior to HBOT with understanding] : Patient educated on the risks of CONSUMING ALCOHOL prior to HBOT with understanding [100% Cotton] : 100% cotton [Empty all pockets] : empty all pockets [No hair oils, wigs, hairpieces, pins] : no hair oils, wigs, hairpieces, pins  [Pre tx medications] : pre tx medications  [No make-up, creams] : no make-up, creams  [No jewelry] : no jewelry  [No matches, cigarettes, lighters] : no matches, cigarettes, lighters  [Hearing aid removed] : hearing aid removed [Dentures removed] : dentures removed [Ground bracelet on pt's wrist] : ground bracelet on pt's wrist  [Contacts removed] : contacts removed  [No reading material] : no reading material  [Remove nail polish] : remove nail polish  [Bra, undergarments removed] : bra, undergarments removed  [No contraindicated dressings] : no contraindicated dressings [Ground Wire - VISUAL Verification - Intact/Free of Obstruction] : Ground Wire - VISUAL Verification - Intact/Free of Obstruction  [Ground Continuity - Verified < 1 ohm w/ Wrist Strap Enrrique] : Ground Continuity - Verified < 1 ohm w/ Wrist Strap Enrrique [Number: ___] : Number: [unfilled] [Diagnosis: ___] : Diagnosis: [unfilled] [____] : Post-Dive: Time - [unfilled] [___] : Post-Dive: Value - [unfilled] mg/dL [Clear all fields] : clear all fields [] : No [FreeTextEntry3] : 90 [FreeTextEntry5] : 2050 [FreeTextEntry7] : 5858 [FreeTextEntry9] : 1839 [de-identified] : 1843 [de-identified] : 108 MINUTES

## 2022-03-16 ENCOUNTER — APPOINTMENT (OUTPATIENT)
Dept: HYPERBARIC MEDICINE | Facility: HOSPITAL | Age: 57
End: 2022-03-16
Payer: COMMERCIAL

## 2022-03-16 ENCOUNTER — OUTPATIENT (OUTPATIENT)
Dept: OUTPATIENT SERVICES | Facility: HOSPITAL | Age: 57
LOS: 1 days | Discharge: HOME | End: 2022-03-16
Payer: COMMERCIAL

## 2022-03-16 VITALS
HEART RATE: 72 BPM | OXYGEN SATURATION: 94 % | TEMPERATURE: 96.8 F | RESPIRATION RATE: 18 BRPM | SYSTOLIC BLOOD PRESSURE: 136 MMHG | DIASTOLIC BLOOD PRESSURE: 74 MMHG

## 2022-03-16 VITALS
OXYGEN SATURATION: 99 % | TEMPERATURE: 98.1 F | SYSTOLIC BLOOD PRESSURE: 151 MMHG | RESPIRATION RATE: 16 BRPM | HEART RATE: 78 BPM | DIASTOLIC BLOOD PRESSURE: 70 MMHG

## 2022-03-16 DIAGNOSIS — Z98.890 OTHER SPECIFIED POSTPROCEDURAL STATES: Chronic | ICD-10-CM

## 2022-03-16 DIAGNOSIS — L97.323 NON-PRESSURE CHRONIC ULCER OF LEFT ANKLE WITH NECROSIS OF MUSCLE: ICD-10-CM

## 2022-03-16 DIAGNOSIS — E11.622 TYPE 2 DIABETES MELLITUS WITH OTHER SKIN ULCER: ICD-10-CM

## 2022-03-16 DIAGNOSIS — Z90.710 ACQUIRED ABSENCE OF BOTH CERVIX AND UTERUS: Chronic | ICD-10-CM

## 2022-03-16 PROCEDURE — 99183 HYPERBARIC OXYGEN THERAPY: CPT

## 2022-03-16 PROCEDURE — 82962 GLUCOSE BLOOD TEST: CPT

## 2022-03-16 PROCEDURE — G0277: CPT

## 2022-03-16 NOTE — PHYSICAL EXAM
[4 x 4] : 4 x 4  [Abdominal Pad] : Abdominal Pad [Normal Thyroid] : the thyroid was normal [Normal Breath Sounds] : Normal breath sounds [Normal Heart Sounds] : normal heart sounds [Normal Rate and Rhythm] : normal rate and rhythm [Oriented to Person] : oriented to person [Oriented to Place] : oriented to place [Oriented to Time] : oriented to time [JVD] : no jugular venous distention  [Abdomen Masses] : No abdominal massess [Abdomen Tenderness] : ~T ~M No abdominal tenderness [Tender] : nontender [Enlarged] : not enlarged [Alert] : not alert [de-identified] : adult WM, NAD, alert, Ox3. [de-identified] : Circ neurovascular function WNL post COBAN compression, pt expressed comfort.\par  [FreeTextEntry1] : Left Lateral Ankle  [FreeTextEntry2] : 0.8 [FreeTextEntry3] : 0.6 [FreeTextEntry4] : 0.3 [de-identified] : Serous/sanguinous [de-identified] : 95% [de-identified] : 5% [de-identified] : Iodosorb and alginate [de-identified] : Cleansed with Normal Saline. \par Kerlix  [TWNoteComboBox4] : Small [TWNoteComboBox5] : No [de-identified] : No [de-identified] : Macerated [de-identified] : None [de-identified] : None [de-identified] : >75% [de-identified] : Yes [de-identified] : Multilayer other compression wrap [de-identified] : 3x Weekly [de-identified] : Primary Dressing

## 2022-03-16 NOTE — ASSESSMENT
[Verbal] : Verbal [Demo] : Demo [Patient] : Patient [Good - alert, interested, motivated] : Good - alert, interested, motivated [Verbalizes knowledge/Understanding] : Verbalizes knowledge/understanding [Dressing changes] : dressing changes [Skin Care] : skin care [Signs and symptoms of infection] : sign and symptoms of infection [Venous Disease] : venous disease [Nutrition] : nutrition [How and When to Call] : how and when to call [Hyperbaric Therapy] : hyperbaric therapy [Off-loading] : off-loading [Compression Therapy] : compression therapy [Home Health] : home health [Patient responsibility to plan of care] : patient responsibility to plan of care [] : Yes [Stable] : stable [Home] : Home [Not Applicable - Long Term Care/Home Health Agency] : Long Term Care/Home Health Agency: Not Applicable [Other: ____] : [unfilled] [FreeTextEntry2] : Infection Prevention\par Promote Skin Integrity\par Offloading\par Elevation\par Compression Compliance\par Low Na+ Diet\par Maintain acceptable levels of pain\par Demonstrates use of both pharmacological and nonpharmacological pain management interventions\par Weight Reduction Strategies\par HBOT\par Use of CAM boot for offloading/pressure relief [FreeTextEntry4] : \par Pt completed 51/60 HBOT, no additional ordered at this time\par Pt to F/U to [facility] daily for HBOT & 1 Week for Assessment\par COVID 19 PCR TO BE DONE EVERY FRIDAY

## 2022-03-16 NOTE — ADDENDUM
[FreeTextEntry1] : pt vitals within acceptable limits for hbot \par pt descended to tx depth 2.0 efrain @1.75 psi/min without incident in chamber #4 \par pt's resting at tx depth with visible chest rise and fall as observed chamber side\par PT ASCENDED FROM 2.0 EFRAIN @ 1.75 PSI/MIN WITHOUT INCIDENT\par PT TOLERATED TX WELL\par

## 2022-03-16 NOTE — REVIEW OF SYSTEMS
[Joint Stiffness] : joint stiffness [Skin Wound] : skin wound [Negative] : Heme/Lymph [Fever] : no fever [Chills] : no chills [Eye Pain] : no eye pain [Loss Of Hearing] : no hearing loss [Abdominal Pain] : no abdominal pain [Vomiting] : no vomiting [Anxiety] : no anxiety [de-identified] : left lateral ankle ulcer down to skin, subcutaneous tissue, fat, and tendon [de-identified] : Possible prediabetic , elevated HgA1c and blood glucose , BMI 43 ^, patient now taking Metformin

## 2022-03-16 NOTE — PROCEDURE
[Outpatient] : Outpatient [Other: ___] : [unfilled] [THIS CHAMBER HAS BEEN CLEANED / DISINFECTED] : This chamber has been cleaned / disinfected according to local and hospital policy and procedure prior to this treatment. [Patient demonstrated and verbalized proper technique for using air break mask] : Patient demonstrated and verbalized proper technique for using air break mask [Patient educated on the risks of SMOKING prior to HBOT with understanding] : Patient educated on the risks of SMOKING prior to HBOT with understanding [Patient educated on the risks of CONSUMING ALCOHOL prior to HBOT with understanding] : Patient educated on the risks of CONSUMING ALCOHOL prior to HBOT with understanding [100% Cotton] : 100% cotton [Empty all pockets] : empty all pockets [No hair oils, wigs, hairpieces, pins] : no hair oils, wigs, hairpieces, pins  [Pre tx medications] : pre tx medications  [No make-up, creams] : no make-up, creams  [No jewelry] : no jewelry  [No matches, cigarettes, lighters] : no matches, cigarettes, lighters  [Hearing aid removed] : hearing aid removed [Dentures removed] : dentures removed [Ground bracelet on pt's wrist] : ground bracelet on pt's wrist  [Contacts removed] : contacts removed  [Remove nail polish] : remove nail polish  [No reading material] : no reading material  [Bra, undergarments removed] : bra, undergarments removed  [No contraindicated dressings] : no contraindicated dressings [Ground Wire - VISUAL Verification - Intact/Free of Obstruction] : Ground Wire - VISUAL Verification - Intact/Free of Obstruction  [Ground Continuity - Verified < 1 ohm w/ Wrist Strap Enrrique] : Ground Continuity - Verified < 1 ohm w/ Wrist Strap Enrrique [Number: ___] : Number: [unfilled] [Diagnosis: ___] : Diagnosis: [unfilled] [____] : Post-Dive: Time - [unfilled] [___] : Post-Dive: Value - [unfilled] mg/dL [Clear all fields] : clear all fields [] : No [FreeTextEntry3] : 90 [FreeTextEntry5] : 4059 [FreeTextEntry7] : 3637 [FreeTextEntry9] : 1823 [de-identified] : 1832 [de-identified] : 108 MINUTES

## 2022-03-17 ENCOUNTER — APPOINTMENT (OUTPATIENT)
Dept: HYPERBARIC MEDICINE | Facility: HOSPITAL | Age: 57
End: 2022-03-17
Payer: COMMERCIAL

## 2022-03-17 ENCOUNTER — OUTPATIENT (OUTPATIENT)
Dept: OUTPATIENT SERVICES | Facility: HOSPITAL | Age: 57
LOS: 1 days | Discharge: HOME | End: 2022-03-17
Payer: COMMERCIAL

## 2022-03-17 VITALS
TEMPERATURE: 98.3 F | HEART RATE: 64 BPM | DIASTOLIC BLOOD PRESSURE: 86 MMHG | OXYGEN SATURATION: 99 % | RESPIRATION RATE: 16 BRPM | SYSTOLIC BLOOD PRESSURE: 136 MMHG

## 2022-03-17 VITALS
TEMPERATURE: 98.6 F | SYSTOLIC BLOOD PRESSURE: 146 MMHG | HEART RATE: 70 BPM | OXYGEN SATURATION: 99 % | DIASTOLIC BLOOD PRESSURE: 66 MMHG | RESPIRATION RATE: 16 BRPM

## 2022-03-17 DIAGNOSIS — E11.622 TYPE 2 DIABETES MELLITUS WITH OTHER SKIN ULCER: ICD-10-CM

## 2022-03-17 DIAGNOSIS — Z98.890 OTHER SPECIFIED POSTPROCEDURAL STATES: Chronic | ICD-10-CM

## 2022-03-17 DIAGNOSIS — Z90.710 ACQUIRED ABSENCE OF BOTH CERVIX AND UTERUS: Chronic | ICD-10-CM

## 2022-03-17 DIAGNOSIS — L97.323 NON-PRESSURE CHRONIC ULCER OF LEFT ANKLE WITH NECROSIS OF MUSCLE: ICD-10-CM

## 2022-03-17 PROCEDURE — 82962 GLUCOSE BLOOD TEST: CPT

## 2022-03-17 PROCEDURE — G0277: CPT

## 2022-03-17 PROCEDURE — 99183 HYPERBARIC OXYGEN THERAPY: CPT

## 2022-03-18 ENCOUNTER — OUTPATIENT (OUTPATIENT)
Dept: OUTPATIENT SERVICES | Facility: HOSPITAL | Age: 57
LOS: 1 days | Discharge: HOME | End: 2022-03-18
Payer: COMMERCIAL

## 2022-03-18 ENCOUNTER — APPOINTMENT (OUTPATIENT)
Dept: HYPERBARIC MEDICINE | Facility: HOSPITAL | Age: 57
End: 2022-03-18
Payer: COMMERCIAL

## 2022-03-18 ENCOUNTER — NON-APPOINTMENT (OUTPATIENT)
Age: 57
End: 2022-03-18

## 2022-03-18 VITALS
OXYGEN SATURATION: 95 % | TEMPERATURE: 97.9 F | DIASTOLIC BLOOD PRESSURE: 81 MMHG | HEART RATE: 77 BPM | RESPIRATION RATE: 20 BRPM | SYSTOLIC BLOOD PRESSURE: 147 MMHG

## 2022-03-18 VITALS
DIASTOLIC BLOOD PRESSURE: 70 MMHG | HEART RATE: 64 BPM | SYSTOLIC BLOOD PRESSURE: 124 MMHG | TEMPERATURE: 97.8 F | RESPIRATION RATE: 20 BRPM | OXYGEN SATURATION: 100 %

## 2022-03-18 DIAGNOSIS — E11.622 TYPE 2 DIABETES MELLITUS WITH OTHER SKIN ULCER: ICD-10-CM

## 2022-03-18 DIAGNOSIS — Z98.890 OTHER SPECIFIED POSTPROCEDURAL STATES: Chronic | ICD-10-CM

## 2022-03-18 DIAGNOSIS — Z90.710 ACQUIRED ABSENCE OF BOTH CERVIX AND UTERUS: Chronic | ICD-10-CM

## 2022-03-18 LAB — SARS-COV-2 RNA SPEC QL NAA+PROBE: SIGNIFICANT CHANGE UP

## 2022-03-18 PROCEDURE — 99183 HYPERBARIC OXYGEN THERAPY: CPT

## 2022-03-18 PROCEDURE — 82962 GLUCOSE BLOOD TEST: CPT

## 2022-03-18 PROCEDURE — U0003: CPT

## 2022-03-18 PROCEDURE — U0005: CPT

## 2022-03-18 PROCEDURE — G0277: CPT

## 2022-03-19 ENCOUNTER — APPOINTMENT (OUTPATIENT)
Dept: HYPERBARIC MEDICINE | Facility: HOSPITAL | Age: 57
End: 2022-03-19
Payer: COMMERCIAL

## 2022-03-19 ENCOUNTER — OUTPATIENT (OUTPATIENT)
Dept: OUTPATIENT SERVICES | Facility: HOSPITAL | Age: 57
LOS: 1 days | Discharge: HOME | End: 2022-03-19
Payer: COMMERCIAL

## 2022-03-19 VITALS
RESPIRATION RATE: 18 BRPM | TEMPERATURE: 97.2 F | HEART RATE: 60 BPM | OXYGEN SATURATION: 98 % | SYSTOLIC BLOOD PRESSURE: 138 MMHG | DIASTOLIC BLOOD PRESSURE: 74 MMHG

## 2022-03-19 VITALS
SYSTOLIC BLOOD PRESSURE: 130 MMHG | TEMPERATURE: 98.2 F | RESPIRATION RATE: 16 BRPM | HEART RATE: 68 BPM | OXYGEN SATURATION: 100 % | DIASTOLIC BLOOD PRESSURE: 68 MMHG

## 2022-03-19 DIAGNOSIS — Z98.890 OTHER SPECIFIED POSTPROCEDURAL STATES: Chronic | ICD-10-CM

## 2022-03-19 DIAGNOSIS — E11.622 TYPE 2 DIABETES MELLITUS WITH OTHER SKIN ULCER: ICD-10-CM

## 2022-03-19 DIAGNOSIS — L97.323 NON-PRESSURE CHRONIC ULCER OF LEFT ANKLE WITH NECROSIS OF MUSCLE: ICD-10-CM

## 2022-03-19 DIAGNOSIS — Z90.710 ACQUIRED ABSENCE OF BOTH CERVIX AND UTERUS: Chronic | ICD-10-CM

## 2022-03-19 PROCEDURE — G0277: CPT

## 2022-03-19 PROCEDURE — 82962 GLUCOSE BLOOD TEST: CPT

## 2022-03-19 PROCEDURE — 99183 HYPERBARIC OXYGEN THERAPY: CPT

## 2022-03-19 NOTE — ADDENDUM
[FreeTextEntry1] : PT ARRIVED TO [facility]\par ALL PT VITALS WITHIN PARAMETERS FOR HBOT\par PT DESCENDED TO TX DEPTH OF 2.0ATA @1.75PSI/MIN\par PT SEEN RESTING AT DEPTH WITH VISIBLE CHEST RISE AND FALL OBSERVED CHAMBERSIDE\par PT ASCENDED TO SURFACE PRESSURE WITHOUT INCIDENT.\par PT RECEIVED WC BY RN POST TX.

## 2022-03-19 NOTE — ASSESSMENT
[No change from previous assessment] : No change from previous assessment [Time MD/Provider assessed Patient:_______] : Time MD/Provider assessed Patient: [unfilled] [Patient prepared for dive] : Patient prepared for dive [Patient undergoing HBO treatment for __________] : Patient undergoing HBO treatment for [unfilled] [Patient descended without problem for 9 minutes] : Patient descended without problem for 9 minutes [No dizziness or thirst] :  No dizziness or thirst [No ear problems] : No ear problems [Vital signs stable] : Vital signs stable [Tolerating dive well] : Tolerating dive well [No Chest Pain, shortness of breath] : No Chest Pain, shortness of breath [Respiratory Rate Stable] : Respiratory Rate Stable [Tolerated Ascent well] : Tolerated Ascent well [No chest pain, shortness of breath, or ear pain] :  No chest pain, shortness of breath, or ear pain  [Vital Signs stable] : Vital Signs stable [A physician was present throughout the entire HBOT] : A physician was present throughout the entire HBOT [No] : No [Clinically Stable] : Clinically stable [Continue Treatment Plan] : Continue treatment plan

## 2022-03-19 NOTE — PROCEDURE
[Ambulatory] : Patient is ambulatory. [Outpatient] : Outpatient [Other: ___] : [unfilled] [THIS CHAMBER HAS BEEN CLEANED / DISINFECTED] : This chamber has been cleaned / disinfected according to local and hospital policy and procedure prior to this treatment. [Patient demonstrated and verbalized proper technique for using air break mask] : Patient demonstrated and verbalized proper technique for using air break mask [Patient educated on the risks of SMOKING prior to HBOT with understanding] : Patient educated on the risks of SMOKING prior to HBOT with understanding [Patient educated on the risks of CONSUMING ALCOHOL prior to HBOT with understanding] : Patient educated on the risks of CONSUMING ALCOHOL prior to HBOT with understanding [100% Cotton] : 100% cotton [Empty all pockets] : empty all pockets [No hair oils, wigs, hairpieces, pins] : no hair oils, wigs, hairpieces, pins  [Pre tx medications] : pre tx medications  [No make-up, creams] : no make-up, creams  [No jewelry] : no jewelry  [No matches, cigarettes, lighters] : no matches, cigarettes, lighters  [Hearing aid removed] : hearing aid removed [Dentures removed] : dentures removed [Ground bracelet on pt's wrist] : ground bracelet on pt's wrist  [Contacts removed] : contacts removed  [Remove nail polish] : remove nail polish  [Bra, undergarments removed] : bra, undergarments removed  [No reading material] : no reading material  [No contraindicated dressings] : no contraindicated dressings [Ground Wire - VISUAL Verification - Intact/Free of Obstruction] : Ground Wire - VISUAL Verification - Intact/Free of Obstruction  [Ground Continuity - Verified < 1 ohm w/ Wrist Strap Enrrique] : Ground Continuity - Verified < 1 ohm w/ Wrist Strap Enrrique [Number: ___] : Number: [unfilled] [Diagnosis: ___] : Diagnosis: [unfilled] [____] : Post-Dive: Time - [unfilled] [___] : Post-Dive: Value - [unfilled] mg/dL [Clear all fields] : clear all fields [] : No [FreeTextEntry1] : 2.0 [FreeTextEntry3] : 90 MINS [FreeTextEntry5] : 5471 [FreeTextEntry7] : 3449 [FreeTextEntry9] : 7877 [de-identified] : 0942 [de-identified] : 108 Mins

## 2022-03-20 DIAGNOSIS — Z86.010 PERSONAL HISTORY OF COLONIC POLYPS: ICD-10-CM

## 2022-03-20 DIAGNOSIS — J45.909 UNSPECIFIED ASTHMA, UNCOMPLICATED: ICD-10-CM

## 2022-03-20 DIAGNOSIS — Z90.710 ACQUIRED ABSENCE OF BOTH CERVIX AND UTERUS: ICD-10-CM

## 2022-03-20 DIAGNOSIS — Z86.16 PERSONAL HISTORY OF COVID-19: ICD-10-CM

## 2022-03-20 DIAGNOSIS — Z79.899 OTHER LONG TERM (CURRENT) DRUG THERAPY: ICD-10-CM

## 2022-03-20 DIAGNOSIS — Z85.038 PERSONAL HISTORY OF OTHER MALIGNANT NEOPLASM OF LARGE INTESTINE: ICD-10-CM

## 2022-03-20 DIAGNOSIS — L97.323 NON-PRESSURE CHRONIC ULCER OF LEFT ANKLE WITH NECROSIS OF MUSCLE: ICD-10-CM

## 2022-03-20 DIAGNOSIS — Z79.51 LONG TERM (CURRENT) USE OF INHALED STEROIDS: ICD-10-CM

## 2022-03-20 DIAGNOSIS — E11.622 TYPE 2 DIABETES MELLITUS WITH OTHER SKIN ULCER: ICD-10-CM

## 2022-03-20 DIAGNOSIS — Z98.890 OTHER SPECIFIED POSTPROCEDURAL STATES: ICD-10-CM

## 2022-03-20 DIAGNOSIS — Z82.49 FAMILY HISTORY OF ISCHEMIC HEART DISEASE AND OTHER DISEASES OF THE CIRCULATORY SYSTEM: ICD-10-CM

## 2022-03-21 ENCOUNTER — OUTPATIENT (OUTPATIENT)
Dept: OUTPATIENT SERVICES | Facility: HOSPITAL | Age: 57
LOS: 1 days | Discharge: HOME | End: 2022-03-21
Payer: COMMERCIAL

## 2022-03-21 ENCOUNTER — APPOINTMENT (OUTPATIENT)
Dept: HYPERBARIC MEDICINE | Facility: HOSPITAL | Age: 57
End: 2022-03-21
Payer: COMMERCIAL

## 2022-03-21 VITALS
OXYGEN SATURATION: 100 % | RESPIRATION RATE: 18 BRPM | SYSTOLIC BLOOD PRESSURE: 133 MMHG | HEART RATE: 64 BPM | TEMPERATURE: 98 F | DIASTOLIC BLOOD PRESSURE: 77 MMHG

## 2022-03-21 VITALS
TEMPERATURE: 97.4 F | OXYGEN SATURATION: 98 % | SYSTOLIC BLOOD PRESSURE: 132 MMHG | HEART RATE: 76 BPM | DIASTOLIC BLOOD PRESSURE: 72 MMHG | RESPIRATION RATE: 18 BRPM

## 2022-03-21 VITALS
HEART RATE: 76 BPM | OXYGEN SATURATION: 98 % | TEMPERATURE: 97.4 F | SYSTOLIC BLOOD PRESSURE: 132 MMHG | DIASTOLIC BLOOD PRESSURE: 72 MMHG | RESPIRATION RATE: 18 BRPM

## 2022-03-21 DIAGNOSIS — E11.622 TYPE 2 DIABETES MELLITUS WITH OTHER SKIN ULCER: ICD-10-CM

## 2022-03-21 DIAGNOSIS — L97.323 NON-PRESSURE CHRONIC ULCER OF LEFT ANKLE WITH NECROSIS OF MUSCLE: ICD-10-CM

## 2022-03-21 DIAGNOSIS — Z98.890 OTHER SPECIFIED POSTPROCEDURAL STATES: Chronic | ICD-10-CM

## 2022-03-21 DIAGNOSIS — Z90.710 ACQUIRED ABSENCE OF BOTH CERVIX AND UTERUS: Chronic | ICD-10-CM

## 2022-03-21 PROCEDURE — 29581 APPL MULTLAYER CMPRN SYS LEG: CPT | Mod: LT

## 2022-03-21 PROCEDURE — 82962 GLUCOSE BLOOD TEST: CPT

## 2022-03-21 PROCEDURE — G0277: CPT

## 2022-03-21 PROCEDURE — 99183 HYPERBARIC OXYGEN THERAPY: CPT

## 2022-03-21 NOTE — PROCEDURE
[Outpatient] : Outpatient [Ambulatory] : Patient is ambulatory. [Other: ___] : [unfilled] [THIS CHAMBER HAS BEEN CLEANED / DISINFECTED] : This chamber has been cleaned / disinfected according to local and hospital policy and procedure prior to this treatment. [Patient demonstrated and verbalized proper technique for using air break mask] : Patient demonstrated and verbalized proper technique for using air break mask [Patient educated on the risks of SMOKING prior to HBOT with understanding] : Patient educated on the risks of SMOKING prior to HBOT with understanding [Patient educated on the risks of CONSUMING ALCOHOL prior to HBOT with understanding] : Patient educated on the risks of CONSUMING ALCOHOL prior to HBOT with understanding [100% Cotton] : 100% cotton [Empty all pockets] : empty all pockets [No hair oils, wigs, hairpieces, pins] : no hair oils, wigs, hairpieces, pins  [Pre tx medications] : pre tx medications  [No make-up, creams] : no make-up, creams  [No jewelry] : no jewelry  [No matches, cigarettes, lighters] : no matches, cigarettes, lighters  [Hearing aid removed] : hearing aid removed [Dentures removed] : dentures removed [Ground bracelet on pt's wrist] : ground bracelet on pt's wrist  [Contacts removed] : contacts removed  [Remove nail polish] : remove nail polish  [No reading material] : no reading material  [Bra, undergarments removed] : bra, undergarments removed  [No contraindicated dressings] : no contraindicated dressings [Ground Wire - VISUAL Verification - Intact/Free of Obstruction] : Ground Wire - VISUAL Verification - Intact/Free of Obstruction  [Ground Continuity - Verified < 1 ohm w/ Wrist Strap Enrrique] : Ground Continuity - Verified < 1 ohm w/ Wrist Strap Enrrique [Number: ___] : Number: [unfilled] [Diagnosis: ___] : Diagnosis: [unfilled] [____] : Post-Dive: Time - [unfilled] [___] : Post-Dive: Value - [unfilled] mg/dL [Clear all fields] : clear all fields [] : No [FreeTextEntry3] : 90 [FreeTextEntry5] : 1766 [FreeTextEntry7] : 8373 [FreeTextEntry9] : 1835 [de-identified] : 1847 [de-identified] : 108 MINUTES

## 2022-03-21 NOTE — PROCEDURE
[Outpatient] : Outpatient [Other: ___] : [unfilled] [THIS CHAMBER HAS BEEN CLEANED / DISINFECTED] : This chamber has been cleaned / disinfected according to local and hospital policy and procedure prior to this treatment. [____] : Post-Dive: Time - [unfilled] [___] : Post-Dive: Value - [unfilled] mg/dL [Patient demonstrated and verbalized proper technique for using air break mask] : Patient demonstrated and verbalized proper technique for using air break mask [Patient educated on the risks of SMOKING prior to HBOT with understanding] : Patient educated on the risks of SMOKING prior to HBOT with understanding [Patient educated on the risks of CONSUMING ALCOHOL prior to HBOT with understanding] : Patient educated on the risks of CONSUMING ALCOHOL prior to HBOT with understanding [100% Cotton] : 100% cotton [Empty all pockets] : empty all pockets [No hair oils, wigs, hairpieces, pins] : no hair oils, wigs, hairpieces, pins  [Pre tx medications] : pre tx medications  [No make-up, creams] : no make-up, creams  [No jewelry] : no jewelry  [No matches, cigarettes, lighters] : no matches, cigarettes, lighters  [Hearing aid removed] : hearing aid removed [Dentures removed] : dentures removed [Ground bracelet on pt's wrist] : ground bracelet on pt's wrist  [Contacts removed] : contacts removed  [Remove nail polish] : remove nail polish  [No reading material] : no reading material  [Bra, undergarments removed] : bra, undergarments removed  [No contraindicated dressings] : no contraindicated dressings [Ground Wire - VISUAL Verification - Intact/Free of Obstruction] : Ground Wire - VISUAL Verification - Intact/Free of Obstruction  [Ground Continuity - Verified < 1 ohm w/ Wrist Strap Enrrique] : Ground Continuity - Verified < 1 ohm w/ Wrist Strap Enrrique [Diagnosis: ___] : Diagnosis: [unfilled] [Number: ___] : Number: [unfilled] [Clear all fields] : clear all fields [] : No [FreeTextEntry3] : 90 mins [FreeTextEntry5] : 3693 [facility] 8070 [FreeTextEntry9] : 1827 [de-identified] : 1836 [de-identified] : 108 MINUTES

## 2022-03-22 ENCOUNTER — OUTPATIENT (OUTPATIENT)
Dept: OUTPATIENT SERVICES | Facility: HOSPITAL | Age: 57
LOS: 1 days | Discharge: HOME | End: 2022-03-22
Payer: COMMERCIAL

## 2022-03-22 ENCOUNTER — APPOINTMENT (OUTPATIENT)
Dept: HYPERBARIC MEDICINE | Facility: HOSPITAL | Age: 57
End: 2022-03-22
Payer: COMMERCIAL

## 2022-03-22 VITALS
DIASTOLIC BLOOD PRESSURE: 79 MMHG | HEART RATE: 73 BPM | OXYGEN SATURATION: 98 % | WEIGHT: 235 LBS | BODY MASS INDEX: 41.64 KG/M2 | HEIGHT: 63 IN | TEMPERATURE: 97.3 F | RESPIRATION RATE: 18 BRPM | SYSTOLIC BLOOD PRESSURE: 155 MMHG

## 2022-03-22 DIAGNOSIS — Z98.890 OTHER SPECIFIED POSTPROCEDURAL STATES: Chronic | ICD-10-CM

## 2022-03-22 DIAGNOSIS — E11.622 TYPE 2 DIABETES MELLITUS WITH OTHER SKIN ULCER: ICD-10-CM

## 2022-03-22 DIAGNOSIS — Z90.710 ACQUIRED ABSENCE OF BOTH CERVIX AND UTERUS: Chronic | ICD-10-CM

## 2022-03-22 PROCEDURE — 99213 OFFICE O/P EST LOW 20 MIN: CPT

## 2022-03-22 PROCEDURE — 29581 APPL MULTLAYER CMPRN SYS LEG: CPT | Mod: LT

## 2022-03-23 ENCOUNTER — OUTPATIENT (OUTPATIENT)
Dept: OUTPATIENT SERVICES | Facility: HOSPITAL | Age: 57
LOS: 1 days | Discharge: HOME | End: 2022-03-23
Payer: COMMERCIAL

## 2022-03-23 ENCOUNTER — APPOINTMENT (OUTPATIENT)
Dept: HYPERBARIC MEDICINE | Facility: HOSPITAL | Age: 57
End: 2022-03-23
Payer: COMMERCIAL

## 2022-03-23 VITALS
TEMPERATURE: 97.8 F | OXYGEN SATURATION: 95 % | RESPIRATION RATE: 18 BRPM | HEART RATE: 67 BPM | SYSTOLIC BLOOD PRESSURE: 161 MMHG | DIASTOLIC BLOOD PRESSURE: 78 MMHG

## 2022-03-23 VITALS
HEART RATE: 59 BPM | RESPIRATION RATE: 18 BRPM | OXYGEN SATURATION: 99 % | SYSTOLIC BLOOD PRESSURE: 149 MMHG | TEMPERATURE: 98.7 F | DIASTOLIC BLOOD PRESSURE: 76 MMHG

## 2022-03-23 DIAGNOSIS — E11.622 TYPE 2 DIABETES MELLITUS WITH OTHER SKIN ULCER: ICD-10-CM

## 2022-03-23 DIAGNOSIS — Z86.010 PERSONAL HISTORY OF COLONIC POLYPS: ICD-10-CM

## 2022-03-23 DIAGNOSIS — Z82.49 FAMILY HISTORY OF ISCHEMIC HEART DISEASE AND OTHER DISEASES OF THE CIRCULATORY SYSTEM: ICD-10-CM

## 2022-03-23 DIAGNOSIS — Z79.899 OTHER LONG TERM (CURRENT) DRUG THERAPY: ICD-10-CM

## 2022-03-23 DIAGNOSIS — Z85.038 PERSONAL HISTORY OF OTHER MALIGNANT NEOPLASM OF LARGE INTESTINE: ICD-10-CM

## 2022-03-23 DIAGNOSIS — Z79.51 LONG TERM (CURRENT) USE OF INHALED STEROIDS: ICD-10-CM

## 2022-03-23 DIAGNOSIS — J45.909 UNSPECIFIED ASTHMA, UNCOMPLICATED: ICD-10-CM

## 2022-03-23 DIAGNOSIS — Z90.710 ACQUIRED ABSENCE OF BOTH CERVIX AND UTERUS: ICD-10-CM

## 2022-03-23 DIAGNOSIS — L97.323 NON-PRESSURE CHRONIC ULCER OF LEFT ANKLE WITH NECROSIS OF MUSCLE: ICD-10-CM

## 2022-03-23 DIAGNOSIS — Z90.710 ACQUIRED ABSENCE OF BOTH CERVIX AND UTERUS: Chronic | ICD-10-CM

## 2022-03-23 DIAGNOSIS — Z98.890 OTHER SPECIFIED POSTPROCEDURAL STATES: Chronic | ICD-10-CM

## 2022-03-23 DIAGNOSIS — Z98.890 OTHER SPECIFIED POSTPROCEDURAL STATES: ICD-10-CM

## 2022-03-23 DIAGNOSIS — Z86.16 PERSONAL HISTORY OF COVID-19: ICD-10-CM

## 2022-03-23 PROCEDURE — 82962 GLUCOSE BLOOD TEST: CPT

## 2022-03-23 PROCEDURE — G0277: CPT

## 2022-03-23 PROCEDURE — 99183 HYPERBARIC OXYGEN THERAPY: CPT

## 2022-03-23 NOTE — HISTORY OF PRESENT ILLNESS
[FreeTextEntry1] : 55 yo WF, here for f/u of chronic left ankle ulcer. Gradually improvement seen. Presently receiving HBO tx.

## 2022-03-23 NOTE — PHYSICAL EXAM
[Normal Thyroid] : the thyroid was normal [Normal Breath Sounds] : Normal breath sounds [Normal Heart Sounds] : normal heart sounds [Normal Rate and Rhythm] : normal rate and rhythm [Ankle Swelling (On Exam)] : present [Ankle Swelling On The Left] : moderate [Alert] : alert [Oriented to Person] : oriented to person [Oriented to Place] : oriented to place [Oriented to Time] : oriented to time [Calm] : calm [4 x 4] : 4 x 4  [JVD] : no jugular venous distention  [] : not present [Abdomen Masses] : No abdominal massess [Abdomen Tenderness] : ~T ~M No abdominal tenderness [Tender] : nontender [Enlarged] : not enlarged [de-identified] : adult WF, NAD, alert, Ox3. [FreeTextEntry1] : Left lateral ankle  [FreeTextEntry2] : 1.1 [FreeTextEntry3] : 0.5 [FreeTextEntry4] : 0.1 [de-identified] : Serous/sanguinous [de-identified] : 10%  [de-identified] : Expressed comfort post Coban application [de-identified] : Iodosorb, Calcium alginate  [de-identified] : Mechanically cleansed with sterile gauze and normal saline.\par Kerlix  [TWNoteComboBox4] : Small [de-identified] : Normal [de-identified] : None [de-identified] : None [de-identified] : >75% [de-identified] : Yes [de-identified] : Multilayer other compression wrap [de-identified] : 3x Weekly [de-identified] : Primary Dressing

## 2022-03-23 NOTE — ASSESSMENT
[Verbal] : Verbal [Written] : Written [Demo] : Demo [Patient] : Patient [Good - alert, interested, motivated] : Good - alert, interested, motivated [Verbalizes knowledge/Understanding] : Verbalizes knowledge/understanding [Dressing changes] : dressing changes [Skin Care] : skin care [Signs and symptoms of infection] : sign and symptoms of infection [How and When to Call] : how and when to call [Pain Management] : pain management [Hyperbaric Therapy] : hyperbaric therapy [Compression Therapy] : compression therapy [Patient responsibility to plan of care] : patient responsibility to plan of care [] : Yes [Stable] : stable [Home] : Home [Other: ____] : [unfilled] [Not Applicable - Long Term Care/Home Health Agency] : Long Term Care/Home Health Agency: Not Applicable [FreeTextEntry2] : Infection prevention\par Localized wound care \par Goal remaining pain free regarding wounds\par Compression therapy \par Hyperbaric oxygen therapy  [FreeTextEntry4] : 56/60 HBOT completed \par Follow up in 1 week

## 2022-03-24 ENCOUNTER — APPOINTMENT (OUTPATIENT)
Dept: HYPERBARIC MEDICINE | Facility: HOSPITAL | Age: 57
End: 2022-03-24
Payer: COMMERCIAL

## 2022-03-24 ENCOUNTER — OUTPATIENT (OUTPATIENT)
Dept: OUTPATIENT SERVICES | Facility: HOSPITAL | Age: 57
LOS: 1 days | Discharge: HOME | End: 2022-03-24
Payer: COMMERCIAL

## 2022-03-24 VITALS
RESPIRATION RATE: 16 BRPM | DIASTOLIC BLOOD PRESSURE: 80 MMHG | HEART RATE: 84 BPM | OXYGEN SATURATION: 98 % | SYSTOLIC BLOOD PRESSURE: 162 MMHG | TEMPERATURE: 98.6 F

## 2022-03-24 VITALS
DIASTOLIC BLOOD PRESSURE: 70 MMHG | OXYGEN SATURATION: 98 % | SYSTOLIC BLOOD PRESSURE: 131 MMHG | TEMPERATURE: 98.3 F | HEART RATE: 70 BPM | RESPIRATION RATE: 16 BRPM

## 2022-03-24 DIAGNOSIS — Z90.710 ACQUIRED ABSENCE OF BOTH CERVIX AND UTERUS: Chronic | ICD-10-CM

## 2022-03-24 DIAGNOSIS — E11.622 TYPE 2 DIABETES MELLITUS WITH OTHER SKIN ULCER: ICD-10-CM

## 2022-03-24 DIAGNOSIS — L97.323 NON-PRESSURE CHRONIC ULCER OF LEFT ANKLE WITH NECROSIS OF MUSCLE: ICD-10-CM

## 2022-03-24 DIAGNOSIS — Z98.890 OTHER SPECIFIED POSTPROCEDURAL STATES: Chronic | ICD-10-CM

## 2022-03-24 PROCEDURE — 99183 HYPERBARIC OXYGEN THERAPY: CPT

## 2022-03-24 PROCEDURE — G0277: CPT

## 2022-03-24 PROCEDURE — 82962 GLUCOSE BLOOD TEST: CPT

## 2022-03-24 NOTE — PROCEDURE
[Outpatient] : Outpatient [Ambulatory] : Patient is ambulatory. [Other: ___] : [unfilled] [THIS CHAMBER HAS BEEN CLEANED / DISINFECTED] : This chamber has been cleaned / disinfected according to local and hospital policy and procedure prior to this treatment. [Patient demonstrated and verbalized proper technique for using air break mask] : Patient demonstrated and verbalized proper technique for using air break mask [Patient educated on the risks of SMOKING prior to HBOT with understanding] : Patient educated on the risks of SMOKING prior to HBOT with understanding [Patient educated on the risks of CONSUMING ALCOHOL prior to HBOT with understanding] : Patient educated on the risks of CONSUMING ALCOHOL prior to HBOT with understanding [100% Cotton] : 100% cotton [Empty all pockets] : empty all pockets [No hair oils, wigs, hairpieces, pins] : no hair oils, wigs, hairpieces, pins  [Pre tx medications] : pre tx medications  [No make-up, creams] : no make-up, creams  [No jewelry] : no jewelry  [No matches, cigarettes, lighters] : no matches, cigarettes, lighters  [Hearing aid removed] : hearing aid removed [Dentures removed] : dentures removed [Ground bracelet on pt's wrist] : ground bracelet on pt's wrist  [Contacts removed] : contacts removed  [No reading material] : no reading material  [Remove nail polish] : remove nail polish  [Bra, undergarments removed] : bra, undergarments removed  [No contraindicated dressings] : no contraindicated dressings [Ground Wire - VISUAL Verification - Intact/Free of Obstruction] : Ground Wire - VISUAL Verification - Intact/Free of Obstruction  [Ground Continuity - Verified < 1 ohm w/ Wrist Strap Enrrique] : Ground Continuity - Verified < 1 ohm w/ Wrist Strap Enrrique [Number: ___] : Number: [unfilled] [Diagnosis: ___] : Diagnosis: [unfilled] [____] : Post-Dive: Time - [unfilled] [___] : Post-Dive: Value - [unfilled] mg/dL [Clear all fields] : clear all fields [] : No [FreeTextEntry3] : 90 [FreeTextEntry5] : 7815 [FreeTextEntry7] : 4033 [FreeTextEntry9] : 1831 [de-identified] : 1844 [de-identified] : 108 minutes

## 2022-03-24 NOTE — ADDENDUM
[FreeTextEntry1] : pt vitals signs within acceptable parameters with the exception of bgl \par rn notified \par pt given 15g of sugar via glucose tabs\par pt asked for retest on opposite hand and still not found to be within limits\par pt retested 15 minutes later and still not within acceptable parameters to begin hbot \par MD notified and cleared pt to begin hbot without another intervention\par pt descended to tx depth 2.0 efrain @1.75 psi/min without incident in chamber #2 \par pt's resting at tx depth with visible chest rise and fall as observed chamber side \par PT ASCENDED FROM 2.0 EFRAIN @ 1.75 PSI/MIN WITHOUT INCIDENT\par PT TOLERATED TX WELL\par \par

## 2022-03-24 NOTE — ADDENDUM
[FreeTextEntry1] : PT ARRIVED A&OX3 \par ALL VITALS WITIN PARAMETERS FOR HBOT\par PT DESCENDED TO 2.0 RAIMUNDO @ 1.75 PSI/MIN IN CHAMBER #  WITHOUT INCIDENT\par PT RESTING AT TX DEPTH WITH VISIBLE CHEST RISE AND FALL OBSERVED CHAMBER SIDE\par PT ASCENDED FROM 2.0 RAIMUNDO @ 1.75 PSI/MIN WITHOUT INCIDENT\par PT TOLERATED TX WELL\par

## 2022-03-24 NOTE — PROCEDURE
[Outpatient] : Outpatient [Other: ___] : [unfilled] [THIS CHAMBER HAS BEEN CLEANED / DISINFECTED] : This chamber has been cleaned / disinfected according to local and hospital policy and procedure prior to this treatment. [Patient demonstrated and verbalized proper technique for using air break mask] : Patient demonstrated and verbalized proper technique for using air break mask [Patient educated on the risks of SMOKING prior to HBOT with understanding] : Patient educated on the risks of SMOKING prior to HBOT with understanding [Patient educated on the risks of CONSUMING ALCOHOL prior to HBOT with understanding] : Patient educated on the risks of CONSUMING ALCOHOL prior to HBOT with understanding [100% Cotton] : 100% cotton [Empty all pockets] : empty all pockets [No hair oils, wigs, hairpieces, pins] : no hair oils, wigs, hairpieces, pins  [Pre tx medications] : pre tx medications  [No make-up, creams] : no make-up, creams  [No jewelry] : no jewelry  [No matches, cigarettes, lighters] : no matches, cigarettes, lighters  [Hearing aid removed] : hearing aid removed [Dentures removed] : dentures removed [Ground bracelet on pt's wrist] : ground bracelet on pt's wrist  [Contacts removed] : contacts removed  [Remove nail polish] : remove nail polish  [No reading material] : no reading material  [Bra, undergarments removed] : bra, undergarments removed  [No contraindicated dressings] : no contraindicated dressings [Ground Wire - VISUAL Verification - Intact/Free of Obstruction] : Ground Wire - VISUAL Verification - Intact/Free of Obstruction  [Ground Continuity - Verified < 1 ohm w/ Wrist Strap Enrrique] : Ground Continuity - Verified < 1 ohm w/ Wrist Strap Enrrique [Number: ___] : Number: [unfilled] [Diagnosis: ___] : Diagnosis: [unfilled] [____] : Post-Dive: Time - [unfilled] [___] : Post-Dive: Value - [unfilled] mg/dL [Clear all fields] : clear all fields [] : No [FreeTextEntry3] : 90 [FreeTextEntry5] : 9940 [facility] 9194 [FreeTextEntry9] : 1846 [de-identified] : 1851 [de-identified] : 108 MINUTES

## 2022-03-25 ENCOUNTER — OUTPATIENT (OUTPATIENT)
Dept: OUTPATIENT SERVICES | Facility: HOSPITAL | Age: 57
LOS: 1 days | Discharge: HOME | End: 2022-03-25
Payer: COMMERCIAL

## 2022-03-25 ENCOUNTER — APPOINTMENT (OUTPATIENT)
Dept: HYPERBARIC MEDICINE | Facility: HOSPITAL | Age: 57
End: 2022-03-25
Payer: COMMERCIAL

## 2022-03-25 VITALS
SYSTOLIC BLOOD PRESSURE: 149 MMHG | RESPIRATION RATE: 16 BRPM | DIASTOLIC BLOOD PRESSURE: 79 MMHG | OXYGEN SATURATION: 97 % | HEART RATE: 74 BPM | TEMPERATURE: 97.7 F

## 2022-03-25 VITALS
TEMPERATURE: 98.2 F | OXYGEN SATURATION: 98 % | HEART RATE: 68 BPM | DIASTOLIC BLOOD PRESSURE: 78 MMHG | SYSTOLIC BLOOD PRESSURE: 138 MMHG | RESPIRATION RATE: 16 BRPM

## 2022-03-25 DIAGNOSIS — Z98.890 OTHER SPECIFIED POSTPROCEDURAL STATES: Chronic | ICD-10-CM

## 2022-03-25 DIAGNOSIS — E11.622 TYPE 2 DIABETES MELLITUS WITH OTHER SKIN ULCER: ICD-10-CM

## 2022-03-25 DIAGNOSIS — Z90.710 ACQUIRED ABSENCE OF BOTH CERVIX AND UTERUS: Chronic | ICD-10-CM

## 2022-03-25 PROCEDURE — G0277: CPT

## 2022-03-25 PROCEDURE — 99183 HYPERBARIC OXYGEN THERAPY: CPT

## 2022-03-25 PROCEDURE — 82962 GLUCOSE BLOOD TEST: CPT

## 2022-03-25 NOTE — ASSESSMENT
[No change from previous assessment] : No change from previous assessment [Time MD/Provider assessed Patient:_______] : Time MD/Provider assessed Patient: [unfilled] [Patient prepared for dive] : Patient prepared for dive [Patient descended without problem for 9 minutes] : Patient descended without problem for 9 minutes [No dizziness or thirst] :  No dizziness or thirst [No ear problems] : No ear problems [Vital signs stable] : Vital signs stable [Tolerating dive well] : Tolerating dive well [No Chest Pain, shortness of breath] : No Chest Pain, shortness of breath [Respiratory Rate Stable] : Respiratory Rate Stable [No chest pain, shortness of breath, or ear pain] :  No chest pain, shortness of breath, or ear pain  [Tolerated Ascent well] : Tolerated Ascent well [Vital Signs stable] : Vital Signs stable [A physician was present throughout the entire HBOT] : A physician was present throughout the entire HBOT [No] : No [Clinically Stable] : Clinically stable [Continue Treatment Plan] : Continue treatment plan [Patient undergoing HBO treatment for __________] : Patient undergoing HBO treatment for [unfilled] [FreeTextEntry2] : none

## 2022-03-25 NOTE — ADDENDUM
[FreeTextEntry1] : as Md pt Recieved total 78 Mins Treatment. Prt's BGL found to be low prior to tx. as per MD orders pt was given total 2 Glucose tablets every 30 Mins during tx, \par Pt descended to 2.0 RAIMUNDO @ 1.75 PSI/min without incident in chamber #2\par Pt resting @ depth with chest rise and fall observed throughout tx. \par Pt ascended from 2.0 RAIMUNDO @ 1.75 PSI/min without incident. \par Pt tolerated tx well. pt received WC by RN post tx \par \par \par \par

## 2022-03-25 NOTE — PROCEDURE
[Outpatient] : Outpatient [Other: ___] : [unfilled] [THIS CHAMBER HAS BEEN CLEANED / DISINFECTED] : This chamber has been cleaned / disinfected according to local and hospital policy and procedure prior to this treatment. [___] : Recheck: Value - [unfilled] mg/dL [Patient demonstrated and verbalized proper technique for using air break mask] : Patient demonstrated and verbalized proper technique for using air break mask [Patient educated on the risks of SMOKING prior to HBOT with understanding] : Patient educated on the risks of SMOKING prior to HBOT with understanding [Patient educated on the risks of CONSUMING ALCOHOL prior to HBOT with understanding] : Patient educated on the risks of CONSUMING ALCOHOL prior to HBOT with understanding [100% Cotton] : 100% cotton [No hair oils, wigs, hairpieces, pins] : no hair oils, wigs, hairpieces, pins  [Empty all pockets] : empty all pockets [Pre tx medications] : pre tx medications  [No make-up, creams] : no make-up, creams  [No jewelry] : no jewelry  [No matches, cigarettes, lighters] : no matches, cigarettes, lighters  [Dentures removed] : dentures removed [Hearing aid removed] : hearing aid removed [Ground bracelet on pt's wrist] : ground bracelet on pt's wrist  [Contacts removed] : contacts removed  [Remove nail polish] : remove nail polish  [No reading material] : no reading material  [Bra, undergarments removed] : bra, undergarments removed  [No contraindicated dressings] : no contraindicated dressings [Ground Wire - VISUAL Verification - Intact/Free of Obstruction] : Ground Wire - VISUAL Verification - Intact/Free of Obstruction  [Ground Continuity - Verified < 1 ohm w/ Wrist Strap Enrrique] : Ground Continuity - Verified < 1 ohm w/ Wrist Strap Enrirque [Number: ___] : Number: [unfilled] [Diagnosis: ___] : Diagnosis: [unfilled] [____] : Post-Dive: Time - [unfilled] [Clear all fields] : clear all fields [] : No [FreeTextEntry3] : 60 mins [FreeTextEntry7] : 9893 [FreeTextEntry5] : 2781 [FreeTextEntry9] : 1832 [de-identified] : 1842 [de-identified] :  78 mins

## 2022-03-26 ENCOUNTER — APPOINTMENT (OUTPATIENT)
Dept: HYPERBARIC MEDICINE | Facility: HOSPITAL | Age: 57
End: 2022-03-26
Payer: COMMERCIAL

## 2022-03-26 ENCOUNTER — OUTPATIENT (OUTPATIENT)
Dept: OUTPATIENT SERVICES | Facility: HOSPITAL | Age: 57
LOS: 1 days | Discharge: HOME | End: 2022-03-26
Payer: COMMERCIAL

## 2022-03-26 VITALS
TEMPERATURE: 97.2 F | HEART RATE: 70 BPM | SYSTOLIC BLOOD PRESSURE: 150 MMHG | RESPIRATION RATE: 16 BRPM | DIASTOLIC BLOOD PRESSURE: 84 MMHG | OXYGEN SATURATION: 99 %

## 2022-03-26 VITALS
DIASTOLIC BLOOD PRESSURE: 76 MMHG | HEART RATE: 74 BPM | RESPIRATION RATE: 16 BRPM | TEMPERATURE: 98 F | OXYGEN SATURATION: 98 % | SYSTOLIC BLOOD PRESSURE: 146 MMHG

## 2022-03-26 DIAGNOSIS — E11.622 TYPE 2 DIABETES MELLITUS WITH OTHER SKIN ULCER: ICD-10-CM

## 2022-03-26 DIAGNOSIS — Z98.890 OTHER SPECIFIED POSTPROCEDURAL STATES: Chronic | ICD-10-CM

## 2022-03-26 DIAGNOSIS — L97.323 NON-PRESSURE CHRONIC ULCER OF LEFT ANKLE WITH NECROSIS OF MUSCLE: ICD-10-CM

## 2022-03-26 DIAGNOSIS — Z90.710 ACQUIRED ABSENCE OF BOTH CERVIX AND UTERUS: Chronic | ICD-10-CM

## 2022-03-26 PROCEDURE — 82962 GLUCOSE BLOOD TEST: CPT

## 2022-03-26 PROCEDURE — 99183 HYPERBARIC OXYGEN THERAPY: CPT

## 2022-03-26 PROCEDURE — G0277: CPT

## 2022-03-26 NOTE — PROCEDURE
[Outpatient] : Outpatient [Other: ___] : [unfilled] [THIS CHAMBER HAS BEEN CLEANED / DISINFECTED] : This chamber has been cleaned / disinfected according to local and hospital policy and procedure prior to this treatment. [____] : Post-Dive: Time - [unfilled] [___] : Post-Dive: Value - [unfilled] mg/dL [Patient demonstrated and verbalized proper technique for using air break mask] : Patient demonstrated and verbalized proper technique for using air break mask [Patient educated on the risks of SMOKING prior to HBOT with understanding] : Patient educated on the risks of SMOKING prior to HBOT with understanding [Patient educated on the risks of CONSUMING ALCOHOL prior to HBOT with understanding] : Patient educated on the risks of CONSUMING ALCOHOL prior to HBOT with understanding [100% Cotton] : 100% cotton [Empty all pockets] : empty all pockets [No hair oils, wigs, hairpieces, pins] : no hair oils, wigs, hairpieces, pins  [Pre tx medications] : pre tx medications  [No make-up, creams] : no make-up, creams  [No jewelry] : no jewelry  [No matches, cigarettes, lighters] : no matches, cigarettes, lighters  [Hearing aid removed] : hearing aid removed [Dentures removed] : dentures removed [Ground bracelet on pt's wrist] : ground bracelet on pt's wrist  [Contacts removed] : contacts removed  [Remove nail polish] : remove nail polish  [No reading material] : no reading material  [Bra, undergarments removed] : bra, undergarments removed  [No contraindicated dressings] : no contraindicated dressings [Ground Wire - VISUAL Verification - Intact/Free of Obstruction] : Ground Wire - VISUAL Verification - Intact/Free of Obstruction  [Ground Continuity - Verified < 1 ohm w/ Wrist Strap Enrrique] : Ground Continuity - Verified < 1 ohm w/ Wrist Strap Enrrique [Number: ___] : Number: [unfilled] [Diagnosis: ___] : Diagnosis: [unfilled] [Clear all fields] : clear all fields [] : No [FreeTextEntry1] : 2.30 [FreeTextEntry5] : 6188 [FreeTextEntry3] : 90 Mins [FreeTextEntry7] : 3737 [FreeTextEntry9] : 1007 [de-identified] : 108 Mins [de-identified] : 1016

## 2022-03-26 NOTE — ADDENDUM
[FreeTextEntry1] : Pt descended to 2.0 RAIMUNDO @ 1.75 PSI/min without incident in chamber #.1\par Pt resting @ depth with chest rise and fall observed throughout tx. \par Pt ascended from 2.0 RAIMUNDO @ 1.75 PSI/min without incident. \par Pt tolerated tx well.\par Pt received WC by RN post  tx. \par \par \par \par \par \par \par

## 2022-03-26 NOTE — ASSESSMENT
[No change from previous assessment] : No change from previous assessment [Patient prepared for dive] : Patient prepared for dive [Patient undergoing HBO treatment for __________] : Patient undergoing HBO treatment for [unfilled] [Patient descended without problem for 9 minutes] : Patient descended without problem for 9 minutes [No dizziness or thirst] :  No dizziness or thirst [No ear problems] : No ear problems [Vital signs stable] : Vital signs stable [Tolerating dive well] : Tolerating dive well [No Chest Pain, shortness of breath] : No Chest Pain, shortness of breath [Respiratory Rate Stable] : Respiratory Rate Stable [No chest pain, shortness of breath, or ear pain] :  No chest pain, shortness of breath, or ear pain  [Tolerated Ascent well] : Tolerated Ascent well [Vital Signs stable] : Vital Signs stable [No] : No [A physician was present throughout the entire HBOT] : A physician was present throughout the entire HBOT [Clinically Stable] : Clinically stable [Continue Treatment Plan] : Continue treatment plan [FreeTextEntry2] : none

## 2022-03-28 ENCOUNTER — APPOINTMENT (OUTPATIENT)
Dept: OTOLARYNGOLOGY | Facility: CLINIC | Age: 57
End: 2022-03-28
Payer: COMMERCIAL

## 2022-03-28 ENCOUNTER — APPOINTMENT (OUTPATIENT)
Dept: WOUND CARE | Facility: HOSPITAL | Age: 57
End: 2022-03-28
Payer: COMMERCIAL

## 2022-03-28 ENCOUNTER — APPOINTMENT (OUTPATIENT)
Dept: HYPERBARIC MEDICINE | Facility: HOSPITAL | Age: 57
End: 2022-03-28

## 2022-03-28 ENCOUNTER — OUTPATIENT (OUTPATIENT)
Dept: OUTPATIENT SERVICES | Facility: HOSPITAL | Age: 57
LOS: 1 days | Discharge: HOME | End: 2022-03-28
Payer: COMMERCIAL

## 2022-03-28 VITALS
TEMPERATURE: 97.2 F | HEART RATE: 80 BPM | WEIGHT: 240 LBS | HEIGHT: 63 IN | SYSTOLIC BLOOD PRESSURE: 130 MMHG | BODY MASS INDEX: 42.52 KG/M2 | DIASTOLIC BLOOD PRESSURE: 75 MMHG | RESPIRATION RATE: 20 BRPM

## 2022-03-28 VITALS
WEIGHT: 240 LBS | DIASTOLIC BLOOD PRESSURE: 75 MMHG | BODY MASS INDEX: 42.52 KG/M2 | HEIGHT: 63 IN | HEART RATE: 80 BPM | SYSTOLIC BLOOD PRESSURE: 128 MMHG

## 2022-03-28 DIAGNOSIS — Z90.710 ACQUIRED ABSENCE OF BOTH CERVIX AND UTERUS: Chronic | ICD-10-CM

## 2022-03-28 DIAGNOSIS — L97.323 NON-PRESSURE CHRONIC ULCER OF LEFT ANKLE WITH NECROSIS OF MUSCLE: ICD-10-CM

## 2022-03-28 DIAGNOSIS — E11.621 TYPE 2 DIABETES MELLITUS WITH FOOT ULCER: ICD-10-CM

## 2022-03-28 DIAGNOSIS — E11.622 TYPE 2 DIABETES MELLITUS WITH OTHER SKIN ULCER: ICD-10-CM

## 2022-03-28 DIAGNOSIS — Z98.890 OTHER SPECIFIED POSTPROCEDURAL STATES: Chronic | ICD-10-CM

## 2022-03-28 DIAGNOSIS — H69.83 OTHER SPECIFIED DISORDERS OF EUSTACHIAN TUBE, BILATERAL: ICD-10-CM

## 2022-03-28 PROCEDURE — ZZZZZ: CPT

## 2022-03-28 PROCEDURE — 99213 OFFICE O/P EST LOW 20 MIN: CPT

## 2022-03-28 PROCEDURE — 29581 APPL MULTLAYER CMPRN SYS LEG: CPT | Mod: LT

## 2022-03-28 RX ORDER — ALBUTEROL SULFATE 2.5 MG/3ML
(2.5 MG/3ML) SOLUTION RESPIRATORY (INHALATION)
Refills: 0 | Status: DISCONTINUED | COMMUNITY
End: 2022-03-28

## 2022-03-28 RX ORDER — PIPERACILLIN SODIUM AND TAZOBACTAM SODIUM 4; .5 G/100ML; G/100ML
INJECTION, SOLUTION INTRAVENOUS
Refills: 0 | Status: DISCONTINUED | COMMUNITY
End: 2022-03-28

## 2022-03-28 RX ORDER — CIPROFLOXACIN AND DEXAMETHASONE 3; 1 MG/ML; MG/ML
0.3-0.1 SUSPENSION/ DROPS AURICULAR (OTIC) TWICE DAILY
Qty: 1 | Refills: 3 | Status: DISCONTINUED | COMMUNITY
Start: 2021-12-13 | End: 2022-03-28

## 2022-03-28 RX ORDER — BACILLUS COAGULANS/INULIN 1B-250 MG
CAPSULE ORAL
Refills: 0 | Status: DISCONTINUED | COMMUNITY
End: 2022-03-28

## 2022-03-28 NOTE — PHYSICAL EXAM
[de-identified] : tube in canal AD, removed [de-identified] : AD clear, AS tube extruding [Normal] : mucosa is normal [Midline] : trachea located in midline position

## 2022-03-28 NOTE — ADDENDUM
[FreeTextEntry1] : pt bgl not within normal parameters for hbot \par DPM made aware \par pt to take 15g of sugar via 4 glucose tabs every 20 minutes as per DPM \par pt descended to tx depth 2.0 efrain @1.75 psi/min without incident in chamber #1 \par pt's resting ast tx depth with visible chest rise and fall as observed chamber side. \par Pt ascended from 2.0 EFRAIN @ 1.75 PSI/min without incident. \par Pt tolerated tx well.\par \par \par \par \par \par \par

## 2022-03-28 NOTE — PROCEDURE
[Outpatient] : Outpatient [Other: ___] : [unfilled] [THIS CHAMBER HAS BEEN CLEANED / DISINFECTED] : This chamber has been cleaned / disinfected according to local and hospital policy and procedure prior to this treatment. [Patient demonstrated and verbalized proper technique for using air break mask] : Patient demonstrated and verbalized proper technique for using air break mask [Patient educated on the risks of SMOKING prior to HBOT with understanding] : Patient educated on the risks of SMOKING prior to HBOT with understanding [Patient educated on the risks of CONSUMING ALCOHOL prior to HBOT with understanding] : Patient educated on the risks of CONSUMING ALCOHOL prior to HBOT with understanding [100% Cotton] : 100% cotton [Empty all pockets] : empty all pockets [No hair oils, wigs, hairpieces, pins] : no hair oils, wigs, hairpieces, pins  [Pre tx medications] : pre tx medications  [No make-up, creams] : no make-up, creams  [No jewelry] : no jewelry  [No matches, cigarettes, lighters] : no matches, cigarettes, lighters  [Hearing aid removed] : hearing aid removed [Dentures removed] : dentures removed [Ground bracelet on pt's wrist] : ground bracelet on pt's wrist  [Contacts removed] : contacts removed  [Remove nail polish] : remove nail polish  [No reading material] : no reading material  [Bra, undergarments removed] : bra, undergarments removed  [No contraindicated dressings] : no contraindicated dressings [Ground Wire - VISUAL Verification - Intact/Free of Obstruction] : Ground Wire - VISUAL Verification - Intact/Free of Obstruction  [Ground Continuity - Verified < 1 ohm w/ Wrist Strap Enrrique] : Ground Continuity - Verified < 1 ohm w/ Wrist Strap Enrrique [Number: ___] : Number: [unfilled] [Diagnosis: ___] : Diagnosis: [unfilled] [____] : Post-Dive: Time - [unfilled] [___] : Post-Dive: Value - [unfilled] mg/dL [Clear all fields] : clear all fields [] : No [FreeTextEntry3] : 90 mins [FreeTextEntry5] : 3735 [facility] : 6765 [FreeTextEntry9] : 1827 [de-identified] : 1834 [de-identified] : 108 Mins

## 2022-03-28 NOTE — HISTORY OF PRESENT ILLNESS
[de-identified] : 57 yr old female s/p BMT 12/13/2021\par completed tx w hyperbaric O2 3/26/2022, still going for wound care for left ankle\par -otorrhea\par hearing has improved\par occ clog AD during tx then it crackles and clears

## 2022-03-29 ENCOUNTER — APPOINTMENT (OUTPATIENT)
Dept: HYPERBARIC MEDICINE | Facility: HOSPITAL | Age: 57
End: 2022-03-29

## 2022-03-30 ENCOUNTER — APPOINTMENT (OUTPATIENT)
Dept: HYPERBARIC MEDICINE | Facility: HOSPITAL | Age: 57
End: 2022-03-30

## 2022-03-30 ENCOUNTER — APPOINTMENT (OUTPATIENT)
Dept: WOUND CARE | Facility: HOSPITAL | Age: 57
End: 2022-03-30

## 2022-03-31 ENCOUNTER — APPOINTMENT (OUTPATIENT)
Dept: HYPERBARIC MEDICINE | Facility: HOSPITAL | Age: 57
End: 2022-03-31

## 2022-03-31 ENCOUNTER — APPOINTMENT (OUTPATIENT)
Dept: WOUND CARE | Facility: HOSPITAL | Age: 57
End: 2022-03-31
Payer: COMMERCIAL

## 2022-03-31 ENCOUNTER — NON-APPOINTMENT (OUTPATIENT)
Age: 57
End: 2022-03-31

## 2022-03-31 ENCOUNTER — OUTPATIENT (OUTPATIENT)
Dept: OUTPATIENT SERVICES | Facility: HOSPITAL | Age: 57
LOS: 1 days | Discharge: HOME | End: 2022-03-31
Payer: COMMERCIAL

## 2022-03-31 VITALS
SYSTOLIC BLOOD PRESSURE: 160 MMHG | OXYGEN SATURATION: 96 % | TEMPERATURE: 98.3 F | HEART RATE: 66 BPM | WEIGHT: 240 LBS | DIASTOLIC BLOOD PRESSURE: 79 MMHG | HEIGHT: 63 IN | BODY MASS INDEX: 42.52 KG/M2

## 2022-03-31 VITALS
BODY MASS INDEX: 42.52 KG/M2 | TEMPERATURE: 98.3 F | WEIGHT: 240 LBS | HEART RATE: 66 BPM | DIASTOLIC BLOOD PRESSURE: 79 MMHG | HEIGHT: 63 IN | SYSTOLIC BLOOD PRESSURE: 160 MMHG | RESPIRATION RATE: 20 BRPM

## 2022-03-31 DIAGNOSIS — E11.622 TYPE 2 DIABETES MELLITUS WITH OTHER SKIN ULCER: ICD-10-CM

## 2022-03-31 DIAGNOSIS — Z90.710 ACQUIRED ABSENCE OF BOTH CERVIX AND UTERUS: Chronic | ICD-10-CM

## 2022-03-31 DIAGNOSIS — L97.323 NON-PRESSURE CHRONIC ULCER OF LEFT ANKLE WITH NECROSIS OF MUSCLE: ICD-10-CM

## 2022-03-31 DIAGNOSIS — Z98.890 OTHER SPECIFIED POSTPROCEDURAL STATES: Chronic | ICD-10-CM

## 2022-03-31 PROCEDURE — 29581 APPL MULTLAYER CMPRN SYS LEG: CPT | Mod: LT

## 2022-03-31 PROCEDURE — ZZZZZ: CPT

## 2022-04-01 ENCOUNTER — OUTPATIENT (OUTPATIENT)
Dept: OUTPATIENT SERVICES | Facility: HOSPITAL | Age: 57
LOS: 1 days | Discharge: HOME | End: 2022-04-01
Payer: COMMERCIAL

## 2022-04-01 ENCOUNTER — APPOINTMENT (OUTPATIENT)
Dept: WOUND CARE | Facility: HOSPITAL | Age: 57
End: 2022-04-01
Payer: COMMERCIAL

## 2022-04-01 ENCOUNTER — APPOINTMENT (OUTPATIENT)
Dept: HYPERBARIC MEDICINE | Facility: HOSPITAL | Age: 57
End: 2022-04-01

## 2022-04-01 VITALS
RESPIRATION RATE: 20 BRPM | OXYGEN SATURATION: 95 % | HEART RATE: 82 BPM | DIASTOLIC BLOOD PRESSURE: 83 MMHG | WEIGHT: 240 LBS | SYSTOLIC BLOOD PRESSURE: 177 MMHG | BODY MASS INDEX: 42.52 KG/M2 | HEIGHT: 63 IN | TEMPERATURE: 98.5 F

## 2022-04-01 DIAGNOSIS — Z90.710 ACQUIRED ABSENCE OF BOTH CERVIX AND UTERUS: Chronic | ICD-10-CM

## 2022-04-01 DIAGNOSIS — Z98.890 OTHER SPECIFIED POSTPROCEDURAL STATES: Chronic | ICD-10-CM

## 2022-04-01 DIAGNOSIS — E11.622 TYPE 2 DIABETES MELLITUS WITH OTHER SKIN ULCER: ICD-10-CM

## 2022-04-01 PROCEDURE — 29581 APPL MULTLAYER CMPRN SYS LEG: CPT | Mod: LT

## 2022-04-01 PROCEDURE — 99213 OFFICE O/P EST LOW 20 MIN: CPT

## 2022-04-01 NOTE — PHYSICAL EXAM
[4 x 4] : 4 x 4  [1+] : left 1+ [Ankle Swelling (On Exam)] : present [Ankle Swelling Bilaterally] : bilaterally  [Ankle Swelling On The Left] : moderate [Alert] : alert [Oriented to Person] : oriented to person [Oriented to Place] : oriented to place [Oriented to Time] : oriented to time [Calm] : calm [Varicose Veins Of Lower Extremities] : not present [Purpura] : no purpura  [Petechiae] : no petechiae [Skin Ulcer] : no ulcer [de-identified] : A&Ox3, NAD [de-identified] : BMI 43 [de-identified] : s/p left peroneal tendon repair, 5/5 strength in all quadrants bilaterally [de-identified] : left lateral ankle ulcer down to skin, subcutaneous tissue, fat, and tendon [de-identified] : wnl [de-identified] : Pt expressed comfort post coban application. No neurovascular deficits noted.  [FreeTextEntry1] : Left Lateral Ankle [FreeTextEntry2] : 1.1 [FreeTextEntry3] : 0.5 [FreeTextEntry4] : 0.1 [de-identified] : serosanguineous [de-identified] : intact [de-identified] : 90% [de-identified] : 10% [de-identified] : Coban Compression  [de-identified] : Iodosorb, Calcium Alginate  [de-identified] : Mechanically Cleansed with Normal Saline\par Callus shaved by MARIA FERNANDA [FreeTextEntry7] : Left Great Toe - Ingrown Nail  [de-identified] : s [de-identified] : No Product  [de-identified] : Mechanically Cleansed with Normal Saline  [TWNoteComboBox4] : Small [de-identified] : No [de-identified] : other [de-identified] : None [de-identified] : None [de-identified] : Yes [de-identified] : >75% [de-identified] : Multilayer other compression wrap [de-identified] : 3x Weekly [de-identified] : Primary Dressing [de-identified] : None [de-identified] : None [de-identified] : Normal [de-identified] : None [de-identified] : None [de-identified] : No

## 2022-04-01 NOTE — PLAN
[FreeTextEntry1] : Patient examined and evaluated at this time.\par Continue local wound care and offloading, and compression dressing.\par Pt remains at risk for infection, amputation, limb loss, sepsis, and death.\par Spent 20 minutes for patient care and medical decision making.\par Patient to follow up in 1 week.\par

## 2022-04-01 NOTE — HISTORY OF PRESENT ILLNESS
[FreeTextEntry1] : Pt seen for left ankle ulcer down to skin, subcutaneous tissue, fat, and tendon. Pt has finished course of IV abx with PICC and HBOT. Denies any other complaints at this time. PT was evaluated At [facility] ED and was found to have a condition that warranted time of to rest and heal from WORK/SCHOOL.   Chirag Howard PA-C

## 2022-04-01 NOTE — ASSESSMENT
[Verbal] : Verbal [Written] : Written [Demo] : Demo [Patient] : Patient [Good - alert, interested, motivated] : Good - alert, interested, motivated [Dressing changes] : dressing changes [Foot Care] : foot care [Skin Care] : skin care [Pressure relief] : pressure relief [Signs and symptoms of infection] : sign and symptoms of infection [Nutrition] : nutrition [How and When to Call] : how and when to call [Off-loading] : off-loading [Patient responsibility to plan of care] : patient responsibility to plan of care [Glycemic Control] : glycemic control [Stable] : stable [Home] : Home [Other: ____] : [unfilled] [Compression Therapy] : compression therapy [] : Yes [FreeTextEntry2] : Promote Skin Integrity \par S/S of Infection \par Offloading and Pressure Relief \par Elevation Compliance \par Glycemic Control \par Compression Compliance \par Foot Care \par F/U x2 weekly for dressing changes and x1 weekly for assessment [FreeTextEntry4] : DPM shaved callus on left lateral ankle. Pt has completed HBO therapy. Photos taken this visit. \par Pt encouraged to continue coban compression compliance. Pt encouraged to elevate the lower legs intermittently when sitting.\par Pt educated on the importance of offloading and pressure relief. \par DPM would like to perform partial nail avulsion on the left great toe next visit to treat ingrown nail. Auth submitted today.\par F/U x2 weekly for dressing changes and 1 weekly for assessment.

## 2022-04-01 NOTE — REVIEW OF SYSTEMS
[Joint Stiffness] : joint stiffness [Skin Wound] : skin wound [Negative] : Heme/Lymph [Fever] : no fever [Chills] : no chills [Eye Pain] : no eye pain [Loss Of Hearing] : no hearing loss [Abdominal Pain] : no abdominal pain [Vomiting] : no vomiting [Anxiety] : no anxiety [de-identified] : left lateral ankle ulcer down to skin, subcutaneous tissue, fat, and tendon [de-identified] : Possible prediabetic , elevated HgA1c and blood glucose , BMI 43 ^, patient now taking Metformin

## 2022-04-04 ENCOUNTER — OUTPATIENT (OUTPATIENT)
Dept: OUTPATIENT SERVICES | Facility: HOSPITAL | Age: 57
LOS: 1 days | Discharge: HOME | End: 2022-04-04
Payer: COMMERCIAL

## 2022-04-04 ENCOUNTER — APPOINTMENT (OUTPATIENT)
Dept: WOUND CARE | Facility: HOSPITAL | Age: 57
End: 2022-04-04
Payer: COMMERCIAL

## 2022-04-04 VITALS
SYSTOLIC BLOOD PRESSURE: 153 MMHG | DIASTOLIC BLOOD PRESSURE: 81 MMHG | WEIGHT: 240 LBS | BODY MASS INDEX: 42.52 KG/M2 | HEIGHT: 63 IN | RESPIRATION RATE: 20 BRPM | HEART RATE: 69 BPM | TEMPERATURE: 98.5 F | OXYGEN SATURATION: 97 %

## 2022-04-04 DIAGNOSIS — Z90.710 ACQUIRED ABSENCE OF BOTH CERVIX AND UTERUS: Chronic | ICD-10-CM

## 2022-04-04 DIAGNOSIS — L97.323 NON-PRESSURE CHRONIC ULCER OF LEFT ANKLE WITH NECROSIS OF MUSCLE: ICD-10-CM

## 2022-04-04 DIAGNOSIS — E11.622 TYPE 2 DIABETES MELLITUS WITH OTHER SKIN ULCER: ICD-10-CM

## 2022-04-04 DIAGNOSIS — Z98.890 OTHER SPECIFIED POSTPROCEDURAL STATES: Chronic | ICD-10-CM

## 2022-04-04 PROCEDURE — 29581 APPL MULTLAYER CMPRN SYS LEG: CPT | Mod: LT

## 2022-04-04 PROCEDURE — ZZZZZ: CPT

## 2022-04-06 ENCOUNTER — APPOINTMENT (OUTPATIENT)
Dept: WOUND CARE | Facility: HOSPITAL | Age: 57
End: 2022-04-06
Payer: COMMERCIAL

## 2022-04-06 ENCOUNTER — OUTPATIENT (OUTPATIENT)
Dept: OUTPATIENT SERVICES | Facility: HOSPITAL | Age: 57
LOS: 1 days | Discharge: HOME | End: 2022-04-06
Payer: COMMERCIAL

## 2022-04-06 VITALS
DIASTOLIC BLOOD PRESSURE: 70 MMHG | HEART RATE: 77 BPM | SYSTOLIC BLOOD PRESSURE: 141 MMHG | HEIGHT: 63 IN | WEIGHT: 240 LBS | OXYGEN SATURATION: 93 % | RESPIRATION RATE: 20 BRPM | BODY MASS INDEX: 42.52 KG/M2 | TEMPERATURE: 97.6 F

## 2022-04-06 VITALS
SYSTOLIC BLOOD PRESSURE: 147 MMHG | OXYGEN SATURATION: 93 % | HEART RATE: 84 BPM | DIASTOLIC BLOOD PRESSURE: 68 MMHG | BODY MASS INDEX: 42.52 KG/M2 | RESPIRATION RATE: 20 BRPM | HEIGHT: 63 IN | WEIGHT: 240 LBS | TEMPERATURE: 99.7 F

## 2022-04-06 DIAGNOSIS — Z98.890 OTHER SPECIFIED POSTPROCEDURAL STATES: Chronic | ICD-10-CM

## 2022-04-06 DIAGNOSIS — E11.622 TYPE 2 DIABETES MELLITUS WITH OTHER SKIN ULCER: ICD-10-CM

## 2022-04-06 DIAGNOSIS — Z90.710 ACQUIRED ABSENCE OF BOTH CERVIX AND UTERUS: Chronic | ICD-10-CM

## 2022-04-06 PROCEDURE — ZZZZZ: CPT

## 2022-04-06 PROCEDURE — 29581 APPL MULTLAYER CMPRN SYS LEG: CPT | Mod: LT

## 2022-04-07 DIAGNOSIS — Z86.010 PERSONAL HISTORY OF COLONIC POLYPS: ICD-10-CM

## 2022-04-07 DIAGNOSIS — L97.323 NON-PRESSURE CHRONIC ULCER OF LEFT ANKLE WITH NECROSIS OF MUSCLE: ICD-10-CM

## 2022-04-07 DIAGNOSIS — E11.622 TYPE 2 DIABETES MELLITUS WITH OTHER SKIN ULCER: ICD-10-CM

## 2022-04-07 DIAGNOSIS — L60.0 INGROWING NAIL: ICD-10-CM

## 2022-04-07 DIAGNOSIS — Z90.710 ACQUIRED ABSENCE OF BOTH CERVIX AND UTERUS: ICD-10-CM

## 2022-04-07 DIAGNOSIS — Z82.49 FAMILY HISTORY OF ISCHEMIC HEART DISEASE AND OTHER DISEASES OF THE CIRCULATORY SYSTEM: ICD-10-CM

## 2022-04-07 DIAGNOSIS — Z98.890 OTHER SPECIFIED POSTPROCEDURAL STATES: ICD-10-CM

## 2022-04-07 DIAGNOSIS — Z79.51 LONG TERM (CURRENT) USE OF INHALED STEROIDS: ICD-10-CM

## 2022-04-07 DIAGNOSIS — Z85.038 PERSONAL HISTORY OF OTHER MALIGNANT NEOPLASM OF LARGE INTESTINE: ICD-10-CM

## 2022-04-07 DIAGNOSIS — Z86.16 PERSONAL HISTORY OF COVID-19: ICD-10-CM

## 2022-04-07 DIAGNOSIS — J45.909 UNSPECIFIED ASTHMA, UNCOMPLICATED: ICD-10-CM

## 2022-04-07 DIAGNOSIS — Z79.899 OTHER LONG TERM (CURRENT) DRUG THERAPY: ICD-10-CM

## 2022-04-08 ENCOUNTER — OUTPATIENT (OUTPATIENT)
Dept: OUTPATIENT SERVICES | Facility: HOSPITAL | Age: 57
LOS: 1 days | Discharge: HOME | End: 2022-04-08
Payer: COMMERCIAL

## 2022-04-08 ENCOUNTER — APPOINTMENT (OUTPATIENT)
Dept: WOUND CARE | Facility: HOSPITAL | Age: 57
End: 2022-04-08
Payer: COMMERCIAL

## 2022-04-08 VITALS
BODY MASS INDEX: 42.52 KG/M2 | OXYGEN SATURATION: 94 % | DIASTOLIC BLOOD PRESSURE: 61 MMHG | SYSTOLIC BLOOD PRESSURE: 161 MMHG | HEIGHT: 63 IN | HEART RATE: 83 BPM | RESPIRATION RATE: 20 BRPM | WEIGHT: 240 LBS | TEMPERATURE: 99.3 F

## 2022-04-08 DIAGNOSIS — Z98.890 OTHER SPECIFIED POSTPROCEDURAL STATES: Chronic | ICD-10-CM

## 2022-04-08 DIAGNOSIS — L60.0 INGROWING NAIL: ICD-10-CM

## 2022-04-08 DIAGNOSIS — Z90.710 ACQUIRED ABSENCE OF BOTH CERVIX AND UTERUS: Chronic | ICD-10-CM

## 2022-04-08 DIAGNOSIS — E11.622 TYPE 2 DIABETES MELLITUS WITH OTHER SKIN ULCER: ICD-10-CM

## 2022-04-08 PROCEDURE — 11730 AVULSION NAIL PLATE SIMPLE 1: CPT | Mod: TA

## 2022-04-08 PROCEDURE — 29581 APPL MULTLAYER CMPRN SYS LEG: CPT | Mod: XS,LT

## 2022-04-10 DIAGNOSIS — Z79.51 LONG TERM (CURRENT) USE OF INHALED STEROIDS: ICD-10-CM

## 2022-04-10 DIAGNOSIS — L60.0 INGROWING NAIL: ICD-10-CM

## 2022-04-10 DIAGNOSIS — Z90.710 ACQUIRED ABSENCE OF BOTH CERVIX AND UTERUS: ICD-10-CM

## 2022-04-10 DIAGNOSIS — Z86.16 PERSONAL HISTORY OF COVID-19: ICD-10-CM

## 2022-04-10 DIAGNOSIS — L97.323 NON-PRESSURE CHRONIC ULCER OF LEFT ANKLE WITH NECROSIS OF MUSCLE: ICD-10-CM

## 2022-04-10 DIAGNOSIS — J45.909 UNSPECIFIED ASTHMA, UNCOMPLICATED: ICD-10-CM

## 2022-04-10 DIAGNOSIS — Z85.038 PERSONAL HISTORY OF OTHER MALIGNANT NEOPLASM OF LARGE INTESTINE: ICD-10-CM

## 2022-04-10 DIAGNOSIS — Z79.899 OTHER LONG TERM (CURRENT) DRUG THERAPY: ICD-10-CM

## 2022-04-10 DIAGNOSIS — E11.622 TYPE 2 DIABETES MELLITUS WITH OTHER SKIN ULCER: ICD-10-CM

## 2022-04-10 NOTE — PHYSICAL EXAM
[4 x 4] : 4 x 4  [FreeTextEntry1] : Left Lateral Ankle  [FreeTextEntry2] : 0.6 [FreeTextEntry3] : 0.3 [de-identified] : serosanguineous [FreeTextEntry4] : 0.1 [de-identified] : callus [de-identified] : 90% [de-identified] : 10% [de-identified] : Coban Compression. Pt expressed comfort post coban application. No neurovascular deficits present.  [de-identified] : Iodosorb, Calcium Alginate  [de-identified] : Mechanically Cleansed with Normal Saline  [FreeTextEntry7] : Left Great Toe- Ingrown Nail  [de-identified] : Bupivacaine  [de-identified] : Partial Nail Avulsion  [de-identified] : Coban Compression. Pt states comfort post coban compression. No neurovascular deficits present. [de-identified] : Iodosorb [de-identified] : Mechanically Cleansed with Normal Saline \par  [TWNoteComboBox4] : Small [TWNoteComboBox5] : No [de-identified] : No [de-identified] : None [de-identified] : None [de-identified] : >75% [de-identified] : Yes [de-identified] : Other [de-identified] : Primary Dressing [de-identified] : 3x Weekly [de-identified] : None [de-identified] : No [de-identified] : No [de-identified] : Normal [de-identified] : None [de-identified] : None [de-identified] : No [de-identified] : None [de-identified] : False [de-identified] : Other [de-identified] : Other [de-identified] : Primary Dressing

## 2022-04-10 NOTE — ASSESSMENT
[Verbal] : Verbal [Demo] : Demo [Patient] : Patient [Good - alert, interested, motivated] : Good - alert, interested, motivated [Verbalizes knowledge/Understanding] : Verbalizes knowledge/understanding [Dressing changes] : dressing changes [Foot Care] : foot care [Skin Care] : skin care [Pressure relief] : pressure relief [Signs and symptoms of infection] : sign and symptoms of infection [Nutrition] : nutrition [How and When to Call] : how and when to call [Off-loading] : off-loading [Compression Therapy] : compression therapy [Patient responsibility to plan of care] : patient responsibility to plan of care [Stable] : stable [Home] : Home [Other: ____] : [unfilled] [] : No [FreeTextEntry2] : Promote Skin Integrity \par S/S of Infection \par Offloading and Pressure Relief \par Elevation Compliance \par Glycemic Control \par Compression Compliance \par Foot Care \par F/U x2 weekly for dressing changes and x1 weekly for assessment.  [FreeTextEntry4] : DPM performed partial nail avulsion on the left great toe. Pt tolerated the procedure. \par Pt encouraged to continue coban compression compliance. Pt encouraged to elevate the lower legs intermittently when sitting.\par Pt educated on the importance of offloading and pressure relief. \par F/U x2 weekly for dressing changes and 1 weekly for assessment.

## 2022-04-10 NOTE — PROCEDURE
[Left Foot] : left foot was [Left foot, great toe] : left foot, great toe [Lateral] : lateral [Iodosorb] : iodosorb [FreeTextEntry9] : 1830 [de-identified] : left hallux lateral ingrown nail [de-identified] : nitesh [FreeTextEntry6] : left hallux lateral ingrown nail [FreeTextEntry7] : left hallux lateral ingrown nail [de-identified] : nail plate

## 2022-04-11 ENCOUNTER — OUTPATIENT (OUTPATIENT)
Dept: OUTPATIENT SERVICES | Facility: HOSPITAL | Age: 57
LOS: 1 days | Discharge: HOME | End: 2022-04-11
Payer: COMMERCIAL

## 2022-04-11 ENCOUNTER — APPOINTMENT (OUTPATIENT)
Dept: WOUND CARE | Facility: HOSPITAL | Age: 57
End: 2022-04-11
Payer: COMMERCIAL

## 2022-04-11 VITALS
OXYGEN SATURATION: 97 % | HEIGHT: 63 IN | BODY MASS INDEX: 42.52 KG/M2 | WEIGHT: 240 LBS | DIASTOLIC BLOOD PRESSURE: 72 MMHG | TEMPERATURE: 97.8 F | HEART RATE: 77 BPM | SYSTOLIC BLOOD PRESSURE: 145 MMHG | RESPIRATION RATE: 18 BRPM

## 2022-04-11 DIAGNOSIS — Z90.710 ACQUIRED ABSENCE OF BOTH CERVIX AND UTERUS: Chronic | ICD-10-CM

## 2022-04-11 DIAGNOSIS — E11.622 TYPE 2 DIABETES MELLITUS WITH OTHER SKIN ULCER: ICD-10-CM

## 2022-04-11 DIAGNOSIS — Z98.890 OTHER SPECIFIED POSTPROCEDURAL STATES: Chronic | ICD-10-CM

## 2022-04-11 PROCEDURE — ZZZZZ: CPT

## 2022-04-11 PROCEDURE — 29581 APPL MULTLAYER CMPRN SYS LEG: CPT | Mod: LT

## 2022-04-12 DIAGNOSIS — L97.323 NON-PRESSURE CHRONIC ULCER OF LEFT ANKLE WITH NECROSIS OF MUSCLE: ICD-10-CM

## 2022-04-12 DIAGNOSIS — E11.622 TYPE 2 DIABETES MELLITUS WITH OTHER SKIN ULCER: ICD-10-CM

## 2022-04-12 DIAGNOSIS — L60.0 INGROWING NAIL: ICD-10-CM

## 2022-04-13 ENCOUNTER — APPOINTMENT (OUTPATIENT)
Dept: WOUND CARE | Facility: HOSPITAL | Age: 57
End: 2022-04-13
Payer: COMMERCIAL

## 2022-04-13 ENCOUNTER — OUTPATIENT (OUTPATIENT)
Dept: OUTPATIENT SERVICES | Facility: HOSPITAL | Age: 57
LOS: 1 days | Discharge: HOME | End: 2022-04-13
Payer: COMMERCIAL

## 2022-04-13 VITALS
WEIGHT: 240 LBS | SYSTOLIC BLOOD PRESSURE: 159 MMHG | TEMPERATURE: 98.4 F | RESPIRATION RATE: 18 BRPM | DIASTOLIC BLOOD PRESSURE: 84 MMHG | BODY MASS INDEX: 42.52 KG/M2 | HEIGHT: 63 IN | OXYGEN SATURATION: 96 % | HEART RATE: 70 BPM

## 2022-04-13 DIAGNOSIS — Z98.890 OTHER SPECIFIED POSTPROCEDURAL STATES: Chronic | ICD-10-CM

## 2022-04-13 DIAGNOSIS — E11.622 TYPE 2 DIABETES MELLITUS WITH OTHER SKIN ULCER: ICD-10-CM

## 2022-04-13 DIAGNOSIS — Z90.710 ACQUIRED ABSENCE OF BOTH CERVIX AND UTERUS: Chronic | ICD-10-CM

## 2022-04-13 PROCEDURE — 29581 APPL MULTLAYER CMPRN SYS LEG: CPT | Mod: LT

## 2022-04-13 PROCEDURE — ZZZZZ: CPT

## 2022-04-14 DIAGNOSIS — E11.622 TYPE 2 DIABETES MELLITUS WITH OTHER SKIN ULCER: ICD-10-CM

## 2022-04-14 DIAGNOSIS — L97.323 NON-PRESSURE CHRONIC ULCER OF LEFT ANKLE WITH NECROSIS OF MUSCLE: ICD-10-CM

## 2022-04-15 ENCOUNTER — APPOINTMENT (OUTPATIENT)
Dept: WOUND CARE | Facility: HOSPITAL | Age: 57
End: 2022-04-15
Payer: COMMERCIAL

## 2022-04-15 ENCOUNTER — OUTPATIENT (OUTPATIENT)
Dept: OUTPATIENT SERVICES | Facility: HOSPITAL | Age: 57
LOS: 1 days | Discharge: HOME | End: 2022-04-15
Payer: COMMERCIAL

## 2022-04-15 VITALS
BODY MASS INDEX: 42.52 KG/M2 | SYSTOLIC BLOOD PRESSURE: 161 MMHG | DIASTOLIC BLOOD PRESSURE: 76 MMHG | HEIGHT: 63 IN | HEART RATE: 75 BPM | TEMPERATURE: 97.9 F | OXYGEN SATURATION: 97 % | WEIGHT: 240 LBS | RESPIRATION RATE: 20 BRPM

## 2022-04-15 DIAGNOSIS — Z90.710 ACQUIRED ABSENCE OF BOTH CERVIX AND UTERUS: Chronic | ICD-10-CM

## 2022-04-15 DIAGNOSIS — E11.622 TYPE 2 DIABETES MELLITUS WITH OTHER SKIN ULCER: ICD-10-CM

## 2022-04-15 DIAGNOSIS — T81.89XD OTHER COMPLICATIONS OF PROCEDURES, NOT ELSEWHERE CLASSIFIED, SUBSEQUENT ENCOUNTER: ICD-10-CM

## 2022-04-15 DIAGNOSIS — Z98.890 OTHER SPECIFIED POSTPROCEDURAL STATES: Chronic | ICD-10-CM

## 2022-04-15 PROCEDURE — 29581 APPL MULTLAYER CMPRN SYS LEG: CPT | Mod: LT

## 2022-04-15 PROCEDURE — 99213 OFFICE O/P EST LOW 20 MIN: CPT

## 2022-04-17 DIAGNOSIS — Z82.49 FAMILY HISTORY OF ISCHEMIC HEART DISEASE AND OTHER DISEASES OF THE CIRCULATORY SYSTEM: ICD-10-CM

## 2022-04-17 DIAGNOSIS — Z86.16 PERSONAL HISTORY OF COVID-19: ICD-10-CM
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
## 2022-04-17 DIAGNOSIS — Z90.710 ACQUIRED ABSENCE OF BOTH CERVIX AND UTERUS: ICD-10-CM

## 2022-04-17 DIAGNOSIS — T81.89XD OTHER COMPLICATIONS OF PROCEDURES, NOT ELSEWHERE CLASSIFIED, SUBSEQUENT ENCOUNTER: ICD-10-CM

## 2022-04-17 DIAGNOSIS — Z85.038 PERSONAL HISTORY OF OTHER MALIGNANT NEOPLASM OF LARGE INTESTINE: ICD-10-CM

## 2022-04-17 DIAGNOSIS — Z79.899 OTHER LONG TERM (CURRENT) DRUG THERAPY: ICD-10-CM

## 2022-04-17 DIAGNOSIS — Z79.51 LONG TERM (CURRENT) USE OF INHALED STEROIDS: ICD-10-CM

## 2022-04-17 DIAGNOSIS — J45.909 UNSPECIFIED ASTHMA, UNCOMPLICATED: ICD-10-CM
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
## 2022-04-17 DIAGNOSIS — Z98.890 OTHER SPECIFIED POSTPROCEDURAL STATES: ICD-10-CM

## 2022-04-17 DIAGNOSIS — Z86.010 PERSONAL HISTORY OF COLONIC POLYPS: ICD-10-CM

## 2022-04-17 DIAGNOSIS — L97.323 NON-PRESSURE CHRONIC ULCER OF LEFT ANKLE WITH NECROSIS OF MUSCLE: ICD-10-CM

## 2022-04-17 DIAGNOSIS — E11.622 TYPE 2 DIABETES MELLITUS WITH OTHER SKIN ULCER: ICD-10-CM

## 2022-04-18 ENCOUNTER — OUTPATIENT (OUTPATIENT)
Dept: OUTPATIENT SERVICES | Facility: HOSPITAL | Age: 57
LOS: 1 days | Discharge: HOME | End: 2022-04-18
Payer: COMMERCIAL

## 2022-04-18 ENCOUNTER — APPOINTMENT (OUTPATIENT)
Dept: WOUND CARE | Facility: HOSPITAL | Age: 57
End: 2022-04-18
Payer: COMMERCIAL

## 2022-04-18 VITALS
WEIGHT: 240 LBS | HEART RATE: 66 BPM | HEIGHT: 63 IN | SYSTOLIC BLOOD PRESSURE: 150 MMHG | BODY MASS INDEX: 42.52 KG/M2 | OXYGEN SATURATION: 96 % | DIASTOLIC BLOOD PRESSURE: 81 MMHG | TEMPERATURE: 96.9 F | RESPIRATION RATE: 20 BRPM

## 2022-04-18 DIAGNOSIS — L97.323 NON-PRESSURE CHRONIC ULCER OF LEFT ANKLE WITH NECROSIS OF MUSCLE: ICD-10-CM

## 2022-04-18 DIAGNOSIS — E11.622 TYPE 2 DIABETES MELLITUS WITH OTHER SKIN ULCER: ICD-10-CM

## 2022-04-18 DIAGNOSIS — Z90.710 ACQUIRED ABSENCE OF BOTH CERVIX AND UTERUS: Chronic | ICD-10-CM

## 2022-04-18 DIAGNOSIS — Z98.890 OTHER SPECIFIED POSTPROCEDURAL STATES: Chronic | ICD-10-CM

## 2022-04-18 PROCEDURE — 29581 APPL MULTLAYER CMPRN SYS LEG: CPT | Mod: LT

## 2022-04-18 PROCEDURE — ZZZZZ: CPT

## 2022-04-20 ENCOUNTER — APPOINTMENT (OUTPATIENT)
Dept: WOUND CARE | Facility: HOSPITAL | Age: 57
End: 2022-04-20
Payer: COMMERCIAL

## 2022-04-20 ENCOUNTER — OUTPATIENT (OUTPATIENT)
Dept: OUTPATIENT SERVICES | Facility: HOSPITAL | Age: 57
LOS: 1 days | Discharge: HOME | End: 2022-04-20
Payer: COMMERCIAL

## 2022-04-20 VITALS
BODY MASS INDEX: 42.52 KG/M2 | OXYGEN SATURATION: 93 % | TEMPERATURE: 97.8 F | SYSTOLIC BLOOD PRESSURE: 146 MMHG | HEART RATE: 93 BPM | HEIGHT: 63 IN | WEIGHT: 240 LBS | DIASTOLIC BLOOD PRESSURE: 83 MMHG | RESPIRATION RATE: 18 BRPM

## 2022-04-20 DIAGNOSIS — Z98.890 OTHER SPECIFIED POSTPROCEDURAL STATES: Chronic | ICD-10-CM

## 2022-04-20 DIAGNOSIS — L97.323 NON-PRESSURE CHRONIC ULCER OF LEFT ANKLE WITH NECROSIS OF MUSCLE: ICD-10-CM

## 2022-04-20 DIAGNOSIS — E11.622 TYPE 2 DIABETES MELLITUS WITH OTHER SKIN ULCER: ICD-10-CM

## 2022-04-20 DIAGNOSIS — Z90.710 ACQUIRED ABSENCE OF BOTH CERVIX AND UTERUS: Chronic | ICD-10-CM

## 2022-04-20 PROCEDURE — ZZZZZ: CPT

## 2022-04-20 PROCEDURE — 29581 APPL MULTLAYER CMPRN SYS LEG: CPT | Mod: LT

## 2022-04-22 ENCOUNTER — OUTPATIENT (OUTPATIENT)
Dept: OUTPATIENT SERVICES | Facility: HOSPITAL | Age: 57
LOS: 1 days | Discharge: HOME | End: 2022-04-22
Payer: COMMERCIAL

## 2022-04-22 ENCOUNTER — APPOINTMENT (OUTPATIENT)
Dept: WOUND CARE | Facility: HOSPITAL | Age: 57
End: 2022-04-22
Payer: COMMERCIAL

## 2022-04-22 VITALS
TEMPERATURE: 98.5 F | HEART RATE: 73 BPM | WEIGHT: 240 LBS | BODY MASS INDEX: 42.52 KG/M2 | DIASTOLIC BLOOD PRESSURE: 78 MMHG | SYSTOLIC BLOOD PRESSURE: 167 MMHG | HEIGHT: 63 IN | RESPIRATION RATE: 20 BRPM | OXYGEN SATURATION: 95 %

## 2022-04-22 DIAGNOSIS — Z98.890 OTHER SPECIFIED POSTPROCEDURAL STATES: Chronic | ICD-10-CM

## 2022-04-22 DIAGNOSIS — Z90.710 ACQUIRED ABSENCE OF BOTH CERVIX AND UTERUS: Chronic | ICD-10-CM

## 2022-04-22 DIAGNOSIS — E11.622 TYPE 2 DIABETES MELLITUS WITH OTHER SKIN ULCER: ICD-10-CM

## 2022-04-22 PROCEDURE — 99213 OFFICE O/P EST LOW 20 MIN: CPT

## 2022-04-22 PROCEDURE — G0463: CPT

## 2022-04-22 RX ORDER — SILVER 2" X 2"
0.9 BANDAGE TOPICAL
Qty: 1 | Refills: 3 | Status: COMPLETED | COMMUNITY
Start: 2022-04-22 | End: 2022-08-20

## 2022-04-22 NOTE — REVIEW OF SYSTEMS
[Fever] : no fever [Chills] : no chills [Eye Pain] : no eye pain [Loss Of Hearing] : no hearing loss [Abdominal Pain] : no abdominal pain [Vomiting] : no vomiting [Joint Stiffness] : joint stiffness [Skin Wound] : skin wound [Anxiety] : no anxiety [Negative] : Heme/Lymph [de-identified] : left lateral ankle ulcer down to skin, subcutaneous tissue, fat, and tendon, healing well. left hallux partial nail avulsion site healed [de-identified] : Possible prediabetic , elevated HgA1c and blood glucose , BMI 43 ^, patient now taking Metformin

## 2022-04-22 NOTE — PHYSICAL EXAM
[2 x 2] : 2 x 2  [1+] : left 1+ [Ankle Swelling (On Exam)] : present [Ankle Swelling Bilaterally] : bilaterally  [Ankle Swelling On The Left] : moderate [Varicose Veins Of Lower Extremities] : bilaterally [Purpura] : no purpura  [Petechiae] : no petechiae [Skin Ulcer] : no ulcer [Alert] : alert [Oriented to Person] : oriented to person [Oriented to Place] : oriented to place [Oriented to Time] : oriented to time [Calm] : calm [de-identified] : A&Ox3, NAD [de-identified] : BMI 43 [de-identified] : s/p left peroneal tendon repair, 5/5 strength in all quadrants bilaterally [de-identified] : left lateral ankle ulcer down to skin, subcutaneous tissue, fat, and tendon, healing well. left hallux partial nail avulsion site healed [de-identified] : light touch sensation diminished bilaterally [FreeTextEntry1] : Left Lateral Ankle  [FreeTextEntry2] : 0.2 [FreeTextEntry3] : 0.1 [FreeTextEntry4] : 0.1 [de-identified] : Scant Serosanguineous  [de-identified] : callus [de-identified] : Expressed comfort post ACE application. [de-identified] : Iodosorb, Calcium Alginate  [de-identified] : Mechanically Cleansed with chlorhexidine then Sterile Gauze & Normal Saline\par Cloth tape  [TWNoteComboBox4] : Small [de-identified] : Macerated [de-identified] : None [de-identified] : None [de-identified] : 100% [de-identified] : No [de-identified] : Ace wraps [de-identified] : Daily [de-identified] : Primary Dressing [de-identified] : None [de-identified] : Normal

## 2022-04-22 NOTE — HISTORY OF PRESENT ILLNESS
[FreeTextEntry1] : Pt seen for left ankle ulcer down to skin, subcutaneous tissue, fat, and tendon, healing well. Pt also s/p left hallux lateral partial nail avulsion. Denies any other complaints at this time.

## 2022-04-22 NOTE — PLAN
[FreeTextEntry1] : Patient examined and evaluated at this time.\par Continue local wound care. Pt remains at risk for infection, amputation, limb loss, sepsis, and death.\par Spent 20 minutes for patient care and medical decision making.\par Patient to follow up in 1 week.\par

## 2022-04-22 NOTE — ASSESSMENT
[Verbal] : Verbal [Written] : Written [Demo] : Demo [Patient] : Patient [Good - alert, interested, motivated] : Good - alert, interested, motivated [Verbalizes knowledge/Understanding] : Verbalizes knowledge/understanding [Dressing changes] : dressing changes [Signs and symptoms of infection] : sign and symptoms of infection [Pain Management] : pain management [How and When to Call] : how and when to call [Compression Therapy] : compression therapy [Stable] : stable [Home] : Home [Ambulatory] : Ambulatory [Not Applicable - Long Term Care/Home Health Agency] : Long Term Care/Home Health Agency: Not Applicable [] : No [FreeTextEntry2] : Infection prevention\par Localized wound care \par Goal remaining pain free regarding wounds\par Compression therapy [FreeTextEntry4] : Iodosorb E scribed \par Follow up in 1 week

## 2022-04-23 DIAGNOSIS — Z98.890 OTHER SPECIFIED POSTPROCEDURAL STATES: ICD-10-CM

## 2022-04-23 DIAGNOSIS — Z85.038 PERSONAL HISTORY OF OTHER MALIGNANT NEOPLASM OF LARGE INTESTINE: ICD-10-CM

## 2022-04-23 DIAGNOSIS — Z90.710 ACQUIRED ABSENCE OF BOTH CERVIX AND UTERUS: ICD-10-CM

## 2022-04-23 DIAGNOSIS — Z82.49 FAMILY HISTORY OF ISCHEMIC HEART DISEASE AND OTHER DISEASES OF THE CIRCULATORY SYSTEM: ICD-10-CM

## 2022-04-23 DIAGNOSIS — L97.323 NON-PRESSURE CHRONIC ULCER OF LEFT ANKLE WITH NECROSIS OF MUSCLE: ICD-10-CM

## 2022-04-23 DIAGNOSIS — Z79.51 LONG TERM (CURRENT) USE OF INHALED STEROIDS: ICD-10-CM

## 2022-04-23 DIAGNOSIS — Z86.010 PERSONAL HISTORY OF COLONIC POLYPS: ICD-10-CM

## 2022-04-23 DIAGNOSIS — Z79.899 OTHER LONG TERM (CURRENT) DRUG THERAPY: ICD-10-CM

## 2022-04-23 DIAGNOSIS — Z86.16 PERSONAL HISTORY OF COVID-19: ICD-10-CM

## 2022-04-23 DIAGNOSIS — J45.909 UNSPECIFIED ASTHMA, UNCOMPLICATED: ICD-10-CM

## 2022-04-23 DIAGNOSIS — E11.622 TYPE 2 DIABETES MELLITUS WITH OTHER SKIN ULCER: ICD-10-CM

## 2022-04-25 NOTE — PHYSICAL EXAM
[4 x 4] : 4 x 4  [1+] : left 1+ [Ankle Swelling (On Exam)] : present [Ankle Swelling Bilaterally] : bilaterally  [Ankle Swelling On The Left] : moderate [Alert] : alert [Oriented to Person] : oriented to person [Oriented to Place] : oriented to place [Oriented to Time] : oriented to time [Calm] : calm [Varicose Veins Of Lower Extremities] : not present [Purpura] : no purpura  [Petechiae] : no petechiae [Skin Ulcer] : no ulcer [de-identified] : A&Ox3, NAD [de-identified] : BMI 43 [de-identified] : s/p left peroneal tendon repair, 5/5 strength in all quadrants bilaterally [de-identified] : left lateral ankle ulcer down to skin, subcutaneous tissue, fat, and tendon, healing well. left hallux partial nail avulsion site healed [de-identified] : light touch sensation diminished bilaterally [FreeTextEntry1] : Left Lateral Ankle  [FreeTextEntry2] : 0.4 [FreeTextEntry3] : 0.1 [FreeTextEntry4] : 0.1 [de-identified] : Scant Serosanguineous  [de-identified] : callus [de-identified] : Coban Compression. Pt expressed comfort post coban application. No neurovascular deficits present.  [de-identified] : Iodosorb, Calcium Alginate  [de-identified] : Mechanically Cleansed with Sterile Gauze & Normal Saline  [FreeTextEntry7] : Left Great Toe- Resolved [de-identified] : No Treatment  [de-identified] : Mechanically Cleansed with Sterile Gauze & Normal Saline  [TWNoteComboBox4] : Small [TWNoteComboBox5] : False [de-identified] : False [de-identified] : Macerated [de-identified] : None [de-identified] : False [de-identified] : 100% [de-identified] : False [de-identified] : Other [de-identified] : 3x Weekly [de-identified] : False [de-identified] : None [de-identified] : False [de-identified] : False [de-identified] : Normal [de-identified] : False [de-identified] : False [de-identified] : False [de-identified] : False [de-identified] : False [de-identified] : False [de-identified] : False

## 2022-04-25 NOTE — ASSESSMENT
[Verbal] : Verbal [Demo] : Demo [Patient] : Patient [Good - alert, interested, motivated] : Good - alert, interested, motivated [Verbalizes knowledge/Understanding] : Verbalizes knowledge/understanding [Dressing changes] : dressing changes [Foot Care] : foot care [Skin Care] : skin care [Pressure relief] : pressure relief [Signs and symptoms of infection] : sign and symptoms of infection [Nutrition] : nutrition [How and When to Call] : how and when to call [Off-loading] : off-loading [Compression Therapy] : compression therapy [Patient responsibility to plan of care] : patient responsibility to plan of care [Stable] : stable [Home] : Home [Ambulatory] : Ambulatory [] : No [FreeTextEntry2] : Restore Optimal Skin Integrity  \par S/S of Infection \par Elevation Compliance \par Compression Therapy  [FreeTextEntry4] : Pt to F/U to [facility] in 1 Week for Assessment & 2x for Dressing Change.

## 2022-04-25 NOTE — REVIEW OF SYSTEMS
[Joint Stiffness] : joint stiffness [Skin Wound] : skin wound [Negative] : Heme/Lymph [Fever] : no fever [Chills] : no chills [Eye Pain] : no eye pain [Loss Of Hearing] : no hearing loss [Abdominal Pain] : no abdominal pain [Vomiting] : no vomiting [Anxiety] : no anxiety [de-identified] : left lateral ankle ulcer down to skin, subcutaneous tissue, fat, and tendon, healing well. left hallux partial nail avulsion site healed [de-identified] : Possible prediabetic , elevated HgA1c and blood glucose , BMI 43 ^, patient now taking Metformin

## 2022-04-27 ENCOUNTER — NON-APPOINTMENT (OUTPATIENT)
Age: 57
End: 2022-04-27

## 2022-04-28 ENCOUNTER — APPOINTMENT (OUTPATIENT)
Dept: WOUND CARE | Facility: HOSPITAL | Age: 57
End: 2022-04-28
Payer: COMMERCIAL

## 2022-04-28 ENCOUNTER — OUTPATIENT (OUTPATIENT)
Dept: OUTPATIENT SERVICES | Facility: HOSPITAL | Age: 57
LOS: 1 days | Discharge: HOME | End: 2022-04-28
Payer: COMMERCIAL

## 2022-04-28 VITALS
HEART RATE: 74 BPM | RESPIRATION RATE: 20 BRPM | OXYGEN SATURATION: 96 % | SYSTOLIC BLOOD PRESSURE: 147 MMHG | TEMPERATURE: 99 F | DIASTOLIC BLOOD PRESSURE: 78 MMHG | HEIGHT: 63 IN | BODY MASS INDEX: 42.52 KG/M2 | WEIGHT: 240 LBS

## 2022-04-28 DIAGNOSIS — Z98.890 OTHER SPECIFIED POSTPROCEDURAL STATES: Chronic | ICD-10-CM

## 2022-04-28 DIAGNOSIS — Z90.710 ACQUIRED ABSENCE OF BOTH CERVIX AND UTERUS: Chronic | ICD-10-CM

## 2022-04-28 DIAGNOSIS — E11.622 TYPE 2 DIABETES MELLITUS WITH OTHER SKIN ULCER: ICD-10-CM

## 2022-04-28 PROCEDURE — 99213 OFFICE O/P EST LOW 20 MIN: CPT

## 2022-04-28 PROCEDURE — G0463: CPT

## 2022-04-28 NOTE — PLAN
[FreeTextEntry1] : Patient examined and evaluated at this time.\par Pt happy with outcome of treatment. Pt remains at risk for infection, amputation, limb loss, sepsis, and death.\par Spent 20 minutes for patient care and medical decision making.\par Patient to follow up in 2 weeks.\par

## 2022-04-28 NOTE — REVIEW OF SYSTEMS
[Fever] : no fever [Chills] : no chills [Eye Pain] : no eye pain [Loss Of Hearing] : no hearing loss [Abdominal Pain] : no abdominal pain [Vomiting] : no vomiting [Joint Stiffness] : joint stiffness [Skin Wound] : skin wound [Anxiety] : no anxiety [Negative] : Heme/Lymph [de-identified] : left lateral ankle ulcer down to skin, subcutaneous tissue, fat, and tendon, healing well. left hallux partial nail avulsion site healed [de-identified] : Possible prediabetic , elevated HgA1c and blood glucose , BMI 43 ^, patient now taking Metformin

## 2022-04-28 NOTE — PHYSICAL EXAM
[2 x 2] : 2 x 2  [1+] : left 1+ [Ankle Swelling (On Exam)] : present [Ankle Swelling Bilaterally] : bilaterally  [Ankle Swelling On The Left] : moderate [Varicose Veins Of Lower Extremities] : bilaterally [Purpura] : no purpura  [Petechiae] : no petechiae [Skin Ulcer] : no ulcer [Alert] : alert [Oriented to Person] : oriented to person [Oriented to Place] : oriented to place [Oriented to Time] : oriented to time [Calm] : calm [de-identified] : A&Ox3, NAD [de-identified] : BMI 43 [de-identified] : s/p left peroneal tendon repair, 5/5 strength in all quadrants bilaterally [de-identified] : left lateral ankle ulcer down to skin, subcutaneous tissue, fat, and tendon, healed well. left hallux partial nail avulsion site healed [de-identified] : light touch sensation diminished bilaterally [de-identified] : \par  [FreeTextEntry1] : Left Lateral Ankle- fragile epithelium [FreeTextEntry2] : 0.4 [FreeTextEntry3] : 0.2 [FreeTextEntry4] : 0.1 [de-identified] : none [de-identified] : callus- shaved by Dr. Quevedo [de-identified] : dry dressing [de-identified] : Mechanically cleansed with Sterile gauze and 0.9% Normal Saline\par Cloth tape\par  [TWNoteComboBox5] : No [TWNoteComboBox6] : Surgical [de-identified] : No [de-identified] : other [de-identified] : None [de-identified] : None [de-identified] : None [de-identified] : No [de-identified] : Ace wraps [de-identified] : Daily [de-identified] : Primary Dressing

## 2022-04-28 NOTE — ASSESSMENT
[Verbal] : Verbal [Demo] : Demo [Patient] : Patient [Good - alert, interested, motivated] : Good - alert, interested, motivated [Verbalizes knowledge/Understanding] : Verbalizes knowledge/understanding [Dressing changes] : dressing changes [Skin Care] : skin care [Signs and symptoms of infection] : sign and symptoms of infection [Nutrition] : nutrition [How and When to Call] : how and when to call [Pain Management] : pain management [Off-loading] : off-loading [Home Health] : home health [Patient responsibility to plan of care] : patient responsibility to plan of care [] : Yes [Stable] : stable [Home] : Home [Ambulatory] : Ambulatory [Not Applicable - Long Term Care/Home Health Agency] : Long Term Care/Home Health Agency: Not Applicable [FreeTextEntry2] : Infection Prevention\par Promote Skin Integrity\par Offloading\par Elevation and low Na+ diet for lower extremity edema\par Maintain acceptable levels of pain\par Demonstrates use of both pharmacological and nonpharmacological pain management interventions\par weight reduction strategies [FreeTextEntry4] : F/U 2 weeks\par Pt allowed to get area wet in shower, pat dry and check skin daily for changes to skin integrity\par

## 2022-04-29 DIAGNOSIS — J45.909 UNSPECIFIED ASTHMA, UNCOMPLICATED: ICD-10-CM

## 2022-04-29 DIAGNOSIS — Z79.51 LONG TERM (CURRENT) USE OF INHALED STEROIDS: ICD-10-CM

## 2022-04-29 DIAGNOSIS — Z79.899 OTHER LONG TERM (CURRENT) DRUG THERAPY: ICD-10-CM

## 2022-04-29 DIAGNOSIS — Z90.710 ACQUIRED ABSENCE OF BOTH CERVIX AND UTERUS: ICD-10-CM

## 2022-04-29 DIAGNOSIS — Z86.010 PERSONAL HISTORY OF COLONIC POLYPS: ICD-10-CM

## 2022-04-29 DIAGNOSIS — Z82.49 FAMILY HISTORY OF ISCHEMIC HEART DISEASE AND OTHER DISEASES OF THE CIRCULATORY SYSTEM: ICD-10-CM

## 2022-04-29 DIAGNOSIS — Z98.890 OTHER SPECIFIED POSTPROCEDURAL STATES: ICD-10-CM

## 2022-04-29 DIAGNOSIS — Z86.16 PERSONAL HISTORY OF COVID-19: ICD-10-CM

## 2022-04-29 DIAGNOSIS — L97.323 NON-PRESSURE CHRONIC ULCER OF LEFT ANKLE WITH NECROSIS OF MUSCLE: ICD-10-CM

## 2022-04-29 DIAGNOSIS — Z85.038 PERSONAL HISTORY OF OTHER MALIGNANT NEOPLASM OF LARGE INTESTINE: ICD-10-CM

## 2022-04-29 DIAGNOSIS — E11.622 TYPE 2 DIABETES MELLITUS WITH OTHER SKIN ULCER: ICD-10-CM

## 2022-05-11 ENCOUNTER — NON-APPOINTMENT (OUTPATIENT)
Age: 57
End: 2022-05-11

## 2022-05-12 ENCOUNTER — APPOINTMENT (OUTPATIENT)
Dept: WOUND CARE | Facility: HOSPITAL | Age: 57
End: 2022-05-12
Payer: COMMERCIAL

## 2022-05-12 ENCOUNTER — OUTPATIENT (OUTPATIENT)
Dept: OUTPATIENT SERVICES | Facility: HOSPITAL | Age: 57
LOS: 1 days | Discharge: HOME | End: 2022-05-12
Payer: COMMERCIAL

## 2022-05-12 VITALS
SYSTOLIC BLOOD PRESSURE: 129 MMHG | WEIGHT: 240 LBS | BODY MASS INDEX: 42.52 KG/M2 | OXYGEN SATURATION: 94 % | HEART RATE: 79 BPM | RESPIRATION RATE: 20 BRPM | HEIGHT: 63 IN | TEMPERATURE: 98.6 F | DIASTOLIC BLOOD PRESSURE: 65 MMHG

## 2022-05-12 DIAGNOSIS — Z90.710 ACQUIRED ABSENCE OF BOTH CERVIX AND UTERUS: Chronic | ICD-10-CM

## 2022-05-12 DIAGNOSIS — Z98.890 OTHER SPECIFIED POSTPROCEDURAL STATES: Chronic | ICD-10-CM

## 2022-05-12 DIAGNOSIS — E11.622 TYPE 2 DIABETES MELLITUS WITH OTHER SKIN ULCER: ICD-10-CM

## 2022-05-12 DIAGNOSIS — L97.323 NON-PRESSURE CHRONIC ULCER OF LEFT ANKLE WITH NECROSIS OF MUSCLE: ICD-10-CM

## 2022-05-12 PROCEDURE — 99213 OFFICE O/P EST LOW 20 MIN: CPT

## 2022-05-12 PROCEDURE — G0463: CPT

## 2022-05-12 NOTE — REVIEW OF SYSTEMS
[Fever] : no fever [Chills] : no chills [Eye Pain] : no eye pain [Loss Of Hearing] : no hearing loss [Abdominal Pain] : no abdominal pain [Vomiting] : no vomiting [Joint Stiffness] : joint stiffness [Skin Wound] : skin wound [Anxiety] : no anxiety [Negative] : Heme/Lymph [de-identified] : left lateral ankle ulcer down to skin, subcutaneous tissue, fat, and tendon, healing well. left hallux partial nail avulsion site healed [de-identified] : Possible prediabetic , elevated HgA1c and blood glucose , BMI 43 ^, patient now taking Metformin

## 2022-05-12 NOTE — PHYSICAL EXAM
[1+] : left 1+ [Ankle Swelling (On Exam)] : present [Ankle Swelling Bilaterally] : bilaterally  [Ankle Swelling On The Left] : moderate [Varicose Veins Of Lower Extremities] : bilaterally [Purpura] : no purpura  [Petechiae] : no petechiae [Skin Ulcer] : no ulcer [Alert] : alert [Oriented to Person] : oriented to person [Oriented to Place] : oriented to place [Oriented to Time] : oriented to time [Calm] : calm [de-identified] : A&Ox3, NAD [de-identified] : BMI 43 [de-identified] : s/p left peroneal tendon repair, 5/5 strength in all quadrants bilaterally [de-identified] : left lateral ankle ulcer down to skin, subcutaneous tissue, fat, and tendon, healed well. left hallux partial nail avulsion site healed [de-identified] : light touch sensation diminished bilaterally [de-identified] : sections of scab removed by DPM [FreeTextEntry1] : Left Lateral Ankle- scab [FreeTextEntry3] : 0.2 [FreeTextEntry2] : 0.4 [FreeTextEntry4] : <0.1 [de-identified] : none [de-identified] : Pt own compression stocking [de-identified] : none [TWNoteComboBox5] : No [de-identified] : Mechanically cleansed with Sterile gauze and 0.9% Normal Saline\par  [TWNoteComboBox6] : Surgical [de-identified] : No [de-identified] : other [de-identified] : None [de-identified] : None [de-identified] : None [de-identified] : No [de-identified] : Daily [de-identified] : Primary Dressing

## 2022-05-12 NOTE — HISTORY OF PRESENT ILLNESS
[FreeTextEntry1] : Pt seen for left ankle ulcer down to skin, subcutaneous tissue, fat, and tendon, healed well. Pt happy with outcome of treatment. Denies any other complaints at this time.

## 2022-05-12 NOTE — ASSESSMENT
[Verbal] : Verbal [Demo] : Demo [Patient] : Patient [Good - alert, interested, motivated] : Good - alert, interested, motivated [Verbalizes knowledge/Understanding] : Verbalizes knowledge/understanding [Dressing changes] : dressing changes [Skin Care] : skin care [Signs and symptoms of infection] : sign and symptoms of infection [Nutrition] : nutrition [How and When to Call] : how and when to call [Pain Management] : pain management [Off-loading] : off-loading [Home Health] : home health [Patient responsibility to plan of care] : patient responsibility to plan of care [] : Yes [Stable] : stable [Home] : Home [Ambulatory] : Ambulatory [Not Applicable - Long Term Care/Home Health Agency] : Long Term Care/Home Health Agency: Not Applicable [Compression Therapy] : compression therapy [FreeTextEntry2] : Infection Prevention\par Promote Skin Integrity\par Offloading\par Elevation and low Na+ diet for lower extremity edema\par Maintain acceptable levels of pain\par Demonstrates use of both pharmacological and nonpharmacological pain management interventions\par weight reduction strategies\par compression therapy [FreeTextEntry4] : F/U 2 months\par Pt allowed to get area wet in shower, pat dry and check skin daily for changes to skin integrity\par

## 2022-05-12 NOTE — PLAN
[FreeTextEntry1] : Patient examined and evaluated at this time.\par Pt happy with outcome of treatment.\par Spent 20 minutes for patient care and medical decision making.\par Patient to follow up as needed.

## 2022-05-14 DIAGNOSIS — Z82.49 FAMILY HISTORY OF ISCHEMIC HEART DISEASE AND OTHER DISEASES OF THE CIRCULATORY SYSTEM: ICD-10-CM

## 2022-05-14 DIAGNOSIS — Z79.51 LONG TERM (CURRENT) USE OF INHALED STEROIDS: ICD-10-CM

## 2022-05-14 DIAGNOSIS — Z86.16 PERSONAL HISTORY OF COVID-19: ICD-10-CM

## 2022-05-14 DIAGNOSIS — Z79.899 OTHER LONG TERM (CURRENT) DRUG THERAPY: ICD-10-CM

## 2022-05-14 DIAGNOSIS — J45.909 UNSPECIFIED ASTHMA, UNCOMPLICATED: ICD-10-CM

## 2022-05-14 DIAGNOSIS — Z85.038 PERSONAL HISTORY OF OTHER MALIGNANT NEOPLASM OF LARGE INTESTINE: ICD-10-CM

## 2022-05-14 DIAGNOSIS — Z98.890 OTHER SPECIFIED POSTPROCEDURAL STATES: ICD-10-CM

## 2022-05-14 DIAGNOSIS — L97.323 NON-PRESSURE CHRONIC ULCER OF LEFT ANKLE WITH NECROSIS OF MUSCLE: ICD-10-CM

## 2022-05-14 DIAGNOSIS — Z86.010 PERSONAL HISTORY OF COLONIC POLYPS: ICD-10-CM

## 2022-05-14 DIAGNOSIS — Z90.710 ACQUIRED ABSENCE OF BOTH CERVIX AND UTERUS: ICD-10-CM

## 2022-05-14 DIAGNOSIS — E11.622 TYPE 2 DIABETES MELLITUS WITH OTHER SKIN ULCER: ICD-10-CM

## 2022-06-27 ENCOUNTER — APPOINTMENT (OUTPATIENT)
Dept: OTOLARYNGOLOGY | Facility: CLINIC | Age: 57
End: 2022-06-27

## 2022-07-13 NOTE — PRE-OP CHECKLIST - DENTURES
on the discharge service for the patient. I have reviewed and made amendments to the documentation where necessary.
no

## 2022-11-01 NOTE — PHYSICAL THERAPY INITIAL EVALUATION ADULT - IMPAIRMENTS CONTRIBUTING TO GAIT DEVIATIONS, PT EVAL
Odomzo Counseling- I discussed with the patient the risks of Odomzo including but not limited to nausea, vomiting, diarrhea, constipation, weight loss, changes in the sense of taste, decreased appetite, muscle spasms, and hair loss.  The patient verbalized understanding of the proper use and possible adverse effects of Odomzo.  All of the patient's questions and concerns were addressed. decreased strength

## 2023-01-30 NOTE — DIETITIAN INITIAL EVALUATION ADULT. - ETIOLOGY
related to increased demand for nutrient, protein Hydroquinone Counseling:  Patient advised that medication may result in skin irritation, lightening (hypopigmentation), dryness, and burning.  In the event of skin irritation, the patient was advised to reduce the amount of the drug applied or use it less frequently.  Rarely, spots that are treated with hydroquinone can become darker (pseudoochronosis).  Should this occur, patient instructed to stop medication and call the office. The patient verbalized understanding of the proper use and possible adverse effects of hydroquinone.  All of the patient's questions and concerns were addressed.

## 2023-06-15 NOTE — PATIENT PROFILE ADULT - NSPROPTRIGHTSUPPORTPERSON_GEN_A_NUR
EMG/Nerve Conduction Study    Date/Time: 6/15/2023 11:13 PM    Performed by: Timmy Grady DO  Authorized by: Timmy Grady DO            [facility] - Neurology  [facility])   [address] [place] [address] - [address]  [place] [address]  P: [phone]   F: [phone]    Nerve Conduction Studies & Electromyography Report        Full Name: Kenya Horne Gender: Female  Patient ID: [id number] YOB: 1951      Visit Date: 6/15/2023 16:57  Age: 71 Years 10 Months Old  Examining Physician: Timmy Bravo DO  Referring Physician: Brian Chapa MD  Height: 5 feet 2 inch    History:    She is here for evaluation of the upper extremities.    Kenya Horne is a pleasant right handed 71 year old female for evaluation of hand numbness.  The numbness in her hands began 1.5 years ago. She describes it as a numbness and tingling sensation. NO burning, no pain.  Has pain in the joints of the hand.  The numbness 2-4 on the right and digit 3 on the left. She notes that the 4th digit on the left can also get stuck sometimes. Her symptoms are worse at night, and it feels as if she has sand in her hands. It helps if she rubs her hands or changes position. Symptoms are also present first thing in the morning. She thinks activity can aggravate her symptoms. She feels clumsy in the right hand when picking up objects. It does not affect her wrists or elbows.  She denies neck pain or symptoms in the upper arms.      Past Medical History:   Diagnosis Date   • Allergic rhinitis    • Arthritis    • Diabetes mellitus (CMD)     26 years   • Essential (primary) hypertension    • Hyperlipidemia      Medication   • glipiZIDE (GLUCOTROL) 5 MG tablet   • atorvastatin (LIPITOR) 10 MG tablet   • metFORMIN (GLUCOPHAGE) 850 MG tablet   • losartan (COZAAR) 50 MG tablet   • aspirin 81 MG tablet     No family history of nerve or muscle disease.    EXAM    MUSCLE Power  0-5/5 Atrophy  Y/N Fascics Y/N Tone Nml/Ab    Right Left Right Left Right Left Right Left   Deltoid 5 5 N N N N Nml Nml   Supraspinatus 5- 4+ N N N N Nml Nml   Triceps 5 5 N N N N Nml Nml   Biceps 5 5 N N N N Nml Nml   Wrist Flexion 5 5 N N N N Nml Nml   Wrist Ex 5 5 N N N N Nml Nml   Grasp 5- 5- N N N N Nml Nml   APB 3 4+ y y N N Nml Nml   IO 4+ 4+ N N N N Nml Nml   Positive tinel's at both wrists    REFLEX Right Left   Triceps 2+ 2+   Biceps 2+ 2+   Brachioradialis 2+ 2+     Sensory:  Pin: Intact sensation to pin.    Summary of Results    Nerve conduction Studies were performed on the upper extremities and the right lower extremity..    Bilateral ULNAR motor responses were normal in amplitude (AMP), on the right delayed in distal latency (DL), both were normal in conduction velocity.  Minimum F wave responses were normal.  Bilateral ULNAR sensory responses were small in AMP, on the left normal in AMP, and on the right slowed in CV.  Latencies were normal on the left but delayed on the right.  Right ULNAR short segment study at the elbow did NOT reveal slowing.  Right DORSAL ULNAR CUTANEOUS sensory responses were normal.  Right MEDIAN motor responses were markedly delayed in DL, severely small in AMP and moderately slowed in CV.  Right MEDIAN sensory responses were ABSENT.  Left MEDIAN motor responses were severely delayed in DL, normal in AMP and mildly slow in CV,  Left MEDIAN sensory responses were ABSENT.  Bilateral RADIAL sensory responses were normal in AMP, latencies and conduction velocity.    In the RIGHT LOWER EXTREMITY    PERONEAL motor responses were normal in amplitude (AMP), distal latency (DL) and conduction velocity.   SURAL sensory responses were normal in AMP, latencies and conduction velocity.    The needle EMG examination was performed on the upper extremities and cervical paraspinal muscles.    EMG was normal in 15 muscle(s): R. Extensor digitorum communis, R. Flexor carpi ulnaris, R. Flexor carpi radialis,  R. Biceps brachii, R. Triceps brachii, R. Cervical paraspinals (mid), R. Cervical paraspinals (low), L. Abductor pollicis brevis, L. Flexor carpi radialis, L. Flexor carpi ulnaris, L. Extensor digitorum communis, L. Biceps brachii, L. Triceps brachii, L. Cervical paraspinals (mid), L. Cervical paraspinals (low).     The study was abnormal in 4 muscle(s), with the following distribution:  • Abnormal spontaneous/insertional activity was found in R. Abductor pollicis brevis, R. First dorsal interosseous, R. Abductor digiti minimi (manus).  • The MUP waveform abnormality was found in R. Abductor pollicis brevis, R. First dorsal interosseous, L. First dorsal interosseous, R. Abductor digiti minimi (manus).  • Abnormal interference pattern was found in R. Abductor pollicis brevis, R. First dorsal interosseous, L. First dorsal interosseous, R. Abductor digiti minimi (manus).    Full details in attached tables.          Conclusion:     ABNORMAL STUDY    1.  There is an extremely severe RIGHT MEDIAN MONONEUROPATHY at the wrist.  This is consistent with the clinical diagnosis of very advanced Carpal Tunnel Syndrome.     2.  There is a severe LEFT MEDIAN MONONEUROPATHY at the wrist, consistent with severe Carpal Tunnel Syndrome.     3.  There are also findings of a RIGHT ULNAR NEUROPATHY at the wrist, [place].  This may be due to compression or trauma.     4.  There was NO evidence of a more wide spread large fiber neuropathy.    5.  There were NO findings of cervical radiculopathy, brachial plexopathy or myopathy.    ------------------------------  Timmy Bravo DO             Sensory NCS      Nerve / Sites Rec. Site Onset Lat Peak Lat NP Amp PP Amp Segments Distance Velocity Temp.     ms ms µV µV  cm m/s °C   R Median - Digit II (Antidromic)      Wrist Dig II NR NR NR NR Wrist - Dig II 14 NR 33.3      Ref.  ?3.30 ?4.00 ?11.0 ?13.0 Ref.      L Median - Digit II (Antidromic)      Wrist Dig II NR NR NR NR Wrist - Dig II  14 NR 32      Ref.  ?3.30 ?4.00 ?11.0 ?13.0 Ref.      R Ulnar - Digit V (Antidromic)      Wrist Dig V 3.33 4.11 1.9 4.7 Wrist - Dig V 14 42 33.3      Ref.  ?3.10 ?4.00 ?10.0 ?9.0 Ref.      L Ulnar - Digit V (Antidromic)      Wrist Dig V 2.81 3.75 5.2 14.6 Wrist - Dig V 14 50 31.7      Ref.  ?3.10 ?4.00 ?10.0 ?9.0 Ref.      R Radial - Anatomical snuff box (Forearm)      Forearm Wrist 1.88 2.50 25.2 29.4 Forearm - Wrist 10 53 33      Ref.   ?2.20 ?15.0 ?11.0 Ref.      L Radial - Anatomical snuff box (Forearm)      Forearm Wrist 1.72 2.34 21.0 25.3 Forearm - Wrist 10 58 32      Ref.   ?2.20 ?15.0 ?11.0 Ref.      R Sural - Ankle (Calf)      Calf Ankle 2.71 3.49 8.1 15.4 Calf - Ankle 11.5 42 31      Ref.  ?3.60 ?4.50 ?4.0 ?4.0 Ref.      R Dorsal ulnar cutaneous - Hand dorsum (Forearm)      Forearm Hand dorsum 1.30 1.98 8.2 9.5 Forearm - Hand dorsum 7.5 58       Ref.   ?2.60 ?8.0 ?8.0 Ref.          Motor NCS      Nerve / Sites Muscle Latency Ref. Amplitude Ref. Amp % Duration Segments Distance Lat Diff Velocity Ref. Temp.     ms ms mV mV % ms  cm ms m/s m/s °C   R Median - APB      Wrist APB 13.65 ?4.40 2.7 ?3.8 100 9.32 Wrist - APB 8   ?51 33.5      Elbow APB 19.01  2.9  105 9.22 Elbow - Wrist 22.5 5.36 42 ?49 33.3   L Median - APB      Wrist APB 7.08 ?4.40 6.0 ?3.8 100 6.41 Wrist - APB 8   ?51 32.5      Elbow APB 11.41  5.1  85.9 7.24 Elbow - Wrist 20.5 4.32 47 ?49 32.1   R Ulnar - ADM      Wrist ADM 3.75 ?3.70 7.8 ?7.9 100 7.08 Wrist - ADM 8    33.1      B.Elbow ADM 8.02  7.1  91.2 8.33 B.Elbow - Wrist 21 4.27 49 ?52 33.1      A.Elbow ADM 10.05  6.8  86.8 8.91 A.Elbow - B.Elbow 10 2.03 49 ?43 33.1   L Ulnar - ADM      Wrist ADM 3.33 ?3.70 8.7 ?7.9 100 5.26 Wrist - ADM 8    31.9      B.Elbow ADM 7.19  7.8  89.7 6.93 B.Elbow - Wrist 20.5 3.85 53 ?52 31.2      A.Elbow ADM 9.38  7.5  86.5 6.72 A.Elbow - B.Elbow 10 2.19 46 ?43 31.2   R Peroneal - EDB      Ankle EDB 3.85 ?6.50 5.1 ?1.1 100 5.36 Ankle - EDB 6    31.1      Fib  head EDB 10.89  4.4  87.2 6.72 Fib head - Ankle 30 7.03 43 ?38 31       Motor NCS Inching      Nerve / Sites Muscle Latency Amplitude Rel Amp Segments Distance Lat Diff     ms mV %  cm ms   R Ulnar - ADM      wrist ADM 3.80 8.4  wrist - ADM 8       4 cm ADM 7.55 6.4 75.7 4 cm - wrist 21 3.75      2 cm ADM 8.07 6.5 102 4 cm - ADM        Elbow ADM 8.70 6.7 103 2 cm - 4 cm 2 0.52      2 cm prox ADM 9.01 6.7 99.8 Elbow - 2 cm 2 0.62      4 cm ADM 9.58 6.5 97 2 cm prox - Elbow 2 0.31        4 cm - 2 cm prox 2 0.57       F  Wave      Nerve Fmin Ref.    ms ms   R Ulnar - ADM 30.57 ?32.00   L Ulnar - ADM 30.83 ?32.00       EMG Summary Table     Spontaneous MUAP Recruitment   Muscle Nerve Roots IA Fib PSW Fasc H.F. Amp Dur. PPP Pattern   R. Abductor pollicis brevis Median C8-T1 N 2+ 2+ None None 1+ N 2+ Reduced   R. First dorsal interosseous Ulnar C8-T1 N 2+ 1+ None None 1+ N N Reduced   R. Extensor digitorum communis Radial C7-C8 N None None None None N N N N   R. Flexor carpi ulnaris Ulnar C7-T1 N None None None None N N N N   R. Flexor carpi radialis Median C6-C7 N None None None None N N N N   R. Biceps brachii Musculocutaneous C5-C6 N None None None None N N N N   R. Triceps brachii Radial C6-C8 N None None None None N N N N   R. Cervical paraspinals (mid)  - N None None None None       R. Cervical paraspinals (low)  - N None None None None       L. Abductor pollicis brevis Median C8-T1 N None None None None N N N N   L. First dorsal interosseous Ulnar C8-T1 N None None None None 1+ N N Reduced   L. Flexor carpi radialis Median C6-C7 N None None None None N N N N   L. Flexor carpi ulnaris Ulnar C7-T1 N None None None None N N N N   L. Extensor digitorum communis Radial C7-C8 N None None None None N N N N   L. Biceps brachii Musculocutaneous C5-C6 N None None None None N N N N   L. Triceps brachii Radial C6-C8 N None None None None N N N N   L. Cervical paraspinals (mid)  - N None None None None       L. Cervical  paraspinals (low)  - N None None None None       R. Abductor digiti minimi (manus) Ulnar C8-T1 N 2+ 1+ None None 1+ N N Reduced           Motor NCS Inching: R Ulnar - ADM                                                    same name as above

## 2023-08-07 NOTE — HISTORY OF PRESENT ILLNESS
[FreeTextEntry1] : 55 yo WF, here for his weekly assessment. Receiving HBO tx for her left ankle DFU. Healing well. Neuro

## 2024-03-14 NOTE — ASU PATIENT PROFILE, ADULT - PROVIDER NOTIFICATION
March 14, 2024         Patient: Morenita Enriquez   YOB: 2018   Date of Visit: 3/14/2024           To Whom it May Concern:    Morenita Enriquez was seen in my clinic on 3/14/2024. She may return to school on 3/15/24.    If you have any questions or concerns, please don't hesitate to call.        Sincerely,           MASHA Burnham.  Electronically Signed      Declines

## 2024-04-24 NOTE — PATIENT PROFILE ADULT - CAREGIVER RELATION TO PATIENT
From: Aleksandr Rocha  To: Meghan Closser  Sent: 4/11/2024 10:15 AM CDT  Subject: Prescriptions - Atrovastatin & Lisinpril    Hi,  I'm very confused.     On Monday, [facility] texted me:   \"ALEKSANDR's Rx Stat[facility] - LIS, OLIVIA: Ready for pickup\"    Today, [facility] texted me:  \"ALEKSANDR, your [facility] Rx is DELAYED - too soon to refill. We'll process it once insurance allows\"    Please help me understand what is going on with my prescriptions.    Thank you,    Aleksandr Rocha   sister Breath sounds clear and equal bilaterally.

## 2024-11-07 NOTE — PHYSICAL THERAPY INITIAL EVALUATION ADULT - CRITERIA FOR SKILLED THERAPEUTIC INTERVENTIONS
beer/wine impairments found/anticipated equipment needs at discharge/anticipated discharge recommendation

## 2024-11-14 NOTE — ED ADULT NURSE REASSESSMENT NOTE - TEMPERATURE IN CELSIUS (DEGREES C)
Writer spoke to patient and provided results, patient verbalized understanding    Thank you,  TRINO ÁlvarezN RN  [facility]    36.7

## 2025-07-11 NOTE — DISCHARGE NOTE NURSING/CASE MANAGEMENT/SOCIAL WORK - NSDPDISTO_GEN_ALL_CORE
Patient underwent Coronary CTA scan today. Heart rate and blood pressure monitored throughout scan. 10mg IV Metoprolol and 0.8mg SL Nitroglycerin given per protocol. HR was 53 during the scan. 70ml of IV contrast administered by CT tech. Patient updated and educated about Cardiac CTA and medications. PIV removed before discharging patient. CT FFR Analysis not approved by insurance yet.    
Home with home care